# Patient Record
Sex: MALE | Race: WHITE | NOT HISPANIC OR LATINO | Employment: OTHER | URBAN - METROPOLITAN AREA
[De-identification: names, ages, dates, MRNs, and addresses within clinical notes are randomized per-mention and may not be internally consistent; named-entity substitution may affect disease eponyms.]

---

## 2017-04-02 ENCOUNTER — OFFICE VISIT - HEALTHEAST (OUTPATIENT)
Dept: FAMILY MEDICINE | Facility: CLINIC | Age: 82
End: 2017-04-02

## 2017-04-02 DIAGNOSIS — S99.929A INJURY OF NAIL BED OF TOE: ICD-10-CM

## 2017-04-20 ENCOUNTER — OFFICE VISIT - HEALTHEAST (OUTPATIENT)
Dept: INTERNAL MEDICINE | Facility: CLINIC | Age: 82
End: 2017-04-20

## 2017-04-20 DIAGNOSIS — F41.9 ANXIETY: ICD-10-CM

## 2017-04-20 DIAGNOSIS — R35.0 FREQUENCY OF URINATION: ICD-10-CM

## 2017-04-20 DIAGNOSIS — N34.2 URETHRITIS: ICD-10-CM

## 2017-04-20 ASSESSMENT — MIFFLIN-ST. JEOR: SCORE: 1918.81

## 2017-07-03 ENCOUNTER — OFFICE VISIT - HEALTHEAST (OUTPATIENT)
Dept: INTERNAL MEDICINE | Facility: CLINIC | Age: 82
End: 2017-07-03

## 2017-07-03 DIAGNOSIS — E78.5 HYPERLIPIDEMIA: ICD-10-CM

## 2017-07-03 LAB
CHOLEST SERPL-MCNC: 154 MG/DL
FASTING STATUS PATIENT QL REPORTED: YES
HDLC SERPL-MCNC: 46 MG/DL
LDLC SERPL CALC-MCNC: 86 MG/DL
TRIGL SERPL-MCNC: 110 MG/DL

## 2017-07-03 ASSESSMENT — MIFFLIN-ST. JEOR: SCORE: 2080.29

## 2017-07-21 ENCOUNTER — COMMUNICATION - HEALTHEAST (OUTPATIENT)
Dept: INTERNAL MEDICINE | Facility: CLINIC | Age: 82
End: 2017-07-21

## 2017-07-21 DIAGNOSIS — I10 UNSPECIFIED ESSENTIAL HYPERTENSION: ICD-10-CM

## 2017-07-21 DIAGNOSIS — R35.0 FREQUENCY OF URINATION: ICD-10-CM

## 2017-07-21 DIAGNOSIS — E78.5 HYPERLIPIDEMIA: ICD-10-CM

## 2017-10-05 ENCOUNTER — OFFICE VISIT - HEALTHEAST (OUTPATIENT)
Dept: FAMILY MEDICINE | Facility: CLINIC | Age: 82
End: 2017-10-05

## 2017-10-05 ENCOUNTER — RECORDS - HEALTHEAST (OUTPATIENT)
Dept: GENERAL RADIOLOGY | Age: 82
End: 2017-10-05

## 2017-10-05 DIAGNOSIS — J98.4 PNEUMONITIS: ICD-10-CM

## 2017-10-05 DIAGNOSIS — J44.1 CHRONIC OBSTRUCTIVE PULMONARY DISEASE WITH (ACUTE) EXACERBATION (H): ICD-10-CM

## 2017-10-05 DIAGNOSIS — J44.1 ACUTE EXACERBATION OF CHRONIC OBSTRUCTIVE PULMONARY DISEASE (COPD) (H): ICD-10-CM

## 2017-10-11 ENCOUNTER — OFFICE VISIT - HEALTHEAST (OUTPATIENT)
Dept: INTERNAL MEDICINE | Facility: CLINIC | Age: 82
End: 2017-10-11

## 2017-10-11 DIAGNOSIS — J40 BRONCHITIS: ICD-10-CM

## 2017-10-11 DIAGNOSIS — R09.82 PND (POST-NASAL DRIP): ICD-10-CM

## 2017-12-05 ENCOUNTER — OFFICE VISIT - HEALTHEAST (OUTPATIENT)
Dept: FAMILY MEDICINE | Facility: CLINIC | Age: 82
End: 2017-12-05

## 2017-12-05 DIAGNOSIS — M79.672 LEFT FOOT PAIN: ICD-10-CM

## 2017-12-15 ENCOUNTER — COMMUNICATION - HEALTHEAST (OUTPATIENT)
Dept: INTERNAL MEDICINE | Facility: CLINIC | Age: 82
End: 2017-12-15

## 2017-12-15 DIAGNOSIS — F41.9 ANXIETY: ICD-10-CM

## 2017-12-15 DIAGNOSIS — R07.9 CHEST PAIN: ICD-10-CM

## 2017-12-15 DIAGNOSIS — I10 ESSENTIAL HYPERTENSION: ICD-10-CM

## 2018-03-09 ENCOUNTER — COMMUNICATION - HEALTHEAST (OUTPATIENT)
Dept: INTERNAL MEDICINE | Facility: CLINIC | Age: 83
End: 2018-03-09

## 2018-03-09 DIAGNOSIS — F41.9 ANXIETY: ICD-10-CM

## 2018-04-19 ENCOUNTER — COMMUNICATION - HEALTHEAST (OUTPATIENT)
Dept: INTERNAL MEDICINE | Facility: CLINIC | Age: 83
End: 2018-04-19

## 2018-04-19 DIAGNOSIS — R35.0 FREQUENCY OF URINATION: ICD-10-CM

## 2018-04-19 DIAGNOSIS — E78.5 HYPERLIPIDEMIA: ICD-10-CM

## 2018-04-19 DIAGNOSIS — I10 ESSENTIAL HYPERTENSION: ICD-10-CM

## 2018-05-25 ENCOUNTER — COMMUNICATION - HEALTHEAST (OUTPATIENT)
Dept: INTERNAL MEDICINE | Facility: CLINIC | Age: 83
End: 2018-05-25

## 2018-05-25 DIAGNOSIS — F41.9 ANXIETY: ICD-10-CM

## 2018-05-25 DIAGNOSIS — R07.9 CHEST PAIN: ICD-10-CM

## 2018-07-23 ENCOUNTER — COMMUNICATION - HEALTHEAST (OUTPATIENT)
Dept: INTERNAL MEDICINE | Facility: CLINIC | Age: 83
End: 2018-07-23

## 2018-07-23 DIAGNOSIS — J44.9 COPD (CHRONIC OBSTRUCTIVE PULMONARY DISEASE) (H): ICD-10-CM

## 2018-08-19 ENCOUNTER — COMMUNICATION - HEALTHEAST (OUTPATIENT)
Dept: INTERNAL MEDICINE | Facility: CLINIC | Age: 83
End: 2018-08-19

## 2018-08-19 DIAGNOSIS — F41.9 ANXIETY: ICD-10-CM

## 2018-11-01 ENCOUNTER — COMMUNICATION - HEALTHEAST (OUTPATIENT)
Dept: INTERNAL MEDICINE | Facility: CLINIC | Age: 83
End: 2018-11-01

## 2018-11-01 DIAGNOSIS — F41.9 ANXIETY: ICD-10-CM

## 2018-11-05 ENCOUNTER — COMMUNICATION - HEALTHEAST (OUTPATIENT)
Dept: INTERNAL MEDICINE | Facility: CLINIC | Age: 83
End: 2018-11-05

## 2018-11-05 DIAGNOSIS — F41.9 ANXIETY: ICD-10-CM

## 2018-11-06 ENCOUNTER — COMMUNICATION - HEALTHEAST (OUTPATIENT)
Dept: INTERNAL MEDICINE | Facility: CLINIC | Age: 83
End: 2018-11-06

## 2018-11-06 DIAGNOSIS — E78.5 HYPERLIPIDEMIA: ICD-10-CM

## 2018-11-06 DIAGNOSIS — I10 ESSENTIAL HYPERTENSION: ICD-10-CM

## 2018-11-06 DIAGNOSIS — R35.0 FREQUENCY OF URINATION: ICD-10-CM

## 2018-12-03 ENCOUNTER — OFFICE VISIT - HEALTHEAST (OUTPATIENT)
Dept: INTERNAL MEDICINE | Facility: CLINIC | Age: 83
End: 2018-12-03

## 2018-12-03 ENCOUNTER — RECORDS - HEALTHEAST (OUTPATIENT)
Dept: GENERAL RADIOLOGY | Facility: CLINIC | Age: 83
End: 2018-12-03

## 2018-12-03 DIAGNOSIS — M17.12 PRIMARY OSTEOARTHRITIS OF LEFT KNEE: ICD-10-CM

## 2018-12-03 DIAGNOSIS — I10 ESSENTIAL HYPERTENSION: ICD-10-CM

## 2018-12-03 DIAGNOSIS — Z79.899 MEDICATION MANAGEMENT: ICD-10-CM

## 2018-12-03 DIAGNOSIS — E78.00 HYPERCHOLESTEROLEMIA: ICD-10-CM

## 2018-12-03 DIAGNOSIS — Z00.00 HEALTHCARE MAINTENANCE: ICD-10-CM

## 2018-12-03 DIAGNOSIS — E66.01 MORBID OBESITY WITH BMI OF 40.0-44.9, ADULT (H): ICD-10-CM

## 2018-12-03 DIAGNOSIS — F41.9 ANXIETY: ICD-10-CM

## 2018-12-03 DIAGNOSIS — M17.12 UNILATERAL PRIMARY OSTEOARTHRITIS, LEFT KNEE: ICD-10-CM

## 2018-12-03 LAB
ALBUMIN SERPL-MCNC: 3.8 G/DL (ref 3.5–5)
ALP SERPL-CCNC: 141 U/L (ref 45–120)
ALT SERPL W P-5'-P-CCNC: 30 U/L (ref 0–45)
AMPHETAMINES UR QL SCN: NORMAL
ANION GAP SERPL CALCULATED.3IONS-SCNC: 11 MMOL/L (ref 5–18)
AST SERPL W P-5'-P-CCNC: 24 U/L (ref 0–40)
BARBITURATES UR QL: NORMAL
BENZODIAZ UR QL: NORMAL
BILIRUB SERPL-MCNC: 0.8 MG/DL (ref 0–1)
BUN SERPL-MCNC: 23 MG/DL (ref 8–28)
CALCIUM SERPL-MCNC: 9.5 MG/DL (ref 8.5–10.5)
CANNABINOIDS UR QL SCN: NORMAL
CHLORIDE BLD-SCNC: 102 MMOL/L (ref 98–107)
CHOLEST SERPL-MCNC: 140 MG/DL
CO2 SERPL-SCNC: 28 MMOL/L (ref 22–31)
COCAINE UR QL: NORMAL
CREAT SERPL-MCNC: 0.97 MG/DL (ref 0.7–1.3)
CREAT UR-MCNC: 194.8 MG/DL
ERYTHROCYTE [DISTWIDTH] IN BLOOD BY AUTOMATED COUNT: 11.5 % (ref 11–14.5)
FASTING STATUS PATIENT QL REPORTED: NO
GFR SERPL CREATININE-BSD FRML MDRD: >60 ML/MIN/1.73M2
GLUCOSE BLD-MCNC: 100 MG/DL (ref 70–125)
HCT VFR BLD AUTO: 51 % (ref 40–54)
HDLC SERPL-MCNC: 48 MG/DL
HGB BLD-MCNC: 16.8 G/DL (ref 14–18)
LDLC SERPL CALC-MCNC: 70 MG/DL
MCH RBC QN AUTO: 30 PG (ref 27–34)
MCHC RBC AUTO-ENTMCNC: 32.9 G/DL (ref 32–36)
MCV RBC AUTO: 91 FL (ref 80–100)
OPIATES UR QL SCN: NORMAL
OXYCODONE UR QL: NORMAL
PCP UR QL SCN: NORMAL
PLATELET # BLD AUTO: 156 THOU/UL (ref 140–440)
PMV BLD AUTO: 7.9 FL (ref 7–10)
POTASSIUM BLD-SCNC: 4.4 MMOL/L (ref 3.5–5)
PROT SERPL-MCNC: 6.8 G/DL (ref 6–8)
RBC # BLD AUTO: 5.58 MILL/UL (ref 4.4–6.2)
SODIUM SERPL-SCNC: 141 MMOL/L (ref 136–145)
TRIGL SERPL-MCNC: 110 MG/DL
WBC: 8.9 THOU/UL (ref 4–11)

## 2019-02-22 ENCOUNTER — COMMUNICATION - HEALTHEAST (OUTPATIENT)
Dept: INTERNAL MEDICINE | Facility: CLINIC | Age: 84
End: 2019-02-22

## 2019-02-22 DIAGNOSIS — F41.9 ANXIETY: ICD-10-CM

## 2019-04-30 ENCOUNTER — COMMUNICATION - HEALTHEAST (OUTPATIENT)
Dept: INTERNAL MEDICINE | Facility: CLINIC | Age: 84
End: 2019-04-30

## 2019-05-09 ENCOUNTER — COMMUNICATION - HEALTHEAST (OUTPATIENT)
Dept: INTERNAL MEDICINE | Facility: CLINIC | Age: 84
End: 2019-05-09

## 2019-05-18 ENCOUNTER — COMMUNICATION - HEALTHEAST (OUTPATIENT)
Dept: INTERNAL MEDICINE | Facility: CLINIC | Age: 84
End: 2019-05-18

## 2019-05-18 DIAGNOSIS — F41.9 ANXIETY: ICD-10-CM

## 2019-08-08 ENCOUNTER — COMMUNICATION - HEALTHEAST (OUTPATIENT)
Dept: INTERNAL MEDICINE | Facility: CLINIC | Age: 84
End: 2019-08-08

## 2019-08-08 DIAGNOSIS — J44.9 COPD (CHRONIC OBSTRUCTIVE PULMONARY DISEASE) (H): ICD-10-CM

## 2019-08-09 ENCOUNTER — COMMUNICATION - HEALTHEAST (OUTPATIENT)
Dept: INTERNAL MEDICINE | Facility: CLINIC | Age: 84
End: 2019-08-09

## 2019-09-28 ENCOUNTER — COMMUNICATION - HEALTHEAST (OUTPATIENT)
Dept: INTERNAL MEDICINE | Facility: CLINIC | Age: 84
End: 2019-09-28

## 2019-11-03 ENCOUNTER — COMMUNICATION - HEALTHEAST (OUTPATIENT)
Dept: INTERNAL MEDICINE | Facility: CLINIC | Age: 84
End: 2019-11-03

## 2019-11-03 DIAGNOSIS — I10 ESSENTIAL HYPERTENSION: ICD-10-CM

## 2019-11-03 DIAGNOSIS — R35.0 FREQUENCY OF URINATION: ICD-10-CM

## 2019-11-03 DIAGNOSIS — E78.5 HYPERLIPIDEMIA: ICD-10-CM

## 2019-12-03 ENCOUNTER — COMMUNICATION - HEALTHEAST (OUTPATIENT)
Dept: INTERNAL MEDICINE | Facility: CLINIC | Age: 84
End: 2019-12-03

## 2019-12-03 DIAGNOSIS — J44.9 COPD (CHRONIC OBSTRUCTIVE PULMONARY DISEASE) (H): ICD-10-CM

## 2019-12-03 DIAGNOSIS — R07.9 CHEST PAIN: ICD-10-CM

## 2020-02-20 ENCOUNTER — RECORDS - HEALTHEAST (OUTPATIENT)
Dept: ADMINISTRATIVE | Facility: OTHER | Age: 85
End: 2020-02-20

## 2020-04-10 ENCOUNTER — OFFICE VISIT - HEALTHEAST (OUTPATIENT)
Dept: INTERNAL MEDICINE | Facility: CLINIC | Age: 85
End: 2020-04-10

## 2020-04-10 DIAGNOSIS — M17.12 PRIMARY OSTEOARTHRITIS OF LEFT KNEE: ICD-10-CM

## 2020-04-10 DIAGNOSIS — I45.10 RIGHT BUNDLE BRANCH BLOCK: ICD-10-CM

## 2020-04-10 DIAGNOSIS — T50.2X5A DIURETIC-INDUCED HYPOKALEMIA: ICD-10-CM

## 2020-04-10 DIAGNOSIS — E87.6 DIURETIC-INDUCED HYPOKALEMIA: ICD-10-CM

## 2020-04-10 DIAGNOSIS — R07.9 CHEST PAIN: ICD-10-CM

## 2020-04-10 DIAGNOSIS — E78.00 PURE HYPERCHOLESTEROLEMIA: ICD-10-CM

## 2020-04-10 DIAGNOSIS — F41.9 ANXIETY: ICD-10-CM

## 2020-04-10 DIAGNOSIS — E66.01 MORBID OBESITY WITH BMI OF 40.0-44.9, ADULT (H): ICD-10-CM

## 2020-04-10 DIAGNOSIS — I87.2 VENOUS (PERIPHERAL) INSUFFICIENCY: ICD-10-CM

## 2020-04-10 DIAGNOSIS — I10 ESSENTIAL HYPERTENSION: ICD-10-CM

## 2020-04-10 DIAGNOSIS — I25.10 ATHEROSCLEROSIS OF NATIVE CORONARY ARTERY OF NATIVE HEART WITHOUT ANGINA PECTORIS: ICD-10-CM

## 2020-04-10 ASSESSMENT — PATIENT HEALTH QUESTIONNAIRE - PHQ9: SUM OF ALL RESPONSES TO PHQ QUESTIONS 1-9: 1

## 2020-07-30 ENCOUNTER — COMMUNICATION - HEALTHEAST (OUTPATIENT)
Dept: INTERNAL MEDICINE | Facility: CLINIC | Age: 85
End: 2020-07-30

## 2020-07-31 ENCOUNTER — COMMUNICATION - HEALTHEAST (OUTPATIENT)
Dept: INTERNAL MEDICINE | Facility: CLINIC | Age: 85
End: 2020-07-31

## 2020-08-03 ENCOUNTER — COMMUNICATION - HEALTHEAST (OUTPATIENT)
Dept: INTERNAL MEDICINE | Facility: CLINIC | Age: 85
End: 2020-08-03

## 2020-08-31 ENCOUNTER — COMMUNICATION - HEALTHEAST (OUTPATIENT)
Dept: INTERNAL MEDICINE | Facility: CLINIC | Age: 85
End: 2020-08-31

## 2020-09-26 ENCOUNTER — COMMUNICATION - HEALTHEAST (OUTPATIENT)
Dept: INTERNAL MEDICINE | Facility: CLINIC | Age: 85
End: 2020-09-26

## 2020-09-26 DIAGNOSIS — R07.9 CHEST PAIN: ICD-10-CM

## 2020-09-28 RX ORDER — NITROGLYCERIN 0.4 MG/1
TABLET SUBLINGUAL
Qty: 25 TABLET | Refills: 3 | Status: SHIPPED | OUTPATIENT
Start: 2020-09-28 | End: 2023-02-20

## 2020-11-29 ENCOUNTER — COMMUNICATION - HEALTHEAST (OUTPATIENT)
Dept: INTERNAL MEDICINE | Facility: CLINIC | Age: 85
End: 2020-11-29

## 2020-11-29 DIAGNOSIS — E78.5 HYPERLIPIDEMIA: ICD-10-CM

## 2020-12-05 ENCOUNTER — COMMUNICATION - HEALTHEAST (OUTPATIENT)
Dept: INTERNAL MEDICINE | Facility: CLINIC | Age: 85
End: 2020-12-05

## 2020-12-08 ENCOUNTER — COMMUNICATION - HEALTHEAST (OUTPATIENT)
Dept: INTERNAL MEDICINE | Facility: CLINIC | Age: 85
End: 2020-12-08

## 2020-12-08 DIAGNOSIS — R35.0 FREQUENCY OF URINATION: ICD-10-CM

## 2020-12-09 RX ORDER — FINASTERIDE 5 MG/1
TABLET, FILM COATED ORAL
Qty: 90 TABLET | Refills: 3 | Status: SHIPPED | OUTPATIENT
Start: 2020-12-09 | End: 2022-04-11

## 2020-12-18 ENCOUNTER — OFFICE VISIT - HEALTHEAST (OUTPATIENT)
Dept: INTERNAL MEDICINE | Facility: CLINIC | Age: 85
End: 2020-12-18

## 2020-12-18 DIAGNOSIS — F51.01 PRIMARY INSOMNIA: ICD-10-CM

## 2020-12-18 DIAGNOSIS — E66.01 MORBID OBESITY WITH BMI OF 40.0-44.9, ADULT (H): ICD-10-CM

## 2020-12-18 DIAGNOSIS — L73.9 FOLLICULITIS: ICD-10-CM

## 2020-12-18 DIAGNOSIS — I25.10 ATHEROSCLEROSIS OF NATIVE CORONARY ARTERY OF NATIVE HEART WITHOUT ANGINA PECTORIS: ICD-10-CM

## 2020-12-18 DIAGNOSIS — J43.9 PULMONARY EMPHYSEMA, UNSPECIFIED EMPHYSEMA TYPE (H): ICD-10-CM

## 2020-12-18 DIAGNOSIS — F41.9 ANXIETY: ICD-10-CM

## 2020-12-27 ENCOUNTER — COMMUNICATION - HEALTHEAST (OUTPATIENT)
Dept: INTERNAL MEDICINE | Facility: CLINIC | Age: 85
End: 2020-12-27

## 2020-12-27 DIAGNOSIS — I10 ESSENTIAL HYPERTENSION: ICD-10-CM

## 2020-12-28 RX ORDER — DOXAZOSIN 8 MG/1
TABLET ORAL
Qty: 90 TABLET | Refills: 3 | Status: SHIPPED | OUTPATIENT
Start: 2020-12-28 | End: 2021-08-16

## 2021-01-24 ENCOUNTER — COMMUNICATION - HEALTHEAST (OUTPATIENT)
Dept: INTERNAL MEDICINE | Facility: CLINIC | Age: 86
End: 2021-01-24

## 2021-01-28 ENCOUNTER — COMMUNICATION - HEALTHEAST (OUTPATIENT)
Dept: INTERNAL MEDICINE | Facility: CLINIC | Age: 86
End: 2021-01-28

## 2021-02-05 ENCOUNTER — COMMUNICATION - HEALTHEAST (OUTPATIENT)
Dept: INTERNAL MEDICINE | Facility: CLINIC | Age: 86
End: 2021-02-05

## 2021-02-09 ENCOUNTER — OFFICE VISIT - HEALTHEAST (OUTPATIENT)
Dept: INTERNAL MEDICINE | Facility: CLINIC | Age: 86
End: 2021-02-09

## 2021-02-09 DIAGNOSIS — E66.01 MORBID OBESITY WITH BMI OF 40.0-44.9, ADULT (H): ICD-10-CM

## 2021-02-09 DIAGNOSIS — J44.9 CHRONIC OBSTRUCTIVE PULMONARY DISEASE, UNSPECIFIED COPD TYPE (H): ICD-10-CM

## 2021-02-09 DIAGNOSIS — I87.2 VENOUS (PERIPHERAL) INSUFFICIENCY: ICD-10-CM

## 2021-02-10 ENCOUNTER — COMMUNICATION - HEALTHEAST (OUTPATIENT)
Dept: OTHER | Facility: CLINIC | Age: 86
End: 2021-02-10

## 2021-03-09 ENCOUNTER — COMMUNICATION - HEALTHEAST (OUTPATIENT)
Dept: INTERNAL MEDICINE | Facility: CLINIC | Age: 86
End: 2021-03-09

## 2021-03-09 ENCOUNTER — OFFICE VISIT - HEALTHEAST (OUTPATIENT)
Dept: INTERNAL MEDICINE | Facility: CLINIC | Age: 86
End: 2021-03-09

## 2021-03-09 DIAGNOSIS — E78.00 PURE HYPERCHOLESTEROLEMIA: ICD-10-CM

## 2021-03-09 DIAGNOSIS — E78.2 MIXED HYPERLIPIDEMIA: ICD-10-CM

## 2021-03-09 DIAGNOSIS — T50.2X5A DIURETIC-INDUCED HYPOKALEMIA: ICD-10-CM

## 2021-03-09 DIAGNOSIS — R60.0 PERIPHERAL EDEMA: ICD-10-CM

## 2021-03-09 DIAGNOSIS — R53.82 CHRONIC FATIGUE: ICD-10-CM

## 2021-03-09 DIAGNOSIS — J44.9 CHRONIC OBSTRUCTIVE PULMONARY DISEASE, UNSPECIFIED COPD TYPE (H): ICD-10-CM

## 2021-03-09 DIAGNOSIS — E66.01 MORBID OBESITY WITH BMI OF 40.0-44.9, ADULT (H): ICD-10-CM

## 2021-03-09 DIAGNOSIS — I10 ESSENTIAL HYPERTENSION, BENIGN: ICD-10-CM

## 2021-03-09 DIAGNOSIS — R06.02 SHORTNESS OF BREATH: ICD-10-CM

## 2021-03-09 DIAGNOSIS — L30.9 DERMATITIS: ICD-10-CM

## 2021-03-09 DIAGNOSIS — I35.0 AORTIC VALVE STENOSIS, ETIOLOGY OF CARDIAC VALVE DISEASE UNSPECIFIED: ICD-10-CM

## 2021-03-09 DIAGNOSIS — E87.6 DIURETIC-INDUCED HYPOKALEMIA: ICD-10-CM

## 2021-03-09 LAB
ALBUMIN SERPL-MCNC: 3.8 G/DL (ref 3.5–5)
ALBUMIN UR-MCNC: NEGATIVE G/DL
ALP SERPL-CCNC: 120 U/L (ref 45–120)
ALT SERPL W P-5'-P-CCNC: 20 U/L (ref 0–45)
ANION GAP SERPL CALCULATED.3IONS-SCNC: 8 MMOL/L (ref 5–18)
APPEARANCE UR: CLEAR
AST SERPL W P-5'-P-CCNC: 20 U/L (ref 0–40)
BILIRUB SERPL-MCNC: 0.7 MG/DL (ref 0–1)
BILIRUB UR QL STRIP: NEGATIVE
BNP SERPL-MCNC: 30 PG/ML (ref 0–93)
BUN SERPL-MCNC: 26 MG/DL (ref 8–28)
CALCIUM SERPL-MCNC: 9 MG/DL (ref 8.5–10.5)
CHLORIDE BLD-SCNC: 102 MMOL/L (ref 98–107)
CHOLEST SERPL-MCNC: 136 MG/DL
CO2 SERPL-SCNC: 29 MMOL/L (ref 22–31)
COLOR UR AUTO: YELLOW
CREAT SERPL-MCNC: 0.88 MG/DL (ref 0.7–1.3)
ERYTHROCYTE [DISTWIDTH] IN BLOOD BY AUTOMATED COUNT: 13 % (ref 11–14.5)
FASTING STATUS PATIENT QL REPORTED: NO
GFR SERPL CREATININE-BSD FRML MDRD: >60 ML/MIN/1.73M2
GLUCOSE BLD-MCNC: 101 MG/DL (ref 70–125)
GLUCOSE UR STRIP-MCNC: NEGATIVE MG/DL
HCT VFR BLD AUTO: 50.2 % (ref 40–54)
HDLC SERPL-MCNC: 53 MG/DL
HGB BLD-MCNC: 16 G/DL (ref 14–18)
HGB UR QL STRIP: NEGATIVE
KETONES UR STRIP-MCNC: NEGATIVE MG/DL
LDLC SERPL CALC-MCNC: 65 MG/DL
LEUKOCYTE ESTERASE UR QL STRIP: NEGATIVE
MCH RBC QN AUTO: 29.9 PG (ref 27–34)
MCHC RBC AUTO-ENTMCNC: 31.9 G/DL (ref 32–36)
MCV RBC AUTO: 94 FL (ref 80–100)
NITRATE UR QL: NEGATIVE
PH UR STRIP: 5.5 [PH] (ref 5–8)
PLATELET # BLD AUTO: 178 THOU/UL (ref 140–440)
PMV BLD AUTO: 10 FL (ref 7–10)
POTASSIUM BLD-SCNC: 4.6 MMOL/L (ref 3.5–5)
PROT SERPL-MCNC: 6.7 G/DL (ref 6–8)
RBC # BLD AUTO: 5.35 MILL/UL (ref 4.4–6.2)
SODIUM SERPL-SCNC: 139 MMOL/L (ref 136–145)
SP GR UR STRIP: 1.02 (ref 1–1.03)
TRIGL SERPL-MCNC: 91 MG/DL
TSH SERPL DL<=0.005 MIU/L-ACNC: 1.71 UIU/ML (ref 0.3–5)
UROBILINOGEN UR STRIP-ACNC: NORMAL
WBC: 9.9 THOU/UL (ref 4–11)

## 2021-03-10 ENCOUNTER — COMMUNICATION - HEALTHEAST (OUTPATIENT)
Dept: INTERNAL MEDICINE | Facility: CLINIC | Age: 86
End: 2021-03-10

## 2021-03-10 DIAGNOSIS — L30.9 DERMATITIS: ICD-10-CM

## 2021-03-18 ENCOUNTER — COMMUNICATION - HEALTHEAST (OUTPATIENT)
Dept: INTERNAL MEDICINE | Facility: CLINIC | Age: 86
End: 2021-03-18

## 2021-03-29 ENCOUNTER — OFFICE VISIT - HEALTHEAST (OUTPATIENT)
Dept: INTERNAL MEDICINE | Facility: CLINIC | Age: 86
End: 2021-03-29

## 2021-03-29 DIAGNOSIS — I10 ESSENTIAL HYPERTENSION, BENIGN: ICD-10-CM

## 2021-03-29 DIAGNOSIS — L30.9 DERMATITIS: ICD-10-CM

## 2021-03-29 DIAGNOSIS — E66.01 MORBID OBESITY WITH BMI OF 40.0-44.9, ADULT (H): ICD-10-CM

## 2021-03-29 DIAGNOSIS — I35.0 AORTIC VALVE STENOSIS, ETIOLOGY OF CARDIAC VALVE DISEASE UNSPECIFIED: ICD-10-CM

## 2021-03-29 DIAGNOSIS — R60.0 PERIPHERAL EDEMA: ICD-10-CM

## 2021-03-29 LAB
ANION GAP SERPL CALCULATED.3IONS-SCNC: 11 MMOL/L (ref 5–18)
BUN SERPL-MCNC: 27 MG/DL (ref 8–28)
CALCIUM SERPL-MCNC: 9.2 MG/DL (ref 8.5–10.5)
CHLORIDE BLD-SCNC: 100 MMOL/L (ref 98–107)
CO2 SERPL-SCNC: 30 MMOL/L (ref 22–31)
CREAT SERPL-MCNC: 1.05 MG/DL (ref 0.7–1.3)
GFR SERPL CREATININE-BSD FRML MDRD: >60 ML/MIN/1.73M2
GLUCOSE BLD-MCNC: 109 MG/DL (ref 70–125)
POTASSIUM BLD-SCNC: 4.7 MMOL/L (ref 3.5–5)
SODIUM SERPL-SCNC: 141 MMOL/L (ref 136–145)

## 2021-03-29 ASSESSMENT — MIFFLIN-ST. JEOR: SCORE: 2083.12

## 2021-03-30 ENCOUNTER — COMMUNICATION - HEALTHEAST (OUTPATIENT)
Dept: INTERNAL MEDICINE | Facility: CLINIC | Age: 86
End: 2021-03-30

## 2021-03-31 ENCOUNTER — COMMUNICATION - HEALTHEAST (OUTPATIENT)
Dept: INTERNAL MEDICINE | Facility: CLINIC | Age: 86
End: 2021-03-31

## 2021-03-31 DIAGNOSIS — I10 ESSENTIAL HYPERTENSION, BENIGN: ICD-10-CM

## 2021-03-31 RX ORDER — LOSARTAN POTASSIUM 100 MG/1
100 TABLET ORAL DAILY
Qty: 30 TABLET | Refills: 11 | Status: SHIPPED | OUTPATIENT
Start: 2021-03-31 | End: 2021-07-12

## 2021-04-13 ENCOUNTER — COMMUNICATION - HEALTHEAST (OUTPATIENT)
Dept: INTERNAL MEDICINE | Facility: CLINIC | Age: 86
End: 2021-04-13

## 2021-04-15 ENCOUNTER — RECORDS - HEALTHEAST (OUTPATIENT)
Dept: ADMINISTRATIVE | Facility: OTHER | Age: 86
End: 2021-04-15

## 2021-04-16 ENCOUNTER — COMMUNICATION - HEALTHEAST (OUTPATIENT)
Dept: INTERNAL MEDICINE | Facility: CLINIC | Age: 86
End: 2021-04-16

## 2021-04-16 DIAGNOSIS — J44.9 COPD (CHRONIC OBSTRUCTIVE PULMONARY DISEASE) (H): ICD-10-CM

## 2021-04-19 RX ORDER — BUDESONIDE AND FORMOTEROL FUMARATE DIHYDRATE 160; 4.5 UG/1; UG/1
AEROSOL RESPIRATORY (INHALATION)
Qty: 1 INHALER | Refills: 3 | Status: SHIPPED | OUTPATIENT
Start: 2021-04-19 | End: 2021-12-14

## 2021-05-04 ENCOUNTER — OFFICE VISIT - HEALTHEAST (OUTPATIENT)
Dept: INTERNAL MEDICINE | Facility: CLINIC | Age: 86
End: 2021-05-04

## 2021-05-04 DIAGNOSIS — E66.01 MORBID OBESITY WITH BMI OF 40.0-44.9, ADULT (H): ICD-10-CM

## 2021-05-04 DIAGNOSIS — I87.2 VENOUS (PERIPHERAL) INSUFFICIENCY: ICD-10-CM

## 2021-05-04 DIAGNOSIS — I10 ESSENTIAL HYPERTENSION: ICD-10-CM

## 2021-05-04 DIAGNOSIS — L12.0 BULLOUS PEMPHIGOID (H): ICD-10-CM

## 2021-05-04 LAB
ANION GAP SERPL CALCULATED.3IONS-SCNC: 9 MMOL/L (ref 5–18)
BASOPHILS # BLD AUTO: 0 THOU/UL (ref 0–0.2)
BASOPHILS NFR BLD AUTO: 0 % (ref 0–2)
BUN SERPL-MCNC: 32 MG/DL (ref 8–28)
C3 SERPL-MCNC: 145 MG/DL (ref 83–177)
C4 SERPL-MCNC: 30 MG/DL (ref 19–59)
CALCIUM SERPL-MCNC: 9 MG/DL (ref 8.5–10.5)
CHLORIDE BLD-SCNC: 102 MMOL/L (ref 98–107)
CK SERPL-CCNC: 180 U/L (ref 30–190)
CO2 SERPL-SCNC: 28 MMOL/L (ref 22–31)
CREAT SERPL-MCNC: 1.03 MG/DL (ref 0.7–1.3)
EOSINOPHIL COUNT (ABSOLUTE): 3.5 THOU/UL (ref 0–0.4)
EOSINOPHIL NFR BLD AUTO: 29 % (ref 0–6)
ERYTHROCYTE [DISTWIDTH] IN BLOOD BY AUTOMATED COUNT: 13.8 % (ref 11–14.5)
ERYTHROCYTE [SEDIMENTATION RATE] IN BLOOD BY WESTERGREN METHOD: 3 MM/HR (ref 0–15)
GFR SERPL CREATININE-BSD FRML MDRD: >60 ML/MIN/1.73M2
GLUCOSE BLD-MCNC: 109 MG/DL (ref 70–125)
HCT VFR BLD AUTO: 48 % (ref 40–54)
HGB BLD-MCNC: 15.6 G/DL (ref 14–18)
IMMATURE GRANULOCYTE % - MAN (DIFF): 0 %
IMMATURE GRANULOCYTE ABSOLUTE - MAN (DIFF): 0 THOU/UL
LYMPHOCYTES # BLD AUTO: 2.6 THOU/UL (ref 0.8–4.4)
LYMPHOCYTES NFR BLD AUTO: 22 % (ref 20–40)
MCH RBC QN AUTO: 30.1 PG (ref 27–34)
MCHC RBC AUTO-ENTMCNC: 32.5 G/DL (ref 32–36)
MCV RBC AUTO: 93 FL (ref 80–100)
MONOCYTES # BLD AUTO: 1.4 THOU/UL (ref 0–0.9)
MONOCYTES NFR BLD AUTO: 12 % (ref 2–10)
PLAT MORPH BLD: NORMAL
PLATELET # BLD AUTO: 205 THOU/UL (ref 140–440)
PMV BLD AUTO: 10.6 FL (ref 8.5–12.5)
POTASSIUM BLD-SCNC: 4.1 MMOL/L (ref 3.5–5)
RBC # BLD AUTO: 5.19 MILL/UL (ref 4.4–6.2)
SODIUM SERPL-SCNC: 139 MMOL/L (ref 136–145)
TOTAL NEUTROPHILS-ABS(DIFF): 4.4 THOU/UL (ref 2–7.7)
TOTAL NEUTROPHILS-REL(DIFF): 37 % (ref 50–70)
WBC: 12 THOU/UL (ref 4–11)

## 2021-05-05 ENCOUNTER — RECORDS - HEALTHEAST (OUTPATIENT)
Dept: ADMINISTRATIVE | Facility: OTHER | Age: 86
End: 2021-05-05

## 2021-05-05 LAB
ALBUMIN PERCENT: 58.9 % (ref 51–67)
ALBUMIN SERPL ELPH-MCNC: 3.8 G/DL (ref 3.2–4.7)
ALPHA 1 PERCENT: 3.3 % (ref 2–4)
ALPHA 2 PERCENT: 12.6 % (ref 5–13)
ALPHA1 GLOB SERPL ELPH-MCNC: 0.2 G/DL (ref 0.1–0.3)
ALPHA2 GLOB SERPL ELPH-MCNC: 0.8 G/DL (ref 0.4–0.9)
B-GLOBULIN SERPL ELPH-MCNC: 0.8 G/DL (ref 0.7–1.2)
BETA PERCENT: 12.1 % (ref 10–17)
GAMMA GLOB SERPL ELPH-MCNC: 0.9 G/DL (ref 0.6–1.4)
GAMMA GLOBULIN PERCENT: 13.1 % (ref 9–20)
PATH ICD:: NORMAL
PROT PATTERN SERPL ELPH-IMP: NORMAL
PROT SERPL-MCNC: 6.5 G/DL (ref 6–8)
REVIEWING PATHOLOGIST: NORMAL

## 2021-05-06 LAB — CH50 SERPL-ACNC: 72.9 U/ML (ref 38.7–89.9)

## 2021-05-07 LAB — ANA SER QL: 0.4 U

## 2021-05-13 ENCOUNTER — RECORDS - HEALTHEAST (OUTPATIENT)
Dept: ADMINISTRATIVE | Facility: OTHER | Age: 86
End: 2021-05-13

## 2021-05-25 ENCOUNTER — RECORDS - HEALTHEAST (OUTPATIENT)
Dept: ADMINISTRATIVE | Facility: CLINIC | Age: 86
End: 2021-05-25

## 2021-05-26 ENCOUNTER — RECORDS - HEALTHEAST (OUTPATIENT)
Dept: ADMINISTRATIVE | Facility: CLINIC | Age: 86
End: 2021-05-26

## 2021-05-27 ENCOUNTER — RECORDS - HEALTHEAST (OUTPATIENT)
Dept: ADMINISTRATIVE | Facility: CLINIC | Age: 86
End: 2021-05-27

## 2021-05-27 VITALS
DIASTOLIC BLOOD PRESSURE: 64 MMHG | OXYGEN SATURATION: 92 % | SYSTOLIC BLOOD PRESSURE: 130 MMHG | HEART RATE: 74 BPM | TEMPERATURE: 97.3 F

## 2021-05-27 ASSESSMENT — PATIENT HEALTH QUESTIONNAIRE - PHQ9: SUM OF ALL RESPONSES TO PHQ QUESTIONS 1-9: 1

## 2021-05-28 ENCOUNTER — RECORDS - HEALTHEAST (OUTPATIENT)
Dept: ADMINISTRATIVE | Facility: CLINIC | Age: 86
End: 2021-05-28

## 2021-05-28 ENCOUNTER — COMMUNICATION - HEALTHEAST (OUTPATIENT)
Dept: INTERNAL MEDICINE | Facility: CLINIC | Age: 86
End: 2021-05-28

## 2021-05-28 DIAGNOSIS — E78.5 HYPERLIPIDEMIA: ICD-10-CM

## 2021-05-28 NOTE — TELEPHONE ENCOUNTER
Who is calling:  wife  Reason for Call:  Checking for Shingrix serum and please make appt.  Date of last appointment with primary care: unknown  Okay to leave a detailed message: No

## 2021-05-28 NOTE — TELEPHONE ENCOUNTER
Controlled Substance Refill Request  Medication:   Requested Prescriptions     Pending Prescriptions Disp Refills     LORazepam (ATIVAN) 1 MG tablet [Pharmacy Med Name: LORAZEPAM 1MG TABLETS] 30 tablet 0     Sig: TAKE 1/2 TO 1 TABLET BY MOUTH THREE TIMES DAILY AS NEEDED FOR ANXIOUSNESS     Date Last Fill: 2/25/19  Pharmacy: [email or web address] [id number]   Submit electronically to pharmacy  Controlled Substance Agreement on File:   Encounter-Level CSA Scan Date - 04/20/2017:    Scan on 4/25/2017  1:46 PM (below)         Last office visit: Last office visit pertaining to requested medication was 12/3/18.

## 2021-05-29 RX ORDER — PRAVASTATIN SODIUM 20 MG
20 TABLET ORAL EVERY EVENING
Qty: 90 TABLET | Refills: 3 | Status: SHIPPED | OUTPATIENT
Start: 2021-05-29 | End: 2022-04-11

## 2021-05-30 VITALS — WEIGHT: 259.9 LBS | HEIGHT: 73 IN | BODY MASS INDEX: 34.45 KG/M2

## 2021-05-30 VITALS — WEIGHT: 300 LBS | BODY MASS INDEX: 39.85 KG/M2

## 2021-05-31 VITALS — BODY MASS INDEX: 39.53 KG/M2 | WEIGHT: 295.5 LBS

## 2021-05-31 VITALS — WEIGHT: 296.38 LBS | BODY MASS INDEX: 39.28 KG/M2 | HEIGHT: 73 IN

## 2021-05-31 NOTE — TELEPHONE ENCOUNTER
RN cannot approve Refill Request    RN can NOT refill this medication med is not covered by policy/route to provider. Last office visit: 12/3/2018 Aditya Mehta MD Last Physical: Visit date not found Last MTM visit: Visit date not found Last visit same specialty: 12/3/2018 Aditya Mehta MD.  Next visit within 3 mo: Visit date not found  Next physical within 3 mo: Visit date not found      Eda Byrnes, [facility] Triage/Med Refill 8/8/2019    Requested Prescriptions   Pending Prescriptions Disp Refills     budesonide-formoterol (SYMBICORT) 160-4.5 mcg/actuation inhaler [Pharmacy Med Name: SYMBICORT INH AEROSOL 160/4.5]  3     Sig: USE 2 INHALATIONS TWICE A DAY       There is no refill protocol information for this order

## 2021-06-01 ENCOUNTER — OFFICE VISIT - HEALTHEAST (OUTPATIENT)
Dept: INTERNAL MEDICINE | Facility: CLINIC | Age: 86
End: 2021-06-01

## 2021-06-01 DIAGNOSIS — Z51.81 ENCOUNTER FOR THERAPEUTIC DRUG MONITORING: ICD-10-CM

## 2021-06-01 DIAGNOSIS — F51.01 PRIMARY INSOMNIA: ICD-10-CM

## 2021-06-01 DIAGNOSIS — I87.2 VENOUS (PERIPHERAL) INSUFFICIENCY: ICD-10-CM

## 2021-06-01 DIAGNOSIS — F41.9 ANXIETY: ICD-10-CM

## 2021-06-01 DIAGNOSIS — L12.0 BULLOUS PEMPHIGOID (H): ICD-10-CM

## 2021-06-01 DIAGNOSIS — E66.01 MORBID OBESITY WITH BMI OF 40.0-44.9, ADULT (H): ICD-10-CM

## 2021-06-01 LAB
ANION GAP SERPL CALCULATED.3IONS-SCNC: 8 MMOL/L (ref 5–18)
BUN SERPL-MCNC: 19 MG/DL (ref 8–28)
CALCIUM SERPL-MCNC: 8.7 MG/DL (ref 8.5–10.5)
CHLORIDE BLD-SCNC: 102 MMOL/L (ref 98–107)
CO2 SERPL-SCNC: 28 MMOL/L (ref 22–31)
CREAT SERPL-MCNC: 0.96 MG/DL (ref 0.7–1.3)
GFR SERPL CREATININE-BSD FRML MDRD: >60 ML/MIN/1.73M2
GLUCOSE BLD-MCNC: 97 MG/DL (ref 70–125)
POTASSIUM BLD-SCNC: 4.7 MMOL/L (ref 3.5–5)
SODIUM SERPL-SCNC: 138 MMOL/L (ref 136–145)

## 2021-06-01 RX ORDER — PREDNISONE 10 MG/1
TABLET ORAL
Qty: 60 TABLET | Refills: 3 | Status: ON HOLD
Start: 2021-06-01 | End: 2021-07-12

## 2021-06-01 RX ORDER — LORAZEPAM 0.5 MG/1
TABLET ORAL
Qty: 30 TABLET | Refills: 5 | Status: SHIPPED | OUTPATIENT
Start: 2021-06-01 | End: 2021-12-14

## 2021-06-01 RX ORDER — DOXYCYCLINE 100 MG/1
CAPSULE ORAL
Status: ON HOLD | COMMUNITY
Start: 2021-05-13 | End: 2021-07-10

## 2021-06-01 RX ORDER — NIACINAMIDE 500 MG
TABLET ORAL
Status: ON HOLD | COMMUNITY
Start: 2021-05-15 | End: 2021-07-10

## 2021-06-02 VITALS — BODY MASS INDEX: 39.86 KG/M2 | WEIGHT: 298 LBS

## 2021-06-02 NOTE — TELEPHONE ENCOUNTER
Refill Approved    Rx renewed per Medication Renewal Policy. Medication was last renewed on   doxazosin (CARDURA) 8 MG tablet 90 tablet 3 11/8/2018   .  pravastatin (PRAVACHOL) 20 MG tablet 90 tablet 3 11/8/2018     verapamil (VERELAN PM) 360 MG 24 hr capsule 90 capsule 3 11/8/2018       Ashley Robertson, [facility] Triage/Med Refill 11/3/2019     Requested Prescriptions   Pending Prescriptions Disp Refills     verapamil (VERELAN PM) 360 MG 24 hr capsule [Pharmacy Med Name: VERAPAMIL HCL SR CAPS 360MG] 90 capsule 4     Sig: TAKE 1 CAPSULE AT BEDTIME       Calcium-Channel Blockers Protocol Passed - 11/3/2019  6:32 AM        Passed - PCP or prescribing provider visit in past 12 months or next 3 months     Last office visit with prescriber/PCP: 12/3/2018 Aditya Mehta MD OR same dept: 12/3/2018 Aditya Mehta MD OR same specialty: 12/3/2018 Aditya Mehta MD  Last physical: Visit date not found Last MTM visit: Visit date not found   Next visit within 3 mo: Visit date not found  Next physical within 3 mo: Visit date not found  Prescriber OR PCP: Aditya Mehta MD  Last diagnosis associated with med order: 1. Essential hypertension  - verapamil (VERELAN PM) 360 MG 24 hr capsule [Pharmacy Med Name: VERAPAMIL HCL SR CAPS 360MG]; TAKE 1 CAPSULE AT BEDTIME  Dispense: 90 capsule; Refill: 4  - doxazosin (CARDURA) 8 MG tablet [Pharmacy Med Name: DOXAZOSIN MESYL TABS 8MG]; TAKE 1 TABLET AT BEDTIME  Dispense: 90 tablet; Refill: 4    2. Frequency of urination  - finasteride (PROSCAR) 5 mg tablet [Pharmacy Med Name: FINASTERIDE TABS 5MG 5MG]; TAKE 1 TABLET DAILY  Dispense: 90 tablet; Refill: 4    3. Hyperlipidemia  - pravastatin (PRAVACHOL) 20 MG tablet [Pharmacy Med Name: PRAVASTATIN TABS 20MG]; TAKE 1 TABLET EVERY EVENING  Dispense: 90 tablet; Refill: 4    If protocol passes may refill for 12 months if within 3 months of last provider visit (or a total of 15 months).             Passed - Blood pressure filed in past  12 months     BP Readings from Last 1 Encounters:   12/03/18 130/70             finasteride (PROSCAR) 5 mg tablet [Pharmacy Med Name: FINASTERIDE TABS 5MG 5MG] 90 tablet 4     Sig: TAKE 1 TABLET DAILY       There is no refill protocol information for this order        doxazosin (CARDURA) 8 MG tablet [Pharmacy Med Name: DOXAZOSIN MESYL TABS 8MG] 90 tablet 4     Sig: TAKE 1 TABLET AT BEDTIME       Alpha Blockers Refill Protocol  Passed - 11/3/2019  6:32 AM        Passed - PCP or prescribing provider visit in past 12 months       Last office visit with prescriber/PCP: 12/3/2018 Aditya Mehta MD OR same dept: 12/3/2018 Aditya Mehta MD OR same specialty: 12/3/2018 Aditya Mehta MD  Last physical: Visit date not found Last MTM visit: Visit date not found   Next visit within 3 mo: Visit date not found  Next physical within 3 mo: Visit date not found  Prescriber OR PCP: Aditya Mehta MD  Last diagnosis associated with med order: 1. Essential hypertension  - verapamil (VERELAN PM) 360 MG 24 hr capsule [Pharmacy Med Name: VERAPAMIL HCL SR CAPS 360MG]; TAKE 1 CAPSULE AT BEDTIME  Dispense: 90 capsule; Refill: 4  - doxazosin (CARDURA) 8 MG tablet [Pharmacy Med Name: DOXAZOSIN MESYL TABS 8MG]; TAKE 1 TABLET AT BEDTIME  Dispense: 90 tablet; Refill: 4    2. Frequency of urination  - finasteride (PROSCAR) 5 mg tablet [Pharmacy Med Name: FINASTERIDE TABS 5MG 5MG]; TAKE 1 TABLET DAILY  Dispense: 90 tablet; Refill: 4    3. Hyperlipidemia  - pravastatin (PRAVACHOL) 20 MG tablet [Pharmacy Med Name: PRAVASTATIN TABS 20MG]; TAKE 1 TABLET EVERY EVENING  Dispense: 90 tablet; Refill: 4    If protocol passes may refill for 12 months if within 3 months of last provider visit (or a total of 15 months).             Passed - Blood pressure filed in past 12 months     BP Readings from Last 1 Encounters:   12/03/18 130/70             pravastatin (PRAVACHOL) 20 MG tablet [Pharmacy Med Name: PRAVASTATIN TABS 20MG] 90 tablet 4     Sig: TAKE  1 TABLET EVERY EVENING       Statins Refill Protocol (Hmg CoA Reductase Inhibitors) Passed - 11/3/2019  6:32 AM        Passed - PCP or prescribing provider visit in past 12 months      Last office visit with prescriber/PCP: 12/3/2018 Aditya Mehta MD OR same dept: 12/3/2018 Aditya Mehta MD OR same specialty: 12/3/2018 Aditya Mehta MD  Last physical: Visit date not found Last MTM visit: Visit date not found   Next visit within 3 mo: Visit date not found  Next physical within 3 mo: Visit date not found  Prescriber OR PCP: Aditya Mehta MD  Last diagnosis associated with med order: 1. Essential hypertension  - verapamil (VERELAN PM) 360 MG 24 hr capsule [Pharmacy Med Name: VERAPAMIL HCL SR CAPS 360MG]; TAKE 1 CAPSULE AT BEDTIME  Dispense: 90 capsule; Refill: 4  - doxazosin (CARDURA) 8 MG tablet [Pharmacy Med Name: DOXAZOSIN MESYL TABS 8MG]; TAKE 1 TABLET AT BEDTIME  Dispense: 90 tablet; Refill: 4    2. Frequency of urination  - finasteride (PROSCAR) 5 mg tablet [Pharmacy Med Name: FINASTERIDE TABS 5MG 5MG]; TAKE 1 TABLET DAILY  Dispense: 90 tablet; Refill: 4    3. Hyperlipidemia  - pravastatin (PRAVACHOL) 20 MG tablet [Pharmacy Med Name: PRAVASTATIN TABS 20MG]; TAKE 1 TABLET EVERY EVENING  Dispense: 90 tablet; Refill: 4    If protocol passes may refill for 12 months if within 3 months of last provider visit (or a total of 15 months).

## 2021-06-02 NOTE — TELEPHONE ENCOUNTER
RN cannot approve Refill Request    RN can NOT refill this medication med is not covered by policy/route to provider. Last office visit: 12/3/2018 Aditya Mehta MD Last Physical: Visit date not found Last MTM visit: Visit date not found Last visit same specialty: 12/3/2018 Aditya Mehta MD.  Next visit within 3 mo: Visit date not found  Next physical within 3 mo: Visit date not found      Ashley Robertson, [facility] Triage/Med Refill 11/3/2019    Requested Prescriptions   Pending Prescriptions Disp Refills     finasteride (PROSCAR) 5 mg tablet [Pharmacy Med Name: FINASTERIDE TABS 5MG 5MG] 90 tablet 4     Sig: TAKE 1 TABLET DAILY       There is no refill protocol information for this order      Signed Prescriptions Disp Refills    verapamil (VERELAN PM) 360 MG 24 hr capsule 90 capsule 3     Sig: Take 1 capsule (360 mg total) by mouth at bedtime.       Calcium-Channel Blockers Protocol Passed - 11/3/2019  6:32 AM        Passed - PCP or prescribing provider visit in past 12 months or next 3 months     Last office visit with prescriber/PCP: 12/3/2018 Aditya Mehta MD OR same dept: 12/3/2018 Aditya Mehta MD OR same specialty: 12/3/2018 Aditya Mehta MD  Last physical: Visit date not found Last MTM visit: Visit date not found   Next visit within 3 mo: Visit date not found  Next physical within 3 mo: Visit date not found  Prescriber OR PCP: Aditya Mehta MD  Last diagnosis associated with med order: 1. Essential hypertension  - verapamil (VERELAN PM) 360 MG 24 hr capsule; Take 1 capsule (360 mg total) by mouth at bedtime.  Dispense: 90 capsule; Refill: 3  - doxazosin (CARDURA) 8 MG tablet; Take 1 tablet (8 mg total) by mouth at bedtime.  Dispense: 90 tablet; Refill: 3    2. Frequency of urination  - finasteride (PROSCAR) 5 mg tablet [Pharmacy Med Name: FINASTERIDE TABS 5MG 5MG]; TAKE 1 TABLET DAILY  Dispense: 90 tablet; Refill: 4    3. Hyperlipidemia  - pravastatin (PRAVACHOL) 20 MG tablet; Take 1  tablet (20 mg total) by mouth every evening.  Dispense: 90 tablet; Refill: 3    If protocol passes may refill for 12 months if within 3 months of last provider visit (or a total of 15 months).             Passed - Blood pressure filed in past 12 months     BP Readings from Last 1 Encounters:   12/03/18 130/70            doxazosin (CARDURA) 8 MG tablet 90 tablet 3     Sig: Take 1 tablet (8 mg total) by mouth at bedtime.       Alpha Blockers Refill Protocol  Passed - 11/3/2019  6:32 AM        Passed - PCP or prescribing provider visit in past 12 months       Last office visit with prescriber/PCP: 12/3/2018 Aditya Mehta MD OR same dept: 12/3/2018 Aditya Mehta MD OR same specialty: 12/3/2018 Aditya Mehta MD  Last physical: Visit date not found Last MTM visit: Visit date not found   Next visit within 3 mo: Visit date not found  Next physical within 3 mo: Visit date not found  Prescriber OR PCP: Aditya Mehta MD  Last diagnosis associated with med order: 1. Essential hypertension  - verapamil (VERELAN PM) 360 MG 24 hr capsule; Take 1 capsule (360 mg total) by mouth at bedtime.  Dispense: 90 capsule; Refill: 3  - doxazosin (CARDURA) 8 MG tablet; Take 1 tablet (8 mg total) by mouth at bedtime.  Dispense: 90 tablet; Refill: 3    2. Frequency of urination  - finasteride (PROSCAR) 5 mg tablet [Pharmacy Med Name: FINASTERIDE TABS 5MG 5MG]; TAKE 1 TABLET DAILY  Dispense: 90 tablet; Refill: 4    3. Hyperlipidemia  - pravastatin (PRAVACHOL) 20 MG tablet; Take 1 tablet (20 mg total) by mouth every evening.  Dispense: 90 tablet; Refill: 3    If protocol passes may refill for 12 months if within 3 months of last provider visit (or a total of 15 months).             Passed - Blood pressure filed in past 12 months     BP Readings from Last 1 Encounters:   12/03/18 130/70            pravastatin (PRAVACHOL) 20 MG tablet 90 tablet 3     Sig: Take 1 tablet (20 mg total) by mouth every evening.       Statins Refill Protocol  (Hmg CoA Reductase Inhibitors) Passed - 11/3/2019  6:32 AM        Passed - PCP or prescribing provider visit in past 12 months      Last office visit with prescriber/PCP: 12/3/2018 Aditya Mehta MD OR same dept: 12/3/2018 Aditya Mehta MD OR same specialty: 12/3/2018 Aditya Mehta MD  Last physical: Visit date not found Last MTM visit: Visit date not found   Next visit within 3 mo: Visit date not found  Next physical within 3 mo: Visit date not found  Prescriber OR PCP: Aditya Mehta MD  Last diagnosis associated with med order: 1. Essential hypertension  - verapamil (VERELAN PM) 360 MG 24 hr capsule; Take 1 capsule (360 mg total) by mouth at bedtime.  Dispense: 90 capsule; Refill: 3  - doxazosin (CARDURA) 8 MG tablet; Take 1 tablet (8 mg total) by mouth at bedtime.  Dispense: 90 tablet; Refill: 3    2. Frequency of urination  - finasteride (PROSCAR) 5 mg tablet [Pharmacy Med Name: FINASTERIDE TABS 5MG 5MG]; TAKE 1 TABLET DAILY  Dispense: 90 tablet; Refill: 4    3. Hyperlipidemia  - pravastatin (PRAVACHOL) 20 MG tablet; Take 1 tablet (20 mg total) by mouth every evening.  Dispense: 90 tablet; Refill: 3    If protocol passes may refill for 12 months if within 3 months of last provider visit (or a total of 15 months).

## 2021-06-05 VITALS
BODY MASS INDEX: 39.73 KG/M2 | OXYGEN SATURATION: 94 % | SYSTOLIC BLOOD PRESSURE: 126 MMHG | HEART RATE: 68 BPM | DIASTOLIC BLOOD PRESSURE: 70 MMHG | WEIGHT: 297 LBS

## 2021-06-05 VITALS
DIASTOLIC BLOOD PRESSURE: 74 MMHG | HEIGHT: 73 IN | SYSTOLIC BLOOD PRESSURE: 134 MMHG | WEIGHT: 297 LBS | HEART RATE: 70 BPM | BODY MASS INDEX: 39.36 KG/M2 | OXYGEN SATURATION: 92 %

## 2021-06-07 NOTE — PROGRESS NOTES
Nishant Olivo is a 85 y.o. male who is being evaluated via a billable telephone visit.      Nishant Olivo complains of    Chief Complaint   Patient presents with     Follow-up     meds         Assessment/Plan:  1.  Essential hypertension:  He is on verapamil 24-hour 360 mg daily along with furosemide 20 mg daily and doxazosin 8 mg at bedtime.  He reports home blood pressures have been good.    2.  Osteoarthritis left knee:  He has had previous arthroscopic surgery on the knee.  X-ray at last visit showed severe tricompartmental osteoarthritis.  He noted good response from a steroid injection for 2 weeks, but then pain recurred.  He would prefer to avoid surgical intervention if possible.  Synvisc or similar would be advised as an alternative.  He reports weight is down 13 pounds since he was last seen.  Further weight loss also would be of benefit for knee pain.    3.  Coronary artery disease:  He requests a refill of nitroglycerin to have on hand.  He reports that he has had a problems with angina since his last clinic visit in December 2018.    4.  Pure hypercholesterolemia:  Last lipids in 12/18 were acceptable on low-dose pravastatin.    5.  History of right bundle branch block:  No symptoms to suggest more advanced degrees of heart block.  An EKG would advise that he has an appointment.  Consideration could be given to lowering his verapamil dose and replacing with the medication that does not suppress AV conduction.    6.  COPD:  He has a pulse oximeter and reports that oxygen saturation averages in the 90s.  Symptoms are stable, but he is winded minor activity.    7.  Anxiety:  He has a prescription for lorazepam.  He uses this primarily with difficulty getting back to sleep when he awakens at night.  Use does not seem excessive.    Plan:  1.  Medications were updated and reviewed.  Refills were authorized.    2.  He is overdue for monitoring labs.  A clinic visit with lab monitoring and an EKG would be  "recommended in about three months.    3.  Orthopedic referral for left knee pain due to severe arthritis.  Synvisc injection would be advised    Additional provider notes: Shari is retired and lives independently with his wife.  He is staying at home due to Covid 19 concerns    Preventative Healthcare Maintenance recommendations, per Healthcare Maintenance tab, reviewed and discussed with patient. Orders placed.  Health maintenance issues were reviewed and will be updated at his next face-to-face appointment.    I have reviewed and updated the patient's Past Medical History, Social History, Family History, Allergies and Medication List.        The patient has been notified of following:     \"This telephone visit will be conducted via a call between you and your physician/provider. We have found that certain health care needs can be provided without the need for a physical exam.  This service lets us provide the care you need with a short phone conversation.  If a prescription is necessary we can send it directly to your pharmacy.  If lab work is needed we can place an order for that and you can then stop by our lab to have the test done at a later time.    If during the course of the call the physician/provider feels a telephone visit is not appropriate, you will not be charged for this service.\"          Phone call duration:  13:59 minutes    CA Intake time 5 minutes    Telephone Consent was completed by  staff and confirmed by SA  "

## 2021-06-07 NOTE — PATIENT INSTRUCTIONS - HE
1.  Medications were updated and renewed    2.  Orthopedic referral is advised for knee pain.  Perhaps a Synvisc or similar injection would be of benefit.    3.  Clinic follow-up in 3 months.  He would require monitoring labs and an EKG.  Consideration should be given to lowering the verapamil dose and replacing with an alternative agent due to right bundle branch block.    4.  He was commended for weight loss and encouraged to keep working on this.

## 2021-06-09 ENCOUNTER — RECORDS - HEALTHEAST (OUTPATIENT)
Dept: ADMINISTRATIVE | Facility: CLINIC | Age: 86
End: 2021-06-09

## 2021-06-09 NOTE — PROGRESS NOTES
Subjective:      Patient ID: Nishant Olivo is a 82 y.o. male.    Chief Complaint:    HPI Nishant Olivo is a 82 y.o. male who presents today complaining of left loose left toe nail x 1 days. Patient was trying to nap last night on a chair which he was too tall for. He tried to slide his foot under an arm rest and it bent the toes and toe nails back on his left foot. He has decreased sensation in his feet due to previous back issues, so he doesn't have much pain. The toe nail is loose, and it wiggles. Patient is able to walk on the foot.  He also has COPD and a sat of 94% is normal for him. Patient's last TDaP was in 2012. The toe nail bled when the injury occurred, bit it has stopped bleeding.         Past Surgical History:   Procedure Laterality Date     HERNIA REPAIR       KS INSERT INTRACORONARY STENT      Description: Cath Stent Placement;  Recorded: 02/24/2014;  Comments: stenting to the LAD in 1999. 2nd done years later.     KS KNEE SCOPE,DIAGNOSTIC      Description: Arthroscopy Knee Left;  Recorded: 06/27/2009;  Comments: details unk.       No family history on file.    Social History   Substance Use Topics     Smoking status: Former Smoker     Years: 30.00     Types: Cigarettes     Smokeless tobacco: None     Alcohol use None       Review of Systems   Constitutional: Negative for fatigue and fever.   Musculoskeletal: Negative for gait problem and myalgias.   Skin: Positive for color change. Negative for pallor, rash and wound.        Positive for loose toe nail on left foot.       Objective:     /66  Pulse 78  Temp 97.9  F (36.6  C) (Oral)   Resp 16  Wt (!) 300 lb (136.1 kg)  SpO2 94%  BMI 39.85 kg/m2    Physical Exam   Constitutional: He appears well-developed and well-nourished.   Cardiovascular:   Pulses:       Posterior tibial pulses are 2+ on the left side.   Musculoskeletal:        Left ankle: He exhibits no swelling, no ecchymosis, no deformity and normal pulse.   No erythema, edema,  or deformity noted on the left toes. Non-tender to palpation.    Skin: Skin is warm and dry.   Nail on the second left toe is loose and wiggles when touched, there is some dried blood present around the distal aspect of the toe. The matrix is still intact. Small amount of dried blood present on left third toe nail, but nail is not mobile and matrix is intact.      Procedures  Considering that the matrix was intact on the second left toe I decided not to disrupt the nail. The foot was soaked, cleaned and bandaged. Considering the patient was able to walk, and there was a lack of deformity and swelling I did not believe a foot x-ray was necessary.     Assessment / Plan:     1. Injury of nail bed of toe           Patient Instructions   1) Soak toe nail in soapy water twice daily.   2) Keep clean and dry, bandage with soft gauze for protection and padding.  3) The toe nail may fall off on its own.  4) Follow up if signs of infection develop: swelling, puss, redness, or fever.

## 2021-06-10 NOTE — PROGRESS NOTES
ASSESSMENT:  1.  Urethral burning: Urine analysis is benign.  He has no evidence for balantitis, and no exposure to sexually transmitted disease.  He will be given a trial of doxycycline  2.  Shortness of breath:; I suspect this is largely due to obesity and deconditioning.  Further evaluation is warranted  3.  A controlled substance agreement was discussed and filled out for use of lorazepam.  Use seems appropriate.  PLAN:  1.  UA was done and reviewed.  No pyuria was noted.  2.  Doxycycline 100 mg twice daily for 10 days.  Note effect on urethral burning  3.  Next visit in the future for assessment of shortness of breath.  He would require chest x-ray, spirometry, and labs at that time    Orders Placed This Encounter   Procedures     Urinalysis-UC if Indicated     There are no discontinued medications.        ASSESSED PROBLEMS:  1. Frequency of urination  Urinalysis-UC if Indicated   2. Anxiety  LORazepam (ATIVAN) 1 MG tablet       CHIEF COMPLAINT:  Chief Complaint   Patient presents with     Pain     pain in urethra when urinating     Shortness of Breath     with fatigue       HISTORY OF PRESENT ILLNESS:  Nishant is a 82 y.o. male presenting to the clinic today with complaints of dysuria. He reports that for the past 5 days or so he has been experiencing urethral pain shortly after urination. He has also noticed urinary leakage after he has finished urinating. He denies any urinary hesitancy or unintended stopping of urine flow.     Dyspnea:  He feels that his exercise tolerance has slowly been declining and feels progressively more short of breath with minimal activity. He mentions that walking around his house he becomes winded and that he notices himself breathing heavily with even short walks. He has been using Symbicort.     REVIEW OF SYSTEMS:   He denies any hematuria, discharge or odor in the urine. He also denies any chest pain or chest tightness.   All other systems are negative.    PFSH:  He recently  "returned from [place] with his wife.     TOBACCO USE:  History   Smoking Status     Former Smoker     Years: 30.00     Types: Cigarettes   Smokeless Tobacco     Not on file       VITALS:  Vitals:    04/20/17 1003   BP: 136/82   Patient Site: Left Arm   Patient Position: Sitting   Cuff Size: Adult Regular   Pulse: 88   Temp: 97  F (36.1  C)   TempSrc: Tympanic   SpO2: 94%   Weight: (!) 259 lb 14.4 oz (117.9 kg)   Height: 6' 0.75\" (1.848 m)     Wt Readings from Last 3 Encounters:   04/20/17 (!) 259 lb 14.4 oz (117.9 kg)   04/02/17 (!) 300 lb (136.1 kg)   08/28/15 (!) 292 lb (132.5 kg)       PHYSICAL EXAM:  Constitutional:   Reveals an alert, pleasant, obese male who appears mildly short of breath. Does not appear acutely ill. Affect appropriate  Vitals: per nursing notes.  HEENT:  Atraumatic.   Neck:  Supple, no carotid bruits or adenopathy.  Back:  No spine or CVA pain.  Thorax:  No bony deformities.  Lungs: Clear to A&P without rales or wheezes but shallow respirations  Cardiac:   Regular rate and rhythm, normal S1, S2, II/VI systolic murmur at the left sternal border to aortic area.   Abdomen:  Soft, obese, active bowel sounds without bruits, mass, or tenderness.  :  (Men)  No urethral discharge or dermatitis. No testicular masses, no penile lesions.    Extremities:   Trace to 1+ edema bilaterally.      Skin:  No jaundice, peripheral cyanosis or lesions to suggest malignancy.  Neuro:  Alert and oriented. Cranial nerves, motor, sensory exams are intact.  No gross focal deficits.  Psychiatric:  Memory intact, mood appropriate.    QUALITY MEASURES:  The following high BMI interventions were performed this visit: encouragement to exercise and weight monitoring    ADDITIONAL HISTORY SUMMARIZED (2): Notes from 4/2017 from Rika Newman reviewed regarding toe injury.  DECISION TO OBTAIN EXTRA INFORMATION (1): None.   RADIOLOGY TESTS (1): None.  LABS (1): Labs ordered.  MEDICINE TESTS (1): None.  INDEPENDENT REVIEW (2 " each): None.     The visit lasted a total of 13 minutes face to face with the patient. Over 50% of the time was spent counseling and educating the patient about urethritis.    I, Tony Grewal, am scribing for and in the presence of, Dr. Mehta.    I, Dr. Mehta, personally performed the services described in this documentation, as scribed by Tony Grewal in my presence, and it is both accurate and complete.    Dragon dictation was used for this note.  Speech recognition errors are a possibility.    MEDICATIONS:  Current Outpatient Prescriptions   Medication Sig Dispense Refill     aspirin 325 MG tablet Take 325 mg by mouth daily.       doxazosin (CARDURA) 8 MG tablet Take 1 tablet (8 mg total) by mouth bedtime. 90 tablet 3     finasteride (PROSCAR) 5 mg tablet Take 1 tablet (5 mg total) by mouth daily. 90 tablet 3     furosemide (LASIX) 20 MG tablet Take 1 tablet (20 mg total) by mouth daily. 30 tablet 11     LORazepam (ATIVAN) 1 MG tablet Take 1 tablet (1 mg total) by mouth every 6 (six) hours as needed for anxiety. 30 tablet 5     naproxen (NAPROSYN) 250 MG tablet Take 250 mg by mouth 2 (two) times a day with meals.       nitroglycerin (NITROSTAT) 0.4 MG SL tablet Place 1 tablet (0.4 mg total) under the tongue every 5 (five) minutes as needed for chest pain. 90 tablet 3     potassium chloride SA (K-DUR,KLOR-CON) 10 MEQ tablet Take 1 tablet (10 mEq total) by mouth daily. 30 tablet 11     pravastatin (PRAVACHOL) 20 MG tablet Take 1 tablet (20 mg total) by mouth every evening. 90 tablet 3     SYMBICORT 160-4.5 mcg/actuation inhaler USE 2 INHALATIONS TWICE A DAY 20.4 Inhaler 2     verapamil (VERELAN PM) 360 MG 24 hr capsule Take 1 capsule (360 mg total) by mouth bedtime. 90 capsule 3     No current facility-administered medications for this visit.        Total data points: 3.

## 2021-06-11 NOTE — PROGRESS NOTES
ASSESSMENT:  1.  Emergency room follow-up:  Nishant's ER visit for vertigo was reviewed.  Symptoms were quite severe but have not recurred.    2.  Hyperlipidemia: He is on pravastatin.  Fasting lipids will be checked.  3.  Hypertension: Blood pressure is acceptable on his current regimen    PLAN:  1.  Call if vertigo recurs.    2.  Check fasting lipids today  3.  I would wait on checking an MRI of the head  4.  Discontinue meclizine and Zofran      No Follow-up on file.    ASSESSED PROBLEMS:  No diagnosis found.    CHIEF COMPLAINT:  Chief Complaint   Patient presents with     Follow-up     F/u ER [facility] due to Vertigo       HISTORY OF PRESENT ILLNESS:  Nishant is a 82 y.o. male presenting to the clinic today for follow up for ER visit at [facility] on 6/29/17 for vertigo. He reports that he was napping on the couch in the afternoon and upon waking, he felt poorly. He got up and went to the kitchen for a glass of orange juice and became very dizzy. He vomited and emptied his bowels. He also sweat through his shirt. He called for his wife, who is familiar with symptoms of vertigo and recommended that he go to the ER.     In the ER, he was administered a head CT which showed a small focus of increased attenuation within the dorsal medial right thalamus thought to represent mineralization. The CT also showed mild to moderate age-related changes, but no evidence of acute infarct. A follow up MRI with and without contrast was recommended. He has never had an episode of vertigo in the past.     Since his ER visit, he has felt well aside from some nausea. He is eating cautiously and has not had a bowel movement. He denies headache, but has had some nasal congestion. He has not had any more dizziness, although he is changing positions carefully.     REVIEW OF SYSTEMS:   Symptoms of a UTI are entirely resolved now following a course of antibiotic. All other systems are negative.    PFSH:  He will travel to [place] in  "a week.     TOBACCO USE:  History   Smoking Status     Former Smoker     Years: 30.00     Types: Cigarettes   Smokeless Tobacco     Never Used       VITALS:  Vitals:    07/03/17 0837 07/03/17 0840   BP: 142/70 136/72   Patient Site:  Left Arm   Patient Position:  Sitting   Cuff Size:  Adult Large   Pulse: 80    Resp: 20    Temp: 97.7  F (36.5  C)    TempSrc: Oral    SpO2: 92%    Weight: (!) 296 lb 6 oz (134.4 kg)    Height: 6' 0.5\" (1.842 m)      Wt Readings from Last 3 Encounters:   07/03/17 (!) 296 lb 6 oz (134.4 kg)   04/20/17 (!) 259 lb 14.4 oz (117.9 kg)   04/02/17 (!) 300 lb (136.1 kg)     Body mass index is 39.64 kg/(m^2).    PHYSICAL EXAM:  Constitutional:   Reveals an alert, pleasant, obese man. Affect appropriate. Does not seem acutely ill. Vitals: per nursing notes.  HEENT:  Atraumatic.   Neck:  Supple, no carotid bruits or adenopathy.  Back:  No spine or CVA pain.  Thorax:  No bony deformities.  Lungs: Slightly diminished air movement. No wheezes or rales.   Cardiac:   Regular rate and rhythm, normal S1, S2, no murmur.  Abdomen:  Obese, active bowel sounds. No mass or tenderness.  Extremities: 1+ edema bilaterally.     Neuro:  Alert and talkative. No gross focal deficits.    ADDITIONAL HISTORY SUMMARIZED (2): Reviewed ER visit of 6/29/17.  DECISION TO OBTAIN EXTRA INFORMATION (1): None.   RADIOLOGY TESTS (1): CT of 6/29/17 reviewed.  LABS (1): Reviewed labs performed in the ER. Labs ordered.   MEDICINE TESTS (1): None.  INDEPENDENT REVIEW (2 each): None.     The visit lasted a total of 12 minutes face to face with the patient. Over 50% of the time was spent counseling and educating the patient about vertigo.    Fabian SULLIVAN, am scribing for and in the presence of, Dr. Mehta.    LEO SULLIVAN, personally performed the services described in this documentation, as scribed by Fabian Julian in my presence, and it is both accurate and complete.    Dragon dictation was used for this note.  Speech " recognition errors are a possibility.    MEDICATIONS:  Current Outpatient Prescriptions   Medication Sig Dispense Refill     aspirin 325 MG tablet Take 325 mg by mouth daily.       doxazosin (CARDURA) 8 MG tablet Take 1 tablet (8 mg total) by mouth bedtime. 90 tablet 3     finasteride (PROSCAR) 5 mg tablet Take 1 tablet (5 mg total) by mouth daily. 90 tablet 3     furosemide (LASIX) 20 MG tablet Take 1 tablet (20 mg total) by mouth daily. 30 tablet 11     LORazepam (ATIVAN) 1 MG tablet Take 1 tablet (1 mg total) by mouth every 6 (six) hours as needed for anxiety. 30 tablet 5     meclizine (ANTIVERT) 25 mg tablet Take 1 tablet (25 mg total) by mouth 3 (three) times a day as needed. 28 tablet 0     naproxen sodium (ALEVE) 220 MG tablet Take 440 mg by mouth as needed for pain (arthritis pain).       nitroglycerin (NITROSTAT) 0.4 MG SL tablet Place 1 tablet (0.4 mg total) under the tongue every 5 (five) minutes as needed for chest pain. 90 tablet 3     potassium chloride SA (K-DUR,KLOR-CON) 10 MEQ tablet Take 1 tablet (10 mEq total) by mouth daily. 30 tablet 11     pravastatin (PRAVACHOL) 20 MG tablet Take 1 tablet (20 mg total) by mouth every evening. 90 tablet 3     SYMBICORT 160-4.5 mcg/actuation inhaler USE 2 INHALATIONS TWICE A DAY 20.4 Inhaler 2     verapamil (VERELAN PM) 360 MG 24 hr capsule Take 1 capsule (360 mg total) by mouth bedtime. 90 capsule 3     No current facility-administered medications for this visit.        Total data points: 4

## 2021-06-11 NOTE — TELEPHONE ENCOUNTER
RN cannot approve Refill Request    RN can NOT refill this medication med is not covered by policy/route to provider. Last office visit: 12/3/2018 Aditya Mehta MD Last Physical: Visit date not found Last MTM visit: Visit date not found Last visit same specialty: 12/3/2018 Aditya Mehta MD.  Next visit within 3 mo: Visit date not found  Next physical within 3 mo: Visit date not found      Kallie Trotter, [facility] Triage/Med Refill 9/27/2020    Requested Prescriptions   Pending Prescriptions Disp Refills     nitroglycerin (NITROSTAT) 0.4 MG SL tablet [Pharmacy Med Name: NITROGLYCERIN 0.4MG SUB TAB 25] 25 tablet 0     Sig: PLACE ONE TABLET UNDER THE TONGUE EVERY 5 MINUTES AS NEEDED FOR CHEST PAIN       There is no refill protocol information for this order

## 2021-06-13 NOTE — PROGRESS NOTES
"Nishant Olivo is a 85 y.o. male who is being evaluated via a billable video visit.      The patient has been notified of following:     \"This video visit will be conducted via a call between you and your physician/provider. We have found that certain health care needs can be provided without the need for an in-person physical exam.  This service lets us provide the care you need with a video conversation.  If a prescription is necessary we can send it directly to your pharmacy.  If lab work is needed we can place an order for that and you can then stop by our lab to have the test done at a later time.    Video visits are billed at different rates depending on your insurance coverage. Please reach out to your insurance provider with any questions.    If during the course of the call the physician/provider feels a video visit is not appropriate, you will not be charged for this service.\"    Patient has given verbal consent to a Video visit? Yes  How would you like to obtain your AVS? AVS Preference: Mail a copy.  If dropped by the video visit, the video invitation should be sent to: Text to cell phone: [phone]  Will anyone else be joining your video visit? No      Video Start Time: 8:10    Additional provider notes:   ASSESSMENT:  1.  Folliculitis\"  Pérez complains of a rash that first began in March.  He describes it as papular with some pustules.  It is primarily on the trunk.  He was seen by Dr. Bala Hughes and diagnosed with folliculitis and started with a topical steroid and topical clindamycin.  Only minor temporary improvement was noted.  Management options were discussed.  After discussion, he is interested in trying oral minocycline.    2.  Insomnia:  He has been using lorazepam.  He generally takes this if he awakens at night with difficulty getting back to sleep.  He generally takes one half of a 1 mg tab.  He denies adverse effects from this.  I would favor changing his next refill to the 0.5 mg " size    3.  Chronic obstructive lung disease:  He gets short of breath with minor activity, but feels respiratory status has been stable.    4.  Obesity with BMI over 40 :  Activity has been limited.  He has not been going out of the house due to Covid-19 concerns.    PLAN:  1.  Lorazepam dose was decreased from 1 mg to 0.5.  Okay to use a maximum of 1 at night for insomnia.    2.  Minocycline 100 mg daily for 1 month.  Assess effect on dermatitis.  Report response    3.  Monitor home blood pressure readings and report.  He may require a larger blood pressure cuff    4.  Covid vaccine when available    5.  See in clinic for annual wellness visit in the future.  He is not comfortable with coming until Covid-19 concerns glory.      Medications Discontinued During This Encounter   Medication Reason     LORazepam (ATIVAN) 1 MG tablet Reorder           ASSESSED PROBLEMS:  1. Folliculitis  minocycline (MINOCIN,DYNACIN) 100 MG capsule   2. Anxiety  LORazepam (ATIVAN) 0.5 MG tablet   3. Primary insomnia  LORazepam (ATIVAN) 0.5 MG tablet   4. Morbid obesity with BMI of 40.0-44.9, adult (H)     5. Pulmonary emphysema, unspecified emphysema type (H)         CHIEF COMPLAINT:  Chief Complaint   Patient presents with     Medication Refill       HISTORY OF PRESENT ILLNESS:  Nishant is a 85 y.o. male who presents via video visit.  He has multiple concerns.  He requests a refill of lorazepam.  Uses this for insomnia.  He generally does not take it on going to bed, but will take it during the night if he awakens and has difficulty getting back to sleep.  He generally uses one half of a 1 mg tab most nights.  He denies adverse effects from this.    He also complains of persistent dermatitis.  This began last March.  He has noted erythematous papules and pustules primarily on the trunk.  This is mildly pruritic.  Dr. Bala Hughes for a dermatology consult in the past and was diagnosed with folliculitis.  He was given a topical steroid  and clindamycin.  Symptoms showed only minor improvement.    He has a history of chronic obstructive pulmonary disease.  He gets short of breath with minor activity.  This is not different than his usual baseline    He has a history of essential hypertension.  He reports home blood pressure readings  Fluctuate widely.  He is not sure his cuff is accurate    He has a history of coronary artery disease with a stent placed to the LAD in 1999 and a second stent years later.  He denies any current issues with angina    REVIEW OF SYSTEMS:    Comprehensive review of systems is otherwise negative.    PFSH:   and retired.  Has been largely staying at home due to Covid-19    TOBACCO USE:  Social History     Tobacco Use   Smoking Status Former Smoker     Years: 30.00     Types: Cigarettes   Smokeless Tobacco Never Used       VITALS:  There were no vitals filed for this visit.  Wt Readings from Last 3 Encounters:   12/03/18 (!) 298 lb (135.2 kg)   10/05/17 (!) 295 lb 8 oz (134 kg)   07/03/17 (!) 296 lb 6 oz (134.4 kg)       PHYSICAL EXAM:  Constitutional:   Reveals an alert talkative obese man who does not seem in acute distress..  Vitals: per nursing notes..    Skin: Skin could not be adequately visualized for assessment of his dermatitis  Psychiatric:  Memory intact, mood appropriate.    DATA REVIEWED:  Additional History from Old Records Summarized (2): Dermatology visit reviewed.  Decision to Obtain Records (1): None.   Radiology Tests Summarized or Ordered (1): None.  Labs Reviewed or Ordered (1):  previous labs reviewed  Medicine Test Summarized or Ordered (1): None.   Independent Review of EKG or X-RAY(2 each): None.    The visit lasted a total of 23 minutes face to face with the patient. Over 50% of the time was spent counseling and educating the patient about dermatitis and chronic health issues..    Dragon dictation was used for this note. Speech recognition errors are a possibility.     MEDICATIONS:  Current  Outpatient Medications   Medication Sig Dispense Refill     budesonide-formoterol (SYMBICORT) 160-4.5 mcg/actuation inhaler USE 2 INHALATIONS TWICE A DAY 3 Inhaler 3     doxazosin (CARDURA) 8 MG tablet Take 1 tablet (8 mg total) by mouth at bedtime. 90 tablet 3     finasteride (PROSCAR) 5 mg tablet TAKE 1 TABLET DAILY 90 tablet 3     furosemide (LASIX) 20 MG tablet One tablet daily as needed for edema 90 tablet 3     LORazepam (ATIVAN) 0.5 MG tablet TAKE 1/2 TO 1 TABLET BY MOUTH THREE TIMES DAILY AS NEEDED FOR ANXIOUSNESS 30 tablet 5     nitroglycerin (NITROSTAT) 0.4 MG SL tablet PLACE ONE TABLET UNDER THE TONGUE EVERY 5 MINUTES AS NEEDED FOR CHEST PAIN 25 tablet 3     potassium chloride (K-DUR,KLOR-CON) 10 MEQ tablet TAKE 1 TABLET BY MOUTH DAILY AS NEEDED WHEN FUROSEMIDE IS USED 90 tablet 3     pravastatin (PRAVACHOL) 20 MG tablet TAKE 1 TABLET EVERY EVENING 90 tablet 1     verapamil (VERELAN PM) 360 MG 24 hr capsule Take 1 capsule (360 mg total) by mouth at bedtime. 90 capsule 3     aspirin 325 MG tablet Take 325 mg by mouth daily.       minocycline (MINOCIN,DYNACIN) 100 MG capsule Take 1 capsule (100 mg total) by mouth daily for 10 days. 30 capsule 4     No current facility-administered medications for this visit.            Video-Visit Details    Type of service:  Video Visit  Video start time:  8:10  Video End Time 8:33   Originating Location (pt. Location): Home    Distant Location (provider location):  [facility]     Platform used for Video Visit: Bhavesh Mehta MD

## 2021-06-13 NOTE — TELEPHONE ENCOUNTER
Refill Approved    Rx renewed per Medication Renewal Policy. Medication was last renewed on 11/3/19.    Freda Hill, [facility] Triage/Med Refill 11/30/2020     Requested Prescriptions   Pending Prescriptions Disp Refills     pravastatin (PRAVACHOL) 20 MG tablet [Pharmacy Med Name: PRAVASTATIN TABS 20MG] 90 tablet 3     Sig: TAKE 1 TABLET EVERY EVENING       Statins Refill Protocol (Hmg CoA Reductase Inhibitors) Passed - 11/29/2020 11:40 PM        Passed - PCP or prescribing provider visit in past 12 months      Last office visit with prescriber/PCP: 12/3/2018 Aditya Mehta MD OR same dept: Visit date not found OR same specialty: 12/3/2018 Aditya Mehta MD  Last physical: Visit date not found Last MTM visit: Visit date not found   Next visit within 3 mo: Visit date not found  Next physical within 3 mo: Visit date not found  Prescriber OR PCP: Aditya Mehta MD  Last diagnosis associated with med order: 1. Hyperlipidemia  - pravastatin (PRAVACHOL) 20 MG tablet [Pharmacy Med Name: PRAVASTATIN TABS 20MG]; TAKE 1 TABLET EVERY EVENING  Dispense: 90 tablet; Refill: 3    If protocol passes may refill for 12 months if within 3 months of last provider visit (or a total of 15 months).

## 2021-06-13 NOTE — PROGRESS NOTES
Provider wore a mask during this visit.   Subjective:   Nishant Olivo is a 82 y.o. male  Roomed by: Patrizia      Chief Complaint   Patient presents with     URI     feels like sinus with lots of discharge.  Has moved into his chest.  Coughing all day, concerned about Bronchitis.   Cold symptoms started about a week ago. Admits lots of nasal drainage. Says chest hurts with coughing. Admits some SOB. Felt feverish a week ago, but no chills. Energy level has been way down, but appetite ok. Admitted some headache, tooth ache several days ago, but not a problem today. Did not use symbicort yesterday or today. Says sense of smell and taste have not changed.   Review of Systems  Const - Resp - see HPI  Allergies   Allergen Reactions     Cat Hair Std Allergenic Ext      Sulfa (Sulfonamide Antibiotics)        Current Outpatient Prescriptions:      aspirin 325 MG tablet, Take 325 mg by mouth daily., Disp: , Rfl:      doxazosin (CARDURA) 8 MG tablet, TAKE 1 TABLET AT BEDTIME, Disp: 90 tablet, Rfl: 2     finasteride (PROSCAR) 5 mg tablet, TAKE 1 TABLET DAILY, Disp: 90 tablet, Rfl: 2     LORazepam (ATIVAN) 1 MG tablet, Take 1 tablet (1 mg total) by mouth every 6 (six) hours as needed for anxiety., Disp: 30 tablet, Rfl: 5     naproxen sodium (ALEVE) 220 MG tablet, Take 440 mg by mouth as needed for pain (arthritis pain)., Disp: , Rfl:      pravastatin (PRAVACHOL) 20 MG tablet, TAKE 1 TABLET EVERY EVENING, Disp: 90 tablet, Rfl: 2     verapamil (VERELAN PM) 360 MG 24 hr capsule, TAKE 1 CAPSULE AT BEDTIME, Disp: 90 capsule, Rfl: 2     nitroglycerin (NITROSTAT) 0.4 MG SL tablet, Place 1 tablet (0.4 mg total) under the tongue every 5 (five) minutes as needed for chest pain., Disp: 90 tablet, Rfl: 3     potassium chloride SA (K-DUR,KLOR-CON) 10 MEQ tablet, Take 1 tablet (10 mEq total) by mouth daily., Disp: 30 tablet, Rfl: 11     SYMBICORT 160-4.5 mcg/actuation inhaler, USE 2 INHALATIONS TWICE A DAY, Disp: 20.4 Inhaler, Rfl:  2  Patient Active Problem List   Diagnosis     Anxiety     Hearing Loss     Left Ventricular Hypertrophy     Hiatal Hernia     Lumbar Disc Degeneration     Nicotine Dependence - In Remission     Squamous Cell Carcinoma Of The Skin     Basal Cell Carcinoma Of The Skin     Hyperlipidemia     Obesity     Hypertension     Coronary Artery Disease     Complete Right Bundle Branch Block     Chronic Obstructive Pulmonary Disease     Benign Polyps Of The Large Intestine     Frequency of urination     Medical History Reviewed  Objective:     Vitals:    10/05/17 1637 10/05/17 1657   BP: 146/80 138/80   Pulse: 66    Resp: 22    Temp: 97.8  F (36.6  C)    TempSrc: Oral    SpO2: 95%    Weight: (!) 295 lb 8 oz (134 kg)    Gen - Pt in NAD  Eyes - Conjunctiva non injected, no drainage  Face - not TTP over frontal sinus areas; not TTP over maxillary area  Ears - external canals - no induration, Right TM - not injected, Left TM - not injected   Nose - mildly congested, no nasal drainage  Pharynx - non injected, tonsils 1+ size  Neck - supple, no cervical adenopathy, no masses  Cor - RRR w/o murmur  Lungs - Fair to good air entry; no wheezes or crackles noted on auscultation - no coughing noted  Skin - no lesions, no rashes noted    Xr Chest Pa And Lateral    Result Date: 10/5/2017  XR CHEST PA AND LATERAL 10/5/2017 5:16 PM INDICATION: Chronic obstructive pulmonary disease with (acute) exacerbation. COMPARISON: 7/7/2014. FINDINGS: Hyperexpanded lungs compatible with obstructive lung disease. Subtle density projecting over the peripheral left midlung and 6th rib. This could be artifactual, focal pneumonitis or underlying nodule. Recommend resolution on follow-up. Additional hazy density over the lower lungs on the frontal view, likely overlapping soft tissues. Otherwise, the lungs are clear without focal abnormality on the lateral view. Incidental accessory azygos fissure. No pleural effusion or pneumothorax. Normal heart size. Aortic  atherosclerosis. NOTE: ABNORMAL REPORT THE DICTATION ABOVE DESCRIBES AN ABNORMALITY FOR WHICH FOLLOW-UP IS NEEDED. This report was electronically interpreted by: Dr. Piyush Bose DO ON 10/05/2017 at 17:28  Radiologist's report discussed day of visit.      Assessment - Plan   Medical Decision Making -82-year-old man with a history of COPD and comorbidities of CAD and hypertension presents with URI symptoms and cough for a week.  Patient did admit some shortness of breath recently but no true fever or chills.  Patient's exam was pretty negative but because of his symptoms chest x-ray was ordered.  Discussed chest x-ray findings with him and elected to treat to cover for both COPD exacerbation and possible pneumonia. From patient's history, normal vital signs and exam, PE is not suspected.     1. Acute exacerbation of chronic obstructive pulmonary disease (COPD)  - XR Chest PA and Lateral; Future  - levoFLOXacin (LEVAQUIN) 750 MG tablet; Take 1 tablet (750 mg total) by mouth daily for 5 days.  Dispense: 5 tablet; Refill: 0    2. Pneumonitis    At the conclusion of the encounter, assessment and plan were discussed.   All questions were answered.   The patient or guardian acknowledged understanding and was involved in the decision making regarding the overall care plan.    Patient Instructions   1. Continue drinking plenty of non-caffeine liquids   2. Follow up at your primary clinic within the next 7-10 days  4. If your symptoms are getting worse after 48 hours of antibiotics or you have any questions, call the clinic number     When to call your healthcare provider  Call your provider immediately if you have any of the following:    Increased shortness of breath, wheezing, or coughing    Increased mucus    Yellow, green, bloody, or smelly mucus    Fever or chills    Tightness in your chest that does not go away with rest or medicine    An irregular heartbeat or a feeling that your heart is beating very  fast    Swollen ankles

## 2021-06-13 NOTE — TELEPHONE ENCOUNTER
RN cannot approve Refill Request    RN can NOT refill this medication med is not covered by policy/route to provider. Last office visit: 12/3/2018 Aditya Mehta MD Last Physical: Visit date not found Last MTM visit: Visit date not found Last visit same specialty: 12/3/2018 Aditya Mehta MD.  Next visit within 3 mo: Visit date not found  Next physical within 3 mo: Visit date not found      Freda Hill, [facility] Triage/Med Refill 12/9/2020    Requested Prescriptions   Pending Prescriptions Disp Refills     finasteride (PROSCAR) 5 mg tablet [Pharmacy Med Name: FINASTERIDE TABS 5MG] 90 tablet 3     Sig: TAKE 1 TABLET DAILY       There is no refill protocol information for this order

## 2021-06-13 NOTE — PROGRESS NOTES
[facility] Follow Up Note    Assessment/Plan:    1. Bronchitis  Recent URI with sinusitis and bronchitis: improved on levaquin. CXR abnormality: recommended to repeat in 6 weeks. Lung exam is clear today. Continue Symbicort.  - fluticasone (FLONASE) 50 mcg/actuation nasal spray; 2 sprays each nostril nightly  Dispense: 16 g; Refill: 12  - montelukast (SINGULAIR) 10 mg tablet; Take 1 tablet (10 mg total) by mouth at bedtime.  Dispense: 30 tablet; Refill: 6    2. PND (post-nasal drip), seasonal allergies and ongoing sinus drainage.  Significant mucosa inflammation on exam. rec nasal spray and Singulair. Pt unable to tolerate untihistamines since they worsen his prostate Sx.  - fluticasone (FLONASE) 50 mcg/actuation nasal spray; 2 sprays each nostril nightly  Dispense: 16 g; Refill: 12  - montelukast (SINGULAIR) 10 mg tablet; Take 1 tablet (10 mg total) by mouth at bedtime.  Dispense: 30 tablet; Refill: 6    Erica Valdivia MD    Chief Complaint:  Chief Complaint   Patient presents with     Follow-up     Walk in       History of Present Illness:  Nishant is a 82 y.o. male with h/o LVH, HH, CAD, COPD, vertigo, anxiety here for urgent care f/u for URI.    H/o cough for 1 months, sinus pain, congestion. Was seen in UR. CXR showed 2 small areas of abnormality. Recommended repeat CXR in 6 weeks. Pt Tx with Levaquin x 5 days with improved coigh and sinus pain, however continue to have significant pND, nasal congestion and bringing up phlegm. Has chronic SOB w.o worsening, using Symbicort once a day. Unable to take antihistamines due to prostate SE. Has h/o seasonal allergies.    Review of Systems:  A comprehensive review of systems was performed and was otherwise negative. Chronic LE edema w/o change.    PFSH:  Social History: , accompanied by wife  History   Smoking Status     Former Smoker     Years: 30.00     Types: Cigarettes   Smokeless Tobacco     Never Used     Social History     Social History  Narrative       Past History: reviewed  Current Outpatient Prescriptions   Medication Sig Dispense Refill     aspirin 325 MG tablet Take 325 mg by mouth daily.       doxazosin (CARDURA) 8 MG tablet TAKE 1 TABLET AT BEDTIME 90 tablet 2     finasteride (PROSCAR) 5 mg tablet TAKE 1 TABLET DAILY 90 tablet 2     furosemide (LASIX) 20 MG tablet Take 20 mg by mouth daily.       LORazepam (ATIVAN) 1 MG tablet Take 1 tablet (1 mg total) by mouth every 6 (six) hours as needed for anxiety. 30 tablet 5     naproxen sodium (ALEVE) 220 MG tablet Take 440 mg by mouth as needed for pain (arthritis pain).       nitroglycerin (NITROSTAT) 0.4 MG SL tablet Place 1 tablet (0.4 mg total) under the tongue every 5 (five) minutes as needed for chest pain. 90 tablet 3     potassium chloride SA (K-DUR,KLOR-CON) 10 MEQ tablet Take 1 tablet (10 mEq total) by mouth daily. 30 tablet 11     pravastatin (PRAVACHOL) 20 MG tablet TAKE 1 TABLET EVERY EVENING 90 tablet 2     SYMBICORT 160-4.5 mcg/actuation inhaler USE 2 INHALATIONS TWICE A DAY 20.4 Inhaler 2     verapamil (VERELAN PM) 360 MG 24 hr capsule TAKE 1 CAPSULE AT BEDTIME 90 capsule 2     fluticasone (FLONASE) 50 mcg/actuation nasal spray 2 sprays each nostril nightly 16 g 12     montelukast (SINGULAIR) 10 mg tablet Take 1 tablet (10 mg total) by mouth at bedtime. 30 tablet 6     No current facility-administered medications for this visit.        Physical Exam:    Vitals:    10/11/17 1624   BP: 134/72   Patient Site: Left Arm   Patient Position: Sitting   Cuff Size: Adult Large   Pulse: 73   Temp: 98  F (36.7  C)   TempSrc: Tympanic   SpO2: 94%     Wt Readings from Last 3 Encounters:   10/05/17 (!) 295 lb 8 oz (134 kg)   07/03/17 (!) 296 lb 6 oz (134.4 kg)   04/20/17 (!) 259 lb 14.4 oz (117.9 kg)     There is no height or weight on file to calculate BMI.    Constitutional:  Reveals a older male.  Vitals:  Per nursing notes.  HEENT:No cervical LAD, no thyromegaly,  conjunctiva is pink, no  scleral icterus, TMs are visualized and normal bl, oropharynx is clear, no exudates,nasal mucosa with moderate congestion and septal deviation, no sinus tenderness to palpation.   Cardiac:  Regular rate and rhythm,no murmurs, rubs, or gallops.  Legs with 1+ chronic edema. Palpation of the radial pulse regular.  Lungs: Clear to auscultation bl.  Respiratory effort normal. No wheezes, rales or mucus appreciated.  Abdomen:positive BS, soft, nontender, nondistended.  No hepato-splenomagaly     Psychiatric: affect appropriate, memory intact.     Data Review:    Analysis and Summary of Old Records (2): yes      Records Requested (1): no     Other History Summarized (from other people in the room) (2): wife    Radiology Tests Summarized (XRAY/CT/MRI/DXA) (1):cxr    Labs Reviewed (1): no    Medicine Tests Reviewed (EKG/ECHO/COLONOSCOPY/EGD) (1): no    Independent Review of EKG or X-RAY (2): no

## 2021-06-13 NOTE — PATIENT INSTRUCTIONS - HE
1.  Lorazepam dose was decreased from 1 mg to 0.5 mg.  Okay to use 1 at night for insomnia.    2.  Minocycline 100 mg daily for 1 month.  Assess effect on  Dermatitis.    3.  Monitor home blood pressure readings.  You may require a larger blood pressure cuff.    4.  Covid vaccine when available.    5.  See in clinic for annual wellness visit when Covid concerns diminish.

## 2021-06-14 NOTE — TELEPHONE ENCOUNTER
RN cannot approve Refill Request    RN can NOT refill this medication PCP messaged that patient is overdue for Office Visit. Last office visit: 12/3/2018 Aditya Mehta MD Last Physical: Visit date not found Last MTM visit: Visit date not found Last visit same specialty: 12/3/2018 Aditya Mehta MD.  Next visit within 3 mo: Visit date not found  Next physical within 3 mo: Visit date not found      Yari Anne, [facility] Triage/Med Refill 12/27/2020    Requested Prescriptions   Pending Prescriptions Disp Refills     doxazosin (CARDURA) 8 MG tablet [Pharmacy Med Name: DOXAZOSIN MESYLATE TABS 8MG] 90 tablet 3     Sig: TAKE 1 TABLET AT BEDTIME       Alpha Blockers Refill Protocol  Failed - 12/27/2020  5:01 AM        Failed - Blood pressure filed in past 12 months     BP Readings from Last 1 Encounters:   12/03/18 130/70             Passed - PCP or prescribing provider visit in past 12 months       Last office visit with prescriber/PCP: 12/3/2018 Aditya Mehta MD OR same dept: Visit date not found OR same specialty: 12/3/2018 Aditya Mehta MD  Last physical: Visit date not found Last MTM visit: Visit date not found   Next visit within 3 mo: Visit date not found  Next physical within 3 mo: Visit date not found  Prescriber OR PCP: Aditya Mehta MD  Last diagnosis associated with med order: 1. Essential hypertension  - doxazosin (CARDURA) 8 MG tablet [Pharmacy Med Name: DOXAZOSIN MESYLATE TABS 8MG]; TAKE 1 TABLET AT BEDTIME  Dispense: 90 tablet; Refill: 3  - verapamiL (VERELAN PM) 360 MG 24 hr capsule [Pharmacy Med Name: VERAPAMIL HCL SR CAPS 360MG]; TAKE 1 CAPSULE AT BEDTIME  Dispense: 90 capsule; Refill: 3    If protocol passes may refill for 12 months if within 3 months of last provider visit (or a total of 15 months).                verapamiL (VERELAN PM) 360 MG 24 hr capsule [Pharmacy Med Name: VERAPAMIL HCL SR CAPS 360MG] 90 capsule 3     Sig: TAKE 1 CAPSULE AT BEDTIME       Calcium-Channel  Blockers Protocol Failed - 12/27/2020  5:01 AM        Failed - Blood pressure filed in past 12 months     BP Readings from Last 1 Encounters:   12/03/18 130/70             Passed - PCP or prescribing provider visit in past 12 months or next 3 months     Last office visit with prescriber/PCP: 12/3/2018 Aditya Mehta MD OR same dept: Visit date not found OR same specialty: 12/3/2018 Aditya Mehta MD  Last physical: Visit date not found Last MTM visit: Visit date not found   Next visit within 3 mo: Visit date not found  Next physical within 3 mo: Visit date not found  Prescriber OR PCP: Aditya Mehta MD  Last diagnosis associated with med order: 1. Essential hypertension  - doxazosin (CARDURA) 8 MG tablet [Pharmacy Med Name: DOXAZOSIN MESYLATE TABS 8MG]; TAKE 1 TABLET AT BEDTIME  Dispense: 90 tablet; Refill: 3  - verapamiL (VERELAN PM) 360 MG 24 hr capsule [Pharmacy Med Name: VERAPAMIL HCL SR CAPS 360MG]; TAKE 1 CAPSULE AT BEDTIME  Dispense: 90 capsule; Refill: 3    If protocol passes may refill for 12 months if within 3 months of last provider visit (or a total of 15 months).

## 2021-06-14 NOTE — PROGRESS NOTES
Chief Complaint   Patient presents with     Foot Pain     left foot pain on the bottom started last night       HPI    Patient is here for moderate left plantar foot pain started immediately yesterday after he stepped on the floor (leveled floor). Pain only comes with weight bearing. No pain at rest. No other injuries. No fever, chills.     ROS: Pertinent ROS noted in HPI.     Allergies   Allergen Reactions     Cat Hair Std Allergenic Ext      Sulfa (Sulfonamide Antibiotics)        Patient Active Problem List   Diagnosis     Anxiety     Hearing Loss     Left Ventricular Hypertrophy     Hiatal Hernia     Lumbar Disc Degeneration     Nicotine Dependence - In Remission     Squamous Cell Carcinoma Of The Skin     Basal Cell Carcinoma Of The Skin     Hyperlipidemia     Obesity     Hypertension     Coronary Artery Disease     Complete Right Bundle Branch Block     Chronic Obstructive Pulmonary Disease     Benign Polyps Of The Large Intestine     Frequency of urination       No family history on file.    Social History     Social History     Marital status:      Spouse name: N/A     Number of children: N/A     Years of education: N/A     Occupational History     Not on file.     Social History Main Topics     Smoking status: Former Smoker     Years: 30.00     Types: Cigarettes     Smokeless tobacco: Never Used     Alcohol use No     Drug use: No     Sexual activity: Not on file     Other Topics Concern     Not on file     Social History Narrative         Objective:     Vitals:    12/05/17 1530   BP: 114/76   Pulse: 73   Temp: 97.8  F (36.6  C)   SpO2: 93%       Gen:NAD  MSK: normal inspection of ankles and feet. Mild tenderness at left mid plantar foot. No pain to palpation of ankles, dorsal feet, toes. Mild limp.   Neuro: intact and symmetric gross sensation of feet.    XR - no acute fractures per my interpretation, discussed during visit. I reviewed official read with patient as well.      Left foot pain  -     XR  Foot Left 3 or More VWS    Likely sprain/contusion. Supportive cares and f/u as directed

## 2021-06-15 NOTE — PROGRESS NOTES
ASSESSMENT:  1. Peripheral edema: This is quite pronounced. He has not noted much improvement with furosemide 20 mg daily, but does note marked urinary frequency. I suspect edema is largely due to venous insufficiency and aggravated by obesity. We will have screening done for other etiologies such as CHF as well. Management was reviewed    2. COPD:  He has no longer smoking. He does note shortness of breath with very minor activity    3. Dermatitis:  He notes a pruritic dermatitis on the trunk and extremities. This has troubled him for at least a year. He was treated for folliculitis by Dr. Bala Hughes, and also received a course of minocycline with no sustained benefit.    4. Obesity:  Weight is stable compared to his last visit in 2018. This is surprising since his peripheral edema is much worse.    5. Aortic outflow murmur:  Echocardiogram revealed mild aortic sclerosis in 2015. Murmur is quite prominent, and echocardiogram will be rechecked    6. Essential hypertension:  Blood pressure tends to be lower than in the past. He is treated with verapamil  mg daily, furosemide 40 mg daily, Cardura 8 mg at bedtime I would favor lowering his verapamil dose.    PLAN:  1. Labs as outlined for medication monitoring, evaluation for various causes of edema, and follow-up of hypercholesterolemia.    2. Try Zyrtec 10 mg twice daily for pruritus.    3. Triamcinolone 0.1% cream. Apply twice daily to involved areas    4. Stop minocycline since it has not been of much benefit    5. Dermatology consult. He may need testing for various allergies    6. Schedule echocardiogram. Evaluate for aortic stenosis.    7. Decrease verapamil to 180 mg in the p.m.    8. Increase furosemide to 40 mg in the a.m. and 40 mg in the afternoon.    9. Increase potassium to 20 mEq twice daily.    10. Elevate legs, low-salt diet, compressive wraps to the knees when up.    11. See in 2 weeks or as needed    Orders Placed This Encounter   Procedures      BNP(B-type Natriuretic Peptide)     HM2(CBC w/o Differential)     Urinalysis-UC if Indicated     Comprehensive Metabolic Panel     Lipid Cascade     Order Specific Question:   Fasting is required?     Answer:   Unknown     Thyroid Stimulating Hormone (TSH)     Ambulatory referral to Dermatology     Referral Priority:   Routine     Referral Type:   Consultation     Referral Reason:   Evaluation and Treatment     Requested Specialty:   Dermatology     Number of Visits Requested:   1     Echo Complete     Standing Status:   Future     Standing Expiration Date:   6/7/2021     Order Specific Question:   Reason for exam?     Answer:   Edema     Medications Discontinued During This Encounter   Medication Reason     furosemide (LASIX) 20 MG tablet Dose adjustment     potassium chloride (K-DUR,KLOR-CON) 10 MEQ tablet Dose adjustment     verapamiL (VERELAN PM) 360 MG 24 hr capsule Dose adjustment       Return in about 2 months (around 5/9/2021) for Recheck.    ASSESSED PROBLEMS:  1. Peripheral edema  furosemide (LASIX) 40 MG tablet    BNP(B-type Natriuretic Peptide)    HM2(CBC w/o Differential)    Urinalysis-UC if Indicated    Comprehensive Metabolic Panel    Thyroid Stimulating Hormone (TSH)   2. Diuretic-induced hypokalemia  potassium chloride (K-DUR,KLOR-CON) 20 MEQ tablet   3. Essential hypertension, benign  verapamiL (CALAN-SR) 180 MG CR tablet   4. Dermatitis  Ambulatory referral to Dermatology   5. Mixed hyperlipidemia  Lipid Cascade   6. Shortness of breath  BNP(B-type Natriuretic Peptide)    Echo Complete   7. Aortic valve stenosis, etiology of cardiac valve disease unspecified  Echo Complete   8. Chronic fatigue  Thyroid Stimulating Hormone (TSH)   9. Morbid obesity with BMI of 40.0-44.9, adult (H)     10. Chronic obstructive pulmonary disease, unspecified COPD type (H)         CHIEF COMPLAINT:  Chief Complaint   Patient presents with     Follow Up     swelling in feet, spot on right foot, skin issue not  "responinging to treatment       HISTORY OF PRESENT ILLNESS:  Nishant is a 86 y.o. male seen with multiple concerns. He last was seen in the clinic and December 2018. He has noted increasing problems with peripheral edema with a blister noted on the lateral aspect of the right foot. He has been started on furosemide, with the dose increased to 40 mg daily after a virtual visit. Edema has persisted.    He has had problems with a diffuse pruritic dermatitis for over a year. He saw Bala Hughes in dermatology consultation 1 year ago and was treated topically for folliculitis. No major improvement was seen. He subsequently was started on minocycline after a \"virtual visit \". He initially noted some improvement, but symptoms returned to baseline. He has difficulty sleeping due to the pruritus    He has a history of COPD and does note shortness of breath with minor activity. He now uses a walker for ambulation.    He acknowledges significant obesity and difficulty losing weight. Weight is about stable compared to December 2018.    He has a history of hypercholesterolemia on pravastatin    He is on doxazosin and verapamil for hypertension. Doxazosin and finasteride are also used for urinary frequency related to prostatic hypertrophy.    REVIEW OF SYSTEMS:    Comprehensive review of systems is otherwise negative.    PFSH:  Retired. Lives with his wife. Formerly interested in travel, but has difficulty now due to health concerns.    TOBACCO USE:  Social History     Tobacco Use   Smoking Status Former Smoker     Years: 30.00     Types: Cigarettes   Smokeless Tobacco Never Used       VITALS:  Vitals:    03/09/21 1113   BP: 126/70   Patient Site: Left Arm   Patient Position: Sitting   Cuff Size: Adult Large   Pulse: 68   SpO2: 94%   Weight: (!) 297 lb (134.7 kg)     Wt Readings from Last 3 Encounters:   03/09/21 (!) 297 lb (134.7 kg)   12/03/18 (!) 298 lb (135.2 kg)   10/05/17 (!) 295 lb 8 oz (134 kg)       PHYSICAL " EXAM:  Constitutional:   Reveals an pleasant obese man who appears short of breath with very minor activity.    Vitals: per nursing notes.  HEENT: Atraumatic  Eyes: No conjunctival hyperemia  Oropharynx:   Mouth and throat clear, no thrush or exudate.  Neck:  Supple, no carotid bruits or adenopathy.  Back:  No spine or CVA pain.  Thorax:  No bony deformities.  Lungs: Diminished air movement. No audible wheezes or focal rales  Cardiac:   Regular rate and rhythm, 2/6 systolic ejection murmur upper left sternal border to aortic area  Breasts:   No masses, no axillary adenopathy.  Abdomen:  Soft, active bowel sounds without bruits, mass, or tenderness. Obese.  Extremities:  2+ peripheral edema with stasis changes, blister at the lateral aspect of the right fifth MTP joint. Some dependent rubor. No foot ulcers.    Skin: Patchy pruritic dermatitis on the trunk and extremities also on the forehead. No vesicles or skin ulcers  Neuro:  Alert and oriented.  No gross focal deficits.  Psychiatric:  Memory intact, mood appropriate.    DATA REVIEWED:  Additional History from Old Records Summarized (2): Chart reviewed. Dermatology note reviewed  Decision to Obtain Records (1): None.   Radiology Tests Summarized or Ordered (1): None.  Labs Reviewed or Ordered (1): Labs reviewed and ordered  Medicine Test Summarized or Ordered (1): Echocardiogram from 2015 reviewed  Independent Review of EKG or X-RAY(2 each): None.    The visit lasted a total of 35 minutes face to face with the patient. Over 50% of the time was spent counseling and educating the patient about management of peripheral edema, hypertension, and evaluation of heart murmur and dermatitis.      Dragon dictation was used for this note. Speech recognition errors are a possibility.     MEDICATIONS:  Current Outpatient Medications   Medication Sig Dispense Refill     aspirin 325 MG tablet Take 325 mg by mouth daily.       budesonide-formoterol (SYMBICORT) 160-4.5  mcg/actuation inhaler USE 2 INHALATIONS TWICE A DAY 3 Inhaler 3     doxazosin (CARDURA) 8 MG tablet TAKE 1 TABLET AT BEDTIME 90 tablet 3     finasteride (PROSCAR) 5 mg tablet TAKE 1 TABLET DAILY 90 tablet 3     LORazepam (ATIVAN) 0.5 MG tablet TAKE 1/2 TO 1 TABLET BY MOUTH THREE TIMES DAILY AS NEEDED FOR ANXIOUSNESS 30 tablet 5     nitroglycerin (NITROSTAT) 0.4 MG SL tablet PLACE ONE TABLET UNDER THE TONGUE EVERY 5 MINUTES AS NEEDED FOR CHEST PAIN 25 tablet 3     pravastatin (PRAVACHOL) 20 MG tablet TAKE 1 TABLET EVERY EVENING 90 tablet 1     furosemide (LASIX) 40 MG tablet Take 1 tablet (40 mg total) by mouth 2 (two) times a day. 180 tablet 3     potassium chloride (K-DUR,KLOR-CON) 20 MEQ tablet Take 1 tablet (20 mEq total) by mouth daily. 30 tablet 11     triamcinolone (KENALOG) 0.1 % cream Apply twice daily to involved area for dermatitis 80 g 1     verapamiL (CALAN-SR) 180 MG CR tablet Take 1 tablet (180 mg total) by mouth at bedtime. 30 tablet 11     No current facility-administered medications for this visit.

## 2021-06-15 NOTE — PROGRESS NOTES
Nishant is a 85 y.o. male contacting the clinic today via video, and would like the invitation sent by: Other e-mail: [email or web address]    Platform used for Video Visit: AmWell    Notified to have Pill Bottles ready, and Pen/Paper for instructions?  Yes  Estimate of how far behind doctor is running? yes     ASSESSMENT:  1.  Peripheral edema  Pérez has noted problems with swelling of the feet and lower legs for the past month.  There is no associated shortness of breath or orthopnea.  He has not had any new medications.  He acknowledges that he has gained weight during the Covid pandemic.  He spends much of his time sitting with his feet down and is quite sedentary.  He is on verapamil.  There is been no recent change in dose.  I suspect peripheral edema is largely due to venous insufficiency and aggravated by obesity and possibly verapamil.  You should have clinic evaluation and exam to make sure CHF or other systemic issues are not contributing factors.    2.  Obesity:  He acknowledges considerable weight gain over the past year    3.  Chronic obstructive pulmonary disease:  He reports respiratory status has been stable    4.  Coronary artery disease:  No recent symptoms to suggest angina    5.  Folliculitis:  He reported improvement 10 dermatitis on the trunk with minocycline, but noted recurrence when he went off of it    Preventive Health Care: Covid vaccination is encouraged    PLAN:  1.  Work on weight loss and low-sodium diet    2.  Compressive stockings to the knees when up.  Elevate legs periodically through the day    3.  Use minocycline for 1 month for folliculitis.  Reassess need for further use at upcoming clinic appointment.    4.  Furosemide 40 mg daily with potassium 20 mEq daily.  Note effect on edema.    5.  .  See for follow-up in 2 to 3 weeks.  He will need monitoring labs done at that time.    6.  Covid vaccine when available        CHIEF COMPLAINT:  Chief Complaint   Patient presents with     Foot  "Swelling     top of feet up to lower leg x several weeks       HISTORY OF PRESENT ILLNESS:  Nishant is a 85 y.o. male contacting the clinic today via video with concern about swelling of his feet and lower legs.  He states this has been present for 1 month.  Edema goes down overnight.  His wife reports that he has had purplish discoloration of the legs and that the skin feels thickened.  He has had a prescription of furosemide 20 mg to be used \"as needed \"for edema in the past.  He has tried this intermittently without much benefit.  He acknowledges that he has had considerable weight gain during the Covid pandemic.   He is quite sedentary, and sits with his legs in a dependent position for prolonged periods of time.  He has a history of COPD.  Respiratory status has been stable.  He has a history of coronary artery disease.  He has not had symptoms suggestive of angina.  He denies orthopnea or PND.  He is on verapamil for hypertension.  There has been no change in dose    REVIEW OF SYSTEMS:   Notes weight gain as noted above.  He was given minocycline at his last virtual visit for symptoms suggestive of folliculitis.  He reports that dermatitis improved with minocycline, but recurred when he went off of it    PFSH:  Social History     Social History Narrative     Not on file     Social Determinants:  .  His wife assists with history    TOBACCO USE:  Social History     Tobacco Use   Smoking Status Former Smoker     Years: 30.00     Types: Cigarettes   Smokeless Tobacco Never Used       VITALS:  There were no vitals filed for this visit.  Wt Readings from Last 3 Encounters:   12/03/18 (!) 298 lb (135.2 kg)   10/05/17 (!) 295 lb 8 oz (134 kg)   07/03/17 (!) 296 lb 6 oz (134.4 kg)       PHYSICAL EXAM:  (observations via Video)  Alert pleasant obese man sitting at a table.  He does not appear short of breath.    MEDICATIONS:   Current Outpatient Medications   Medication Sig Dispense Refill     aspirin 325 MG " tablet Take 325 mg by mouth daily.       budesonide-formoterol (SYMBICORT) 160-4.5 mcg/actuation inhaler USE 2 INHALATIONS TWICE A DAY 3 Inhaler 3     doxazosin (CARDURA) 8 MG tablet TAKE 1 TABLET AT BEDTIME 90 tablet 3     finasteride (PROSCAR) 5 mg tablet TAKE 1 TABLET DAILY 90 tablet 3     furosemide (LASIX) 20 MG tablet One tablet daily as needed for edema 90 tablet 3     LORazepam (ATIVAN) 0.5 MG tablet TAKE 1/2 TO 1 TABLET BY MOUTH THREE TIMES DAILY AS NEEDED FOR ANXIOUSNESS 30 tablet 5     nitroglycerin (NITROSTAT) 0.4 MG SL tablet PLACE ONE TABLET UNDER THE TONGUE EVERY 5 MINUTES AS NEEDED FOR CHEST PAIN 25 tablet 3     potassium chloride (K-DUR,KLOR-CON) 10 MEQ tablet TAKE 1 TABLET BY MOUTH DAILY AS NEEDED WHEN FUROSEMIDE IS USED 90 tablet 3     pravastatin (PRAVACHOL) 20 MG tablet TAKE 1 TABLET EVERY EVENING 90 tablet 1     verapamiL (VERELAN PM) 360 MG 24 hr capsule TAKE 1 CAPSULE AT BEDTIME 90 capsule 3     No current facility-administered medications for this visit.              I      Video Start time:  4:20  Video End time:  4:45  Face to face plus orders:  28 minutes  Documentation time:  5 minutes    The visit lasted a total of 33 minutes       Patient has given verbal consent to a Video visit?  Yes  How would you like to obtain your AVS?  MyChart  If dropped by the video visit, the video invitation should be sent to: Text to cell phone: [phone]  Will anyone else be joining your video visit?  Wife Daniela     Video-Visit Details    Type of service:  Video Visit  Originating Location (pt. Location): Home  Distant Location (provider location):   [facility]       Aditya Mehta MD

## 2021-06-15 NOTE — PATIENT INSTRUCTIONS - HE
1.  Try Zyrtec 10 mg twice daily for itching    2. Triamcinolone 0.1 % cream  Twice daily to involved areas.    3.  Dermatology consult.    4.  Schedule echocardiogram.  Evaluate for aortic stenosis.    5.  Increase furosemide to 40 mg in the morning and 40 mg in the afternoon.    6.  Decrease verapimil to 180 mg in the PM    7.  I will notify you of test results    8.  See in 2 weeks.    9.  Increase potassium to 20 MEQ twice daily.

## 2021-06-15 NOTE — PATIENT INSTRUCTIONS - HE
1.  Furosemide 40 mg daily    2.  Take potassium 20 mEq daily    3.  Low-salt diet.  Leg elevation.  Work on weight loss.    4.  Covid vaccine when available    5.  See in 2 to 3 weeks.    6.  Compressive stockings to the knees.  On in the a.m.  Of in the PM    7.  Use minocycline for 1 month for folliculitis.  Reassess need for use at that time.

## 2021-06-16 PROBLEM — L12.0 BULLOUS PEMPHIGOID (H): Status: ACTIVE | Noted: 2021-06-03

## 2021-06-16 PROBLEM — I87.2 VENOUS (PERIPHERAL) INSUFFICIENCY: Status: ACTIVE | Noted: 2021-05-06

## 2021-06-16 PROBLEM — E66.01 MORBID OBESITY WITH BMI OF 40.0-44.9, ADULT (H): Status: ACTIVE | Noted: 2018-12-05

## 2021-06-16 PROBLEM — R35.0 FREQUENCY OF URINATION: Status: ACTIVE | Noted: 2017-07-21

## 2021-06-16 NOTE — TELEPHONE ENCOUNTER
Telephone Encounter by Aletha Barroso CMA at 5/20/2019 11:14 AM     Author: Alteha Barroso CMA Service: -- Author Type: Certified Medical Assistant    Filed: 5/20/2019 11:19 AM Encounter Date: 5/18/2019 Status: Signed    : Aletha Barroso CMA (Certified Medical Assistant)       Medication: Lorazepam   Last Date Filled 2/25/19   pulled: YES    Only PCP Prescribing? : YES  Taken as prescribed from physician notes? YES    CSA in last year: YES  Random Utox in last year: YES - Urine was negative for Benzos   (if any of the above answer NO - schedule with PCP)     Opioids + benzodiazepines? NO  (if the above answer YES - schedule with PCP every 6 months)     Is patient on the Executive Review Team? NO      All responses suggest: Scheduling with PCP for further intervention

## 2021-06-16 NOTE — PATIENT INSTRUCTIONS - HE
1.  Stop verapamil    2.  Start Losartan 100 mg daily    3.  Increase furosemide to 80 mg in the AM, 40 mg in the afternoon.    4.  See in three to four weeks.    5.  Schedule echocardiogram for follow-up of aortic stenosis

## 2021-06-16 NOTE — PROGRESS NOTES
Pérez: Potassium, kidney tests, and other labs look good.  Adjust medications as we discussed in clinic.  It was nice to see you.    Aditya Mehta MD

## 2021-06-16 NOTE — TELEPHONE ENCOUNTER
RN cannot approve Refill Request    RN can NOT refill this medication Protocol failed and NO refill given. Last office visit: 3/29/2021 Aditya Mehta MD Last Physical: Visit date not found Last MTM visit: Visit date not found Last visit same specialty: 3/29/2021 Aditya Mehta MD.  Next visit within 3 mo: Visit date not found  Next physical within 3 mo: Visit date not found      Yari Anne, [facility] Triage/Med Refill 4/16/2021    Requested Prescriptions   Pending Prescriptions Disp Refills     budesonide-formoteroL (SYMBICORT) 160-4.5 mcg/actuation inhaler [Pharmacy Med Name: SYMBICORT INH AEROSOL 160/4.5MCG] 1 Inhaler 3     Sig: USE 2 INHALATIONS TWICE A DAY       There is no refill protocol information for this order

## 2021-06-16 NOTE — PROGRESS NOTES
ASSESSMENT:  1.  Peripheral edema:  BNP was in the normal range when last checked.  I suspect edema is largely due to venous insufficiency and aggravated by morbid obesity.  His verapamil dose was decreased at last visit.  I would favor going off of it completely since the calcium channel blocker could contribute to edema.  He is using furosemide 40 mg twice daily.  The dose will be increased further.  He was encouraged to continue leg elevation and compressive wraps.    2.  Morbid obesity:  The importance of weight loss and long-term management was emphasized    3.  Essential hypertension:  Blood pressure remains acceptable on the lower verapamil dose.  I would favored totally going off of it with losartan as an alternate.    4.  Aortic stenosis:  This should be monitored with serial echocardiograms.    5.  Dermatitis:  He notes transient improvement with Zyrtec and topical triamcinolone, but the dermatitis has not resolved.  He does have an upcoming dermatology appointment.  PLAN:  1.  Stop verapamil.    2.  Start losartan 100 mg daily.    3.  Increase furosemide to 80 mg in a.m., 40 mg in the afternoon.  Keep potassium supplement the same for now.    4.  See in 3 to 4 weeks    5.  Continue leg elevation and compressive stockings    6.  Schedule echocardiogram for follow-up of aortic stenosis.    Orders Placed This Encounter   Procedures     Basic Metabolic Panel     Echo Complete     Standing Status:   Future     Standing Expiration Date:   6/27/2021     Order Specific Question:   Reason for exam?     Answer:   Aortic stenosis     Medications Discontinued During This Encounter   Medication Reason     verapamiL (CALAN-SR) 180 MG CR tablet Alternate therapy     furosemide (LASIX) 40 MG tablet        Return in about 4 weeks (around 4/26/2021) for Recheck.    ASSESSED PROBLEMS:  1. Essential hypertension, benign  Basic Metabolic Panel    DISCONTINUED: losartan (COZAAR) 100 MG tablet   2. Peripheral edema  furosemide  "(LASIX) 40 MG tablet    Basic Metabolic Panel   3. Aortic valve stenosis, etiology of cardiac valve disease unspecified  Echo Complete   4. Morbid obesity with BMI of 40.0-44.9, adult (H)     5. Dermatitis         CHIEF COMPLAINT:  Chief Complaint   Patient presents with     Follow-up       HISTORY OF PRESENT ILLNESS:  Nishant is a 86 y.o. male seen for follow-up of peripheral edema.  He last was seen on 3/9.  BNP was in the normal range.  Hemogram, thyroid function and other labs were largely unremarkable.  Furosemide 40 mg twice daily was advised.  His verapamil dose was decreased due to concerns that it could contribute to edema.  He states he has been trying to elevate the legs and use compressive stockings.  Edema remains an issue particularly on the right foot.  Has had some blisters noted.    Uses a walker for ambulation and is quite sedentary.  BMI is substantially above goal at 39.73.    He has a history of COPD.  He notes shortness of breath with minor activity.  O2 saturation is 92% on room air.    He has been using Zyrtec and topical triamcinolone for pruritic dermatitis.  This is of temporary benefit, but dermatitis comes back rapidly if he misses treatment    REVIEW OF SYSTEMS:    Comprehensive review of systems is negative.    PFSH:   and retired.  Lives with his wife.  Former avid traveler.  Has remained home due to Covid and health concerns    TOBACCO USE:  Social History     Tobacco Use   Smoking Status Former Smoker     Years: 30.00     Types: Cigarettes   Smokeless Tobacco Never Used       VITALS:  Vitals:    03/29/21 1301   BP: 134/74   Patient Site: Left Arm   Patient Position: Sitting   Cuff Size: Adult Regular   Pulse: 70   SpO2: 92%   Weight: (!) 297 lb (134.7 kg)   Height: 6' 0.5\" (1.842 m)     Wt Readings from Last 3 Encounters:   03/29/21 (!) 297 lb (134.7 kg)   03/09/21 (!) 297 lb (134.7 kg)   12/03/18 (!) 298 lb (135.2 kg)       PHYSICAL EXAM:  Constitutional:   Reveals an alert " pleasant obese man.  He appears short of breath with minor activity.  He uses a walker for ambulation, Vitals: per nursing notes.  HEENT: Atraumatic  Eyes:   no conjunctival hyperemia  Oropharynx:   Mouth and throat clear, no thrush or exudate.  Neck:  Supple, no carotid bruits or adenopathy.  Back:  No spine or CVA pain.  Thorax:  No bony deformities.  Lungs:  diminished air movement.  No audible wheezes  Cardiac:   Regular rate and rhythm.  2/6 systolic ejection murmur left sternal border to aortic area  Abdomen:  Soft, active bowel sounds without bruits, mass, or tenderness.  Obese.  Extremities:   2+ peripheral edema,  most prominent dorsal aspect right foot.  Small blister noted there.    Skin: Patchy erythematous eruption with excoriation  Neuro:  Alert and oriented.   No gross focal deficits.  Psychiatric:  Memory intact, mood appropriate.    DATA REVIEWED:  Additional History from Old Records Summarized (2): Records reviewed  Decision to Obtain Records (1): None.   Radiology Tests Summarized or Ordered (1): None.  Labs Reviewed or Ordered (1): Labs reviewed and ordered  Medicine Test Summarized or Ordered (1): Echocardiogram reviewed and ordered  Independent Review of EKG or X-RAY(2 each): None.    The visit lasted a total of 25 minutes face to face with the patient. Over 50% of the time was spent counseling and educating the patient about management of peripheral edema.    Dragon dictation was used for this note. Speech recognition errors are a possibility.     MEDICATIONS:  Current Outpatient Medications   Medication Sig Dispense Refill     aspirin 325 MG tablet Take 325 mg by mouth daily.       budesonide-formoterol (SYMBICORT) 160-4.5 mcg/actuation inhaler USE 2 INHALATIONS TWICE A DAY 3 Inhaler 3     doxazosin (CARDURA) 8 MG tablet TAKE 1 TABLET AT BEDTIME 90 tablet 3     finasteride (PROSCAR) 5 mg tablet TAKE 1 TABLET DAILY 90 tablet 3     furosemide (LASIX) 40 MG tablet Two tabs in AM, one in the  afternoon 270 tablet 3     LORazepam (ATIVAN) 0.5 MG tablet TAKE 1/2 TO 1 TABLET BY MOUTH THREE TIMES DAILY AS NEEDED FOR ANXIOUSNESS 30 tablet 5     nitroglycerin (NITROSTAT) 0.4 MG SL tablet PLACE ONE TABLET UNDER THE TONGUE EVERY 5 MINUTES AS NEEDED FOR CHEST PAIN 25 tablet 3     potassium chloride (K-DUR,KLOR-CON) 20 MEQ tablet Take 1 tablet (20 mEq total) by mouth daily. 30 tablet 11     pravastatin (PRAVACHOL) 20 MG tablet TAKE 1 TABLET EVERY EVENING 90 tablet 1     triamcinolone (KENALOG) 0.1 % cream Apply twice daily to involved area for dermatitis 80 g 1     losartan (COZAAR) 100 MG tablet Take 1 tablet (100 mg total) by mouth daily. 30 tablet 11     No current facility-administered medications for this visit.

## 2021-06-16 NOTE — TELEPHONE ENCOUNTER
"Telephone Encounter by Kinsey Salas CMA at 2/25/2019 11:43 AM     Author: Kinsey Salas CMA Service: -- Author Type: Certified Medical Assistant    Filed: 2/25/2019 11:46 AM Encounter Date: 2/22/2019 Status: Signed    : Kinsey Salas CMA (Certified Medical Assistant)       Medication: Lorazepam  Last Date Filled 12/4/2018 for 30 tablets   pulled: YES     Only PCP Prescribing? : YES  Taken as prescribed from physician notes? YES- 12/3/2018- 2.  Medication management:  Uses lorazepam intermittently for insomnia or anxiety.  Use seems appropriate.  He denies adverse effects.  Controlled substance agreement was discussed and filled out.  He will have a urine tox screen sent per clinic policy.  3.  Anxiety disorder: Uses lorazepam \"as needed \".  Use does not seem excessive.  Controlled substance agreement was discussed and filled out as above.    CSA in last year: YES  Random Utox in last year: YES  (if any of the above answer NO - schedule with PCP)     Opioids + benzodiazepines? NO  (if the above answer YES - schedule with PCP every 6 months)     All responses suggest: Refilling prescription         "

## 2021-06-17 NOTE — PATIENT INSTRUCTIONS - HE
1.  Dermatology consult for probable bullous pemphigoid.    2.  Start prednisone 20 mg.  Three tabs daily    3.  I will notify you of test results.    4.  Report status in one week.  See in one month

## 2021-06-17 NOTE — PROGRESS NOTES
Pérez: Labs all look fairly normal aside from prominent eosinophilia.  Eosinophils tend to be elevated with allergic reactions.  I hope the prednisone is helping with the dermatitis and blistering.    Aditya Mehta MD

## 2021-06-17 NOTE — PROGRESS NOTES
ASSESSMENT:  1.  Bullous pemphigoid;  Pérez earlier was seen with massive peripheral edema with some blisters on the lower legs which seem primarily due to edema.. He now has new much more prominent blisters on the lower legs, and also some blisters on the hands and arms.  This seems most consistent with bullous pemphigoid.  He also has an erythematous pruritic eruption which suggests an allergic component.  He will be placed on high-dose steroids, and will have a dermatology consult ordered.    2.  Venous insufficiency;  Peripheral edema actually has improved despite the worsening blisters.    3.  Morbid obesity:  This is a contributing factor to peripheral edema    4.  Essential hypertension;  Blood pressure is acceptable on current regimen    PLAN:  1.  Labs as outlined.  He will be notified of test results.    2.  Dermatology consultation to evaluate for bullous dermatitis.    3.  Start prednisone 20 mg.  3 tabs daily pending dermatology consult.    4.  Labs as outlined.  I will notify you of test results.    5.  Report the effect of prednisone on symptoms in 1 week.  See in 1 month.    Orders Placed This Encounter   Procedures     Complement, C'3     Complement, C'4     Complement, Total     Antinuclear Antibodies Screen (BLACK)     Erythrocyte Sedimentation Rate     Electrophoresis, Protein, Serum     Basic Metabolic Panel     HM1 (CBC with Diff)     CK Total     Manual Differential     There are no discontinued medications.    Return in about 4 weeks (around 6/1/2021).    ASSESSED PROBLEMS:  1. Bullous pemphigoid  Complement, C'3    Complement, C'4    Complement, Total    Antinuclear Antibodies Screen (BLACK)    Erythrocyte Sedimentation Rate    Electrophoresis, Protein, Serum    HM1(CBC and Differential)    Basic Metabolic Panel    predniSONE (DELTASONE) 20 MG tablet    CK Total   2. Morbid obesity with BMI of 40.0-44.9, adult (H)     3. Essential hypertension     4. Venous (peripheral) insufficiency          CHIEF COMPLAINT:  Chief Complaint   Patient presents with     Follow-up     f/u from 03/29 appt        HISTORY OF PRESENT ILLNESS:  Nishant is a 86 y.o. male seen with his daughter for evaluation of prominent blistering dermatitis.  At last visit, he primarily complained of massive peripheral edema with some blisters on the lower legs.  Was placed on diuretics.  Edema has improved, but he now has worsening blisters on the feet and lower legs and also on the hands.  He also notes pruritic dermatitis on the legs, lower back, and hands and arms.  He has not had blisters on the face, mouth, back, or abdomen.  He has been taking Zyrtec and using topical triamcinolone.    REVIEW OF SYSTEMS:    Comprehensive review of systems is otherwise negative.    PFSH:   and retired.  Likes to travel, but activity has been limited due to health concerns and Covid.    TOBACCO USE:  Social History     Tobacco Use   Smoking Status Former Smoker     Years: 30.00     Types: Cigarettes   Smokeless Tobacco Never Used       VITALS:  Vitals:    05/04/21 1255   BP: 130/64   Patient Site: Left Arm   Patient Position: Sitting   Cuff Size: Adult Large   Pulse: 74   Temp: 97.3  F (36.3  C)   TempSrc: Tympanic   SpO2: 92%     Wt Readings from Last 3 Encounters:   03/29/21 (!) 297 lb (134.7 kg)   03/09/21 (!) 297 lb (134.7 kg)   12/03/18 (!) 298 lb (135.2 kg)       PHYSICAL EXAM:  Constitutional:   Reveals an alert pleasant talkative male.  He does appear rather frail and chronically ill.   Vitals: per nursing notes.  HEENT: Atraumatic.    Eyes: No conjunctival hyperemia  Oropharynx:   Mouth and throat clear, no thrush or exudate.  No mouth blisters  Neck:  Supple, no carotid bruits or adenopathy.  Back:  No spine or CVA pain.  Thorax:  No bony deformities.  Lungs: Somewhat diminished air movement.  No wheezes  Cardiac:   Regular rate and rhythm, normal S1, S2, no murmur or gallop.  Abdomen:  Soft, active bowel sounds without bruits,  mass, or tenderness.  Obese  Extremities: 2+ edema.  Prominent flaccid blisters over the feet and lower legs.  Also swelling and blisters on the hands and forearms    Skin: Erythematous patchy dermatitis with some excoriation on the lower back and face.  Erythematous dermatitis with swelling and flaccid blisters on the hands, lower legs, and forearms  Neuro:  Alert and oriented  No gross focal deficits.  Psychiatric:  Memory intact, mood appropriate.    DATA REVIEWED:  Additional History from Old Records Summarized (2): Chart reviewed  Decision to Obtain Records (1): None.   Radiology Tests Summarized or Ordered (1): None.  Labs Reviewed or Ordered (1): Labs reviewed and ordered  Medicine Test Summarized or Ordered (1): None.   Independent Review of EKG or X-RAY(2 each): None.    The visit lasted a total of 30 minutes face to face with the patient. Over 50% of the time was spent counseling and educating the patient about evaluation of bullous dermatitis      Dragon dictation was used for this note. Speech recognition errors are a possibility.     MEDICATIONS:  Current Outpatient Medications   Medication Sig Dispense Refill     aspirin 325 MG tablet Take 325 mg by mouth daily.       budesonide-formoteroL (SYMBICORT) 160-4.5 mcg/actuation inhaler USE 2 INHALATIONS TWICE A DAY 1 Inhaler 3     doxazosin (CARDURA) 8 MG tablet TAKE 1 TABLET AT BEDTIME 90 tablet 3     finasteride (PROSCAR) 5 mg tablet TAKE 1 TABLET DAILY 90 tablet 3     furosemide (LASIX) 40 MG tablet Two tabs in AM, one in the afternoon 270 tablet 3     LORazepam (ATIVAN) 0.5 MG tablet TAKE 1/2 TO 1 TABLET BY MOUTH THREE TIMES DAILY AS NEEDED FOR ANXIOUSNESS 30 tablet 5     losartan (COZAAR) 100 MG tablet Take 1 tablet (100 mg total) by mouth daily. 30 tablet 11     nitroglycerin (NITROSTAT) 0.4 MG SL tablet PLACE ONE TABLET UNDER THE TONGUE EVERY 5 MINUTES AS NEEDED FOR CHEST PAIN 25 tablet 3     potassium chloride (K-DUR,KLOR-CON) 20 MEQ tablet Take 1  tablet (20 mEq total) by mouth daily. 30 tablet 11     pravastatin (PRAVACHOL) 20 MG tablet TAKE 1 TABLET EVERY EVENING 90 tablet 1     triamcinolone (KENALOG) 0.1 % cream Apply twice daily to involved area for dermatitis 80 g 1     predniSONE (DELTASONE) 20 MG tablet Three tabs daily. 90 tablet 1     No current facility-administered medications for this visit.

## 2021-06-18 ENCOUNTER — RECORDS - HEALTHEAST (OUTPATIENT)
Dept: ADMINISTRATIVE | Facility: CLINIC | Age: 86
End: 2021-06-18

## 2021-06-20 NOTE — LETTER
Letter by Aditya Mehta MD at      Author: Aditya Mehta MD Service: -- Author Type: --    Filed:  Encounter Date: 7/31/2020 Status: (Other)         July 31, 2020     Patient: Nishant Olivo   YOB: 1935   Date of Visit: 7/31/2020       To Whom It May Concern:    Daniela Llanos is  to my patient Nishant Olivo, and assists him with many activities of daily living.  He is 85 years old, and has a medical history including chronic obstructive pulmonary disease and coronary artery disease.  He would be at high risk for complications if he ever developed a Covid-19 infection.  His wife is a .  I feel it would be medically  best for her to continue working from home to limit Pérez's risk for COVID exposure.    If you have any questions or concerns, please don't hesitate to call.    Sincerely,        Electronically signed by Aditya Mehta MD

## 2021-06-21 NOTE — LETTER
Letter by Aditya Mehta MD at      Author: Aditya Mehta MD Service: -- Author Type: --    Filed:  Encounter Date: 3/30/2021 Status: (Other)         Nishant Olivo  [address] [place] [address]             March 30, 2021         Dear Mr. Olivo,    Below are the results from your recent visit:    Resulted Orders   Basic Metabolic Panel   Result Value Ref Range    Sodium 141 136 - 145 mmol/L    Potassium 4.7 3.5 - 5.0 mmol/L    Chloride 100 98 - 107 mmol/L    CO2 30 22 - 31 mmol/L    Anion Gap, Calculation 11 5 - 18 mmol/L    Glucose 109 70 - 125 mg/dL    Calcium 9.2 8.5 - 10.5 mg/dL    BUN 27 8 - 28 mg/dL    Creatinine 1.05 0.70 - 1.30 mg/dL    GFR MDRD Af Amer >60 >60 mL/min/1.73m2    GFR MDRD Non Af Amer >60 >60 mL/min/1.73m2    Narrative    Fasting Glucose reference range is 70-99 mg/dL per  American Diabetes Association (ADA) guidelines.       Pérez: Potassium, kidney tests, and other labs look good.  Adjust medications as we discussed in clinic.  It was nice to see you.     Please call with questions or contact us using [employer].    Sincerely,        Electronically signed by Aditya Mehta MD

## 2021-06-21 NOTE — LETTER
Letter by Aditya Mehta MD at      Author: Aditya Mehta MD Service: -- Author Type: --    Filed:  Encounter Date: 3/9/2021 Status: (Other)         Nishant Olivo  [address] [place] [address]             March 9, 2021         Dear Mr. Olivo,    Below are the results from your recent visit:    Resulted Orders   BNP(B-type Natriuretic Peptide)   Result Value Ref Range    BNP 30 0 - 93 pg/mL   HM2(CBC w/o Differential)   Result Value Ref Range    WBC 9.9 4.0 - 11.0 thou/uL    RBC 5.35 4.40 - 6.20 mill/uL    Hemoglobin 16.0 14.0 - 18.0 g/dL    Hematocrit 50.2 40.0 - 54.0 %    MCV 94 80 - 100 fL    MCH 29.9 27.0 - 34.0 pg    MCHC 31.9 (L) 32.0 - 36.0 g/dL    RDW 13.0 11.0 - 14.5 %    Platelets 178 140 - 440 thou/uL    MPV 10.0 7.0 - 10.0 fL   Urinalysis-UC if Indicated   Result Value Ref Range    Color, UA Yellow Colorless, Yellow, Straw, Light Yellow    Clarity, UA Clear Clear    Glucose, UA Negative Negative    Bilirubin, UA Negative Negative    Ketones, UA Negative Negative    Specific Gravity, UA 1.025 1.005 - 1.030    Blood, UA Negative Negative    pH, UA 5.5 5.0 - 8.0    Protein, UA Negative Negative    Urobilinogen, UA 0.2 E.U./dL 0.2 E.U./dL, 1.0 E.U./dL    Nitrite, UA Negative Negative    Leukocytes, UA Negative Negative    Narrative    Microscopic not indicated  UC not indicated   Comprehensive Metabolic Panel   Result Value Ref Range    Sodium 139 136 - 145 mmol/L    Potassium 4.6 3.5 - 5.0 mmol/L    Chloride 102 98 - 107 mmol/L    CO2 29 22 - 31 mmol/L    Anion Gap, Calculation 8 5 - 18 mmol/L    Glucose 101 70 - 125 mg/dL    BUN 26 8 - 28 mg/dL    Creatinine 0.88 0.70 - 1.30 mg/dL    GFR MDRD Af Amer >60 >60 mL/min/1.73m2    GFR MDRD Non Af Amer >60 >60 mL/min/1.73m2    Bilirubin, Total 0.7 0.0 - 1.0 mg/dL    Calcium 9.0 8.5 - 10.5 mg/dL    Protein, Total 6.7 6.0 - 8.0 g/dL    Albumin 3.8 3.5 - 5.0 g/dL    Alkaline Phosphatase 120 45 - 120 U/L    AST 20 0 - 40 U/L    ALT 20 0 - 45 U/L     Narrative    Fasting Glucose reference range is 70-99 mg/dL per  American Diabetes Association (ADA) guidelines.   Lipid Cascade   Result Value Ref Range    Cholesterol 136 <=199 mg/dL    Triglycerides 91 <=149 mg/dL    HDL Cholesterol 53 >=40 mg/dL    LDL Calculated 65 <=129 mg/dL    Patient Fasting > 8hrs? No    Thyroid Stimulating Hormone (TSH)   Result Value Ref Range    TSH 1.71 0.30 - 5.00 uIU/mL        Pérez: The BNP, (heart test), is in the normal range.  Suggest that your leg swelling is due to venous insufficiency and not a cardiac problem.  The TSH, (thyroid-stimulating hormone), is in the normal range.  Beds are good with total cholesterol 136 and an LDL fraction of 65.  Continue the pravastatin.  Other labs including your potassium, blood sugar, hemoglobin, liver and kidney tests are normal.  It was nice to see you.  Let me know how you do with the medication changes.     Please call with questions or contact us using [employer].    Sincerely,        Electronically signed by Aditya Mehta MD

## 2021-06-22 NOTE — PROGRESS NOTES
"ASSESSMENT:  1.  Knee pain due to advanced osteoarthritis: Left knee troubles him more than the right.  X-ray reveals advanced degenerative change involving all 3 compartments.  Management options were discussed, he is interested in a steroid injection which will be given today.    2.  Medication management:  Uses lorazepam intermittently for insomnia or anxiety.  Use seems appropriate.  He denies adverse effects.  Controlled substance agreement was discussed and filled out.  He will have a urine tox screen sent per clinic policy.    3.  Anxiety disorder: Uses lorazepam \"as needed \".  Use does not seem excessive.  Controlled substance agreement was discussed and filled out as above.    4.  Hypercholesterolemia: He is on low-dose pravastatin.  He has a previous diagnosis of coronary artery disease, although no recent issues.  Lipid cascade will be checked    5.  Essential hypertension: Control is good on current regimen    6.  Morbid obesity: He understands that this is a risk for worsening knee and low back pain.  Further efforts at weight loss were encouraged    7.  Obstructive uropathy: On finasteride and doxazosin.  He still notes urinary frequency and urgency.    8.  COPD: On Symbicort.  Symptoms seem stable    PLAN:  1.  Left knee x-ray was done and reviewed    2.  After informed consent and sterilely prepping, the left knee was injected with 80 mg of Depo-Medrol with 2 cc of 2% lidocaine.  He tolerated the procedure well.  Report the effect of the injection and pain in 2 weeks.  Right knee could be done at a later date if he has a good response to this injection.    3.  Labs as outlined.  He will be notified of test results    4.  Pneumo-23 booster for health maintenance    5.  Lorazepam was reordered.    6.  Clinic follow-up 6 months or as needed.    Orders Placed This Encounter   Procedures     XR Knee Left 1 or 2 VWS     Standing Status:   Future     Number of Occurrences:   1     Standing Expiration Date:  "  12/3/2019     Order Specific Question:   Can the procedure be changed per Radiologist protocol?     Answer:   Yes     Pneumococcal polysaccharide vaccine 23-valent 3 yo or older, subq/IM     Drugs of Abuse 1,Urine     Lipid Cascade     Order Specific Question:   Fasting is required?     Answer:   Unknown     Comprehensive Metabolic Panel     HM2(CBC w/o Differential)     Medications Discontinued During This Encounter   Medication Reason     furosemide (LASIX) 20 MG tablet Duplicate order     fluticasone (FLONASE) 50 mcg/actuation nasal spray Therapy completed     LORazepam (ATIVAN) 1 MG tablet Reorder     Administrations This Visit     methylPREDNISolone acetate injection 80 mg (DEPO-MEDROL)     Admin Date  12/03/2018 Action  Given Dose  80 mg Route  Intra-articular Administered By  Aditya Mehta MD                  ASSESSED PROBLEMS:  1. Medication management  Drugs of Abuse 1,Urine    Comprehensive Metabolic Panel    HM2(CBC w/o Differential)   2. Anxiety  LORazepam (ATIVAN) 1 MG tablet   3. Primary osteoarthritis of left knee  XR Knee Left 1 or 2 VWS    methylPREDNISolone acetate injection 80 mg (DEPO-MEDROL)   4. Healthcare maintenance  Pneumococcal polysaccharide vaccine 23-valent 3 yo or older, subq/IM   5. Hypercholesterolemia  Lipid Cascade       CHIEF COMPLAINT:  Chief Complaint   Patient presents with     Medication Management     follow up, discuss med, need update CSA?Utox, Ca pend the order     Hypertension     follow up     Knee Pain     X 2 weeks of Bilaterial knee pain, left side is worse     Fatigue     feels weekness       HISTORY OF PRESENT ILLNESS:  Nishant is a 83 y.o. male seen with multiple concerns.  His major concern is knee pain.  This is a chronic problem with worsening over the past 2 weeks.  He particularly notes left knee discomfort aggravated by walking.  There is no recent injury.  He is quite sedentary.  He has used Aleve for discomfort with suboptimal relief of symptoms.    He  remains on Pravachol for hypercholesterolemia.  He has a past history of ischemic heart disease, but no recent anginal symptoms    He uses lorazepam for anxiousness and sometimes for insomnia.  Use overall does not seem excessive.  A controlled substance agreement was discussed and filled out.  He will have a urine tox screen done per clinic policy    Blood pressure is under good control on current regimen    He is on finasteride and doxazosin due to urinary frequency and urgency.  Symptoms overall have been stable.  He still does have significant symptoms    He acknowledges difficulty controlling his weight.  He understands that this is a risk factor for worsening knee and low back pain    He is not smoking.  He remains on Symbicort for chronic obstructive lung disease    REVIEW OF SYSTEMS:    Comprehensive review of systems is negative except as mentioned    PFSH:  Retired.  He and his wife are [employer].  He has upcoming trips planned to [place] and death of family.  His wife is going to [place] as well.  He is not going there  due to his difficulties with walking    TOBACCO USE:  Social History     Tobacco Use   Smoking Status Former Smoker     Years: 30.00     Types: Cigarettes   Smokeless Tobacco Never Used       VITALS:  Vitals:    12/03/18 1513   BP: 130/70   Patient Site: Left Arm   Patient Position: Sitting   Cuff Size: Adult Large   Pulse: 70   SpO2: 92%   Weight: (!) 298 lb (135.2 kg)     Wt Readings from Last 3 Encounters:   12/03/18 (!) 298 lb (135.2 kg)   10/05/17 (!) 295 lb 8 oz (134 kg)   07/03/17 (!) 296 lb 6 oz (134.4 kg)       PHYSICAL EXAM:  Constitutional:   Reveals an alert obese male who ambulates with an antalgic gait.  He needs assistance to get up on the exam table vitals: per nursing notes.  HEENT: Atraumatic.    Eyes:  EOMs full, PERRL.  Oropharynx:   Mouth and throat clear, no thrush or exudate.  Neck:  Supple, no carotid bruits or adenopathy.  Back:  No spine or CVA pain.  Thorax:  No  bony deformities.  Lungs: Poor air movement but no audible wheezes or rales  Cardiac:   Regular rate and rhythm, normal S1, S2, no murmur or gallop.  Abdomen:  Soft, active bowel sounds without bruits, mass, or tenderness.  Obese with apple shape habitus  Extremities: Moderate crepitance range of motion of both knees.  Stasis changes lower legs with trace ankle edema  Skin:  No jaundice, peripheral cyanosis or lesions to suggest malignancy.  Alvino facial complexion  Neuro:  Alert and oriented.   No gross focal deficits.  Psychiatric:  Memory intact, mood appropriate.    DATA REVIEWED:  Additional History from Old Records Summarized (2): None.  Decision to Obtain Records (1): None.   Radiology Tests Summarized or Ordered (1): None.  Labs Reviewed or Ordered (1): None.  Medicine Test Summarized or Ordered (1): None.   Independent Review of EKG or X-RAY(2 each): None.    The visit lasted a total of 30minutes face to face with the patient. Over 50% of the time was spent counseling and educating the patient about management of knee pain due to osteoarthritis..    .    Dragon dictation was used for this note. Speech recognition errors are a possibility.     MEDICATIONS:  Current Outpatient Medications   Medication Sig Dispense Refill     aspirin 325 MG tablet Take 325 mg by mouth daily.       budesonide-formoterol (SYMBICORT) 160-4.5 mcg/actuation inhaler USE 2 INHALATIONS TWICE A DAY 3 Inhaler 3     doxazosin (CARDURA) 8 MG tablet Take 1 tablet (8 mg total) by mouth at bedtime. 90 tablet 3     finasteride (PROSCAR) 5 mg tablet Take 1 tablet (5 mg total) by mouth daily. 90 tablet 3     furosemide (LASIX) 20 MG tablet Take 20 mg by mouth daily.       LORazepam (ATIVAN) 1 MG tablet 1/2-1 tab 3 times daily as needed for anxiousness. 30 tablet 0     montelukast (SINGULAIR) 10 mg tablet Take 1 tablet (10 mg total) by mouth at bedtime. 30 tablet 6     naproxen sodium (ALEVE) 220 MG tablet Take 440 mg by mouth as needed for pain  (arthritis pain).       nitroglycerin (NITROSTAT) 0.4 MG SL tablet Place 1 tablet (0.4 mg total) under the tongue every 5 (five) minutes as needed for chest pain. 100 tablet 0     potassium chloride SA (K-DUR,KLOR-CON) 10 MEQ tablet TAKE 1 TABLET BY MOUTH DAILY AS NEEDED WHEN FUROSEMIDE IS USED 90 tablet 1     pravastatin (PRAVACHOL) 20 MG tablet Take 1 tablet (20 mg total) by mouth every evening. 90 tablet 3     verapamil (VERELAN PM) 360 MG 24 hr capsule Take 1 capsule (360 mg total) by mouth at bedtime. 90 capsule 3     No current facility-administered medications for this visit.

## 2021-06-24 NOTE — TELEPHONE ENCOUNTER
Controlled Substance Refill Request  Medication:   Requested Prescriptions     Pending Prescriptions Disp Refills     LORazepam (ATIVAN) 1 MG tablet 30 tablet 0     Si/2-1 tab 3 times daily as needed for anxiousness     Date Last Fill: 12/3/18  Pharmacy: [facility] [id number]   Submit electronically to pharmacy  Controlled Substance Agreement on File:   Encounter-Level CSA Scan Date - 2017:    Scan on 2017  1:46 PM (below)         Last office visit: Last office visit pertaining to requested medication was 12/3/18.

## 2021-06-25 NOTE — TELEPHONE ENCOUNTER
Refill Approved    Rx renewed per Medication Renewal Policy. Medication was last renewed on 11/30/20, last OV 5/4/21.    Yari Anne, [facility] Triage/Med Refill 5/29/2021     Requested Prescriptions   Pending Prescriptions Disp Refills     pravastatin (PRAVACHOL) 20 MG tablet [Pharmacy Med Name: PRAVASTATIN TABS 20MG] 90 tablet 3     Sig: TAKE 1 TABLET EVERY EVENING       Statins Refill Protocol (Hmg CoA Reductase Inhibitors) Passed - 5/28/2021 11:40 PM        Passed - PCP or prescribing provider visit in past 12 months      Last office visit with prescriber/PCP: 5/4/2021 Aditya Mehta MD OR same dept: 5/4/2021 Aditya Mehta MD OR same specialty: 5/4/2021 Aditya Mehta MD  Last physical: Visit date not found Last MTM visit: Visit date not found   Next visit within 3 mo: Visit date not found  Next physical within 3 mo: Visit date not found  Prescriber OR PCP: Aditya Mehta MD  Last diagnosis associated with med order: 1. Hyperlipidemia  - pravastatin (PRAVACHOL) 20 MG tablet [Pharmacy Med Name: PRAVASTATIN TABS 20MG]; TAKE 1 TABLET EVERY EVENING  Dispense: 90 tablet; Refill: 3    If protocol passes may refill for 12 months if within 3 months of last provider visit (or a total of 15 months).

## 2021-06-25 NOTE — PROGRESS NOTES
ASSESSMENT:  1.  Bullous pemphigoid:  He was started on prednisone 60 mg daily on 5/4/2021.  He subsequently has seen Dr. Hughes in dermatology consult and did have a skin biopsy consistent with bullous pemphigoid.  Symptoms have dramatically improved with steroids.  He is now down to 30 mg daily.  He previously had been placed on furosemide due to massive peripheral edema.  This was discontinued by Dr. Hughes due to concerns that the pemphigoid could be triggered by a furosemide allergy.  Pérez does have a past history of sulfa allergy.    2.  Peripheral edema due to venous insufficiency:  This is quite prominent particularly on the dorsal aspect of the right foot.  BNP from March was in the normal range at 30 suggesting that edema is due to venous insufficiency and not CHF.   We will try managing this with compressive stockings and leg elevation.  Lymphedema clinic consult is advised.  Weight loss would be of benefit    3.  Obesity with BMI of 39.33    PLAN:  1.  Check glucose due to prolonged steroid use    2.  Lymphedema clinic consult    3 compressive stockings to the knees.  Orthotic company will call to schedule measuring.      4.  Dermatology follow-up with Dr. Hughes as planned.    5.  Avoid diuretics due to concerns about furosemide allergy is a triggering cause    6.  See in 3 months or as needed    Orders Placed This Encounter   Procedures     Basic Metabolic Panel     Ambulatory referral to Lymphedema Care     Referral Priority:   Routine     Referral Type:   Consultation     Referral Reason:   Evaluation and Treatment     Number of Visits Requested:   1     Ambulatory referral for Orthotics / Prosthetics - [place]     Referral Priority:   Routine     Referral Type:   Orthotics     Referral Reason:   Evaluation and Treatment     Referral Location:   [facility]     Requested Specialty:   Orthotics     Number of Visits Requested:   1     Medications Discontinued During This Encounter    Medication Reason     potassium chloride (K-DUR,KLOR-CON) 20 MEQ tablet Therapy completed     triamcinolone (KENALOG) 0.1 % cream Therapy completed     furosemide (LASIX) 40 MG tablet Therapy completed     predniSONE (DELTASONE) 20 MG tablet Therapy completed     LORazepam (ATIVAN) 0.5 MG tablet Reorder       Return in about 3 months (around 9/1/2021) for Recheck.    ASSESSED PROBLEMS:  1. Venous (peripheral) insufficiency  Ambulatory referral to Lymphedema Care    Ambulatory referral for Orthotics / Prosthetics - [place]   2. Encounter for therapeutic drug monitoring  Basic Metabolic Panel   3. Anxiety  LORazepam (ATIVAN) 0.5 MG tablet   4. Primary insomnia  LORazepam (ATIVAN) 0.5 MG tablet   5. Bullous pemphigoid  predniSONE (DELTASONE) 10 mg tablet   6. Morbid obesity with BMI of 40.0-44.9, adult (H)         CHIEF COMPLAINT:  Chief Complaint   Patient presents with     Follow-up     1 month       HISTORY OF PRESENT ILLNESS:  Nishant is a 86 y.o. male seen for follow-up of bullous pemphigoid.  He originally was seen in March complaining of marked peripheral edema.  He had some blisters on the lower legs which seem more related to tense edema.  He was placed on furosemide, advised to elevate legs, avoid salt, and use compressive dressings.  When seen in follow-up on 5/4, he has not developed profound blistering of the legs and also on the arms, along with an erythematous pruritic dermatitis.  Blistering was felt most consistent with bullous pemphigoid.  He was started on prednisone 60 mg daily and referred to Dr.. Hughes for dermatology consultation.  Biopsy was consistent with bullous pemphigoid.  He now has tapered down to 30 mg prednisone daily.  Blistering has largely resolved.    He had a normal BNP of 31 seen in March.  He does have considerable edema of the lower legs particularly the right foot, but feels respiratory status is at baseline    REVIEW OF SYSTEMS:  He had the Pfizer Covid  vaccine  Comprehensive review of systems is negative.    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  Vitals: per nursing notes.  HEENT: Atraumatic  Eyes: No conjunctival hyperemia  Oropharynx:   Mouth and throat clear, no thrush or exudate.  Neck:  Supple, no carotid bruits or adenopathy.  Back:  No spine or CVA pain.  Thorax:  No bony deformities.  Lungs: Diminished air movement, but no audible wheezes or rales  Cardiac:   Regular rate and rhythm, normal S1, S2, no murmur or gallop.  Abdomen:  Soft, active bowel sounds without bruits, mass, or tenderness.  Obese  Extremities: 2+ edema right lower leg particularly dorsal aspect of foot with slightly lesser edema left.   Skin:   Scabbing blisters on lower legs.  Previously noted blisters on arms have resolved  Neuro:  Alert and oriented.   No gross focal deficits.  Psychiatric:  Memory intact, mood appropriate.    DATA REVIEWED:  Additional History from Old Records Summarized (2):  charted dermatology records reviewed  Decision to Obtain Records (1): None.   Radiology Tests Summarized or Ordered (1): None.  Labs Reviewed or Ordered (1):  labs reviewed and ordered  Medicine Test Summarized or Ordered (1):  skin biopsy reviewed  Independent Review of EKG or X-RAY(2 each): None.    The visit lasted a total of 25 minutes face to face with the patient. Over 50% of the time was spent counseling and educating the patient about management of bullous pemphigoid and  peripheral edema due to venous insufficiency.    Dragon dictation was used for this note. Speech recognition errors are a possibility.     MEDICATIONS:  Current Outpatient Medications   Medication Sig Dispense Refill     aspirin 325 MG tablet Take 325 mg by mouth daily.       budesonide-formoteroL (SYMBICORT) 160-4.5 mcg/actuation inhaler USE 2 INHALATIONS TWICE A DAY 1 Inhaler 3     cholecalciferol, vitamin D3, (VITAMIN D3) 25 mcg (1,000 unit) capsule        doxazosin (CARDURA) 8 MG tablet TAKE 1 TABLET AT BEDTIME 90 tablet 3     doxycycline (MONODOX) 100 MG capsule TAKE 1 CAPSULE BY MOUTH TWICE DAILY WITH FOOD AND WATER       finasteride (PROSCAR) 5 mg tablet TAKE 1 TABLET DAILY 90 tablet 3     LORazepam (ATIVAN) 0.5 MG tablet TAKE 1/2 TO 1 TABLET BY MOUTH THREE TIMES DAILY AS NEEDED FOR ANXIOUSNESS 30 tablet 5     losartan (COZAAR) 100 MG tablet Take 1 tablet (100 mg total) by mouth daily. 30 tablet 11     niacinamide 500 mg tablet        nitroglycerin (NITROSTAT) 0.4 MG SL tablet PLACE ONE TABLET UNDER THE TONGUE EVERY 5 MINUTES AS NEEDED FOR CHEST PAIN 25 tablet 3     pravastatin (PRAVACHOL) 20 MG tablet Take 1 tablet (20 mg total) by mouth every evening. 90 tablet 3     predniSONE (DELTASONE) 10 mg tablet Three tabs daily. 60 tablet 3     No current facility-administered medications for this visit.

## 2021-06-25 NOTE — PROGRESS NOTES
Pérez: Blood sugar and other labs are good.  I am glad that you have had an excellent response to the prednisone.  It was nice to see you.    Aditya Mehta MD

## 2021-06-26 NOTE — PATIENT INSTRUCTIONS - HE
1.  Check glucose today    2.  Lymphedema clinic consult    3.  Compressive stockings to knees.  Orthotic company will call to schedule    4.  Follow-up with Dr. Hughes as planned.    5.  Avoid diuretics for now    6. See me in three months or as needed

## 2021-06-28 ENCOUNTER — COMMUNICATION - HEALTHEAST (OUTPATIENT)
Dept: INTERNAL MEDICINE | Facility: CLINIC | Age: 86
End: 2021-06-28

## 2021-06-28 DIAGNOSIS — L12.0 BULLOUS PEMPHIGOID (H): ICD-10-CM

## 2021-06-28 DIAGNOSIS — R05.9 COUGH: ICD-10-CM

## 2021-07-06 ENCOUNTER — COMMUNICATION - HEALTHEAST (OUTPATIENT)
Dept: INTERNAL MEDICINE | Facility: CLINIC | Age: 86
End: 2021-07-06

## 2021-07-06 VITALS
DIASTOLIC BLOOD PRESSURE: 60 MMHG | BODY MASS INDEX: 39.33 KG/M2 | SYSTOLIC BLOOD PRESSURE: 138 MMHG | HEART RATE: 85 BPM | OXYGEN SATURATION: 95 % | WEIGHT: 294 LBS

## 2021-07-07 ENCOUNTER — HOSPITAL ENCOUNTER (INPATIENT)
Dept: MEDSURG UNIT | Facility: HOSPITAL | Age: 86
LOS: 5 days | Discharge: HOME HEALTH | End: 2021-07-12
Attending: EMERGENCY MEDICINE | Admitting: FAMILY MEDICINE
Payer: COMMERCIAL

## 2021-07-07 DIAGNOSIS — I48.91 ATRIAL FIBRILLATION WITH RVR (H): ICD-10-CM

## 2021-07-07 DIAGNOSIS — I50.30 NYHA CLASS 3 HEART FAILURE WITH PRESERVED EJECTION FRACTION (H): Primary | ICD-10-CM

## 2021-07-07 DIAGNOSIS — J31.0 CHRONIC RHINITIS: ICD-10-CM

## 2021-07-07 DIAGNOSIS — I50.9 ACUTE CONGESTIVE HEART FAILURE, UNSPECIFIED HEART FAILURE TYPE (H): ICD-10-CM

## 2021-07-07 DIAGNOSIS — I48.91 ATRIAL FIBRILLATION WITH RAPID VENTRICULAR RESPONSE (H): ICD-10-CM

## 2021-07-07 LAB
ANION GAP SERPL CALCULATED.3IONS-SCNC: 8 MMOL/L (ref 5–18)
BNP SERPL-MCNC: 133 PG/ML (ref 0–93)
BUN SERPL-MCNC: 22 MG/DL (ref 8–28)
CALCIUM SERPL-MCNC: 9 MG/DL (ref 8.5–10.5)
CHLORIDE BLD-SCNC: 103 MMOL/L (ref 98–107)
CO2 SERPL-SCNC: 28 MMOL/L (ref 22–31)
CREAT SERPL-MCNC: 1.04 MG/DL (ref 0.7–1.3)
ERYTHROCYTE [DISTWIDTH] IN BLOOD BY AUTOMATED COUNT: 15.2 % (ref 11–14.5)
GFR SERPL CREATININE-BSD FRML MDRD: >60 ML/MIN/1.73M2
GLUCOSE BLD-MCNC: 107 MG/DL (ref 70–125)
HCT VFR BLD AUTO: 49.6 % (ref 40–54)
HGB BLD-MCNC: 15.9 G/DL (ref 14–18)
INR PPP: 1.04 (ref 0.9–1.1)
MAGNESIUM SERPL-MCNC: 2.5 MG/DL (ref 1.8–2.6)
MCH RBC QN AUTO: 30.8 PG (ref 27–34)
MCHC RBC AUTO-ENTMCNC: 32.1 G/DL (ref 32–36)
MCV RBC AUTO: 96 FL (ref 80–100)
PLATELET # BLD AUTO: 173 THOU/UL (ref 140–440)
PMV BLD AUTO: 9.9 FL (ref 8.5–12.5)
POTASSIUM BLD-SCNC: 4.4 MMOL/L (ref 3.5–5)
RBC # BLD AUTO: 5.16 MILL/UL (ref 4.4–6.2)
SARS-COV-2 PCR RESULT-HE - HISTORICAL: NEGATIVE
SODIUM SERPL-SCNC: 139 MMOL/L (ref 136–145)
TROPONIN I SERPL-MCNC: 0.02 NG/ML (ref 0–0.29)
TROPONIN I SERPL-MCNC: 0.02 NG/ML (ref 0–0.29)
TSH SERPL DL<=0.005 MIU/L-ACNC: 0.54 UIU/ML (ref 0.3–5)
WBC: 12.3 THOU/UL (ref 4–11)

## 2021-07-07 PROCEDURE — C9803 HOPD COVID-19 SPEC COLLECT: HCPCS

## 2021-07-07 PROCEDURE — 85027 COMPLETE CBC AUTOMATED: CPT

## 2021-07-07 PROCEDURE — 84484 ASSAY OF TROPONIN QUANT: CPT

## 2021-07-07 PROCEDURE — 96374 THER/PROPH/DIAG INJ IV PUSH: CPT

## 2021-07-07 PROCEDURE — 999N001212 HC REV CODE 9999 CHARGE CONVERSION OPNP

## 2021-07-07 PROCEDURE — 99285 EMERGENCY DEPT VISIT HI MDM: CPT | Mod: 25

## 2021-07-07 PROCEDURE — 80048 BASIC METABOLIC PNL TOTAL CA: CPT

## 2021-07-07 PROCEDURE — 36415 COLL VENOUS BLD VENIPUNCTURE: CPT

## 2021-07-07 PROCEDURE — 85610 PROTHROMBIN TIME: CPT

## 2021-07-07 PROCEDURE — 71045 X-RAY EXAM CHEST 1 VIEW: CPT

## 2021-07-07 PROCEDURE — 84443 ASSAY THYROID STIM HORMONE: CPT

## 2021-07-07 PROCEDURE — 83880 ASSAY OF NATRIURETIC PEPTIDE: CPT

## 2021-07-07 PROCEDURE — 83735 ASSAY OF MAGNESIUM: CPT

## 2021-07-07 PROCEDURE — 87635 SARS-COV-2 COVID-19 AMP PRB: CPT

## 2021-07-07 PROCEDURE — 96376 TX/PRO/DX INJ SAME DRUG ADON: CPT

## 2021-07-07 PROCEDURE — 96375 TX/PRO/DX INJ NEW DRUG ADDON: CPT

## 2021-07-07 RX ORDER — PREDNISONE 5 MG/1
5 TABLET ORAL 2 TIMES DAILY
Status: SHIPPED | COMMUNITY
Start: 2021-07-07 | End: 2021-10-07

## 2021-07-07 ASSESSMENT — MIFFLIN-ST. JEOR
SCORE: 2113.29
SCORE: 2127.35

## 2021-07-07 NOTE — ED TRIAGE NOTES
ED Triage Notes by Alison Sánchez RN at 7/7/2021 12:59 PM     Author: Alison Sánchez RN Service: -- Author Type: Registered Nurse    Filed: 7/7/2021 12:59 PM Date of Service: 7/7/2021 12:59 PM Status: Signed    : Alison Sánchez RN (Registered Nurse)       Pt sent by primary provider for eval of a-fib and heart failure. Denies chest pain.

## 2021-07-07 NOTE — PROGRESS NOTES
Progress Notes by Mirta Beard PharmD at 7/7/2021  3:31 PM     Author: Mirta Beard PharmD Service: Pharmacy Author Type: Pharmacist    Filed: 7/7/2021  3:32 PM Date of Service: 7/7/2021  3:31 PM Status: Signed    : Mirta Beard PharmD (Pharmacist)       Pharmacy Note - Admission Medication History    Pertinent Provider Information:      ______________________________________________________________________    Prior To Admission (PTA) med list completed and updated in EMR.       PTA Med List   Medication Sig Last Dose   ? aspirin 325 MG tablet Take 325 mg by mouth at bedtime.  7/6/2021 at pm   ? budesonide-formoteroL (SYMBICORT) 160-4.5 mcg/actuation inhaler USE 2 INHALATIONS TWICE A DAY 7/7/2021 at am   ? cholecalciferol, vitamin D3, (VITAMIN D3) 25 mcg (1,000 unit) capsule  7/7/2021 at am   ? doxazosin (CARDURA) 8 MG tablet TAKE 1 TABLET AT BEDTIME 7/6/2021 at pm   ? finasteride (PROSCAR) 5 mg tablet TAKE 1 TABLET DAILY 7/6/2021 at pm   ? LORazepam (ATIVAN) 0.5 MG tablet TAKE 1/2 TO 1 TABLET BY MOUTH THREE TIMES DAILY AS NEEDED FOR ANXIOUSNESS Past Week at Unknown time   ? losartan (COZAAR) 100 MG tablet Take 1 tablet (100 mg total) by mouth daily. 7/6/2021 at am   ? nitroglycerin (NITROSTAT) 0.4 MG SL tablet PLACE ONE TABLET UNDER THE TONGUE EVERY 5 MINUTES AS NEEDED FOR CHEST PAIN More than a month at Unknown time   ? pravastatin (PRAVACHOL) 20 MG tablet Take 1 tablet (20 mg total) by mouth every evening. 7/6/2021 at pm   ? predniSONE (DELTASONE) 5 MG tablet Take 5 mg by mouth 2 (two) times a day . 7/7/2021 at am       Information source(s): Patient, Clinic records and [facility]/[employer]  Method of interview communication: in-person    Summary of Changes to PTA Med List  New:   Discontinued:   Changed: Prednisone    Patient was asked about OTC/herbal products specifically.  PTA med list reflects this.    In the past week, patient estimated taking medication this percent of the time:  greater than  90%.    Allergies were reviewed, assessed, and updated with the patient.      Patient did not bring any medications to the hospital and can't retrieve from home. No multi-dose medications are available for use during hospital stay.     The information provided in this note is only as accurate as the sources available at the time of the update(s).    Thank you for the opportunity to participate in the care of this patient.    Mirta Beard, PharmTERRI  7/7/2021 3:31 PM

## 2021-07-07 NOTE — ED PROVIDER NOTES
ED Provider Notes by Manny Rm MD at 7/7/2021  1:21 PM     Author: Manny Rm MD Service: -- Author Type: Physician    Filed: 7/7/2021  4:32 PM Date of Service: 7/7/2021  1:21 PM Status: Signed    : Manny Rm MD (Physician)       [facility] EMERGENCY DEPARTMENT  PHYSICIAN NOTE    MRN: [id number]    FINAL IMPRESSION     1. Atrial fibrillation with rapid ventricular response (H)     2. Acute congestive heart failure, unspecified heart failure type (H)           MDM & ED COURSE     ED Course as of Jul 07 1525   Wed Jul 07, 2021   1323 Pt well-appearing. Vital signs notable for tachycardia. He is here with HESTER and newly diagnosed afib from clinic EKG earlier today. He has had weight gain and worsening HESTER which suggest acute CHF, though he does not have a previous dx of this. His EKG shows potential ischemic changes with a new TWI in III, likely due to the stress of afib with RVR, though ACS as a precipitating event is certainly a possibility. COVID is possible but he is vaccinated and has no cough or fever, making this much less likely. Ddx considered also includes asthma/COPD exacerbation, PE, bacterial PNA, airway obstruction, pleural effusion, PTX, DKA, anemia, thyrotoxicosis, and sepsis. Decision for metoprolol vs diltiazem complicated by the fact that he has both COPD and a likely decreased EF. He has no wheezing on exam so I think metoprolol is the better option - will give PO and IV doses. He had a significant reaction to Lasix previously and clinic notes express concern over the possibility of a cross reaction with another loop diuretic - will discuss with cardiology to determine best course for diuresis. Will plan to admit pending workup.    [AA]      ED Course User Index  [AA] Manny Rm MD         1:10 PM Initial history and physical performed. Plan of care discussed.     1:15 PM EKG interpretation: Afib at 139 bpm with no ectopy. No ST changes,  TWI in III, new from previous. RBBB unchanged.  Please see signed document for full description.     1:26 PM Cardiology paged, awaiting call back.    1:37 PM I spoke with Dr. Clark of Cardiology who recommends Bumex because it is structurally different from Lasix but recommends consulting pharmacolgy. Conferred with the pharmacist who agrees that Bumex should be safe and recommends starting a dose of .5mg.     2:09 PM CXR shows central vascular congestion without overt pulmonary edema per radiology read.    2:43 PM Heart rate decreased to 105-115 after 2 doses of IV metoprolol.     2:51 PM BNP noted to be 133. This is not significantly elevated but it was 30 four months ago. His BMI may be keeping the BNP lower than expected for acute CHF so this is not ruled out.    3:23 PM I reevaluated the patient and he is feeling improved. His heart rate is not quite at the goal. Will give 3rd dose of IV metoprolol. Hospitalist paged, awaiting call back.     3:29 PM I spoke with hospitalist Dr. Mcgrath who accepts the patient for admission. All available labs reviewed and unremarkable unless otherwise discussed previously.         ED Meds  Medications   metoprolol tartrate injection 5 mg (LOPRESSOR) (5 mg Intravenous Given 7/7/21 1425)   metoprolol tartrate tablet 25 mg (LOPRESSOR) (25 mg Oral Given 7/7/21 1352)   bumetanide injection 0.5 mg (BUMEX) (0.5 mg Intravenous Given 7/7/21 1432)         ===================================================================    HPI     Nishant Olivo is a 86 y.o. male with relevant PMH significant for hypertension, CAD, and COPD presenting with increased shortness of breath and increased heart rate. Patient states that he was sent from [facility] this morning to evaluate increased shortness of breath at rest, weight gain, and increased heart rate. Patient does not have shortness of breath at rest in the ED but states getting out of the bed will cause him shortness of breath. He has gained  15 pounds in the last week. The patient denies chest pain and abdominal pain. He has chronic LE edema but it has been worse bilaterally over the past several days.      ROS  All other ROS negative.    Problem list, medications, allergies, PMH, PSH, family history, and social history reviewed and updated as able in Epic.      PHYSICAL EXAM     Vitals:    07/07/21 1336 07/07/21 1400 07/07/21 1430 07/07/21 1500   BP:  (!) 141/94 133/77 125/78   Patient Position:       Pulse: (!) 137 (!) 130 (!) 125 (!) 115   Resp: 27 (!) 34 24 19   Temp:       TempSrc:       SpO2: 95% 95% 94% 94%   Weight:       Height:          Constitutional: Alert, no acute distress.  HENT: Normocephalic, atraumatic.  Eyes: PERRL, EOMI.  Neck: Supple, full ROM. No JVD.  CV: Tachycardic, irregularly irregular rhythm. Peripheral pulses intact and symmetric.  Pulm: Non-labored respirations. CTAB.  Abdomen: Soft, non-tender.  MSK: Significant bilateral LE pitting edema, no calf tenderness.  Neuro: A/O x3. Normal speech. No focal deficits.  Skin: Circular areas of erythema to left leg c/w previous pemphigoid.   Psych: Cooperative, able to follow commands. Intact attention.      COVID PPE during patient encounter  Eye protection: face shield  Mask: N95  Gown: no  Gloves: yes  Hair cover: no    I attest that Lena Hayden is acting in a scribe capacity, has observed my performance of services, and has documented them in accordance with my direction.        Asp, Manny Nogueira MD  07/07/21 7052

## 2021-07-07 NOTE — CONSULTS
"Consults by Shayna Tsai RN at 7/7/2021  2:11 PM     Author: Shayna Tsai RN Service: -- Author Type: RN, Care Manager    Filed: 7/7/2021  2:13 PM Date of Service: 7/7/2021  2:11 PM Status: Signed    : Shayna Tsai RN (RN, Care Manager)     Consult Orders    1. Inpatient consult to Care Management / Social Work [[id number]] ordered by Sujey, Manny Nogueira MD at 07/07/21 1409             Care Management Initial Consult    General Information:  Patient's communication limitations: hard of hearing  Level of Orientation: A & O x 3     Advance Care Planning: Patient does not have advance directive   Yes, will do independently    Living Environment:   People in home/Living arrangements: Spouse/significant other  Current residence:  Private residence(\"now having to take breaks using steps; can sleep on 1 leve\")      Family/Social Support:  Care provided by/ Primary Caregiver: Self immediate family  Provides care for someone: No  Description of Support System: Spouse/significant other, Children, Family members     Lifestyle & Psychosocial Needs:        Socioeconomic History   ? Marital status:      Spouse name: Not on file   ? Number of children: Not on file   ? Years of education: Not on file   ? Highest education level: Not on file     Tobacco Use   ? Smoking status: Former Smoker     Years: 30.00     Types: Cigarettes   ? Smokeless tobacco: Never Used   Substance and Sexual Activity   ? Alcohol use: No   ? Drug use: No       Functional Status:  Prior to admission ADL limits: Yes hearing difficulty, gait disturbance, housekeeping, laundry, stairs    Current Resources:   Skilled Home Care Services:    Community Resources: None  Equipment currently used at home: cane, straight, walker, rolling(\"uses the cane mostly, walker for long distances\")  Supplies currently used at home: Other(\"reading glasses, denture; will wear comp. stocking again\")    Employment:  Employment Status: RetiredNo No  " "  Financial/Environmental Concerns/Barriers to Discharge:        Values/Beliefs:  Spiritual, Cultural Beliefs, [employer] Practices, Values that affect care:                Additional Information:  Pérez lives in a house with his wife. He is able to live on the one level if needed, \"he can stay in the first floor bedroom if needed. The steps have been harder lately with needing to take breaks\". He is independent with ADLs at baseline. Likely no discharge needs at this time.    He is Hard of Hearing at baseline. He does not wear hearing aids. He uses a \"cane most of the time for mobility, but he also has a walker for longer distances to use if needed\". He \"was wearing compression stockings, but got wounds on his legs so has not started using them again.\"    Wife to transport at discharge.      Shayna Tsai RN       "

## 2021-07-07 NOTE — ED NOTES
ED Notes by Shayna Nuñez MD at 7/7/2021 12:44 PM     Author: Shayna Nuñez MD Service: -- Author Type: Physician    Filed: 7/7/2021 12:46 PM Date of Service: 7/7/2021 12:44 PM Status: Signed    : Shayna Nuñez MD (Physician)       87 y/o man being sent in from [facility]. Recently treated with steroids and antibiotics for presumed COPD exacerbation but came to clinic today because he wasn't improving.     In clinic today his weight is up 15 lbs in the last week. He is in afib RVR with rates in the 130s. 94% on room air. Coming by POV.     To do: workup for dyspnea/likely CHF and admit     Shayna Nuñez MD  07/07/21 2919

## 2021-07-08 ENCOUNTER — COMMUNICATION - HEALTHEAST (OUTPATIENT)
Dept: SCHEDULING | Facility: CLINIC | Age: 86
End: 2021-07-08

## 2021-07-08 LAB
ANION GAP SERPL CALCULATED.3IONS-SCNC: 8 MMOL/L (ref 5–18)
AORTIC ROOT: 2.3 CM
AORTIC VALVE MEAN VELOCITY: 79.9 CM/S
ASCENDING AORTA: 3.3 CM
AV DIMENSIONLESS INDEX VTI: 0.9
AV MEAN GRADIENT: 3 MMHG
AV PEAK GRADIENT: 6.9 MMHG
AV VALVE AREA: 3.5 CM2
AV VELOCITY RATIO: 0.7
BASOPHILS # BLD AUTO: 0.1 THOU/UL (ref 0–0.2)
BASOPHILS NFR BLD AUTO: 1 % (ref 0–2)
BSA FOR ECHO PROCEDURE: 2.65 M2
BUN SERPL-MCNC: 21 MG/DL (ref 8–28)
CALCIUM SERPL-MCNC: 8.3 MG/DL (ref 8.5–10.5)
CHLORIDE BLD-SCNC: 105 MMOL/L (ref 98–107)
CO2 SERPL-SCNC: 26 MMOL/L (ref 22–31)
CREAT SERPL-MCNC: 0.95 MG/DL (ref 0.7–1.3)
CV BLOOD PRESSURE: ABNORMAL MMHG
CV ECHO HEIGHT: 73 IN
CV ECHO WEIGHT: 299 LBS
DOP CALC AO PEAK VEL: 131 CM/S
DOP CALC AO VTI: 18.5 CM
DOP CALC LVOT AREA: 3.8 CM2
DOP CALC LVOT DIAMETER: 2.2 CM
DOP CALC LVOT PEAK VEL: 93.8 CM/S
DOP CALC LVOT STROKE VOLUME: 64.6 CM3
DOP CALCLVOT PEAK VEL VTI: 17 CM
ECHO EJECTION FRACTION ESTIMATED: 50 %
EOSINOPHIL # BLD AUTO: 0 THOU/UL (ref 0–0.4)
EOSINOPHIL NFR BLD AUTO: 0 % (ref 0–6)
ERYTHROCYTE [DISTWIDTH] IN BLOOD BY AUTOMATED COUNT: 15 % (ref 11–14.5)
FRACTIONAL SHORTENING: 39.2 % (ref 28–44)
GFR SERPL CREATININE-BSD FRML MDRD: >60 ML/MIN/1.73M2
GLUCOSE BLD-MCNC: 100 MG/DL (ref 70–125)
HCT VFR BLD AUTO: 45.9 % (ref 40–54)
HGB BLD-MCNC: 14.8 G/DL (ref 14–18)
IMM GRANULOCYTES # BLD: 0.4 THOU/UL
IMM GRANULOCYTES NFR BLD: 4 %
INTERVENTRICULAR SEPTUM IN END DIASTOLE: 1.37 CM (ref 0.6–1)
IVS/PW RATIO: 1.4
LA AREA 1: 25.5 CM2
LA AREA 2: 30 CM2
LEFT ATRIUM LENGTH: 5.4 CM
LEFT ATRIUM SIZE: 4 CM
LEFT ATRIUM VOLUME INDEX: 45.4 ML/M2
LEFT ATRIUM VOLUME: 120.4 ML
LEFT VENTRICLE CARDIAC INDEX: 2.6 L/MIN/M2
LEFT VENTRICLE CARDIAC OUTPUT: 6.8 L/MIN
LEFT VENTRICLE HEART RATE: 106 BPM
LEFT VENTRICLE MASS INDEX: 65.5 G/M2
LEFT VENTRICULAR INTERNAL DIMENSION IN DIASTOLE: 4.24 CM (ref 4.2–5.8)
LEFT VENTRICULAR INTERNAL DIMENSION IN SYSTOLE: 2.58 CM (ref 2.5–4)
LEFT VENTRICULAR MASS: 173.7 G
LEFT VENTRICULAR OUTFLOW TRACT MEAN GRADIENT: 2 MMHG
LEFT VENTRICULAR OUTFLOW TRACT MEAN VELOCITY: 63.7 CM/S
LEFT VENTRICULAR OUTFLOW TRACT PEAK GRADIENT: 4 MMHG
LEFT VENTRICULAR POSTERIOR WALL IN END DIASTOLE: 0.96 CM (ref 0.6–1)
LV STROKE VOLUME INDEX: 24.4 ML/M2
LYMPHOCYTES # BLD AUTO: 2 THOU/UL (ref 0.8–4.4)
LYMPHOCYTES NFR BLD AUTO: 19 % (ref 20–40)
MAGNESIUM SERPL-MCNC: 2.4 MG/DL (ref 1.8–2.6)
MCH RBC QN AUTO: 30.8 PG (ref 27–34)
MCHC RBC AUTO-ENTMCNC: 32.2 G/DL (ref 32–36)
MCV RBC AUTO: 96 FL (ref 80–100)
MONOCYTES # BLD AUTO: 1.1 THOU/UL (ref 0–0.9)
MONOCYTES NFR BLD AUTO: 10 % (ref 2–10)
MV AVERAGE E/E' RATIO: 20.7 CM/S
MV DECELERATION TIME: 149 MS
MV E'TISSUE VEL-LAT: 5.9 CM/S
MV E'TISSUE VEL-MED: 4.25 CM/S
MV LATERAL E/E' RATIO: 17.8
MV MEDIAL E/E' RATIO: 24.7
MV PEAK E VELOCITY: 105 CM/S
NEUTROPHILS # BLD AUTO: 6.8 THOU/UL (ref 2–7.7)
NEUTROPHILS NFR BLD AUTO: 65 % (ref 50–70)
NUC REST DIASTOLIC VOLUME INDEX: 4785.6 LBS
NUC REST SYSTOLIC VOLUME INDEX: 73 IN
PLATELET # BLD AUTO: 159 THOU/UL (ref 140–440)
PMV BLD AUTO: 9.8 FL (ref 8.5–12.5)
POTASSIUM BLD-SCNC: 4.3 MMOL/L (ref 3.5–5)
PV ACCELERATION TIME: 113 MS
RBC # BLD AUTO: 4.8 MILL/UL (ref 4.4–6.2)
SODIUM SERPL-SCNC: 139 MMOL/L (ref 136–145)
TRICUSPID REGURGITATION PEAK PRESSURE GRADIENT: 37.9 MMHG
TRICUSPID VALVE ANULAR PLANE SYSTOLIC EXCURSION: 2.1 CM
TRICUSPID VALVE PEAK REGURGITANT VELOCITY: 308 CM/S
TROPONIN I SERPL-MCNC: 0.03 NG/ML (ref 0–0.29)
WBC: 10.4 THOU/UL (ref 4–11)

## 2021-07-08 PROCEDURE — 36415 COLL VENOUS BLD VENIPUNCTURE: CPT

## 2021-07-08 PROCEDURE — 97116 GAIT TRAINING THERAPY: CPT | Mod: GP

## 2021-07-08 PROCEDURE — 85025 COMPLETE CBC W/AUTO DIFF WBC: CPT

## 2021-07-08 PROCEDURE — 83735 ASSAY OF MAGNESIUM: CPT

## 2021-07-08 PROCEDURE — 97166 OT EVAL MOD COMPLEX 45 MIN: CPT | Mod: GO

## 2021-07-08 PROCEDURE — 97162 PT EVAL MOD COMPLEX 30 MIN: CPT | Mod: GP

## 2021-07-08 PROCEDURE — 80048 BASIC METABOLIC PNL TOTAL CA: CPT

## 2021-07-08 PROCEDURE — 999N001212 HC REV CODE 9999 CHARGE CONVERSION OPNP: Mod: GP

## 2021-07-08 PROCEDURE — 97535 SELF CARE MNGMENT TRAINING: CPT | Mod: GO

## 2021-07-08 PROCEDURE — 93306 TTE W/DOPPLER COMPLETE: CPT

## 2021-07-08 ASSESSMENT — MIFFLIN-ST. JEOR: SCORE: 2100.59

## 2021-07-08 NOTE — PROGRESS NOTES
"Progress Notes by Estuardo Dugan DO at 7/8/2021  3:31 PM     Author: Estuardo Dugan DO Service: Hospitalist Author Type: Physician    Filed: 7/8/2021  3:37 PM Date of Service: 7/8/2021  3:31 PM Status: Signed    : Estuardo Dugan DO (Physician)       [facility] [place] [facility] Progress Note       ASSESSMENT:    Principal Problem:    Atrial fibrillation with RVR (H)  Active Problems:    Bullous pemphigoid    Acute diastolic congestive heart failure (H)    Leukocytosis      PLAN:   86-year-old male with history of coronary artery disease presents with new A. fib with RVR and acute systolic CHF.      New A. fib: TTE shows newly reduced EF of 50%  --Increase metoprolol given poor rate control  --Start Eliquis  --Consider digoxin and cardiology consultation if rate control difficult on increased dose metoprolol      CAD: Continue aspirin, reduce dose to 81 mg daily given Eliquis initiation.  Continue home Cozaar and Pravachol.  Continue beta-blocker initiation as above      Acute systolic CHF: TTE shows reduced EF of 50%  --Continue IV Bumex, continue increased dose metoprolol      Bullous pemphigoid:  --Continue prednisone      COPD: Continue home inhalers      Reported history of aortic stenosis: TTE 7/8/2021 notes normal aortic valve structure and function      Lymphedema: Continue leg wraps      BPH: Continue home Cardura and Proscar      Allergic rhinitis: Start Flonase            DVT PPX: Starting Eliquis            Estuardo Dugan D.O.               -------------------------------------------------------------------------------------------------------------  SUBJECTIVE: NAD. Denies any nausea, vomiting, abdominal pain, chest pain, SOB, new swelling, fevers, chills, confusion or headache.     Exam:  /59 (Patient Position: Lying)   Pulse (!) 101   Temp 97.8  F (36.6  C) (Oral)   Resp 22   Ht 6' 1\" (1.854 m)   Wt (!) 299 lb 1.6 oz (135.7 kg)   SpO2 90%   BMI 39.46 kg/m    General: " NAD  RESPIRATORY: Breathing nonlabored  CARDIOVASCULAR: 2+ le edema bilat.   ABDOMEN: soft and non-tender  NEUROLOGIC: Non focal, motor and sensory intact, speech clear      Diagnostics Reviewed:      Recent Results (from the past 24 hour(s))   Troponin I    Collection Time: 07/07/21  6:06 PM   Result Value Ref Range    Troponin I 0.02 0.00 - 0.29 ng/mL   Troponin I    Collection Time: 07/07/21 11:52 PM   Result Value Ref Range    Troponin I 0.03 0.00 - 0.29 ng/mL   Basic metabolic panel    Collection Time: 07/08/21  5:01 AM   Result Value Ref Range    Sodium 139 136 - 145 mmol/L    Potassium 4.3 3.5 - 5.0 mmol/L    Chloride 105 98 - 107 mmol/L    CO2 26 22 - 31 mmol/L    Anion Gap, Calculation 8 5 - 18 mmol/L    Glucose 100 70 - 125 mg/dL    Calcium 8.3 (L) 8.5 - 10.5 mg/dL    BUN 21 8 - 28 mg/dL    Creatinine 0.95 0.70 - 1.30 mg/dL    GFR MDRD Af Amer >60 >60 mL/min/1.73m2    GFR MDRD Non Af Amer >60 >60 mL/min/1.73m2   Magnesium    Collection Time: 07/08/21  5:01 AM   Result Value Ref Range    Magnesium 2.4 1.8 - 2.6 mg/dL   HM1 (CBC with Diff)    Collection Time: 07/08/21  5:01 AM   Result Value Ref Range    WBC 10.4 4.0 - 11.0 thou/uL    RBC 4.80 4.40 - 6.20 mill/uL    Hemoglobin 14.8 14.0 - 18.0 g/dL    Hematocrit 45.9 40.0 - 54.0 %    MCV 96 80 - 100 fL    MCH 30.8 27.0 - 34.0 pg    MCHC 32.2 32.0 - 36.0 g/dL    RDW 15.0 (H) 11.0 - 14.5 %    Platelets 159 140 - 440 thou/uL    MPV 9.8 8.5 - 12.5 fL    Neutrophils % 65 50 - 70 %    Lymphocytes % 19 (L) 20 - 40 %    Monocytes % 10 2 - 10 %    Eosinophils % 0 0 - 6 %    Basophils % 1 0 - 2 %    Immature Granulocyte % 4 (H) <=0 %    Neutrophils Absolute 6.8 2.0 - 7.7 thou/uL    Lymphocytes Absolute 2.0 0.8 - 4.4 thou/uL    Monocytes Absolute 1.1 (H) 0.0 - 0.9 thou/uL    Eosinophils Absolute 0.0 0.0 - 0.4 thou/uL    Basophils Absolute 0.1 0.0 - 0.2 thou/uL    Immature Granulocyte Absolute 0.4 (H) <=0.0 thou/uL   Echo Complete    Collection Time: 07/08/21  8:05 AM    Result Value Ref Range    AV mean sade 79.9 cm/s    AV mean gradient 3 mmHg    AV VTI 18.5 cm    AV peak sade 131 cm/s    AO root 2.3 cm    AO ascending 3.3 cm    IVSd 1.37 (!) 0.6 - 1.0 cm    LVIDd 4.24 4.2 - 5.8 cm    LVIDs 2.58 2.5 - 4.0 cm    LVOT diam 2.2 cm    LVOT mean gradient 2 mmHg    LVOT peak VTI 17 cm    LVOT mean sade 63.7 cm/s    LVOT peak sade 93.8 cm/s    LVOT peak gradient 4 mmHg    LV PWd 0.965 0.6 - 1.0 cm    MV E' lat sade 5.9 cm/s    MV E' med sade 4.25 cm/s    LA size 4 cm    MV decel time 149 ms    MV peak E sade 105 cm/s    PV accel time 113 ms    TR peak sade 308 cm/s    BSA 2.65 m2    Hieght 73 in    Weight 4,785.6 lbs    /80 mmHg     bpm    IVS/PW ratio 1.4     TR peak gradent 37.9 mmHg    LV FS 39.2 28.0 - 44.0 %    LA volume 120.4 mL    LV mass 173.7 g    AV area 3.5 cm2    AV DIM IND sade 0.7     LVOT area 3.80 cm2    LVOT SV 64.6 cm3    AV peak gradient 6.9 mmHg    LA volume index 45.4 mL/m2    LV mass index 65.5 g/m2    LV SVi 24.4 ml/m2    TAPSE 2.1 cm    MV med E/e' ratio 24.7     MV lat E/e' ratio 17.8     LV CO 6.8 l/min    LV Ci 2.6 l/min/m2    LA area 2 30.0 cm2    LA area 1 25.5 cm2    Height 73.0 in    Weight 299 lbs    MV Avg E/e' Ratio 20.7 cm/s    LA length 5.4 cm    AV DIM IND VTI 0.9     Echo LVEF Estimated 50 %

## 2021-07-08 NOTE — PLAN OF CARE
Plan of Care by Sara Rojas RN at 7/8/2021  2:57 PM     Author: Sara Rojas RN Service: -- Author Type: Registered Nurse    Filed: 7/8/2021  3:07 PM Date of Service: 7/8/2021  2:57 PM Status: Signed    : Sara Rojas RN (Registered Nurse)         Problem: Daily Care  Goal: Daily care needs are met  Outcome: Progressing     Problem: Psychosocial Needs  Goal: Demonstrates ability to cope with hospitalization/illness  Outcome: Progressing     Problem: Psychosocial Needs  Goal: Collaborate with patient/family/caregiver to identify patient specific goals for this hospitalization  Outcome: Progressing   Heart rate tachy in the low 100's to 130's, Metoprolol dose increased. Patient denies pain, denies shortness of breath. Continue to monitor

## 2021-07-08 NOTE — PROGRESS NOTES
"Progress Notes by Margi Valdovinos RN at 2021  8:42 PM     Author: Margi Valdovinos RN Service: -- Author Type: Registered Nurse    Filed: 2021 10:52 PM Date of Service: 2021  8:42 PM Status: Signed    : Margi Valdovinos RN (Registered Nurse)         Progress Note:    Name: Nishant Olivo  :   1935  MRN:   [id number]    Pt denied pain. VSS on room air. Tele reading a-fib with RVR- rates up into the 120's. Notified MD and order obtained for PRN metoprolol, HR hasnt consistently met parameters so writer didn't administer. Gets very dyspneic with exertion or talking in bed. Ambulating stand-by-assist/independent. Calling appropriately.         /78 (Patient Position: Semi-basurto)   Pulse (!) 113   Temp 97.7  F (36.5  C) (Oral)   Resp 24   Ht 6' 1\" (1.854 m)   Wt (!) 301 lb 14.4 oz (136.9 kg)   SpO2 92%   BMI 39.83 kg/m        2021      Margi Valdovinos RN               "

## 2021-07-08 NOTE — PROGRESS NOTES
Progress Notes by Shauna Do RN at 7/8/2021 11:42 AM     Author: Shauna Do RN Service: -- Author Type: Registered Nurse    Filed: 7/8/2021  3:45 PM Date of Service: 7/8/2021 11:42 AM Status: Addendum    : Shauna Do RN (Registered Nurse)    Related Notes: Original Note by Shauna Do RN (Registered Nurse) filed at 7/8/2021 11:45 AM       Care Management Follow Up Note    Length of Stay (days) 1     Patient plan of care discussed at Interdisciplinary Rounds: yes                Expected Discharge Date: 07/10/21  Concerns to be Addressed / Barriers to Discharge:   Medical Progression. IV Bumex.    Anticipated Discharge Disposition:  Home with possible home care services   Anticipated Discharge Services:    Therapy recommending Home PT  Selected Continued Care - Admitted Since 7/7/2021    No services have been selected for the patient.        Anticipated Discharge DME:  To be determiend     Plan:  Chart Reviewed. Therapy recommending home PT at discharge. Went to meet in room to discuss with patient; however, patient asleep at this time. Will try to follow up again this afternoon. At baseline from home with spouse where he can live on one level if needed; independent at baseline. Per previous note patient hard of hearing and does not wear hearing aids. Has a cane and walker available for use as needed. Spouse to transport. Care manager to follow.   3:43 PM Met with patient in room to discuss recommendation of home care services for PT. Patient in agreement. Has not previously worked with home care and does not have an agency preference. Patient agreeable for care management to arrange home care services with an agency that has availability in his area. Will send referral to [facility] care to review. Will follow up with alternate home care agencies as needed.     Shauna Do RN

## 2021-07-08 NOTE — PROGRESS NOTES
Progress Notes by Neelam Anderson OT at 7/8/2021  1:02 PM     Author: Neelam Anderson OT Service: -- Author Type: Occupational Therapist    Filed: 7/8/2021  1:04 PM Date of Service: 7/8/2021  1:02 PM Status: Signed    : Neelam Anderson OT (Occupational Therapist)       Occupational Therapy    Patient was not ready to initiate wraps on LE's at this time.  Pt is interested but feels he needs to manage his current condition.  While the wraps will help his LE swelling at this time, it is appropriate to allow patient to initiate his treatment as an outpatient to allow a consistent follow up of care.  Pt may not be able to get into an outpatient clinic for a few weeks and family would have to attempt to manage cares until then.  Place patient in tubigrip for the time being.     07/08/21 1000   Visit Specifics   Session Type Eval   Treatment Time   ADL Training 15   Patient Information   Onset date 7/8/2021   Chart Reviewed Yes   Prior Status   Independent With All ADL's   Lymphedema Information   Onset of Swelling  pt with 1 year h/o bullous pemphigoid that increased swelling and caused a full body rash, has now resolved mostly, still with LE swelling   Caregiver Availability spouse can assist at home   Prior Lymph Garments No   Stemmer Sign Negative   Additional Comments was supposed to be fit with compression socks after MD visit in June, was never contacted   Skin Integrity   Skin Integrity Site R Below Knee;L Below knee;R Foot;L Foot   R Below the Knee Intact;Moderate edema;Severe edema   L Below The Knee Intact;Moderate edema   R Foot Intact;Severe edema   L Foot  Intact;Moderate edema   Skin Check Performed Yes;No concerns   Comments small red patches on LLE near ankle from pemphigoid   Edema   Edema Site R Below Knee;R Foot;L Foot;L Below knee   R Below Knee 2+   L Below Knee 2+   R Foot 3+   L Foot  3+   Lymphedema Assessment   Treatment Diagnosis Cardiac related   Rehab Potential Good    Lymphedema Treatment Plan Bandaging   Frequency 3-5x/week   Lymph Treatment   Lymphedema Treatment Tubular compression   Tubular Compression   Location Bilateral;Foot;Below knee   Compression Level Medium   Educated on Indications for Removal and Follow-up Yes   Comments size H to left LE, size J to R LE   Recommendations   Follow-up Care Outpatient lymphedema clinic   Treatment Suggestions for Next Session recheck tubigrip, likelywill need smaller size on RLE   Interdisciplinary Treatment   PT/OT Therapy Goals Reviewed Yes   PT/OT Lymph Therapy POC Yes

## 2021-07-08 NOTE — H&P
H&P by Amie Mcgrath MD at 2021  3:29 PM     Author: Amie Mcgrath MD Service: Hospitalist Author Type: Physician    Filed: 2021  1:42 AM Date of Service: 2021  3:29 PM Status: Signed    : Amie Mcgrath MD (Physician)       Admission History and Physical   Nishant Olivo, RAJIV 1935, MRN [id number]    PCP: Aditya Mehta MD, [phone]   Code status:  Full Code       Extended Emergency Contact Information  Primary Emergency Contact: Daniela Llanos   [place]  Mobile Phone: [phone]  Relation: Spouse       Assessment and Plan   86-year-old male with a remote history of coronary artery disease status post 2 stents placed in the  admitted to the hospital with A. fib with RVR and CHF    A. fib with RVR  --- New onset.  Suspect contributing to pulmonary complaints/CHF  --- Admit to cardiac telemetry  --- Serial troponin  --- Rate responded well to IV metoprolol.  Continue oral Lopressor  --- Patient with significant allergy/reaction with bullous pemphigoid to Lasix.  Continue IV Bumex  --- TSH normal  ---check mag  --- Echocardiogram in the morning.  Last echo 6 years ago showing EF 55%  ---chadsvasc 5 - briefly discussed AC with him today; more discussions in am  ---check mag and replace if indicated    Acute CHFpEF  --- Suspect related to tachyarrhythmia  --- Continue Bumex per above; diuretic naïve  --- Heart failure order set used  --- Echo in the morning, last EF 55%    Leukocytosis  --- No symptoms infection  --- Is on steroids.  Suspect related to this.  Mild.  Monitor.    Bullous pemphigoid  --- There is recently increased.  Sounds like they thought dyspnea was potentially COPD exacerbation.  --- To new current dose but could consider decreasing while here.    CAD--- remote history stent placement in the .  States he has 2 stents  --- Starting beta-blocker per above  --- Continue Cozaar, Pravachol, aspirin  --- Echo in a.m. per above    Lymphedema - OT for  wraps    Aortic stenosis--- noted previously.  Recheck on echo    Hypertension--- IV Bumex per above  --- Continue home Cozaar while adding metoprolol for rate control  --- BMP stable    BPH--- continue home Cardura and Proscar    COPD  ---previous tobacco abuse  --- No symptoms exacerbation; shortness of breath CHF related  --- Tinea Symbicort    Anxiety - uses lorazepam prn    Obesity    COVID-19 negative, vaccinated      DVT prophylaxis; lovenox  Code status; full code  Admit inppatient status as expect at greater than two midnight stay for treatment of new onset A. fib with RVR and acute CHF.             Chief Complaint:  Tachycardia, suspected A. fib     HPI:    Nishant Olivo is a 86 y.o. old male with a history of known coronary artery disease with 2 remote stents placed in the 90s as well as recent treatment with prednisone for bullous pemphigoid secondary to Lasix who came into the emergency room department sent in from clinic for further work-up and evaluation of tachycardia suspected to be new onset A. fib.  Patient's primary historian.  He tells me that he has been struggling with dyspnea for the last 2 weeks.  Prednisone was increased without any relief.  He has no history that he knows of of COPD but did have a previous tobacco abuse quitting about 50 years ago.  He denies any chest pain but endorses increase in PND and orthopnea along with dyspnea on exertion.  He is somewhat equivocal when I asked him about weight gain or increase in lower extremity edema.  Has had no vomiting no change in urination patterns has had some loose stools recently although not in the last 2 weeks he thought associated with some antibiotics.  He does endorse a dry cough but no fever.    In the emergency room department he was found to have A. fib with RVR as well as concern for CHF and he is being admitted.         Medical History  Past Medical History:   Diagnosis Date   ? COPD (chronic obstructive pulmonary disease)  (H)    ? Coronary artery disease    ? Hypertension    ? Right bundle branch block     complete        Surgical History  He  has a past surgical history that includes pr knee scope,diagnostic; pr insert intracoronary stent; and Hernia repair.       Social History  Reviewed, and he  reports that he has quit smoking. His smoking use included cigarettes. He quit after 30.00 years of use. He has never used smokeless tobacco. He reports that he does not drink alcohol or use drugs.  Social History     Social History Narrative    Lives with his wife.        Allergies  Allergies   Allergen Reactions   ? Furosemide Rash     pemphigoid   ? Cat Hair Std Allergenic Ext    ? Sulfa (Sulfonamide Antibiotics)     Family History  Reviewed, and History reviewed. No pertinent family history.  Family Status   Relation Name Status   ? Mother     ? Father             Prior to Admission Medications   Medications Prior to Admission   Medication Sig Dispense Refill Last Dose   ? aspirin 325 MG tablet Take 325 mg by mouth at bedtime.    2021 at pm   ? budesonide-formoteroL (SYMBICORT) 160-4.5 mcg/actuation inhaler USE 2 INHALATIONS TWICE A DAY 1 Inhaler 3 2021 at am   ? cholecalciferol, vitamin D3, (VITAMIN D3) 25 mcg (1,000 unit) capsule    2021 at am   ? doxazosin (CARDURA) 8 MG tablet TAKE 1 TABLET AT BEDTIME 90 tablet 3 2021 at pm   ? finasteride (PROSCAR) 5 mg tablet TAKE 1 TABLET DAILY 90 tablet 3 2021 at pm   ? LORazepam (ATIVAN) 0.5 MG tablet TAKE 1/2 TO 1 TABLET BY MOUTH THREE TIMES DAILY AS NEEDED FOR ANXIOUSNESS 30 tablet 5 Past Week at Unknown time   ? losartan (COZAAR) 100 MG tablet Take 1 tablet (100 mg total) by mouth daily. 30 tablet 11 2021 at am   ? nitroglycerin (NITROSTAT) 0.4 MG SL tablet PLACE ONE TABLET UNDER THE TONGUE EVERY 5 MINUTES AS NEEDED FOR CHEST PAIN 25 tablet 3 More than a month at Unknown time   ? pravastatin (PRAVACHOL) 20 MG tablet Take 1 tablet (20 mg total) by  mouth every evening. 90 tablet 3 7/6/2021 at pm   ? predniSONE (DELTASONE) 5 MG tablet Take 5 mg by mouth 2 (two) times a day .   7/7/2021 at am          Review of Systems:  A comprehensive review of systems was negative except as noted. Physical Exam:  Temp:  [97.6  F (36.4  C)-98.4  F (36.9  C)] 97.6  F (36.4  C)  Heart Rate:  [] 107  Resp:  [18-34] 20  BP: (104-158)/(72-94) 125/83    General Appearance:    Alert, cooperative, no distress, appears stated age   Head:    Normocephalic, without obvious abnormality, atraumatic   Eyes:    conjunctiva/corneas clear, EOM's intact,    Throat:  Oropharynx moist   Neck:   Supple,  trachea midline, no adenopathy;     thyroid:  no enlargement/tenderness/nodules;    Lungs:    Mild crackles bilateral    Heart:   Tachycardic and irregularly irregular   Abdomen:     Soft, obese, non-tender, no hepatosplenomegaly, no masses   Extremities:  1-2+ bilateral LE edema, otherwise extremities normal, atraumatic, without cyanosis    Skin:   No open sores, no rashes or lesions, normal turgor and color   Lymph nodes:   Cervical, supraclavicular  nodes normal   Neurologic:  Alert and oriented, grossly intact without focal deficits appreciated.        Pertinent Labs  Lab Results: personally reviewed.     Recent Results (from the past 24 hour(s))   Electrocardiogram Perform and Read   Result Value Ref Range    SYSTOLIC BLOOD PRESSURE      DIASTOLIC BLOOD PRESSURE      VENTRICULAR RATE 135 BPM    ATRIAL RATE 136 BPM    P-R INTERVAL      QRS DURATION 124 ms    Q-T INTERVAL 318 ms    QTC CALCULATION (BEZET) 477 ms    P Axis      R AXIS 79 degrees    T AXIS -21 degrees    MUSE DIAGNOSIS       Atrial fibrillation with rapid ventricular response  Right bundle branch block  Abnormal ECG  When compared with ECG of 29-JUN-2017 19:09,  Atrial fibrillation has replaced Sinus rhythm  Vent. rate has increased BY  65 BPM  T wave inversion now evident in Inferior leads     HM2(CBC w/o Differential)    Result Value Ref Range    WBC 12.3 (H) 4.0 - 11.0 thou/uL    RBC 5.16 4.40 - 6.20 mill/uL    Hemoglobin 15.9 14.0 - 18.0 g/dL    Hematocrit 49.6 40.0 - 54.0 %    MCV 96 80 - 100 fL    MCH 30.8 27.0 - 34.0 pg    MCHC 32.1 32.0 - 36.0 g/dL    RDW 15.2 (H) 11.0 - 14.5 %    Platelets 173 140 - 440 thou/uL    MPV 9.9 8.5 - 12.5 fL   INR   Result Value Ref Range    INR 1.04 0.90 - 1.10   Basic Metabolic Panel   Result Value Ref Range    Sodium 139 136 - 145 mmol/L    Potassium 4.4 3.5 - 5.0 mmol/L    Chloride 103 98 - 107 mmol/L    CO2 28 22 - 31 mmol/L    Anion Gap, Calculation 8 5 - 18 mmol/L    Glucose 107 70 - 125 mg/dL    Calcium 9.0 8.5 - 10.5 mg/dL    BUN 22 8 - 28 mg/dL    Creatinine 1.04 0.70 - 1.30 mg/dL    GFR MDRD Af Amer >60 >60 mL/min/1.73m2    GFR MDRD Non Af Amer >60 >60 mL/min/1.73m2   Troponin I   Result Value Ref Range    Troponin I 0.02 0.00 - 0.29 ng/mL   BNP(B-type Natriuretic Peptide)   Result Value Ref Range     (H) 0 - 93 pg/mL   Thyroid Stimulating Hormone (TSH)   Result Value Ref Range    TSH 0.54 0.30 - 5.00 uIU/mL   Magnesium   Result Value Ref Range    Magnesium 2.5 1.8 - 2.6 mg/dL   Asymptomatic SARS-CoV-2 (COVID-19)-PCR    Specimen: Respiratory   Result Value Ref Range    SARS-CoV-2 PCR Result Negative Negative, Invalid   Troponin I   Result Value Ref Range    Troponin I 0.02 0.00 - 0.29 ng/mL   Troponin I   Result Value Ref Range    Troponin I 0.03 0.00 - 0.29 ng/mL         Pertinent Radiology  Radiology Results: reviewed.      Xr Chest 1 View Portable    Result Date: 7/7/2021  EXAM: XR CHEST 1 VIEW PORTABLE LOCATION: [facility] DATE/TIME: 7/7/2021 1:54 PM INDICATION: Dyspnea on exertion COMPARISON: Chest x-ray 10/05/2017     Incidental azygos fissure. Upper normal heart size. Central vascular congestion. No overt pulmonary edema. No focal pneumonic consolidation or pleural effusion.        EKG Results: Tracing personally reviewed; A fib with RVR.  QTc  483.

## 2021-07-08 NOTE — PROGRESS NOTES
Progress Notes by Nataliya Salvador PT at 7/8/2021  9:54 AM     Author: Nataliya Salvador PT Service: -- Author Type: Physical Therapist    Filed: 7/8/2021  9:54 AM Date of Service: 7/8/2021  9:54 AM Status: Signed    : Nataliya Salvador PT (Physical Therapist)       Physical Therapy     07/08/21 0915   Visit Specifics   Eval Type Initial eval   Inital PT Consult 07/08/21   Bed/Tabs/Pad Alarm Applied Not applicable   General   Onset date 07/07/21   Chart Reviewed Yes   PT/OT Patient/Caregiver Stated Goals go home   Treatment Time   Gait Training 25   Precautions   Weight Bearing Status fwb   Home Living   Type of Home House   Home Layout Able to live on main level with bedroom/bathroom;Stairs to enter with rails   Mobility Equipment Cane;Walker-4 wheeled   Prior Status   Independent With Functional mobility;Without device;With device  (cane or 4WW out of home for distance)   Lives With Spouse   Device Used Yes   Cognition   Overall Cognitive Status WFL   RLE Assessment   RLE Assessment   (grossly deconditioned)   LLE Assessment   LLE Assessment   (grossly deconditioned)   Bed Mobility   Supine to Sit Independent   Transfer    Sit To Stand Independent   Stand To Sit Independent    Additional Transfer Toilet   Toilet Independent   Transfer Comments safe transitions, educated in safe tech with FWW   Ambulation    Ambulation 02 Sat at rest;Ambulation 02 Sat;Amount of 02;02 Recovery Time;HR at rest;HR with activity;Additional Ambulation Assessment   Weight Bearing Status wbat   Distance (ft)  100   Assistance Distant supervision   Assistive Device Rolling walker;Bariatric   Verbal Cues Posture;Breathing techniques;LE advancement  (pacing)   Pattern Flexed posture;Decreased pace;Decreased step length;R decreased heel strike;L decreased heel strike   02 Saturation At Rest 94   02 Saturation With Activity 86 %   Amount of 02 Delivered (L/min) 0 Liters   02 Saturation Recovery Time (sec) 90   HR At Rest 120 bpm    HR With Activity 140 bpm   Ambulation 2   Distance (ft) 2 10   Assistance 2 Close supervision   Assistive Device 2 None   Quality of Gait/Comment 2 in room to bathroom   Pattern 2 Flexed posture;L decreased heel strike;R decreased heel strike;L decreased foot clearance;R decreased foot clearance;Decreased step length;Decreased pace   Stairs   Stairs   (limited by fatigue)   Fall Risk   Fall Risk   (reports fall 4 years ago, stumbling over a child)   Plan   Treatment/Interventions Functional transfer training;Gait/stair training;Home exercise program;Strengthening/ROM   PT Frequency Daily   Assessment   Prognosis Excellent   Problem List Decreased strength;Decreased endurance;Decreased mobility   Barriers to Discharge None   Recommendation   PT Discharge Recommendation Home PT;Home with assist   PT Equipment Recommendation Rolling walker / FWW;4WW   Treatment Suggestions for Next Session stairs, gait with/without  AD, strength/endurance   PT Care Plan REVIEWED DAILY Yes, goals remain appropriate

## 2021-07-08 NOTE — PLAN OF CARE
Plan of Care by Latrice Fierro RN at 7/8/2021  5:39 AM     Author: Latrice Fierro RN Service: -- Author Type: Registered Nurse    Filed: 7/8/2021  5:43 AM Date of Service: 7/8/2021  5:39 AM Status: Signed    : Latrice Fierro RN (Registered Nurse)         Problem: Daily Care  Goal: Daily care needs are met  Outcome: Progressing  Patient denied feeling short of breath at rest, though told staff that earlier he was breathing hard with activity.  Heart rate was elevated >120 at 0140.  Patient received prn metoprolol 5 mg once, and heart rate decreased to low 100s, though continued in atrial fibrillation.      Problem: Excessive Fluid Volume  Goal: Patient will achieve/maintain normal respiratory rate/effort  Outcome: Progressing     Intake and output measured.  Patient compliant with using urinal.  He denied pain, including chest pain.

## 2021-07-09 LAB
ANION GAP SERPL CALCULATED.3IONS-SCNC: 10 MMOL/L (ref 5–18)
BUN SERPL-MCNC: 22 MG/DL (ref 8–28)
C DIFF TOX B STL QL: NEGATIVE
CALCIUM SERPL-MCNC: 8.5 MG/DL (ref 8.5–10.5)
CHLORIDE BLD-SCNC: 102 MMOL/L (ref 98–107)
CO2 SERPL-SCNC: 28 MMOL/L (ref 22–31)
CREAT SERPL-MCNC: 0.96 MG/DL (ref 0.7–1.3)
GFR SERPL CREATININE-BSD FRML MDRD: >60 ML/MIN/1.73M2
GLUCOSE BLD-MCNC: 100 MG/DL (ref 70–125)
MAGNESIUM SERPL-MCNC: 2.4 MG/DL (ref 1.8–2.6)
PLATELET # BLD AUTO: 153 THOU/UL (ref 140–440)
POTASSIUM BLD-SCNC: 3.9 MMOL/L (ref 3.5–5)
POTASSIUM BLD-SCNC: 3.9 MMOL/L (ref 3.5–5)
RIBOTYPE 027/NAP1/BI: NORMAL
SODIUM SERPL-SCNC: 140 MMOL/L (ref 136–145)

## 2021-07-09 PROCEDURE — 85049 AUTOMATED PLATELET COUNT: CPT

## 2021-07-09 PROCEDURE — 87493 C DIFF AMPLIFIED PROBE: CPT

## 2021-07-09 PROCEDURE — 999N001212 HC REV CODE 9999 CHARGE CONVERSION OPNP

## 2021-07-09 PROCEDURE — 83735 ASSAY OF MAGNESIUM: CPT

## 2021-07-09 PROCEDURE — 36415 COLL VENOUS BLD VENIPUNCTURE: CPT

## 2021-07-09 PROCEDURE — 80048 BASIC METABOLIC PNL TOTAL CA: CPT

## 2021-07-09 ASSESSMENT — MIFFLIN-ST. JEOR: SCORE: 2100.14

## 2021-07-09 NOTE — PLAN OF CARE
Plan of Care by Neelam Anderson OT at 7/9/2021  2:43 PM     Author: Neelam Anderson OT Service: -- Author Type: Occupational Therapist    Filed: 7/9/2021  2:44 PM Date of Service: 7/9/2021  2:43 PM Status: Signed    : Neelam Anderson OT (Occupational Therapist)       Lymphedema Therapy Discharge Summary  Refer to Care Plan goals above for progress towards goals at time of discharge.   Date of Lymphedema Discharge: 7/9/2021  Refer to daily documentation flowsheet for equipment issued.   Discharge Destination: Remains in hospital  Discharge Comments: Pt to continue to wear tubigrip as tolerated.  Home therapy for lymph care upon discharge recommended.  CM to get orders.    Neelam ABBASI/L, CLT  7/9/2021      Problem: Lymphedema Therapy  Goal: Lymphedema Goals  Description: Patient will demonstrate the following by 7/12/2021, in order to maximize independence with ADL/IADL performance:   -Tolerate compression at least 23/24 hours per day to improve comfort in extremities.   -Patient/family to verbalize ability to direct care for edema management techniques.       Goals entered on 7/8/2021 by Neelam Anderson OTR/L, CLT           Outcome: Completed

## 2021-07-09 NOTE — PROGRESS NOTES
"Progress Notes by Estuardo Dugan DO at 7/9/2021  8:20 AM     Author: Estuardo Dugan DO Service: Hospitalist Author Type: Physician    Filed: 7/9/2021 12:38 PM Date of Service: 7/9/2021  8:20 AM Status: Signed    : Estuardo Dugan DO (Physician)       [facility] Progress Note       ASSESSMENT:    Principal Problem:    Atrial fibrillation with RVR (H)  Active Problems:    Bullous pemphigoid    Acute diastolic congestive heart failure (H)    Leukocytosis      PLAN:   86-year-old male with history of coronary artery disease presents with new A. fib with RVR and acute systolic CHF.      New A. fib: TTE shows newly reduced EF of 50%  --Increased metoprolol to 100mg BID however remains with poor rate control and oxygen requirement  --Started Eliquis  -- appreciate cardiology consultation to assist with management. Question need to start digoxin.       CAD: Continue aspirin, reduce dose to 81 mg daily given Eliquis initiation.  Continue home Cozaar and Pravachol.  Continue beta-blocker initiation as above      Acute systolic CHF: TTE shows reduced EF of 50%  --Continue IV Bumex, continue increased dose metoprolol      Bullous pemphigoid:  --Continue prednisone      COPD: Continue home inhalers      Reported history of aortic stenosis: TTE 7/8/2021 notes normal aortic valve structure and function      Lymphedema: Continue leg wraps      BPH: Continue home Cardura and Proscar      Allergic rhinitis: Started Flonase            DVT PPX: Started Eliquis            Esturado Dugan D.O.               -------------------------------------------------------------------------------------------------------------  SUBJECTIVE: NAD. Denies any nausea, vomiting, abdominal pain, chest pain,  fevers, chills, confusion or headache.     Exam:  /86 (Patient Position: Lying)   Pulse (!) 113   Temp 97.8  F (36.6  C) (Oral)   Resp 18   Ht 6' 1\" (1.854 m)   Wt (!) 299 lb (135.6 kg)   SpO2 96%   BMI 39.45 " kg/m    General: NAD  RESPIRATORY: Breathing nonlabored  CARDIOVASCULAR: 2+ le edema bilat.   ABDOMEN: soft and non-tender  NEUROLOGIC: Non focal, motor and sensory intact, speech clear      Diagnostics Reviewed:      Recent Results (from the past 24 hour(s))   Platelet Count - every other day x 3    Collection Time: 07/09/21  4:49 AM   Result Value Ref Range    Platelets 153 140 - 440 thou/uL   Magnesium    Collection Time: 07/09/21  4:50 AM   Result Value Ref Range    Magnesium 2.4 1.8 - 2.6 mg/dL   Potassium - Next AM    Collection Time: 07/09/21  4:50 AM   Result Value Ref Range    Potassium 3.9 3.5 - 5.0 mmol/L   Basic Metabolic Panel    Collection Time: 07/09/21  4:50 AM   Result Value Ref Range    Sodium 140 136 - 145 mmol/L    Potassium 3.9 3.5 - 5.0 mmol/L    Chloride 102 98 - 107 mmol/L    CO2 28 22 - 31 mmol/L    Anion Gap, Calculation 10 5 - 18 mmol/L    Glucose 100 70 - 125 mg/dL    Calcium 8.5 8.5 - 10.5 mg/dL    BUN 22 8 - 28 mg/dL    Creatinine 0.96 0.70 - 1.30 mg/dL    GFR MDRD Af Amer >60 >60 mL/min/1.73m2    GFR MDRD Non Af Amer >60 >60 mL/min/1.73m2

## 2021-07-09 NOTE — CONSULTS
Consults by Phil Rowley MD at 7/9/2021  9:14 AM     Author: Phil Rowley MD Service: Cardiology Author Type: Physician    Filed: 7/9/2021  2:38 PM Date of Service: 7/9/2021  9:14 AM Status: Signed    : Phil Rowley MD (Physician)     Consult Orders    1. Inpatient consult to Cardiology Reason for Consult? new a. fib with RVR; Did you contact the consulting MD? No; Consult priority: Today (routine); Communication for MD: No phone communication necessary for now [[id number]] ordered by Estuardo Dugan DO at 07/09/21 0804              [facility] team is asked to see Nishant Olivo in consultation to evaluate new a. fib with RVR.      Assessment:     1. Atrial fibrillation with rapid ventricular response.  Improving with initiation of metoprolol.  Given his prior tolerance of verapamil, will add this agent to obtain better heart rate control.  Suggest switching to once daily regimen tomorrow.  Continue Eliquis.  2. Morbid obesity, sleep difficulties suggesting obstructive sleep apnea with no prior evaluation.  3. Remote history of coronary artery disease with no recent anginal symptoms and no evidence of myocardial infarction despite rapid ventricular response.  4. Heart failure with preserved ejection fraction likely secondary to #1 and possibly #2.  Will use Bumex for diuresis acutely, advised to resume use of support stockings after hospital discharge for chronic venous insufficiency.     Plan:     1. Continue Bumex 1 mg twice daily IV  2. Add spironolactone to help prevent hyponatremia  3. Add verapamil 80 every 8 to help with rate control as blood pressure allows.  4. Consider overnight oximetry, or outpatient sleep apnea evaluation  5. Support stockings on every morning, off nightly     Current History:     Nishant Olivo is an 86-year-old gentleman with a remote history of coronary artery disease, status post coronary intervention more than 2 decades  ago.  He also has aortic sclerosis noted previously but no stenosis by echocardiogram.  Over the last several months he has been noted to have lower extremity edema, initially suspected to be related to venous insufficiency aggravated by obesity.  He was treated with furosemide which seemed to worsen the swelling, with blistering over legs as well as hands and arms.  A skin biopsy was consistent with bullous pemphigoid.  Furosemide was discontinued and he was treated with steroids, with improved symptoms.    2 days ago he became acutely aware of shortness of breath, particularly when climbing steps.  He also noted difficulty sleeping at night because of dyspnea.  He has felt his lower extremity edema has been stable to slightly increasing, but his activity tolerance has decreased significantly.  He has been working on weight loss and reports a recent weight of 285 pounds.  This suggests a 20 pound weight gain in the last few days leading up to his admission.    Has not experienced any chest pain or stroke symptoms.  He has had no awareness of palpitations or racing heartbeat.  Feels considerably improved since admission following administration of Bumex.  He recalls that many years ago he was treated with verapamil after his coronary intervention, but this was switched to losartan for uncertain reasons.  He does not recall any adverse reaction to verapamil.    He does have some tendency towards chronic constipation but this is mild.  He denies bruising or bleeding difficulties.  He has been tolerating Eliquis without a problem.        Past Medical History:     Past Medical History:   Diagnosis Date   ? COPD (chronic obstructive pulmonary disease) (H)    ? Coronary artery disease    ? Hypertension    ? Right bundle branch block     complete     Sleep history: Chronically poor, with frequent nocturnal awakenings.  Denies history of snoring or apnea.    Past Surgical History:     Past Surgical History:   Procedure  "Laterality Date   ? HERNIA REPAIR     ? WI INSERT INTRACORONARY STENT      Description: Cath Stent Placement;  Recorded: 02/24/2014;  Comments: stenting to the LAD in 1999. 2nd done years later.   ? WI KNEE SCOPE,DIAGNOSTIC      Description: Arthroscopy Knee Left;  Recorded: 06/27/2009;  Comments: details unk.       Family History:   History reviewed. No pertinent family history.  Family history reviewed and is not pertinent to the chief complaint or presenting problem    Social History:    reports that he has quit smoking. His smoking use included cigarettes. He quit after 30.00 years of use. He has never used smokeless tobacco. He reports that he does not drink alcohol or use drugs.  Exercise history: Minimal    Meds:   Scheduled Meds:  ? apixaban ANTICOAGULANT  5 mg Oral BID   ? aspirin  81 mg Oral Daily with brkfst   ? budesonide-formoteroL  2 puff Inhalation BID   ? bumetanide  1 mg Intravenous Q12H   ? doxazosin  8 mg Oral QHS   ? finasteride  5 mg Oral DAILY   ? fluticasone propionate  1 spray Each Nare DAILY   ? losartan  100 mg Oral DAILY   ? metoprolol tartrate  100 mg Oral BID   ? polyethylene glycol  17 g Oral DAILY   ? pravastatin  20 mg Oral QPM   ? predniSONE  5 mg Oral BID   ? sodium chloride  2.5 mL Intravenous Line Care     Continuous Infusions:  PRN Meds:.acetaminophen, acetaminophen, bisacodyL, LORazepam, magnesium hydroxide, nitroglycerin, ondansetron **OR** ondansetron    Allergies:   Furosemide, Cat hair std allergenic ext, and Sulfa (sulfonamide antibiotics)    Review of Systems:   A 12 point comprehensive review of systems was negative except as noted in the current history.    Objective:      Physical Exam  Wt Readings from Last 3 Encounters:   07/09/21 (!) 299 lb (135.6 kg)   07/07/21 (!) 305 lb 12.8 oz (138.7 kg)   06/01/21 (!) 294 lb (133.4 kg)     Body mass index is 39.45 kg/m .  /86 (Patient Position: Lying)   Pulse (!) 113   Temp 97.8  F (36.6  C) (Oral)   Resp 18   Ht 6' 1\" " (1.854 m)   Wt (!) 299 lb (135.6 kg)   SpO2 96%   BMI 39.45 kg/m      General Appearance:  No apparent distress.  HEENT: No scleral icterus; the mucous membranes were pink and moist.  Conjunctiva not injected  Neck:  No cervical bruits, jugular venous distention, or thyromegaly.  Stout.  Chest:The spine was straight. The chest was symmetric.  Lungs:  Respirations unlabored; the lungs are clear to auscultation.  Cardiovascular:    Nonpalpable point of maximal impulse. Auscultation reveals rapid, irregularly irregular first and second heart sounds with no murmurs, rubs, or gallops. Carotid, radial, and dorsalis pedal pulses are intact and symmetric.  Abdomen: Obese.  No organomegaly, masses, bruits, or tenderness. Bowels sounds are present  Extremities: 2+ pedal ankle and pretibial edema.  Nontender  Skin:  No xanthelasma.  Neurologic: Mood and affect are appropriate. Speech is fluent.      EKG (personally reviewed): Atrial fibrillation 139 bpm.  Right bundle branch block.    Imaging   EXAM: XR CHEST 1 VIEW PORTABLE  LOCATION: [facility]  DATE/TIME: 7/7/2021 1:54 PM  INDICATION: Dyspnea on exertion  COMPARISON: Chest x-ray 10/05/2017  IMPRESSION:   Incidental azygos fissure. Upper normal heart size. Central vascular congestion. No overt pulmonary edema. No focal pneumonic consolidation or pleural effusion.      Cardiac diagnostics    Echocardiogram 7/8:    Left ventricle ejection fraction is mildly decreased. The estimated left ventricular ejection fraction is 50% without obvious wall motion abnormality.    Normal right ventricular size and systolic function.    Trace mitral and mild tricuspid insufficiency. Mild pulmonary hypertension noted.    When compared to the previous study dated 6/29/2015, left ventricular systolic function may be slightly reduced on current study.        Lab Review   Results from last 7 days   Lab Units 07/09/21  0450 07/08/21  0501 07/07/21  1317   LN-SODIUM  mmol/L 140 139 139   LN-POTASSIUM mmol/L 3.9  3.9 4.3 4.4   LN-CHLORIDE mmol/L 102 105 103   LN-CO2 mmol/L 28 26 28   LN-BLOOD UREA NITROGEN mg/dL 22 21 22   LN-CREATININE mg/dL 0.96 0.95 1.04   LN-CALCIUM mg/dL 8.5 8.3* 9.0     Results from last 7 days   Lab Units 07/09/21  0449 07/08/21  0501   LN-WHITE BLOOD CELL COUNT thou/uL  --  10.4   LN-HEMOGLOBIN g/dL  --  14.8   LN-HEMATOCRIT %  --  45.9   LN-PLATELET COUNT thou/uL 153 159     Lab Results   Component Value Date    LDLCALC 65 03/09/2021           Phil Rowley MD, FACC  7/9/2021    Note created using Dragon voice recognition software.  Sound alike errors may have escaped editing.

## 2021-07-09 NOTE — PLAN OF CARE
Plan of Care by Harleen Foote RN at 7/9/2021  5:25 AM     Author: Harleen Foote RN Service: -- Author Type: Registered Nurse    Filed: 7/9/2021  5:29 AM Date of Service: 7/9/2021  5:25 AM Status: Signed    : Harleen Foote RN (Registered Nurse)       Had 1 episode of diarrhea, loose and brown in color per patient- RN didn't see the BM. Instructed patient to call RN in case he will have another episode.  Afib- 90 to low 100's.  88% on room air- placed on 2 liters- 91%, increased it to 3 liters- 94%

## 2021-07-09 NOTE — PROGRESS NOTES
Progress Notes by Geneva Church SW at 7/9/2021  2:16 PM     Author: Geneva Church SW Service: -- Author Type:     Filed: 7/9/2021  2:20 PM Date of Service: 7/9/2021  2:16 PM Status: Signed    : Geneva Church SW ()       Care Management Follow Up Note    Length of Stay (days) 2     Patient plan of care discussed at Interdisciplinary Rounds: yes                Expected Discharge Date: 07/10/21  Concerns to be Addressed / Barriers to Discharge:  clinical progression    Anticipated Discharge Disposition:  home wit home care  Anticipated Discharge Services:    PT and OT (Lymphedema)  Selected Continued Care - Admitted Since 7/7/2021     Home Medical Care     Service Provider Selected Services Address Phone Fax Patient Preferred Last Updated    [facility] [address] [place] [address] [address], [facility] [id number] [phone] -- Geneva Church SW 7/9/2021 1416               Anticipated Discharge DME:  walker    Plan: DIRK completed chart review, discussed updates with MD and rehab department.   Patient will discharge home with his wife, therapy recommending home PT , OT ( for lymphedema). Patient and wife agreeable. DIRK spoke to Manny in intake at [facility] who states they are able to accept patient, and patient commercial BCBS plan.   CM will follow for discharge planning needs.    DIRK Orantes

## 2021-07-09 NOTE — PROGRESS NOTES
"Progress Notes by Margi Valdovinos RN at 2021 10:27 PM     Author: Margi Valdovinos RN Service: -- Author Type: Registered Nurse    Filed: 2021 10:30 PM Date of Service: 2021 10:27 PM Status: Signed    : Margi Valdovinos RN (Registered Nurse)         Progress Note:    Name: Nishant Olivo  :   1935  MRN:   [id number]    Pt denied pain. VSS on room air. Tele reading a-fib. HR has been better since he took 100 mg of PO Metoprolol. Now running in high 90's to 100's. Getting IV Bumex. Had an output of 1325 mL this evening. Voiding via the urinal. Ambulating independently. Calling appropriately.       /76 (Patient Position: Lying)   Pulse (!) 113   Temp 97.9  F (36.6  C) (Oral)   Resp 20   Ht 6' 1\" (1.854 m)   Wt (!) 299 lb 1.6 oz (135.7 kg)   SpO2 95%   BMI 39.46 kg/m        2021      Margi Valdovinos RN               "

## 2021-07-10 PROBLEM — I48.91 ATRIAL FIBRILLATION WITH RAPID VENTRICULAR RESPONSE (H): Status: ACTIVE | Noted: 2021-07-10

## 2021-07-10 PROBLEM — I50.9 ACUTE CONGESTIVE HEART FAILURE, UNSPECIFIED HEART FAILURE TYPE (H): Status: ACTIVE | Noted: 2021-07-10

## 2021-07-10 LAB
ANION GAP SERPL CALCULATED.3IONS-SCNC: 8 MMOL/L (ref 5–18)
BUN SERPL-MCNC: 23 MG/DL (ref 8–28)
CALCIUM SERPL-MCNC: 8.2 MG/DL (ref 8.5–10.5)
CHLORIDE BLD-SCNC: 101 MMOL/L (ref 98–107)
CO2 SERPL-SCNC: 30 MMOL/L (ref 22–31)
CREAT SERPL-MCNC: 1.14 MG/DL (ref 0.7–1.3)
GFR SERPL CREATININE-BSD FRML MDRD: >60 ML/MIN/1.73M2
GLUCOSE BLD-MCNC: 107 MG/DL (ref 70–125)
LACTATE SERPL-SCNC: 0.9 MMOL/L (ref 0.7–2)
MAGNESIUM SERPL-MCNC: 2.5 MG/DL (ref 1.8–2.6)
POTASSIUM BLD-SCNC: 4.1 MMOL/L (ref 3.5–5)
SODIUM SERPL-SCNC: 139 MMOL/L (ref 136–145)

## 2021-07-10 PROCEDURE — 97110 THERAPEUTIC EXERCISES: CPT | Mod: GP

## 2021-07-10 PROCEDURE — 83605 ASSAY OF LACTIC ACID: CPT

## 2021-07-10 PROCEDURE — 36415 COLL VENOUS BLD VENIPUNCTURE: CPT

## 2021-07-10 PROCEDURE — 999N001212 HC REV CODE 9999 CHARGE CONVERSION OPNP

## 2021-07-10 PROCEDURE — 97116 GAIT TRAINING THERAPY: CPT | Mod: GP

## 2021-07-10 PROCEDURE — 83735 ASSAY OF MAGNESIUM: CPT

## 2021-07-10 PROCEDURE — 80048 BASIC METABOLIC PNL TOTAL CA: CPT

## 2021-07-10 RX ORDER — FINASTERIDE 5 MG/1
5 TABLET, FILM COATED ORAL AT BEDTIME
Status: DISCONTINUED | OUTPATIENT
Start: 2021-07-11 | End: 2021-07-12 | Stop reason: HOSPADM

## 2021-07-10 RX ORDER — PRAVASTATIN SODIUM 20 MG
20 TABLET ORAL EVERY EVENING
Status: DISCONTINUED | OUTPATIENT
Start: 2021-07-11 | End: 2021-07-12 | Stop reason: HOSPADM

## 2021-07-10 RX ORDER — LOSARTAN POTASSIUM 50 MG/1
100 TABLET ORAL DAILY
Status: DISCONTINUED | OUTPATIENT
Start: 2021-07-11 | End: 2021-07-11

## 2021-07-10 RX ORDER — DOCUSATE SODIUM 100 MG/1
100 CAPSULE, LIQUID FILLED ORAL DAILY
Status: DISCONTINUED | OUTPATIENT
Start: 2021-07-11 | End: 2021-07-11

## 2021-07-10 RX ORDER — LORAZEPAM 0.5 MG/1
0.5 TABLET ORAL 3 TIMES DAILY PRN
Status: DISCONTINUED | OUTPATIENT
Start: 2021-07-11 | End: 2021-07-12 | Stop reason: HOSPADM

## 2021-07-10 RX ORDER — NITROGLYCERIN 0.4 MG/1
0.4 TABLET SUBLINGUAL EVERY 5 MIN PRN
Status: DISCONTINUED | OUTPATIENT
Start: 2021-07-11 | End: 2021-07-12 | Stop reason: HOSPADM

## 2021-07-10 RX ORDER — BISACODYL 10 MG
10 SUPPOSITORY, RECTAL RECTAL DAILY PRN
Status: DISCONTINUED | OUTPATIENT
Start: 2021-07-11 | End: 2021-07-11

## 2021-07-10 RX ORDER — PREDNISONE 5 MG/1
5 TABLET ORAL 2 TIMES DAILY
Status: DISCONTINUED | OUTPATIENT
Start: 2021-07-11 | End: 2021-07-12 | Stop reason: HOSPADM

## 2021-07-10 RX ORDER — FLUTICASONE PROPIONATE 50 MCG
1 SPRAY, SUSPENSION (ML) NASAL DAILY
Status: DISCONTINUED | OUTPATIENT
Start: 2021-07-11 | End: 2021-07-12 | Stop reason: HOSPADM

## 2021-07-10 RX ORDER — POLYETHYLENE GLYCOL 3350 17 G/17G
17 POWDER, FOR SOLUTION ORAL DAILY
Status: DISCONTINUED | OUTPATIENT
Start: 2021-07-11 | End: 2021-07-11

## 2021-07-10 RX ORDER — ASPIRIN 81 MG/1
81 TABLET, CHEWABLE ORAL DAILY
Status: DISCONTINUED | OUTPATIENT
Start: 2021-07-11 | End: 2021-07-12 | Stop reason: HOSPADM

## 2021-07-10 RX ORDER — DOXAZOSIN 4 MG/1
8 TABLET ORAL AT BEDTIME
Status: DISCONTINUED | OUTPATIENT
Start: 2021-07-11 | End: 2021-07-12 | Stop reason: HOSPADM

## 2021-07-10 RX ORDER — SPIRONOLACTONE 25 MG/1
25 TABLET ORAL DAILY
Status: DISCONTINUED | OUTPATIENT
Start: 2021-07-11 | End: 2021-07-12 | Stop reason: HOSPADM

## 2021-07-10 RX ORDER — ONDANSETRON 4 MG/1
8 TABLET, FILM COATED ORAL EVERY 8 HOURS PRN
Status: DISCONTINUED | OUTPATIENT
Start: 2021-07-11 | End: 2021-07-12 | Stop reason: HOSPADM

## 2021-07-10 RX ORDER — ACETAMINOPHEN 650 MG/1
650 SUPPOSITORY RECTAL EVERY 4 HOURS PRN
Status: DISCONTINUED | OUTPATIENT
Start: 2021-07-11 | End: 2021-07-12 | Stop reason: HOSPADM

## 2021-07-10 RX ORDER — ACETAMINOPHEN 500 MG
500-1000 TABLET ORAL EVERY 4 HOURS PRN
Status: DISCONTINUED | OUTPATIENT
Start: 2021-07-11 | End: 2021-07-12 | Stop reason: HOSPADM

## 2021-07-10 RX ORDER — METOPROLOL TARTRATE 25 MG/1
100 TABLET, FILM COATED ORAL 2 TIMES DAILY
Status: DISCONTINUED | OUTPATIENT
Start: 2021-07-11 | End: 2021-07-12 | Stop reason: HOSPADM

## 2021-07-10 RX ORDER — ONDANSETRON 2 MG/ML
4 INJECTION INTRAMUSCULAR; INTRAVENOUS EVERY 4 HOURS PRN
Status: DISCONTINUED | OUTPATIENT
Start: 2021-07-11 | End: 2021-07-12 | Stop reason: HOSPADM

## 2021-07-10 RX ORDER — VERAPAMIL HYDROCHLORIDE 40 MG/1
80 TABLET ORAL EVERY 8 HOURS SCHEDULED
Status: DISCONTINUED | OUTPATIENT
Start: 2021-07-11 | End: 2021-07-11

## 2021-07-10 RX ORDER — BUDESONIDE AND FORMOTEROL FUMARATE DIHYDRATE 160; 4.5 UG/1; UG/1
2 AEROSOL RESPIRATORY (INHALATION)
Status: DISCONTINUED | OUTPATIENT
Start: 2021-07-11 | End: 2021-07-12 | Stop reason: HOSPADM

## 2021-07-10 ASSESSMENT — MIFFLIN-ST. JEOR
SCORE: 2069.29
SCORE: 2068.88

## 2021-07-10 NOTE — PROGRESS NOTES
Progress Notes by Margo Gonzalez PT at 7/10/2021  4:34 PM     Author: Margo Gonzalez PT Service: -- Author Type: Physical Therapist    Filed: 7/10/2021  4:35 PM Date of Service: 7/10/2021  4:34 PM Status: Signed    : Margo Gonzalez PT (Physical Therapist)          07/10/21 1451   Visit Specifics   Bed/Tabs/Pad Alarm Applied Not applicable   Treatment Time   Gait Training 15   Theraputic Exercise 15   Precautions   Weight Bearing Status fwb   General Precautions   (tele )   Transfer    Sit To Stand Stand by assistance;Verbal cues   Stand To Sit Stand by assistance;Verbal cues   Transfer Comments failed to push up/reach back   Ambulation    Weight Bearing Status fwb   Distance (ft)  100 x2   Assistance Close supervision;CGA  (more sba second time)   Assistive Device Rolling walker;Bariatric   Verbal Cues Posture;Breathing techniques   Pattern Flexed posture  (flexed knees, toes-in, poor turn tech. )   02 Saturation With Activity 93 %   Amount of 02 Delivered (L/min) 0 Liters   HR At Rest 80 bpm   HR With Activity 88 bpm   Stairs   Stairs   (pt. declined; doesn't feel tolerant)   Exercise   Exercise Seated   Seated Exercises   Seated Exercises Heel Toe Lifts;LAQ;Hip flexion;Hip abduction/adduction;Chair push ups   LAQ Both;10 reps;2 sets   hip Flexion Both;10 reps;2 sets   Hip Abduction/adduction Both;10 reps;2 sets   Chair Push Ups Both;10 reps;2 sets   Heel/Toe Lifts Both;10 reps;2 sets   Response to Treatment   Response to Treatment Treatment limited by SOB   Recommendation   PT Discharge Recommendation Home PT;Home with assist   PT Equipment Recommendation Rolling walker / FWW   Treatment Suggestions for Next Session 3 stairs, gait quality/safety, stamina   PT Care Plan REVIEWED DAILY Yes, goals revised   PT Handouts Given HEP

## 2021-07-10 NOTE — PROGRESS NOTES
Progress Notes by Isa Zepeda RD at 7/10/2021  3:42 PM     Author: Isa Zepeda RD Service: -- Author Type: Registered Dietitian    Filed: 7/10/2021  3:44 PM Date of Service: 7/10/2021  3:42 PM Status: Signed    : Isa Zepeda RD (Registered Dietitian)       Clinical Nutrition Therapy Education Note    Nutrition History:  Information from patient  Met with pt to provide written and verbal guidelines on 2 gram Na diet.  Pt states he and his wife have been discussing this too.  Discussed foods high and low in Na, appropriate sodium free seasonings, label reading and low sodium shopping guidelines.  Pt verbalized understanding -had no questions at this time.  RD name and number provided in case questions arise    Intervention:  Education materials provided: low sodium nutrition therapy, low sodium shopping guidelines

## 2021-07-10 NOTE — PROGRESS NOTES
Progress Notes by Phil Rowley MD at 7/10/2021 12:28 PM     Author: Phil Rowley MD Service: Cardiology Author Type: Physician    Filed: 7/10/2021 12:56 PM Date of Service: 7/10/2021 12:28 PM Status: Signed    : Phil Rowley MD (Physician)       Cardiology Progress Note    Assessment/Plan:    1. Atrial fibrillation with rapid ventricular response.  Rate control improving with metoprolol, but blood pressure too low for most doses of verapamil.  Now off of losartan, will try losartan again today.  Consider switch to diltiazem or digoxin if ineffective.  2. Heart failure with preserved ejection fraction.  Weight down 9 pounds since admission, will decrease Bumex and switch to oral.  3. Morbid obesity, possible obstructive sleep apnea based on history.  Will check overnight oximetry tonight  4. Remote history of coronary disease with no recent anginal symptoms despite rapid atrial fibrillation        Principal Problem:    Atrial fibrillation with RVR (H)  Active Problems:    Bullous pemphigoid    Acute diastolic congestive heart failure (H)    Leukocytosis    NYHA class 3 heart failure with preserved ejection fraction (H)     LOS: 3 days     Subjective:  Still quite tired.  Does not feel much better.  No awareness of palpitations.  No lightheadedness with activities.      Objective:   Vital signs in last 24 hours:  Vitals:    07/10/21 0519 07/10/21 0656 07/10/21 0755 07/10/21 1139   BP: 99/71 112/81 104/73 92/68   Patient Position: Lying Sitting Sitting Semi-basurto   Pulse: 98 89 82 88   Resp: 22  20 18   Temp: 98  F (36.7  C)  97.5  F (36.4  C) 98.2  F (36.8  C)   TempSrc: Oral  Oral Oral   SpO2: 94%  95% 92%   Weight: (!) 292 lb 3.2 oz (132.5 kg)      Height:         Weight:   Wt Readings from Last 3 Encounters:   07/10/21 (!) 292 lb 3.2 oz (132.5 kg)   07/07/21 (!) 305 lb 12.8 oz (138.7 kg)   06/01/21 (!) 294 lb (133.4 kg)           PHYSICAL EXAM    Conjunctiva bilaterally  injected.  Respiratory: Few bibasilar crackles.  No respiratory distress, No wheezing, No chest tenderness.   Cardiovascular: Irregularly irregular, rapid.  No murmurs, No rubs, No gallops.   GI: Obese.  Bowel sounds normal, Soft, No tenderness, No masses  Extremities: 2+ pitting edema up to the knees.  Chronic stasis changes.       Cardiographics:   Telemetry atrial fibrillation, 80 to 110 bpm.    Imaging   EXAM: XR CHEST 1 VIEW PORTABLE  LOCATION: [facility]  DATE/TIME: 7/7/2021 1:54 PM  INDICATION: Dyspnea on exertion  COMPARISON: Chest x-ray 10/05/2017  IMPRESSION:   Incidental azygos fissure. Upper normal heart size. Central vascular congestion. No overt pulmonary edema. No focal pneumonic consolidation or pleural effusion.        Cardiac diagnostics    Echocardiogram 7/8:    Left ventricle ejection fraction is mildly decreased. The estimated left ventricular ejection fraction is 50% without obvious wall motion abnormality.    Normal right ventricular size and systolic function.    Trace mitral and mild tricuspid insufficiency. Mild pulmonary hypertension noted.    When compared to the previous study dated 6/29/2015, left ventricular systolic function may be slightly reduced on current study.       Lab Results:   Lab Results   Component Value Date    WBC 10.4 07/08/2021    HGB 14.8 07/08/2021    HCT 45.9 07/08/2021     07/09/2021    CHOL 136 03/09/2021    TRIG 91 03/09/2021    HDL 53 03/09/2021    ALT 20 03/09/2021    AST 20 03/09/2021     07/10/2021    K 4.1 07/10/2021     07/10/2021    CREATININE 1.14 07/10/2021    BUN 23 07/10/2021    CO2 30 07/10/2021    TSH 0.54 07/07/2021    PSA 0.69 11/01/2012    INR 1.04 07/07/2021     Lab Results   Component Value Date    CKTOTAL 180 05/04/2021    CKMB 3 06/29/2017    TROPONINI 0.03 07/07/2021         Phil Rowley MD [facility]  7/10/2021

## 2021-07-10 NOTE — PROGRESS NOTES
Progress Notes by Estuardo Dugan DO at 7/10/2021  7:49 AM     Author: Estuardo Dugan DO Service: Hospitalist Author Type: Physician    Filed: 7/10/2021  2:09 PM Date of Service: 7/10/2021  7:49 AM Status: Signed    : Estuardo Dugan DO (Physician)       [facility] [place] Medicine Progress Note       ASSESSMENT:    Principal Problem:    Atrial fibrillation with RVR (H)  Active Problems:    Bullous pemphigoid    Acute diastolic congestive heart failure (H)    Leukocytosis    NYHA class 3 heart failure with preserved ejection fraction (H)      PLAN:   86-year-old male with history of coronary artery disease presents with new A. fib with RVR and acute systolic CHF.      New A. fib: TTE shows newly reduced EF of 50%  --Increased metoprolol to 100mg two times a day, started verapamil 7/9/21  --Started Eliquis  -- appreciate cardiology consultation to assist with management. Consider transition to dilt vs digoxin pending clinical course  -- continue spironolactone and bumex      CAD: Continue aspirin, reduce dose to 81 mg daily given Eliquis initiation.  Continue home Cozaar( as bp allows) and Pravachol.  Continue beta-blocker initiation as above.       Acute systolic CHF: TTE shows reduced EF of 50%  --Continue Bumex, spironolactone, increased dose metoprolol, ARB as bp allows      Bullous pemphigoid:  --Continue prednisone      COPD: Continue home inhalers      Reported history of aortic stenosis: TTE 7/8/2021 notes normal aortic valve structure and function      Lymphedema: Continue leg wraps      BPH: Continue home Cardura and Proscar      Allergic rhinitis: Started Flonase            DVT PPX: Started Eliquis            Estuardo Dugan D.O.               -------------------------------------------------------------------------------------------------------------  SUBJECTIVE: NAD. Denies any nausea, vomiting, abdominal pain, chest pain,  fevers, chills, confusion or headache.     Exam:  /81 (Patient  "Position: Sitting)   Pulse 89   Temp 98  F (36.7  C) (Oral)   Resp 22   Ht 6' 1\" (1.854 m)   Wt (!) 292 lb 3.2 oz (132.5 kg)   SpO2 94%   BMI 38.55 kg/m    General: NAD  RESPIRATORY: Breathing nonlabored  CARDIOVASCULAR: 2+ le edema bilat.   ABDOMEN: soft and non-tender  NEUROLOGIC: Non focal, motor and sensory intact, speech clear      Diagnostics Reviewed:      Recent Results (from the past 24 hour(s))   Lactic Acid    Collection Time: 07/10/21  1:02 AM   Result Value Ref Range    Lactic Acid 0.9 0.7 - 2.0 mmol/L   Magnesium    Collection Time: 07/10/21  4:28 AM   Result Value Ref Range    Magnesium 2.5 1.8 - 2.6 mg/dL   Basic Metabolic Panel    Collection Time: 07/10/21  4:28 AM   Result Value Ref Range    Sodium 139 136 - 145 mmol/L    Potassium 4.1 3.5 - 5.0 mmol/L    Chloride 101 98 - 107 mmol/L    CO2 30 22 - 31 mmol/L    Anion Gap, Calculation 8 5 - 18 mmol/L    Glucose 107 70 - 125 mg/dL    Calcium 8.2 (L) 8.5 - 10.5 mg/dL    BUN 23 8 - 28 mg/dL    Creatinine 1.14 0.70 - 1.30 mg/dL    GFR MDRD Af Amer >60 >60 mL/min/1.73m2    GFR MDRD Non Af Amer >60 >60 mL/min/1.73m2                 "

## 2021-07-10 NOTE — PLAN OF CARE
Plan of Care by Alyse Drummond RN at 7/10/2021  7:31 AM     Author: Alyse Drummond RN Service: -- Author Type: Registered Nurse    Filed: 7/10/2021  7:31 AM Date of Service: 7/10/2021  7:31 AM Status: Signed    : Alyse Drummond RN (Registered Nurse)         Problem: Pain  Goal: Patient's pain/discomfort is manageable  Outcome: Progressing     Problem: Safety  Goal: Patient will be injury free during hospitalization  Outcome: Progressing     Problem: Daily Care  Goal: Daily care needs are met  Outcome: Progressing   Pt denies pain. Had Low BP during the shift, updated MD, no new orders. BP monitored during the shift until resolved. Oxygen saturation in the 80s on RA, able to reach >90 on 3L via oxymask. Encouraged Incentive spirometer use. Pt able to achieve 3000 ml on IS. Uses urinal at bedside. Afib on tele monitor with HR btw

## 2021-07-10 NOTE — PLAN OF CARE
Plan of Care by Chantal Moyer RN at 7/9/2021 11:22 PM     Author: Chantal Moyer RN Service: -- Author Type: Registered Nurse    Filed: 7/9/2021 11:33 PM Date of Service: 7/9/2021 11:22 PM Status: Signed    : Chantal Moyer RN (Registered Nurse)         Problem: Pain  Goal: Patient's pain/discomfort is manageable  Outcome: Progressing     Problem: Safety  Goal: Patient will be injury free during hospitalization  Outcome: Progressing     Problem: Daily Care  Goal: Daily care needs are met  Outcome: Progressing     Problem: Psychosocial Needs  Goal: Collaborate with patient/family/caregiver to identify patient specific goals for this hospitalization  Outcome: Progressing     Problem: Discharge Barriers  Goal: Patient's discharge needs are met  Outcome: Progressing     Problem: Excessive Fluid Volume  Goal: Patient will achieve/maintain normal respiratory rate/effort  Outcome: Progressing     Problem: Activity Intolerance/Impaired Mobility  Goal: Mobility/activity is maintained at optimum level for patient  Outcome: Progressing     Problem: Knowledge Deficit  Goal: Patient/family/caregiver demonstrates understanding of disease process, treatment plan, medications, and discharge instructions  Outcome: Progressing     Problem: Potential for Falls  Goal: Patient will remain free of falls  Outcome: Progressing     Problem: Pain  Goal: Patient's pain/discomfort is manageable  Outcome: Progressing     Problem: Safety  Goal: Patient will be injury free during hospitalization  Outcome: Progressing     Problem: Daily Care  Goal: Daily care needs are met  Outcome: Progressing     Problem: Psychosocial Needs  Goal: Collaborate with patient/family/caregiver to identify patient specific goals for this hospitalization  Outcome: Progressing     Problem: Discharge Barriers  Goal: Patient's discharge needs are met  Outcome: Progressing     Problem: Excessive Fluid Volume  Goal: Patient will achieve/maintain normal respiratory  rate/effort  Outcome: Progressing     Problem: Activity Intolerance/Impaired Mobility  Goal: Mobility/activity is maintained at optimum level for patient  Outcome: Progressing     Problem: Knowledge Deficit  Goal: Patient/family/caregiver demonstrates understanding of disease process, treatment plan, medications, and discharge instructions  Outcome: Progressing     Problem: Potential for Falls  Goal: Patient will remain free of falls  Outcome: Progressing      Alert. Oriented. Forgetful with time. Reoriented as needed.    Denied pain.     Unable to determine if breathing is better or not. In room air. No respiratory distress noted.    IV bumex, strict I/O.    Afib 80's - 100's.     Falls education given. Patient demonstrated understanding.

## 2021-07-11 PROBLEM — I48.91 ATRIAL FIBRILLATION WITH RVR (H): Status: ACTIVE | Noted: 2021-07-07

## 2021-07-11 PROBLEM — I50.31 ACUTE DIASTOLIC CONGESTIVE HEART FAILURE (H): Status: ACTIVE | Noted: 2021-07-08

## 2021-07-11 PROBLEM — D72.829 LEUKOCYTOSIS: Status: ACTIVE | Noted: 2021-07-08

## 2021-07-11 LAB
ANION GAP SERPL CALCULATED.3IONS-SCNC: 9 MMOL/L (ref 5–18)
BUN SERPL-MCNC: 26 MG/DL (ref 8–28)
CALCIUM SERPL-MCNC: 8.6 MG/DL (ref 8.5–10.5)
CHLORIDE BLD-SCNC: 99 MMOL/L (ref 98–107)
CO2 SERPL-SCNC: 30 MMOL/L (ref 22–31)
CREAT SERPL-MCNC: 1.17 MG/DL (ref 0.7–1.3)
GFR SERPL CREATININE-BSD FRML MDRD: 57 ML/MIN/1.73M2
GLUCOSE BLD-MCNC: 104 MG/DL (ref 70–125)
MAGNESIUM SERPL-MCNC: 2.6 MG/DL (ref 1.8–2.6)
PLATELET # BLD AUTO: 157 10E3/UL (ref 150–450)
POTASSIUM BLD-SCNC: 3.9 MMOL/L (ref 3.5–5)
SODIUM SERPL-SCNC: 138 MMOL/L (ref 136–145)

## 2021-07-11 PROCEDURE — 80048 BASIC METABOLIC PNL TOTAL CA: CPT | Performed by: EMERGENCY MEDICINE

## 2021-07-11 PROCEDURE — 250N000013 HC RX MED GY IP 250 OP 250 PS 637: Performed by: INTERNAL MEDICINE

## 2021-07-11 PROCEDURE — 85049 AUTOMATED PLATELET COUNT: CPT | Performed by: EMERGENCY MEDICINE

## 2021-07-11 PROCEDURE — 99207 PR CDG-CUT & PASTE-POTENTIAL IMPACT ON LEVEL: CPT | Performed by: INTERNAL MEDICINE

## 2021-07-11 PROCEDURE — 94762 N-INVAS EAR/PLS OXIMTRY CONT: CPT

## 2021-07-11 PROCEDURE — 83735 ASSAY OF MAGNESIUM: CPT | Performed by: EMERGENCY MEDICINE

## 2021-07-11 PROCEDURE — 99232 SBSQ HOSP IP/OBS MODERATE 35: CPT | Performed by: INTERNAL MEDICINE

## 2021-07-11 PROCEDURE — 36415 COLL VENOUS BLD VENIPUNCTURE: CPT | Performed by: EMERGENCY MEDICINE

## 2021-07-11 PROCEDURE — 99233 SBSQ HOSP IP/OBS HIGH 50: CPT | Performed by: INTERNAL MEDICINE

## 2021-07-11 PROCEDURE — 250N000012 HC RX MED GY IP 250 OP 636 PS 637

## 2021-07-11 PROCEDURE — 210N000001 HC R&B IMCU HEART CARE

## 2021-07-11 PROCEDURE — 250N000013 HC RX MED GY IP 250 OP 250 PS 637

## 2021-07-11 RX ORDER — VERAPAMIL HYDROCHLORIDE 180 MG/1
180 TABLET, EXTENDED RELEASE ORAL AT BEDTIME
Status: DISCONTINUED | OUTPATIENT
Start: 2021-07-11 | End: 2021-07-12 | Stop reason: HOSPADM

## 2021-07-11 RX ORDER — LOPERAMIDE HCL 2 MG
4 CAPSULE ORAL 3 TIMES DAILY PRN
Status: DISCONTINUED | OUTPATIENT
Start: 2021-07-11 | End: 2021-07-12 | Stop reason: HOSPADM

## 2021-07-11 RX ORDER — BUMETANIDE 1 MG/1
1 TABLET ORAL
Status: DISCONTINUED | OUTPATIENT
Start: 2021-07-11 | End: 2021-07-11

## 2021-07-11 RX ADMIN — METOPROLOL TARTRATE 100 MG: 25 TABLET, FILM COATED ORAL at 21:21

## 2021-07-11 RX ADMIN — PREDNISONE 5 MG: 5 TABLET ORAL at 08:58

## 2021-07-11 RX ADMIN — FINASTERIDE 5 MG: 5 TABLET, FILM COATED ORAL at 21:22

## 2021-07-11 RX ADMIN — METOPROLOL TARTRATE 100 MG: 25 TABLET, FILM COATED ORAL at 08:55

## 2021-07-11 RX ADMIN — LOSARTAN POTASSIUM 100 MG: 50 TABLET, FILM COATED ORAL at 08:54

## 2021-07-11 RX ADMIN — PRAVASTATIN SODIUM 20 MG: 20 TABLET ORAL at 21:22

## 2021-07-11 RX ADMIN — ASPIRIN 81 MG: 81 TABLET, CHEWABLE ORAL at 08:55

## 2021-07-11 RX ADMIN — PREDNISONE 5 MG: 5 TABLET ORAL at 21:21

## 2021-07-11 RX ADMIN — VERAPAMIL HYDROCHLORIDE 80 MG: 40 TABLET, FILM COATED ORAL at 05:50

## 2021-07-11 RX ADMIN — APIXABAN 5 MG: 5 TABLET, FILM COATED ORAL at 08:57

## 2021-07-11 RX ADMIN — SPIRONOLACTONE 25 MG: 25 TABLET ORAL at 08:56

## 2021-07-11 RX ADMIN — DOXAZOSIN 8 MG: 4 TABLET ORAL at 21:21

## 2021-07-11 RX ADMIN — BUDESONIDE AND FORMOTEROL FUMARATE DIHYDRATE 2 PUFF: 160; 4.5 AEROSOL RESPIRATORY (INHALATION) at 21:20

## 2021-07-11 RX ADMIN — APIXABAN 5 MG: 5 TABLET, FILM COATED ORAL at 21:22

## 2021-07-11 RX ADMIN — VERAPAMIL HYDROCHLORIDE 180 MG: 180 TABLET, FILM COATED, EXTENDED RELEASE ORAL at 21:22

## 2021-07-11 RX ADMIN — FLUTICASONE PROPIONATE 1 SPRAY: 50 SPRAY, METERED NASAL at 08:53

## 2021-07-11 ASSESSMENT — MIFFLIN-ST. JEOR: SCORE: 2064.3

## 2021-07-11 NOTE — PROGRESS NOTES
Cardiology Progress Note    Assessment/Plan:    1. Atrial fibrillation with rapid ventricular response.  Rate control improved with verapamil plus metoprolol  2. Heart failure with preserved ejection fraction.  Weight down 9 pounds since admission stabilizing on oral diuretics  3. Morbid obesity, possible obstructive sleep apnea based on history.  Will check overnight oximetry tonight  4. Remote history of coronary disease with no recent anginal symptoms despite rapid atrial fibrillation    Likely discharge in a.m. if weight stable or declining.  Increase activity today and assess tolerance, rate control    Principal Problem:    Atrial fibrillation with RVR (H)  Active Problems:    Bullous pemphigoid    Acute diastolic congestive heart failure (H)    Leukocytosis    NYHA class 3 heart failure with preserved ejection fraction (H)     LOS: 3 days     Subjective:  Feels improved.  More comfortable supine.  No awareness of heart racing.  No lightheadedness or dizziness.  Walking some with improved activity tolerance.      Objective:   Vital signs in last 24 hours:  Vitals:    07/10/21 0519 07/10/21 0656 07/10/21 0755 07/10/21 1139   BP: 99/71 112/81 104/73 92/68   Patient Position: Lying Sitting Sitting Semi-basurto   Pulse: 98 89 82 88   Resp: 22  20 18   Temp: 98  F (36.7  C)  97.5  F (36.4  C) 98.2  F (36.8  C)   TempSrc: Oral  Oral Oral   SpO2: 94%  95% 92%   Weight: (!) 292 lb 3.2 oz (132.5 kg)      Height:         Weight:   Wt Readings from Last 3 Encounters:   07/10/21 (!) 292 lb 3.2 oz (132.5 kg)   07/07/21 (!) 305 lb 12.8 oz (138.7 kg)   06/01/21 (!) 294 lb (133.4 kg)           PHYSICAL EXAM    Conjunctiva bilaterally injected.  Respiratory: Clear throughout.  No respiratory distress, No wheezing, No chest tenderness.   Cardiovascular: Irregularly irregular.  No murmurs, No rubs, No gallops.   GI: Obese.  Bowel sounds normal, Soft, No tenderness, No masses  Extremities: 2+ pitting edema up to the knees.   Chronic stasis changes.  Slightly tender    Cardiographics:   Telemetry atrial fibrillation, 80 to 100 bpm.    Imaging   EXAM: XR CHEST 1 VIEW PORTABLE  LOCATION: [facility]  DATE/TIME: 7/7/2021 1:54 PM  INDICATION: Dyspnea on exertion  COMPARISON: Chest x-ray 10/05/2017  IMPRESSION:   Incidental azygos fissure. Upper normal heart size. Central vascular congestion. No overt pulmonary edema. No focal pneumonic consolidation or pleural effusion.        Cardiac diagnostics    Echocardiogram 7/8:    Left ventricle ejection fraction is mildly decreased. The estimated left ventricular ejection fraction is 50% without obvious wall motion abnormality.    Normal right ventricular size and systolic function.    Trace mitral and mild tricuspid insufficiency. Mild pulmonary hypertension noted.    When compared to the previous study dated 6/29/2015, left ventricular systolic function may be slightly reduced on current study.       Lab Results:   Lab Results   Component Value Date    WBC 10.4 07/08/2021    HGB 14.8 07/08/2021    HCT 45.9 07/08/2021     07/09/2021    CHOL 136 03/09/2021    TRIG 91 03/09/2021    HDL 53 03/09/2021    ALT 20 03/09/2021    AST 20 03/09/2021     07/10/2021    K 4.1 07/10/2021     07/10/2021    CREATININE 1.14 07/10/2021    BUN 23 07/10/2021    CO2 30 07/10/2021    TSH 0.54 07/07/2021    PSA 0.69 11/01/2012    INR 1.04 07/07/2021     Lab Results   Component Value Date    CKTOTAL 180 05/04/2021    CKMB 3 06/29/2017    TROPONINI 0.03 07/07/2021         Phil Rowley MD FAC  7/11/2021

## 2021-07-11 NOTE — PHARMACY-ADMISSION MEDICATION HISTORY
Medication history completed upon admission. See pharmacy admission medication history note from 7/7/21.

## 2021-07-11 NOTE — PLAN OF CARE
Patient up with assist of one.  Had interupted sleeep, per night shift, due to alarns sounding and staff checking on patient.  Denied pain when asked.  Patient stated that he had received chf pamphlet and that wife took home.  Patient has a scale at home and was able to verbalize to call the doctor if gained 5 pounds in a week but needed to be reminded about calling for 2 pounds on e consecutive days.  Patient also verbalized that he should eat a low fat 2 gram sodium diet, .    Alize Plaza RN

## 2021-07-11 NOTE — PLAN OF CARE
Problem: Risk for Delirium  Goal: Optimal Coping  Outcome: Declining     Problem: Risk for Delirium  Goal: Improved Sleep  Outcome: Declining   Patient here with new a-fib and HF. Denied pain, was on oximitry study on room air . 02 sat's dropped as low as 74% do to apneic episodes and rebounded back to 89-93%. Patient did not get restful sleep and is still tired. Rhythm=a-fib with Hr=75, lungs clear.

## 2021-07-11 NOTE — PROGRESS NOTES
Progress Notes by Estuardo Dugan DO at 7/10/2021  7:49 AM     Author: Estuardo Dugan DO Service: Hospitalist Author Type: Physician    Filed: 7/10/2021  2:09 PM Date of Service: 7/10/2021  7:49 AM Status: Signed    : Estuardo Dugan DO (Physician)       [facility] Progress Note       ASSESSMENT:    Principal Problem:    Atrial fibrillation with RVR (H)  Active Problems:    Bullous pemphigoid    Acute diastolic congestive heart failure (H)    Leukocytosis    NYHA class 3 heart failure with preserved ejection fraction (H)      PLAN:   86-year-old male with history of coronary artery disease presents with new A. fib with RVR and acute systolic CHF.      New A. fib: TTE shows newly reduced EF of 50%  --Increased metoprolol to 100mg two times a day, started verapamil 7/9/21  --Started Eliquis  -- appreciate cardiology consultation to assist with management.   -- continue spironolactone and monitor for net negative volume balance   -- bumex stopped       CAD: Continue aspirin, reduce dose to 81 mg daily given Eliquis initiation.  Continue home Cozaar( as bp allows) and Pravachol.  Continue beta-blocker initiation as above.       Concern for SHANKAR: overnight oximetry performed 7/10, No alarms   Overnight with strip recorder      Acute systolic CHF: TTE shows reduced EF of 50%  --Continue spironolactone, verapamil, metoprolol       Bullous pemphigoid:  --Continue prednisone      COPD: Continue home inhalers      Reported history of aortic stenosis: TTE 7/8/2021 notes normal aortic valve structure and function      Lymphedema: Continue leg wraps      BPH: Continue home Cardura and Proscar      Allergic rhinitis: Started Flonase            DVT PPX: Started Eliquis            Estuardo Dugan D.O.               -------------------------------------------------------------------------------------------------------------  SUBJECTIVE: NAD. Denies any nausea, vomiting, abdominal pain, chest pain,   "fevers, chills, confusion or headache.     Exam:  /81 (Patient Position: Sitting)   Pulse 89   Temp 98  F (36.7  C) (Oral)   Resp 22   Ht 6' 1\" (1.854 m)   Wt (!) 292 lb 3.2 oz (132.5 kg)   SpO2 94%   BMI 38.55 kg/m    General: NAD  RESPIRATORY: Breathing nonlabored  CARDIOVASCULAR: 2+ le edema bilat.   NEUROLOGIC: Non focal, motor and sensory intact, speech clear      Diagnostics Reviewed:      Recent Results (from the past 24 hour(s))   Lactic Acid    Collection Time: 07/10/21  1:02 AM   Result Value Ref Range    Lactic Acid 0.9 0.7 - 2.0 mmol/L   Magnesium    Collection Time: 07/10/21  4:28 AM   Result Value Ref Range    Magnesium 2.5 1.8 - 2.6 mg/dL   Basic Metabolic Panel    Collection Time: 07/10/21  4:28 AM   Result Value Ref Range    Sodium 139 136 - 145 mmol/L    Potassium 4.1 3.5 - 5.0 mmol/L    Chloride 101 98 - 107 mmol/L    CO2 30 22 - 31 mmol/L    Anion Gap, Calculation 8 5 - 18 mmol/L    Glucose 107 70 - 125 mg/dL    Calcium 8.2 (L) 8.5 - 10.5 mg/dL    BUN 23 8 - 28 mg/dL    Creatinine 1.14 0.70 - 1.30 mg/dL    GFR MDRD Af Amer >60 >60 mL/min/1.73m2    GFR MDRD Non Af Amer >60 >60 mL/min/1.73m2                   "

## 2021-07-11 NOTE — PLAN OF CARE
Plan of Care by Chantal Moyer RN at 7/10/2021 11:12 PM     Author: Chantal Moyer RN Service: -- Author Type: Registered Nurse    Filed: 7/10/2021 11:17 PM Date of Service: 7/10/2021 11:12 PM Status: Signed    : Chantal Moyer RN (Registered Nurse)         Problem: Pain  Goal: Patient's pain/discomfort is manageable  Outcome: Progressing     Problem: Safety  Goal: Patient will be injury free during hospitalization  Outcome: Progressing     Problem: Psychosocial Needs  Goal: Demonstrates ability to cope with hospitalization/illness  Outcome: Progressing  Goal: Collaborate with patient/family/caregiver to identify patient specific goals for this hospitalization  Outcome: Progressing     Problem: Discharge Barriers  Goal: Patient's discharge needs are met  Outcome: Progressing     Problem: Excessive Fluid Volume  Goal: Patient will achieve/maintain normal respiratory rate/effort  Outcome: Progressing     Problem: Activity Intolerance/Impaired Mobility  Goal: Mobility/activity is maintained at optimum level for patient  Outcome: Progressing     Problem: Potential for Falls  Goal: Patient will remain free of falls  Outcome: Progressing      Alert. Oriented. Forgetful at times. Reoriented as needed.    Denied pain.    Patient reported he is less short of breath with activity. 91 -93% room air. IS 2500.     Afib 's -110's.     Strict I/O.    Will have overnoc oximetry.    Falls education. Demonstrated understanding. Interventions in placed.

## 2021-07-12 VITALS
OXYGEN SATURATION: 92 % | TEMPERATURE: 97.7 F | DIASTOLIC BLOOD PRESSURE: 66 MMHG | HEART RATE: 73 BPM | BODY MASS INDEX: 38.42 KG/M2 | SYSTOLIC BLOOD PRESSURE: 98 MMHG | WEIGHT: 289.9 LBS | HEIGHT: 73 IN | RESPIRATION RATE: 18 BRPM

## 2021-07-12 LAB
ANION GAP SERPL CALCULATED.3IONS-SCNC: 9 MMOL/L (ref 5–18)
BUN SERPL-MCNC: 25 MG/DL (ref 8–28)
CALCIUM SERPL-MCNC: 8.5 MG/DL (ref 8.5–10.5)
CHLORIDE BLD-SCNC: 103 MMOL/L (ref 98–107)
CO2 SERPL-SCNC: 29 MMOL/L (ref 22–31)
CREAT SERPL-MCNC: 1.13 MG/DL (ref 0.7–1.3)
GFR SERPL CREATININE-BSD FRML MDRD: 59 ML/MIN/1.73M2
GLUCOSE BLD-MCNC: 121 MG/DL (ref 70–125)
MAGNESIUM SERPL-MCNC: 2.6 MG/DL (ref 1.8–2.6)
PLATELET # BLD AUTO: 152 10E3/UL (ref 150–450)
POTASSIUM BLD-SCNC: 4 MMOL/L (ref 3.5–5)
SODIUM SERPL-SCNC: 141 MMOL/L (ref 136–145)

## 2021-07-12 PROCEDURE — 250N000013 HC RX MED GY IP 250 OP 250 PS 637

## 2021-07-12 PROCEDURE — 85049 AUTOMATED PLATELET COUNT: CPT | Performed by: FAMILY MEDICINE

## 2021-07-12 PROCEDURE — 80048 BASIC METABOLIC PNL TOTAL CA: CPT | Performed by: INTERNAL MEDICINE

## 2021-07-12 PROCEDURE — 99239 HOSP IP/OBS DSCHRG MGMT >30: CPT | Performed by: INTERNAL MEDICINE

## 2021-07-12 PROCEDURE — 250N000012 HC RX MED GY IP 250 OP 636 PS 637

## 2021-07-12 PROCEDURE — 36415 COLL VENOUS BLD VENIPUNCTURE: CPT | Performed by: FAMILY MEDICINE

## 2021-07-12 PROCEDURE — 83735 ASSAY OF MAGNESIUM: CPT | Performed by: INTERNAL MEDICINE

## 2021-07-12 RX ORDER — VERAPAMIL HYDROCHLORIDE 180 MG/1
180 TABLET, EXTENDED RELEASE ORAL AT BEDTIME
Qty: 30 TABLET | Refills: 0 | Status: SHIPPED | OUTPATIENT
Start: 2021-07-12 | End: 2021-07-16

## 2021-07-12 RX ORDER — ASPIRIN 81 MG/1
81 TABLET, CHEWABLE ORAL DAILY
Qty: 30 TABLET | Refills: 0 | Status: SHIPPED | OUTPATIENT
Start: 2021-07-13 | End: 2021-08-24

## 2021-07-12 RX ORDER — METOPROLOL TARTRATE 100 MG
100 TABLET ORAL 2 TIMES DAILY
Qty: 60 TABLET | Refills: 0 | Status: SHIPPED | OUTPATIENT
Start: 2021-07-12 | End: 2021-07-16

## 2021-07-12 RX ORDER — FLUTICASONE PROPIONATE 50 MCG
1 SPRAY, SUSPENSION (ML) NASAL DAILY
Qty: 9.9 ML | Refills: 0 | Status: SHIPPED | OUTPATIENT
Start: 2021-07-13 | End: 2021-12-14

## 2021-07-12 RX ORDER — SPIRONOLACTONE 25 MG/1
25 TABLET ORAL DAILY
Qty: 30 TABLET | Refills: 0 | Status: SHIPPED | OUTPATIENT
Start: 2021-07-13 | End: 2021-07-16

## 2021-07-12 RX ADMIN — METOPROLOL TARTRATE 100 MG: 25 TABLET, FILM COATED ORAL at 09:53

## 2021-07-12 RX ADMIN — ASPIRIN 81 MG: 81 TABLET, CHEWABLE ORAL at 09:54

## 2021-07-12 RX ADMIN — BUDESONIDE AND FORMOTEROL FUMARATE DIHYDRATE 2 PUFF: 160; 4.5 AEROSOL RESPIRATORY (INHALATION) at 09:53

## 2021-07-12 RX ADMIN — SPIRONOLACTONE 25 MG: 25 TABLET ORAL at 09:54

## 2021-07-12 RX ADMIN — FLUTICASONE PROPIONATE 1 SPRAY: 50 SPRAY, METERED NASAL at 09:53

## 2021-07-12 RX ADMIN — PREDNISONE 5 MG: 5 TABLET ORAL at 09:54

## 2021-07-12 RX ADMIN — APIXABAN 5 MG: 5 TABLET, FILM COATED ORAL at 09:53

## 2021-07-12 ASSESSMENT — MIFFLIN-ST. JEOR: SCORE: 2058.86

## 2021-07-12 NOTE — PLAN OF CARE
Problem: Adult Inpatient Plan of Care  Goal: Absence of Hospital-Acquired Illness or Injury  Intervention: Identify and Manage Fall Risk  Recent Flowsheet Documentation  Taken 7/12/2021 0033 by Zulema Brand RN  Safety Promotion/Fall Prevention:   nonskid shoes/slippers when out of bed   mobility aid in reach   assistive device/personal items within reach   clutter free environment maintained   patient and family education     Problem: Adult Inpatient Plan of Care  Goal: Absence of Hospital-Acquired Illness or Injury  Intervention: Prevent Skin Injury  Recent Flowsheet Documentation  Taken 7/12/2021 0033 by Zulema Brand RN  Body Position: position changed independently     Problem: Risk for Delirium  Goal: Improved Sleep  Outcome: Declining     Problem: Respiratory Compromise (Heart Failure)  Goal: Effective Oxygenation and Ventilation  Outcome: Declining  Intervention: Promote Airway Secretion Clearance  Recent Flowsheet Documentation  Taken 7/12/2021 0033 by Zulema Brand RN  Cough And Deep Breathing: done independently per patient     Problem: Respiratory Compromise (Heart Failure)  Goal: Effective Oxygenation and Ventilation  Intervention: Promote Airway Secretion Clearance  Recent Flowsheet Documentation  Taken 7/12/2021 0033 by Zulema Brand RN  Cough And Deep Breathing: done independently per patient     Problem: Sleep Disordered Breathing (Heart Failure)  Goal: Effective Breathing Pattern During Sleep  Outcome: Declining     Problem: Cardiac Output Decreased (Heart Failure)  Goal: Optimal Cardiac Output  Outcome: Change based on patient need/priority   Patient is on oximetry study on room air, desat's to 80% during sleep and apneic episodes noted. 02 sat's 90-93% when awake. Rhythm=a-fib and SVR with hr=37 at 0433. Lungs are clear. Patient is not getting quality sleep. Will cont to monitor.

## 2021-07-12 NOTE — PLAN OF CARE
Patient and spouse were present when discharge paper work reviewed.  Text sent to discharging MD to call room or come see patient and spouse concerning Oxygen.  MD called and talked to spouse. There had no other concerns.  Wife transported patient home. Both verbalized understanding.  Discharge follow up appointment made and in discharge paperwork.      Alize Plaza RN

## 2021-07-12 NOTE — DISCHARGE INSTRUCTIONS
[facility] will call and set up an appointment for congestive heart failurefollow up and follow up appointment  with  ***.  If you have not heard from them ehsan afternoon call [phone] to schedule those appointments.

## 2021-07-12 NOTE — PROVIDER NOTIFICATION
07/11/21 2228   Sleep Oximetry Log   Note: Testing will be done on Room Air unless specified by Physician   Pulse 86   SpO2 89 %   FiO2 (%) 21 %   O2 Device (LPM) 0 LPM   Respirations 16   Awake / Asleep Asleep   Position Right   $Pulse Ox-Overnight Set-up

## 2021-07-12 NOTE — PLAN OF CARE
Problem: Risk for Delirium  Goal: Optimal Coping  Outcome: No Change  Goal: Improved Behavioral Control  Outcome: No Change  Goal: Improved Attention and Thought Clarity  Outcome: No Change  Goal: Improved Sleep  Outcome: No Change     Problem: Adult Inpatient Plan of Care  Goal: Absence of Hospital-Acquired Illness or Injury  Outcome: No Change  Intervention: Identify and Manage Fall Risk  Recent Flowsheet Documentation  Taken 7/11/2021 1800 by Chantal Moyer RN  Safety Promotion/Fall Prevention:    nonskid shoes/slippers when out of bed    patient and family education    safety round/check completed  Intervention: Prevent Skin Injury  Recent Flowsheet Documentation  Taken 7/11/2021 1800 by Chantal Moyer RN  Body Position: position changed independently  Goal: Optimal Comfort and Wellbeing  Outcome: No Change        Alert. Oriented. Forgetful date. Hard of hearing.     Patient verbalized his breathing is much better. 94% on room air.    SBP 90's -110's. 's - 120's later tonight. Scheduled metoprolol given, HR improved to 80's - 90's. Afib BBB.    Standby assist. Falls education, demonstrated understanding.

## 2021-07-12 NOTE — DISCHARGE SUMMARY
[facility] MEDICINE  DISCHARGE SUMMARY      Primary Care Physician: Aditya Mehta              Admission Date: 7/7/2021    Discharge Provider: Estuardo Dugan DO Discharge Date: 7/12/2021    Diet: see discharge orders below Code Status: Full Code    Activity: as tolerated       Condition at Discharge: Stable      REASON FOR ADMISSION (See Admission Note for Details)      New Atrial fibrillation    PRINCIPAL DISCHARGE DIAGNOSIS    Active Problems:    Atrial fibrillation with rapid ventricular response (H)    Acute congestive heart failure, unspecified heart failure type (H)        SIGNIFICANT FINDINGS (Imaging, labs):      See below    PENDING LABS      None    PROCEDURES ( this hospitalization only)       None    RECOMMENDATION FOR F/U VISIT      See below    DISPOSITION ( home, home care, TCU...)      Home    SUMMARY OF HOSPITAL COURSE:       86-year-old male with history of coronary artery disease presents with new A. fib with RVR and acute systolic CHF.        New A. fib: TTE shows newly reduced EF of 50%  --Discharge on metoprolol to 100mg two times a day, and verapamil 180 mg daily.  --Started Eliquis  -- continue spironolactone  -- outpatient CHF follow up           CAD: Continue aspirin, reduce dose to 81 mg daily given Eliquis initiation.  Hold home Cozaar given soft blood pressures, Continue Pravachol.  Continue beta-blocker and verapamil initiation as above.         Concern for SHANKAR: overnight oximetry performed 7/10, No alarms   Overnight with strip recorder. Will plan referral to sleep medicine        Acute systolic CHF: TTE shows reduced EF of 50%  --Continue spironolactone, verapamil, metoprolol         Bullous pemphigoid:  --Continue prednisone        COPD: Continue home inhalers        Reported history of aortic stenosis: TTE 7/8/2021 notes normal aortic valve structure and function        Lymphedema: Continue leg wraps        BPH: Continue home  Cardura and Proscar        Allergic rhinitis: Started Flonase       Discharge Medications with Med changes:         Review of your medicines      START taking      Dose / Directions   apixaban ANTICOAGULANT 5 MG tablet  Commonly known as: ELIQUIS  Indication: Atrial Fibrillation Not Caused By A Heart Valve Problem  Used for: Atrial fibrillation with RVR (H)      Dose: 5 mg  Take 1 tablet (5 mg) by mouth 2 times daily  Quantity: 60 tablet  Refills: 0     aspirin 81 MG chewable tablet  Commonly known as: ASA  Used for: Atrial fibrillation with RVR (H)  Replaces: aspirin 325 MG tablet      Dose: 81 mg  Start taking on: July 13, 2021  Take 1 tablet (81 mg) by mouth daily  Quantity: 30 tablet  Refills: 0     fluticasone 50 MCG/ACT nasal spray  Commonly known as: FLONASE  Used for: Chronic rhinitis      Dose: 1 spray  Start taking on: July 13, 2021  Spray 1 spray into both nostrils daily  Quantity: 9.9 mL  Refills: 0     metoprolol tartrate 100 MG tablet  Commonly known as: LOPRESSOR  Used for: Atrial fibrillation with RVR (H)      Dose: 100 mg  Take 1 tablet (100 mg) by mouth 2 times daily  Quantity: 60 tablet  Refills: 0     spironolactone 25 MG tablet  Commonly known as: ALDACTONE  Used for: Atrial fibrillation with RVR (H)      Dose: 25 mg  Start taking on: July 13, 2021  Take 1 tablet (25 mg) by mouth daily  Quantity: 30 tablet  Refills: 0     verapamil  MG CR tablet  Commonly known as: CALAN-SR  Used for: Atrial fibrillation with RVR (H)      Dose: 180 mg  Take 1 tablet (180 mg) by mouth At Bedtime  Quantity: 30 tablet  Refills: 0        CONTINUE these medicines which may have CHANGED, or have new prescriptions. If we are uncertain of the size of tablets/capsules you have at home, strength may be listed as something that might have changed.      Dose / Directions   predniSONE 5 MG tablet  Commonly known as: DELTASONE  This may have changed: Another medication with the same name was removed. Continue taking  this medication, and follow the directions you see here.      Dose: 5 mg  [PREDNISONE (DELTASONE) 5 MG TABLET] Take 5 mg by mouth 2 (two) times a day .  Refills: 0        CONTINUE these medicines which have NOT CHANGED      Dose / Directions   budesonide-formoterol 160-4.5 MCG/ACT Inhaler  Commonly known as: SYMBICORT  Used for: COPD (chronic obstructive pulmonary disease) (H)      [BUDESONIDE-FORMOTEROL (SYMBICORT) 160-4.5 MCG/ACTUATION INHALER] USE 2 INHALATIONS TWICE A DAY  Quantity: 1 Inhaler  Refills: 3     cholecalciferol 25 mcg (1000 units) capsule  Commonly known as: VITAMIN D3      [CHOLECALCIFEROL, VITAMIN D3, (VITAMIN D3) 25 MCG (1,000 UNIT) CAPSULE]  Refills: 0     doxazosin 8 MG tablet  Commonly known as: CARDURA  Used for: Essential hypertension      [DOXAZOSIN (CARDURA) 8 MG TABLET] TAKE 1 TABLET AT BEDTIME  Quantity: 90 tablet  Refills: 3     finasteride 5 MG tablet  Commonly known as: PROSCAR  Used for: Frequency of urination      [FINASTERIDE (PROSCAR) 5 MG TABLET] TAKE 1 TABLET DAILY  Quantity: 90 tablet  Refills: 3     LORazepam 0.5 MG tablet  Commonly known as: ATIVAN  Used for: Anxiety, Primary insomnia      [LORAZEPAM (ATIVAN) 0.5 MG TABLET] TAKE 1/2 TO 1 TABLET BY MOUTH THREE TIMES DAILY AS NEEDED FOR ANXIOUSNESS  Quantity: 30 tablet  Refills: 5     nitroGLYcerin 0.4 MG sublingual tablet  Commonly known as: NITROSTAT  Used for: Chest pain  Notes to patient: Replace bottle 90 days after first opened.      [NITROGLYCERIN (NITROSTAT) 0.4 MG SL TABLET] PLACE ONE TABLET UNDER THE TONGUE EVERY 5 MINUTES AS NEEDED FOR CHEST PAIN  Quantity: 25 tablet  Refills: 3     pravastatin 20 MG tablet  Commonly known as: PRAVACHOL  Used for: Hyperlipidemia      Dose: 20 mg  [PRAVASTATIN (PRAVACHOL) 20 MG TABLET] Take 1 tablet (20 mg total) by mouth every evening.  Quantity: 90 tablet  Refills: 3        STOP taking    aspirin 325 MG tablet  Commonly known as: ASA  Replaced by: aspirin 81 MG chewable tablet         losartan 100 MG tablet  Commonly known as: COZAAR              Where to get your medicines      These medications were sent to [id number] - [place], [place] - [address] [facility] [address]  [address], [place] [address]    Phone: [phone]     apixaban ANTICOAGULANT 5 MG tablet    aspirin 81 MG chewable tablet    fluticasone 50 MCG/ACT nasal spray    metoprolol tartrate 100 MG tablet    spironolactone 25 MG tablet    verapamil  MG CR tablet                             Discharge Procedure Orders   Home Care PT Referral for Hospital Discharge   Standing Status: Future   Referral Priority: Routine Referral Type: Home Health Therapies & Aides   Number of Visits Requested: 1     Home Care OT Referral for Hospital Discharge   Standing Status: Future   Referral Priority: Routine Referral Type: Consultation   Number of Visits Requested: 1     SLEEP EVALUATION & MANAGEMENT REFERRAL - ADULT -   Standing Status: Future   Referral Priority: Routine Referral Type: Consultation   Number of Visits Requested: 1     Reason for your hospital stay   Order Comments: Atrial fibrillation     MD face to face encounter   Order Comments: Documentation of Face to Face and Certification for Home Health Services    I certify that patient: Nishant Olivo is under my care and that I, or a nurse practitioner or physician's assistant working with me, had a face-to-face encounter that meets the physician face-to-face encounter requirements with this patient on: 7/12/2021.    This encounter with the patient was in whole, or in part, for the following medical condition, which is the primary reason for home health care: Deconditioning.    I certify that, based on my findings, the following services are medically necessary home health services: Occupational Therapy and Physical Therapy.    My clinical findings support the need for the above services because: Occupational Therapy  Services are needed to assess and treat cognitive ability and address ADL safety due to impairment in ROM. and Physical Therapy Services are needed to assess and treat the following functional impairments: Deconditioning.    Further, I certify that my clinical findings support that this patient is homebound (i.e. absences from home require considerable and taxing effort and are for medical reasons or [facility] or infrequently or of short duration when for other reasons) because: Requires assistance of another person or specialized equipment to access medical services because patient: Range of motion limitations prevents ability to exit home safely...    Based on the above findings. I certify that this patient is confined to the home and needs intermittent skilled nursing care, physical therapy and/or speech therapy.  The patient is under my care, and I have initiated the establishment of the plan of care.  This patient will be followed by a physician who will periodically review the plan of care.  Physician/Provider to provide follow up care: Aditya Mehta    Attending hospital physician (the Medicare certified [facility] provider): Estuardo Dugan DO  Physician Signature: See electronic signature associated with these discharge orders.  Date: 7/12/2021     Activity   Order Comments: Your activity upon discharge: activity as tolerated     Order Specific Question Answer Comments   Is discharge order? Yes      Monitor and record   Order Comments: Weigh yourself every morning     When to contact your care team   Order Comments: Contact your care team If symptoms get worse, If increased shortness of breath, If waking up at night with difficulty breathing, If unable to lie down for sleep due to symptoms, If weight gain of 2 pounds a day for 2 days in a row OR 5 pounds in 1 week., Increased swelling in your ankles or legs, and Dizziness or lightheadedness     Discharge Follow Up - with Primary Care clinic within  "3-5 days (RN to schedule prior to d/c for [facility] primary care     Add follow up information to the AVS prior to printing     Diet   Order Comments: Follow this diet upon discharge: Orders Placed This Encounter      Combination Diet 2 gm NA Diet; Low Saturated Fat Diet     Order Specific Question Answer Comments   Is discharge order? Yes          Subjective       NAD. Denies any nausea, vomiting, abdominal pain, chest pain, SOB, new swelling, fevers, chills, confusion or headache.     Examination      Vital Signs in last 24 hours:   Vital signs:  Temp: 97.7  F (36.5  C) Temp src: Oral BP: 98/66 Pulse: 73   Resp: 18 SpO2: 92 % O2 Device: None (Room air) Oxygen Delivery: 2 LPM Height: 185.4 cm (6' 1\") Weight: 131.5 kg (289 lb 14.4 oz)  Estimated body mass index is 38.25 kg/m  as calculated from the following:    Height as of this encounter: 1.854 m (6' 1\").    Weight as of this encounter: 131.5 kg (289 lb 14.4 oz).    General: NAD  HEENT: Without congestion or inflammation  RESPIRATORY: Respirations nonlabored  CARDIOVASCULAR: 1+ le edema bilat.  NEUROLOGIC: No focal arm or leg weakness, speech is clear      Please see EMR for more detailed significant labs, imaging, consultant notes etc.  Total time spent on discharge: >30 minutes    Estuardo Dugan D.O.        CC:Aditya Mehta        "

## 2021-07-13 ENCOUNTER — PATIENT OUTREACH (OUTPATIENT)
Dept: CARE COORDINATION | Facility: CLINIC | Age: 86
End: 2021-07-13

## 2021-07-13 DIAGNOSIS — Z71.89 OTHER SPECIFIED COUNSELING: ICD-10-CM

## 2021-07-13 NOTE — PROGRESS NOTES
Clinic Care Coordination Contact    Assessment: Upon chart review, [facility] Team member will cancel/close the referral for TCM outreach due to reason below:     Patient has a follow up appointment with an appropriate provider today for hospital discharge.     Mabel Hirsch MA  [facility]

## 2021-07-16 ENCOUNTER — OFFICE VISIT (OUTPATIENT)
Dept: INTERNAL MEDICINE | Facility: CLINIC | Age: 86
End: 2021-07-16
Payer: COMMERCIAL

## 2021-07-16 VITALS
HEIGHT: 73 IN | HEART RATE: 94 BPM | OXYGEN SATURATION: 98 % | DIASTOLIC BLOOD PRESSURE: 66 MMHG | SYSTOLIC BLOOD PRESSURE: 96 MMHG | WEIGHT: 291.8 LBS | BODY MASS INDEX: 38.67 KG/M2

## 2021-07-16 DIAGNOSIS — I25.10 ATHEROSCLEROSIS OF NATIVE CORONARY ARTERY OF NATIVE HEART WITHOUT ANGINA PECTORIS: ICD-10-CM

## 2021-07-16 DIAGNOSIS — I50.9 ACUTE CONGESTIVE HEART FAILURE, UNSPECIFIED HEART FAILURE TYPE (H): ICD-10-CM

## 2021-07-16 DIAGNOSIS — R29.818 SUSPECTED SLEEP APNEA: ICD-10-CM

## 2021-07-16 DIAGNOSIS — L12.0 BULLOUS PEMPHIGOID (H): ICD-10-CM

## 2021-07-16 DIAGNOSIS — I48.91 ATRIAL FIBRILLATION WITH RVR (H): Primary | ICD-10-CM

## 2021-07-16 LAB
ANION GAP SERPL CALCULATED.3IONS-SCNC: 11 MMOL/L (ref 5–18)
BUN SERPL-MCNC: 25 MG/DL (ref 8–28)
CALCIUM SERPL-MCNC: 9.1 MG/DL (ref 8.5–10.5)
CHLORIDE BLD-SCNC: 103 MMOL/L (ref 98–107)
CO2 SERPL-SCNC: 27 MMOL/L (ref 22–31)
CREAT SERPL-MCNC: 1.1 MG/DL (ref 0.7–1.3)
GFR SERPL CREATININE-BSD FRML MDRD: 61 ML/MIN/1.73M2
GLUCOSE BLD-MCNC: 103 MG/DL (ref 70–125)
POTASSIUM BLD-SCNC: 4.4 MMOL/L (ref 3.5–5)
SODIUM SERPL-SCNC: 141 MMOL/L (ref 136–145)

## 2021-07-16 PROCEDURE — 99496 TRANSJ CARE MGMT HIGH F2F 7D: CPT | Performed by: INTERNAL MEDICINE

## 2021-07-16 PROCEDURE — 36415 COLL VENOUS BLD VENIPUNCTURE: CPT | Performed by: INTERNAL MEDICINE

## 2021-07-16 PROCEDURE — 80048 BASIC METABOLIC PNL TOTAL CA: CPT | Performed by: INTERNAL MEDICINE

## 2021-07-16 RX ORDER — SPIRONOLACTONE 25 MG/1
25 TABLET ORAL DAILY
Qty: 90 TABLET | Refills: 3 | Status: SHIPPED | OUTPATIENT
Start: 2021-07-16 | End: 2021-07-22

## 2021-07-16 RX ORDER — METOPROLOL TARTRATE 100 MG
100 TABLET ORAL 2 TIMES DAILY
Qty: 180 TABLET | Refills: 3 | Status: SHIPPED | OUTPATIENT
Start: 2021-07-16 | End: 2021-07-22

## 2021-07-16 RX ORDER — VERAPAMIL HYDROCHLORIDE 180 MG/1
180 TABLET, EXTENDED RELEASE ORAL AT BEDTIME
Qty: 90 TABLET | Refills: 3 | Status: SHIPPED | OUTPATIENT
Start: 2021-07-16 | End: 2021-07-22

## 2021-07-16 ASSESSMENT — MIFFLIN-ST. JEOR: SCORE: 2067.48

## 2021-07-16 NOTE — PROGRESS NOTES
Office Visit - Follow Up   Nishant Olivo   86 year old male    Date of Visit: 7/16/2021    Chief Complaint   Patient presents with     Hospital F/U     ED, [facility], discharged 7/12/21, A fib        Assessment and Plan   1. Atrial fibrillation with RVR (H)  Continue with rate control strategy and Eliquis for stroke prevention.  We discussed rate versus rhythm control.  We discussed consideration of cardioversion and the need to be on anticoagulation prior to this as well as role of anticoagulation and stroke prevention.  We also discussed alternative methods of stroke prevention such as left atrial occlusion device.  He will continue on metoprolol verapamil and Eliquis.  Cardiology consultation placed  - apixaban ANTICOAGULANT (ELIQUIS) 5 MG tablet; Take 1 tablet (5 mg) by mouth 2 times daily  Dispense: 180 tablet; Refill: 3  - verapamil ER (CALAN-SR) 180 MG CR tablet; Take 1 tablet (180 mg) by mouth At Bedtime  Dispense: 90 tablet; Refill: 3  - metoprolol tartrate (LOPRESSOR) 100 MG tablet; Take 1 tablet (100 mg) by mouth 2 times daily  Dispense: 180 tablet; Refill: 3  - spironolactone (ALDACTONE) 25 MG tablet; Take 1 tablet (25 mg) by mouth daily  Dispense: 90 tablet; Refill: 3  - Adult Cardiology Eval Referral; Future  - Basic metabolic panel  (Ca, Cl, CO2, Creat, Gluc, K, Na, BUN); Future  - SLEEP EVALUATION & MANAGEMENT REFERRAL - ADULT -; Future  - Basic metabolic panel  (Ca, Cl, CO2, Creat, Gluc, K, Na, BUN)    2. Acute congestive heart failure, unspecified heart failure type (H)  Doing okay still little bit edematous but I think he also has some venous stasis.  Continue with leg elevation compression and current diuretic dosing    3. Bullous pemphigoid  He is on low-dose prednisone tapering down, apparently related to furosemide    4. Suspected sleep apnea  - SLEEP EVALUATION & MANAGEMENT REFERRAL - ADULT -; Future    5. Atherosclerosis of native coronary artery of native heart without angina  "pectoris  Continue secondary prevention    Return in about 4 weeks (around 8/13/2021) for visit with PCP.     History of Present Illness   This 86 year old old man comes in for hospital follow-up.  Admitted with atrial fibrillation with RVR and heart failure.  His EF was about 50%.  He reports he is unable to take furosemide because of bullous pemphigus.  It looks like he did tolerate IV Bumex.  He diuresed and was rate controlled metoprolol and verapamil.  He is feeling better at discharge and his weights been stable.  He does have some underlying edema and wears compression garments.  He is taking spironolactone and has been holding statin.  He does not yet have a follow-up with cardiology.  He believes a sleep study has been ordered but he has not been contacted yet    Review of Systems: A comprehensive review of systems was negative except as noted.     Medications, Allergies and Problem List   Reviewed, reconciled and updated  Post Discharge Medication Reconciliation Status: discharge medications reconciled, continue medications without change     Physical Exam   General Appearance:   No acute distress    BP 96/66 (BP Location: Left arm, Patient Position: Sitting, Cuff Size: Adult Regular)   Pulse 94   Ht 1.854 m (6' 1\")   Wt 132.4 kg (291 lb 12.8 oz)   SpO2 98%   BMI 38.50 kg/m      His heart rate is controlled rhythm is regular lungs are generally clear to auscultation bilaterally and he has bilateral pitting edema     Additional Information   Current Outpatient Medications   Medication Sig Dispense Refill     apixaban ANTICOAGULANT (ELIQUIS) 5 MG tablet Take 1 tablet (5 mg) by mouth 2 times daily 180 tablet 3     aspirin (ASA) 81 MG chewable tablet Take 1 tablet (81 mg) by mouth daily 30 tablet 0     budesonide-formoteroL (SYMBICORT) 160-4.5 mcg/actuation inhaler [BUDESONIDE-FORMOTEROL (SYMBICORT) 160-4.5 MCG/ACTUATION INHALER] USE 2 INHALATIONS TWICE A DAY 1 Inhaler 3     cholecalciferol, vitamin D3, " (VITAMIN D3) 25 mcg (1,000 unit) capsule [CHOLECALCIFEROL, VITAMIN D3, (VITAMIN D3) 25 MCG (1,000 UNIT) CAPSULE]        doxazosin (CARDURA) 8 MG tablet [DOXAZOSIN (CARDURA) 8 MG TABLET] TAKE 1 TABLET AT BEDTIME 90 tablet 3     finasteride (PROSCAR) 5 mg tablet [FINASTERIDE (PROSCAR) 5 MG TABLET] TAKE 1 TABLET DAILY 90 tablet 3     fluticasone (FLONASE) 50 MCG/ACT nasal spray Spray 1 spray into both nostrils daily 9.9 mL 0     LORazepam (ATIVAN) 0.5 MG tablet [LORAZEPAM (ATIVAN) 0.5 MG TABLET] TAKE 1/2 TO 1 TABLET BY MOUTH THREE TIMES DAILY AS NEEDED FOR ANXIOUSNESS 30 tablet 5     metoprolol tartrate (LOPRESSOR) 100 MG tablet Take 1 tablet (100 mg) by mouth 2 times daily 180 tablet 3     nitroglycerin (NITROSTAT) 0.4 MG SL tablet [NITROGLYCERIN (NITROSTAT) 0.4 MG SL TABLET] PLACE ONE TABLET UNDER THE TONGUE EVERY 5 MINUTES AS NEEDED FOR CHEST PAIN 25 tablet 3     pravastatin (PRAVACHOL) 20 MG tablet [PRAVASTATIN (PRAVACHOL) 20 MG TABLET] Take 1 tablet (20 mg total) by mouth every evening. 90 tablet 3     predniSONE (DELTASONE) 5 MG tablet [PREDNISONE (DELTASONE) 5 MG TABLET] Take 5 mg by mouth 2 (two) times a day .       spironolactone (ALDACTONE) 25 MG tablet Take 1 tablet (25 mg) by mouth daily 90 tablet 3     verapamil ER (CALAN-SR) 180 MG CR tablet Take 1 tablet (180 mg) by mouth At Bedtime 90 tablet 3     Allergies   Allergen Reactions     Furosemide Rash     pemphigoid     Cat Hair Std Allergenic Ext [Cat Hair Extract] Unknown     Sulfa (Sulfonamide Antibiotics) [Sulfa Drugs] Unknown     Social History     Tobacco Use     Smoking status: Former Smoker     Years: 30.00     Types: Cigarettes     Smokeless tobacco: Never Used   Substance Use Topics     Alcohol use: No     Drug use: No       Review and/or order of clinical lab tests:  Review and/or order of radiology tests:  Review and/or order of medicine tests:  Discussion of test results with performing physician:  Decision to obtain old records and/or obtain  history from someone other than the patient:  Review and summarization of old records and/or obtaining history from someone other than the patient and.or discussion of case with another health care provider:  Independent visualization of image, tracing or specimen itself:    Time:      Smith Ellis MD

## 2021-07-19 ENCOUNTER — MYC MEDICAL ADVICE (OUTPATIENT)
Dept: INTERNAL MEDICINE | Facility: CLINIC | Age: 86
End: 2021-07-19

## 2021-07-19 DIAGNOSIS — I50.31 ACUTE DIASTOLIC CONGESTIVE HEART FAILURE (H): Primary | ICD-10-CM

## 2021-07-19 DIAGNOSIS — I10 ESSENTIAL HYPERTENSION: ICD-10-CM

## 2021-07-19 DIAGNOSIS — I48.91 ATRIAL FIBRILLATION WITH RVR (H): ICD-10-CM

## 2021-07-20 DIAGNOSIS — I50.33 ACUTE ON CHRONIC DIASTOLIC HEART FAILURE (H): Primary | ICD-10-CM

## 2021-07-20 RX ORDER — BUMETANIDE 1 MG/1
1 TABLET ORAL DAILY
Qty: 30 TABLET | Refills: 11 | Status: SHIPPED | OUTPATIENT
Start: 2021-07-20 | End: 2021-10-15

## 2021-07-22 RX ORDER — VERAPAMIL HYDROCHLORIDE 180 MG/1
180 TABLET, EXTENDED RELEASE ORAL AT BEDTIME
Qty: 30 TABLET | Refills: 5 | Status: SHIPPED | OUTPATIENT
Start: 2021-07-22 | End: 2021-08-16

## 2021-07-22 RX ORDER — METOPROLOL TARTRATE 100 MG
100 TABLET ORAL 2 TIMES DAILY
Qty: 60 TABLET | Refills: 5 | Status: SHIPPED | OUTPATIENT
Start: 2021-07-22 | End: 2022-10-06

## 2021-07-22 RX ORDER — SPIRONOLACTONE 25 MG/1
25 TABLET ORAL DAILY
Qty: 30 TABLET | Refills: 5 | Status: SHIPPED | OUTPATIENT
Start: 2021-07-22 | End: 2022-10-06

## 2021-07-26 ENCOUNTER — MYC MEDICAL ADVICE (OUTPATIENT)
Dept: INTERNAL MEDICINE | Facility: CLINIC | Age: 86
End: 2021-07-26

## 2021-07-26 DIAGNOSIS — Z88.9 DRUG ALLERGY: Primary | ICD-10-CM

## 2021-07-29 ENCOUNTER — OFFICE VISIT (OUTPATIENT)
Dept: ALLERGY | Facility: CLINIC | Age: 86
End: 2021-07-29
Attending: INTERNAL MEDICINE
Payer: COMMERCIAL

## 2021-07-29 VITALS — OXYGEN SATURATION: 97 % | HEART RATE: 61 BPM | HEIGHT: 73 IN | BODY MASS INDEX: 38.57 KG/M2 | WEIGHT: 291 LBS

## 2021-07-29 DIAGNOSIS — L12.0 BULLOUS PEMPHIGOID (H): ICD-10-CM

## 2021-07-29 DIAGNOSIS — Z88.9 DRUG ALLERGY: Primary | ICD-10-CM

## 2021-07-29 PROCEDURE — 99243 OFF/OP CNSLTJ NEW/EST LOW 30: CPT | Performed by: ALLERGY & IMMUNOLOGY

## 2021-07-29 ASSESSMENT — MIFFLIN-ST. JEOR: SCORE: 2063.85

## 2021-07-29 NOTE — PROGRESS NOTES
"        Subjective       HPI Chief complaint: Concern for medication reaction    History of present illness: This is a pleasant 86-year-old gentleman I was asked to see for evaluation by Dr. Mehta in regards to possible medication reaction.  Patient states that he developed bullous pemphigoid in May of this year. It was shortly after he had his Lasix dose increased.    It was thought that may  have been secondary to Lasix.  He has a history of heart failure and has been using the Lasix to control his symptoms.  He states his bullous pemphigoid got quite intense.  His wife states she had a bandages on his legs.  He had biopsy with dermatology that confirmed bullous pemphigoid, and the Lasix was stopped.  He was put on prednisone, doxycycline and several creams to try and control symptoms.  He was hospitalized in July with heart failure.  He was started on Bumex IV and noted no recurrence of the bullous pemphigoid, however, he was on prednisone at that time.  He stopped prednisone this last Saturday but then Tuesday took a dose of Bumex.  He took this orally.  He notes that last night the blister started to come back again.    Past medical history: COPD, hypertension, coronary artery disease, congestive heart failure, right bundle zeina block, atrial fibrillation with rapid ventricular response    Social history: He is     Family history: Noncontributory        Review of Systems   Constitutional, HEENT, cardiovascular, pulmonary, gi and gu systems are negative, except as otherwise noted.  He does note swelling in his lower legs.  He is hard of hearing        Objective    Pulse 61   Ht 1.854 m (6' 1\")   Wt 132 kg (291 lb)   SpO2 97%   BMI 38.39 kg/m    Body mass index is 38.39 kg/m .  Physical Exam      Gen: Pleasant male not in acute distress  HEENT: Eyes no erythema of the bulbar or palpebral conjunctiva, no edema. Ears: No deformities or lesions nose: No congestion, Mouth: Throat clear, .   Neck: No " masses lesions or swelling  Respiratory: No coughing with breathing, no retractions  Lymph: No visible supraclavicular or cervical lymphadenopathy  Skin: Red patches on the arms  Psych: Alert and oriented times 3    Impression report and plan:  1.  Bullous pemphigoid    Frostbite has been associated with bullous pemphigoid,, however, I do not have any tests to conclusively prove that this is secondary to furosemide as this pemphigoid does have an increased incidence and elderly population.  I would like to talk with his dermatologist and I will contact him.  I have also contacted other allergist that may be more equipped to answer this problem.  I will get back to the patient regarding this result but I think alternative diuretic choice may be the most safe option given his BP.

## 2021-07-29 NOTE — LETTER
7/29/2021         RE: Nishant Gilliam  [address] [place] [address]        Dear Colleague,    Thank you for referring your patient, Nishant Gilliam, to the [facility]. Unfortunately, there is no way to conclusively prove that Bumex or Lasix are causing his BP.  I think best choice is likely alternative diuretic if possible.  However, I have contacted [facility] for opinion as well.  Please see a copy of my visit note below.            Subjective       HPI Chief complaint: Concern for medication reaction    History of present illness: This is a pleasant 86-year-old gentleman I was asked to see for evaluation by Dr. Mehta in regards to possible medication reaction.  Patient states that he developed bullous pemphigoid in May of this year. It was shortly after he had his Lasix dose increased.    It was thought that may  have been secondary to Lasix.  He has a history of heart failure and has been using the Lasix to control his symptoms.  He states his bullous pemphigoid got quite intense.  His wife states she had a bandages on his legs.  He had biopsy with dermatology that confirmed bullous pemphigoid, and the Lasix was stopped.  He was put on prednisone, doxycycline and several creams to try and control symptoms.  He was hospitalized in July with heart failure.  He was started on Bumex IV and noted no recurrence of the bullous pemphigoid, however, he was on prednisone at that time.  He stopped prednisone this last Saturday but then Tuesday took a dose of Bumex.  He took this orally.  He notes that last night the blister started to come back again.    Past medical history: COPD, hypertension, coronary artery disease, congestive heart failure, right bundle zeina block, atrial fibrillation with rapid ventricular response    Social history: He is     Family history: Noncontributory        Review of Systems   Constitutional, HEENT, cardiovascular, pulmonary, gi and gu  "systems are negative, except as otherwise noted.  He does note swelling in his lower legs.  He is hard of hearing        Objective    Pulse 61   Ht 1.854 m (6' 1\")   Wt 132 kg (291 lb)   SpO2 97%   BMI 38.39 kg/m    Body mass index is 38.39 kg/m .  Physical Exam      Gen: Pleasant male not in acute distress  HEENT: Eyes no erythema of the bulbar or palpebral conjunctiva, no edema. Ears: No deformities or lesions nose: No congestion, Mouth: Throat clear, .   Neck: No masses lesions or swelling  Respiratory: No coughing with breathing, no retractions  Lymph: No visible supraclavicular or cervical lymphadenopathy  Skin: Red patches on the arms  Psych: Alert and oriented times 3    Impression report and plan:  1.  Bullous pemphigoid    Frostbite has been associated with bullous pemphigoid,, however, I do not have any tests to conclusively prove that this is secondary to furosemide as this pemphigoid does have an increased incidence and elderly population.  I would like to talk with his dermatologist and I will contact him.  I have also contacted other allergist that may be more equipped to answer this problem.  I will get back to the patient regarding this result but I think alternative diuretic choice may be the most safe option given his BP.          Again, thank you for allowing me to participate in the care of your patient.        Sincerely,        Vira GUNN MD    "

## 2021-08-05 ENCOUNTER — TRANSFERRED RECORDS (OUTPATIENT)
Dept: HEALTH INFORMATION MANAGEMENT | Facility: CLINIC | Age: 86
End: 2021-08-05

## 2021-08-16 ENCOUNTER — OFFICE VISIT (OUTPATIENT)
Dept: INTERNAL MEDICINE | Facility: CLINIC | Age: 86
End: 2021-08-16
Payer: COMMERCIAL

## 2021-08-16 VITALS
SYSTOLIC BLOOD PRESSURE: 102 MMHG | HEART RATE: 78 BPM | DIASTOLIC BLOOD PRESSURE: 64 MMHG | WEIGHT: 291.1 LBS | OXYGEN SATURATION: 96 % | BODY MASS INDEX: 38.41 KG/M2

## 2021-08-16 DIAGNOSIS — L12.0 BULLOUS PEMPHIGOID (H): ICD-10-CM

## 2021-08-16 DIAGNOSIS — I48.91 ATRIAL FIBRILLATION WITH RAPID VENTRICULAR RESPONSE (H): ICD-10-CM

## 2021-08-16 DIAGNOSIS — I87.2 VENOUS (PERIPHERAL) INSUFFICIENCY: ICD-10-CM

## 2021-08-16 DIAGNOSIS — N13.9 OBSTRUCTIVE UROPATHY: Primary | ICD-10-CM

## 2021-08-16 DIAGNOSIS — E66.01 MORBID OBESITY WITH BMI OF 40.0-44.9, ADULT (H): ICD-10-CM

## 2021-08-16 DIAGNOSIS — I10 ESSENTIAL HYPERTENSION: ICD-10-CM

## 2021-08-16 PROCEDURE — 99214 OFFICE O/P EST MOD 30 MIN: CPT | Performed by: INTERNAL MEDICINE

## 2021-08-16 RX ORDER — DOXAZOSIN 4 MG/1
4 TABLET ORAL AT BEDTIME
Qty: 90 TABLET | Refills: 3 | Status: SHIPPED | OUTPATIENT
Start: 2021-08-16 | End: 2021-08-24

## 2021-08-16 RX ORDER — DOXAZOSIN 8 MG/1
4 TABLET ORAL AT BEDTIME
Qty: 90 TABLET | Refills: 3
Start: 2021-08-16 | End: 2021-12-16 | Stop reason: DRUGHIGH

## 2021-08-16 RX ORDER — MINOCYCLINE HYDROCHLORIDE 100 MG/1
1 TABLET ORAL 2 TIMES DAILY
COMMUNITY
Start: 2021-08-05 | End: 2021-12-14

## 2021-08-16 NOTE — PATIENT INSTRUCTIONS
1.  Decrease Doxazosin to 4 mg daily.  Monitor BP.    2.  Recheck EKG at cardiology appointment.    3.  Do not take supplemental potassium currently.  May need to add if frequent use of Bumex is required    4. See in two to three months    5.  Continue both aspirin and Eliquis for now

## 2021-08-17 PROBLEM — I48.91 ATRIAL FIBRILLATION WITH RVR (H): Status: RESOLVED | Noted: 2021-07-07 | Resolved: 2021-08-17

## 2021-08-17 NOTE — PROGRESS NOTES
"ASSESSMENT:  1. Essential hypertension  Since last visit, Pérez has recorded multiple low blood pressure readings.  He was taken off of verapamil due to this.  Blood pressure still is on the low end.  After discussion, he will decrease his doxazosin dose.  - doxazosin (CARDURA) 8 MG tablet; Take 0.5 tablets (4 mg) by mouth At Bedtime  Dispense: 90 tablet; Refill: 3  - doxazosin (CARDURA) 4 MG tablet; Take 1 tablet (4 mg) by mouth At Bedtime  Dispense: 90 tablet; Refill: 3    2. Obstructive uropathy  Pérez has been on doxazosin both for obstructive uropathy and essential hypertension.  Doxazosin dose will be decreased.  If blood pressure remains low I would stop it entirely and replaced with tamsulosin  - doxazosin (CARDURA) 4 MG tablet; Take 1 tablet (4 mg) by mouth At Bedtime  Dispense: 90 tablet; Refill: 3    3. Morbid obesity with BMI of 40.0-44.9, adult (H)  This is a strong contributing factor to his issues with peripheral edema and problems with ambulation    4. Atrial fibrillation with rapid ventricular response (H)  He was hospitalized on 7/7 with atrial fibrillation with rapid ventricular response and symptoms consistent with acute heart failure with ejection fraction of 50%.  Metoprolol was added to verapamil for rate control.  He was started on Eliquis.  He has not had problems with symptomatic palpitations since leaving the hospital    5. Bullous pemphigoid  He was referred to Dr. Bala Hughes.  He has tapered prednisone to 10 mg daily.  Concern has been raised that he may be allergic to loop diuretics.  He feels his skin has been under good control since his last dermatology appointment.  He was seen by Dr. Hightower to discuss diuretic management with concern over allergy to loop diuretics.  It most pemphigoid originally occurred while he was on furosemide    6. Venous (peripheral) insufficiency  He still has significant peripheral edema.  He is taking Bumex \"as needed \"rather than daily.  He averages taking " this about twice weekly    PLAN:  1.  Decrease doxazosin to 4 mg daily.  Monitor blood pressure.  Note effect on urinary symptoms.    2.  EKG will be rechecked at cardiology appointment.    3.  Do not take supplemental potassium at the present time.  This may need to be added if frequent use of Bumex is required    4.  Report the effect of lower doxazosin unknown blood pressure in several weeks.  Clinic follow-up 2 to 3 months.    5.  Continue Eliquis due to the history of atrial fibrillation.  Continue low-dose aspirin due to coronary artery disease with previous stent placement      Medications Discontinued During This Encounter   Medication Reason     verapamil ER (CALAN-SR) 180 MG CR tablet Therapy completed     doxazosin (CARDURA) 8 MG tablet Reorder       Return in about 3 months (around 11/16/2021) for Follow up.    ASSESSED PROBLEMS:  See above      CHIEF COMPLAINT:  chief complaint    HISTORY OF PRESENT ILLNESS:  Nishant is a 86 year old male seen for follow-up of essential hypertension, venous insufficiency, bullous pemphigoid, and other concerns.  He was hospitalized on 7/9 with increased shortness of breath, atrial fibrillation with rapid ventricular response, and symptoms of acute heart failure.  He was diuresed with IV Bumex at that time.  Metoprolol was added to verapamil for rate control, and he was started on Eliquis as an anticoagulant.  He reports heart rate has generally averaged in the 70s.  He has not had symptomatic palpitations.    He has a recent diagnosis of bullous pemphigoid.  This began while he was on furosemide as a diuretic.  He was taken off of furosemide but placed on Bumex when hospitalized with rapid atrial fibrillation.  Pérez has seen Dr. Bala Hughes in dermatology consultation.  He is on a tapering course of prednisone.  Skin is currently doing well.  He was referred to Dr. Vira Hightower due to concerns that the bullous pemphigoid could have been triggered by a loop diuretic  allergy.    He reports blood pressure has been low recently with systolics in the 80s to low 100s.  Was taken off of verapamil due to this.  He also was advised to decrease doxazosin, but he has continued at this point.  Doxazosin originally was prescribed both for hypertension and for symptoms of obstructive uropathy    REVIEW OF SYSTEMS:  He notes chronic shortness of breath aggravated by activity.  Uses a walker for ambulation  Comprehensive review of systems is negative.    PFSH:   and retired.  Previously an avid traveler with a goal of visiting all 50 states.  Seen with his daughter    TOBACCO USE:  History   Smoking Status     Former Smoker     Years: 30.00     Types: Cigarettes   Smokeless Tobacco     Never Used       VITALS:  Vitals:    08/16/21 0934   BP: 102/64   BP Location: Left arm   Patient Position: Sitting   Cuff Size: Adult Large   Pulse: 78   SpO2: 96%   Weight: 132 kg (291 lb 1.6 oz)     Wt Readings from Last 3 Encounters:   08/16/21 132 kg (291 lb 1.6 oz)   07/29/21 132 kg (291 lb)   07/16/21 132.4 kg (291 lb 12.8 oz)       PHYSICAL EXAM:  Constitutional:   Reveals an alert pleasant obese man who appears short of breath with minor activity.    Vitals: per nursing notes.  HEENT: Atraumatic  Eyes: No scleral icterus  Neck:  Supple, no carotid bruits or adenopathy.  Back:  No spine or CVA pain.  Thorax:  No bony deformities.  Lungs: Clear to A&P without rales or wheezes.  Respiratory effort normal.  Cardiac:   Distant heart tones.  Regular rhythm.  No murmur appreciated  Abdomen:  Soft, active bowel sounds without bruits, mass, or tenderness.  Obese  Extremities:   2+ edema with stasis changes right lower leg with 1+ edema left lower leg.  Skin: No bullae currently appreciated  Neuro:  Alert and oriented. .  No gross focal deficits.  Psychiatric:  Memory intact, mood appropriate.    DATA REVIEWED:  Additional History from Old Records Summarized (2): None.  Decision to Obtain Records (1):  None.   Radiology Tests Summarized or Ordered (1): None.  Labs Reviewed or Ordered (1): Labs reviewed.  Medicine Test Summarized or Ordered (1):  previous cardiac echo and EKG reviewed  Independent Review of EKG or X-RAY(2 each): Previous EKG reviewed    The visit lasted a total of 30 minutes face to face with the patient. Over 50% of the time was spent counseling and educating the patient about management of essential hypertension and peripheral edema.    Dragon dictation was used for this note. Speech recognition errors are a possibility.     MEDICATIONS:  Current Outpatient Medications   Medication Sig Dispense Refill     apixaban ANTICOAGULANT (ELIQUIS) 5 MG tablet Take 1 tablet (5 mg) by mouth 2 times daily 60 tablet 5     aspirin (ASA) 81 MG chewable tablet Take 1 tablet (81 mg) by mouth daily 30 tablet 0     budesonide-formoteroL (SYMBICORT) 160-4.5 mcg/actuation inhaler [BUDESONIDE-FORMOTEROL (SYMBICORT) 160-4.5 MCG/ACTUATION INHALER] USE 2 INHALATIONS TWICE A DAY 1 Inhaler 3     bumetanide (BUMEX) 1 MG tablet Take 1 tablet (1 mg) by mouth daily 30 tablet 11     cholecalciferol, vitamin D3, (VITAMIN D3) 25 mcg (1,000 unit) capsule [CHOLECALCIFEROL, VITAMIN D3, (VITAMIN D3) 25 MCG (1,000 UNIT) CAPSULE]        doxazosin (CARDURA) 4 MG tablet Take 1 tablet (4 mg) by mouth At Bedtime 90 tablet 3     doxazosin (CARDURA) 8 MG tablet Take 0.5 tablets (4 mg) by mouth At Bedtime 90 tablet 3     finasteride (PROSCAR) 5 mg tablet [FINASTERIDE (PROSCAR) 5 MG TABLET] TAKE 1 TABLET DAILY 90 tablet 3     fluticasone (FLONASE) 50 MCG/ACT nasal spray Spray 1 spray into both nostrils daily 9.9 mL 0     LORazepam (ATIVAN) 0.5 MG tablet [LORAZEPAM (ATIVAN) 0.5 MG TABLET] TAKE 1/2 TO 1 TABLET BY MOUTH THREE TIMES DAILY AS NEEDED FOR ANXIOUSNESS 30 tablet 5     metoprolol tartrate (LOPRESSOR) 100 MG tablet Take 1 tablet (100 mg) by mouth 2 times daily 60 tablet 5     minocycline (DYNACIN) 100 MG tablet Take 1 tablet by mouth 2  times daily       nitroglycerin (NITROSTAT) 0.4 MG SL tablet [NITROGLYCERIN (NITROSTAT) 0.4 MG SL TABLET] PLACE ONE TABLET UNDER THE TONGUE EVERY 5 MINUTES AS NEEDED FOR CHEST PAIN 25 tablet 3     pravastatin (PRAVACHOL) 20 MG tablet [PRAVASTATIN (PRAVACHOL) 20 MG TABLET] Take 1 tablet (20 mg total) by mouth every evening. 90 tablet 3     predniSONE (DELTASONE) 5 MG tablet Take 5 mg by mouth 2 times daily        spironolactone (ALDACTONE) 25 MG tablet Take 1 tablet (25 mg) by mouth daily 30 tablet 5

## 2021-08-21 ENCOUNTER — HEALTH MAINTENANCE LETTER (OUTPATIENT)
Age: 86
End: 2021-08-21

## 2021-08-23 ENCOUNTER — OFFICE VISIT (OUTPATIENT)
Dept: CARDIOLOGY | Facility: CLINIC | Age: 86
End: 2021-08-23
Attending: INTERNAL MEDICINE
Payer: COMMERCIAL

## 2021-08-23 VITALS
HEIGHT: 73 IN | SYSTOLIC BLOOD PRESSURE: 106 MMHG | DIASTOLIC BLOOD PRESSURE: 72 MMHG | WEIGHT: 295 LBS | HEART RATE: 97 BPM | RESPIRATION RATE: 16 BRPM | BODY MASS INDEX: 39.1 KG/M2

## 2021-08-23 DIAGNOSIS — E66.01 MORBID OBESITY WITH BMI OF 40.0-44.9, ADULT (H): ICD-10-CM

## 2021-08-23 DIAGNOSIS — I48.91 ATRIAL FIBRILLATION WITH RAPID VENTRICULAR RESPONSE (H): Primary | ICD-10-CM

## 2021-08-23 DIAGNOSIS — I50.30 NYHA CLASS 3 HEART FAILURE WITH PRESERVED EJECTION FRACTION (H): ICD-10-CM

## 2021-08-23 DIAGNOSIS — I48.91 ATRIAL FIBRILLATION WITH RVR (H): ICD-10-CM

## 2021-08-23 DIAGNOSIS — I48.19 PERSISTENT ATRIAL FIBRILLATION (H): ICD-10-CM

## 2021-08-23 DIAGNOSIS — I25.10 ATHEROSCLEROSIS OF NATIVE CORONARY ARTERY OF NATIVE HEART WITHOUT ANGINA PECTORIS: ICD-10-CM

## 2021-08-23 PROCEDURE — 99214 OFFICE O/P EST MOD 30 MIN: CPT | Performed by: INTERNAL MEDICINE

## 2021-08-23 ASSESSMENT — ENCOUNTER SYMPTOMS
POOR WOUND HEALING: 0
SYNCOPE: 0
DYSPNEA ON EXERTION: 1
ORTHOPNEA: 0
DOUBLE VISION: 0
COUGH DISTURBING SLEEP: 0
EYE PAIN: 1
NAIL CHANGES: 0
JAUNDICE: 0
LIGHT-HEADEDNESS: 1
EXERCISE INTOLERANCE: 1
BRUISES/BLEEDS EASILY: 1
EYE IRRITATION: 1
BLOATING: 0
MYALGIAS: 1
TASTE DISTURBANCE: 0
TROUBLE SWALLOWING: 0
NECK MASS: 0
HEARTBURN: 0
BOWEL INCONTINENCE: 0
SPUTUM PRODUCTION: 1
SNORES LOUDLY: 0
ABDOMINAL PAIN: 0
SORE THROAT: 0
HEMOPTYSIS: 0
ARTHRALGIAS: 1
EYE REDNESS: 1
NECK PAIN: 0
SMELL DISTURBANCE: 0
MUSCLE CRAMPS: 0
EYE WATERING: 0
SWOLLEN GLANDS: 0
HYPERTENSION: 0
NAUSEA: 0
DIARRHEA: 0
SINUS PAIN: 0
LEG PAIN: 1
MUSCLE WEAKNESS: 0
PALPITATIONS: 1
HYPOTENSION: 1
WHEEZING: 0
STIFFNESS: 1
COUGH: 0
SKIN CHANGES: 0
POSTURAL DYSPNEA: 0
SLEEP DISTURBANCES DUE TO BREATHING: 0
CONSTIPATION: 1
VOMITING: 0
HOARSE VOICE: 0
SINUS CONGESTION: 1
BLOOD IN STOOL: 0
JOINT SWELLING: 1
BACK PAIN: 1
SHORTNESS OF BREATH: 1
RECTAL PAIN: 0

## 2021-08-23 ASSESSMENT — SLEEP AND FATIGUE QUESTIONNAIRES
HOW LIKELY ARE YOU TO NOD OFF OR FALL ASLEEP WHILE WATCHING TV: HIGH CHANCE OF DOZING
HOW LIKELY ARE YOU TO NOD OFF OR FALL ASLEEP WHEN YOU ARE A PASSENGER IN A CAR FOR AN HOUR WITHOUT A BREAK: MODERATE CHANCE OF DOZING
HOW LIKELY ARE YOU TO NOD OFF OR FALL ASLEEP WHILE SITTING AND READING: SLIGHT CHANCE OF DOZING
HOW LIKELY ARE YOU TO NOD OFF OR FALL ASLEEP WHILE SITTING INACTIVE IN A PUBLIC PLACE: SLIGHT CHANCE OF DOZING
HOW LIKELY ARE YOU TO NOD OFF OR FALL ASLEEP IN A CAR, WHILE STOPPED FOR A FEW MINUTES IN TRAFFIC: WOULD NEVER DOZE
HOW LIKELY ARE YOU TO NOD OFF OR FALL ASLEEP WHILE SITTING QUIETLY AFTER LUNCH WITHOUT ALCOHOL: MODERATE CHANCE OF DOZING
HOW LIKELY ARE YOU TO NOD OFF OR FALL ASLEEP WHILE SITTING AND TALKING TO SOMEONE: SLIGHT CHANCE OF DOZING
HOW LIKELY ARE YOU TO NOD OFF OR FALL ASLEEP WHILE LYING DOWN TO REST IN THE AFTERNOON WHEN CIRCUMSTANCES PERMIT: HIGH CHANCE OF DOZING

## 2021-08-23 ASSESSMENT — MIFFLIN-ST. JEOR: SCORE: 2081.99

## 2021-08-23 NOTE — PATIENT INSTRUCTIONS
1. Stop aspirin.  Your coronary artery disease is stable, and the risk/benefit ratio no longer favors its continued use.  2. Follow through with sleep apnea testing and their recommendations.  3. We will schedule a 24-hour Holter monitor to make sure your heart rate is adequately controlled with activities and at rest.  4. I look forward to seeing you back in 3 months, we can discuss whether to pursue cardioversion at that time.

## 2021-08-23 NOTE — LETTER
8/23/2021    Aditya Mehta MD  [address] [facility] [address]    RE: Nishant Gilliam       Dear Colleague,    I had the pleasure of seeing Nishant Gilliam in the [facility].      Cardiology Clinic Office Note    Assessment / Plan:    1.  Atrial fibrillation.  Currently on metoprolol only for heart rate control.  Will assess adequacy of rate control with 24-hour Holter monitor.  Discussed possibility of a trial of cardioversion if he does not note any significant improvement in activity tolerance with initiation of treatment of suspected sleep apnea.  2.  Heart failure with preserved ejection fraction likely related to poorly controlled atrial fibrillation rate  3.  Morbid obesity, suspected obstructive sleep apnea.  Evaluation to take place tonight.  Encouraged to follow through with recommendations.  This may affect his lower extremity edema and activity tolerance.  4.  Remote history of coronary artery disease.  Risk/benefit ratio no longer favors continued use of aspirin.  Advised to discontinue this agent.    Follow-up 3 months    ______________________________________________________________________    Subjective:    I had the opportunity to see Nishant Gilliam at the [facility].  He is accompanied by his wife today.  Nishant Gilliam is a 86 year old male with a history of coronary artery disease, status post coronary intervention more than 2 decades ago.  History of aortic sclerosis but no aortic stenosis.  He was admitted last month with rapid atrial fibrillation, developing after he was diagnosed with bullous pemphigoid, and sudden worsening of edema in his legs.  He was treated with rate control using metoprolol and verapamil, and anticoagulation with Eliquis.    Since his hospital discharge, his blood pressure has declined, leading to discontinuation of verapamil.  His doxazosin has also been  reduced, without any return of prostate symptoms.  He takes a milligram of Bumex about twice a week, only when his weight climbs, reporting that this is never significantly impacted his longstanding lower extremity edema.  He is not aware of any racing heartbeat but does note that he fatigues quickly walking around his house with physical therapy.  He does use a walker.  ______________________________________________________________________    Problem List:  Patient Active Problem List   Diagnosis     Anxiety     Hearing Loss     Left Ventricular Hypertrophy     Hiatal Hernia     Lumbar Disc Degeneration     Nicotine Dependence - In Remission     Squamous Cell Carcinoma Of The Skin     Basal Cell Carcinoma Of The Skin     Pure hypercholesterolemia     Essential hypertension     Coronary Artery Disease     Complete Right Bundle Branch Block     Chronic Obstructive Pulmonary Disease     Benign Polyps Of The Large Intestine     Frequency of urination     Morbid obesity with BMI of 40.0-44.9, adult (H)     Venous (peripheral) insufficiency     Bullous pemphigoid     Atrial fibrillation with rapid ventricular response (H)     Acute congestive heart failure, unspecified heart failure type (H)     Acute diastolic congestive heart failure (H)     Leukocytosis     NYHA class 3 heart failure with preserved ejection fraction (H)     Medical History:  Past Medical History:   Diagnosis Date     COPD (chronic obstructive pulmonary disease) (H)      Coronary artery disease      Hypertension      Right bundle branch block     complete     Surgical History:  Past Surgical History:   Procedure Laterality Date      KNEE SCOPE, DIAGNOSTIC      Description: Arthroscopy Knee Left;  Recorded: 06/27/2009;  Comments: details unk.     HERNIA REPAIR       ZZ CORONARY STENT PERCUT, INITIAL VESSEL      Description: Cath Stent Placement;  Recorded: 02/24/2014;  Comments: stenting to the LAD in 1999. 2nd done years later.     Social  History:  Social History     Socioeconomic History     Marital status:      Spouse name: Not on file     Number of children: Not on file     Years of education: Not on file     Highest education level: Not on file   Occupational History     Not on file   Tobacco Use     Smoking status: Former Smoker     Years: 30.00     Types: Cigarettes     Smokeless tobacco: Never Used   Substance and Sexual Activity     Alcohol use: No     Drug use: No     Sexual activity: Not on file   Other Topics Concern     Not on file   Social History Narrative    Lives with his wife.     Social Determinants of Health     Financial Resource Strain:      Difficulty of Paying Living Expenses:    Food Insecurity:      Worried About Running Out of Food in the Last Year:      Ran Out of Food in the Last Year:    Transportation Needs:      Lack of Transportation (Medical):      Lack of Transportation (Non-Medical):    Physical Activity:      Days of Exercise per Week:      Minutes of Exercise per Session:    Stress:      Feeling of Stress :    Social Connections:      Frequency of Communication with Friends and Family:      Frequency of Social Gatherings with Friends and Family:      Attends Sikh Services:      Active Member of Clubs or Organizations:      Attends Club or Organization Meetings:      Marital Status:    Intimate Partner Violence:      Fear of Current or Ex-Partner:      Emotionally Abused:      Physically Abused:      Sexually Abused:      Sleep History:  Poorly restorative, sleeping for 2 to 3 hours then awakening, then returning to sleep after a couple of hours.  Exercise History:  With physical therapy, walks with a walker to a limited degree around his home only.    Review of Systems:        Positive for shortness of breath with walking, leg swelling, constipation, joint pains, pemphigoid rash,, daytime sleepiness, easy bruising, easy bleeding.        Family History:  No family history on  file.      Allergies:  Allergies   Allergen Reactions     Furosemide Rash     pemphigoid     Cat Hair Std Allergenic Ext [Cat Hair Extract] Unknown     Sulfa Drugs Unknown     Medications:  Current Outpatient Medications   Medication Sig Dispense Refill     apixaban ANTICOAGULANT (ELIQUIS) 5 MG tablet Take 1 tablet (5 mg) by mouth 2 times daily 60 tablet 5     aspirin (ASA) 81 MG chewable tablet Take 1 tablet (81 mg) by mouth daily 30 tablet 0     budesonide-formoteroL (SYMBICORT) 160-4.5 mcg/actuation inhaler [BUDESONIDE-FORMOTEROL (SYMBICORT) 160-4.5 MCG/ACTUATION INHALER] USE 2 INHALATIONS TWICE A DAY (Patient taking differently: Inhale 2 puffs into the lungs daily ) 1 Inhaler 3     bumetanide (BUMEX) 1 MG tablet Take 1 tablet (1 mg) by mouth daily (Patient taking differently: Take 1 mg by mouth daily as needed ) 30 tablet 11     cholecalciferol, vitamin D3, (VITAMIN D3) 25 mcg (1,000 unit) capsule [CHOLECALCIFEROL, VITAMIN D3, (VITAMIN D3) 25 MCG (1,000 UNIT) CAPSULE]        doxazosin (CARDURA) 8 MG tablet Take 0.5 tablets (4 mg) by mouth At Bedtime 90 tablet 3     finasteride (PROSCAR) 5 mg tablet [FINASTERIDE (PROSCAR) 5 MG TABLET] TAKE 1 TABLET DAILY 90 tablet 3     fluticasone (FLONASE) 50 MCG/ACT nasal spray Spray 1 spray into both nostrils daily (Patient taking differently: Spray 1 spray into both nostrils daily as needed ) 9.9 mL 0     metoprolol tartrate (LOPRESSOR) 100 MG tablet Take 1 tablet (100 mg) by mouth 2 times daily 60 tablet 5     minocycline (DYNACIN) 100 MG tablet Take 1 tablet by mouth 2 times daily       nitroglycerin (NITROSTAT) 0.4 MG SL tablet [NITROGLYCERIN (NITROSTAT) 0.4 MG SL TABLET] PLACE ONE TABLET UNDER THE TONGUE EVERY 5 MINUTES AS NEEDED FOR CHEST PAIN 25 tablet 3     pravastatin (PRAVACHOL) 20 MG tablet [PRAVASTATIN (PRAVACHOL) 20 MG TABLET] Take 1 tablet (20 mg total) by mouth every evening. 90 tablet 3     predniSONE (DELTASONE) 5 MG tablet Take 5 mg by mouth 2 times daily     "    spironolactone (ALDACTONE) 25 MG tablet Take 1 tablet (25 mg) by mouth daily 30 tablet 5     doxazosin (CARDURA) 4 MG tablet Take 1 tablet (4 mg) by mouth At Bedtime (Patient not taking: Reported on 8/23/2021) 90 tablet 3     LORazepam (ATIVAN) 0.5 MG tablet [LORAZEPAM (ATIVAN) 0.5 MG TABLET] TAKE 1/2 TO 1 TABLET BY MOUTH THREE TIMES DAILY AS NEEDED FOR ANXIOUSNESS (Patient not taking: Reported on 8/23/2021) 30 tablet 5       Objective:   Wt Readings from Last 3 Encounters:   08/23/21 133.8 kg (295 lb)   08/16/21 132 kg (291 lb 1.6 oz)   07/29/21 132 kg (291 lb)     Vital signs:  /72 (BP Location: Left arm, Patient Position: Sitting, Cuff Size: Adult Large)   Pulse 97   Resp 16   Ht 1.854 m (6' 1\")   Wt 133.8 kg (295 lb)   BMI 38.92 kg/m        Physical Exam:    General Appearance : Awake, Alert, No acute distress  HEENT: No Scleral icterus; the mucous membranes were pink and moist.  Conjunctivae slightly injected  Neck:  No cervical bruits, jugular venous distention, or thyromegaly   Chest: The spine was straight. Chest wall symmetric  Lungs: Respirations unlabored; the lungs are clear to auscultation.  No wheezing   Cardiovascular: Nonpalpable point of maximal impulse.  Auscultation reveals irregularly irregular first and second heart sounds with no murmurs, rubs, or gallops.  Carotid, radial, and dorsalis pedal pulses are intact and symmetric.    Abdomen: Obese.  No organomegaly, masses, bruits, or tenderness. Bowels sounds are present  Extremities:  No clubbing, cyanosis.  3+ edema to the knees bilaterally, right slightly more than left  Skin: No rash, bruising  Musculoskeletal: No tenderness.  Neurologic: Alert and oriented ×3. Speech is fluent.    Lab Results:  LIPIDS:  Lab Results   Component Value Date    CHOL 136 03/09/2021    CHOL 140 12/03/2018    CHOL 154 07/03/2017     Lab Results   Component Value Date    HDL 53 03/09/2021    HDL 48 12/03/2018    HDL 46 07/03/2017     No components found " for: LDLCALC  Lab Results   Component Value Date    TRIG 91 03/09/2021    TRIG 110 12/03/2018    TRIG 110 07/03/2017       Echocardiogram 7/8/2021:    Left ventricle ejection fraction is mildly decreased. The estimated left ventricular ejection fraction is 50% without obvious wall motion abnormality.    Normal right ventricular size and systolic function.    Trace mitral and mild tricuspid insufficiency. Mild pulmonary hypertension noted.    When compared to the previous study dated 6/29/2015, left ventricular systolic function may be slightly reduced on current study.            MAYE KIM MD [facility]  [phone]    This note created using Dragon voice recognition software.  Sound alike errors may have escaped editing.            Thank you for allowing me to participate in the care of your patient.      Sincerely,     Maye Kim MD     [facility]  cc:   Smith Ellis MD  [facility]  [address] [place] [address]

## 2021-08-23 NOTE — PROGRESS NOTES
Cardiology Clinic Office Note    Assessment / Plan:    1.  Atrial fibrillation.  Currently on metoprolol only for heart rate control.  Will assess adequacy of rate control with 24-hour Holter monitor.  Discussed possibility of a trial of cardioversion if he does not note any significant improvement in activity tolerance with initiation of treatment of suspected sleep apnea.  2.  Heart failure with preserved ejection fraction likely related to poorly controlled atrial fibrillation rate  3.  Morbid obesity, suspected obstructive sleep apnea.  Evaluation to take place tonight.  Encouraged to follow through with recommendations.  This may affect his lower extremity edema and activity tolerance.  4.  Remote history of coronary artery disease.  Risk/benefit ratio no longer favors continued use of aspirin.  Advised to discontinue this agent.    Follow-up 3 months    ______________________________________________________________________    Subjective:    I had the opportunity to see Nishant Gilliam at the [facility].  He is accompanied by his wife today.  Nishant Gilliam is a 86 year old male with a history of coronary artery disease, status post coronary intervention more than 2 decades ago.  History of aortic sclerosis but no aortic stenosis.  He was admitted last month with rapid atrial fibrillation, developing after he was diagnosed with bullous pemphigoid, and sudden worsening of edema in his legs.  He was treated with rate control using metoprolol and verapamil, and anticoagulation with Eliquis.    Since his hospital discharge, his blood pressure has declined, leading to discontinuation of verapamil.  His doxazosin has also been reduced, without any return of prostate symptoms.  He takes a milligram of Bumex about twice a week, only when his weight climbs, reporting that this is never significantly impacted his longstanding lower extremity edema.  He is not aware of any  racing heartbeat but does note that he fatigues quickly walking around his house with physical therapy.  He does use a walker.  ______________________________________________________________________    Problem List:  Patient Active Problem List   Diagnosis     Anxiety     Hearing Loss     Left Ventricular Hypertrophy     Hiatal Hernia     Lumbar Disc Degeneration     Nicotine Dependence - In Remission     Squamous Cell Carcinoma Of The Skin     Basal Cell Carcinoma Of The Skin     Pure hypercholesterolemia     Essential hypertension     Coronary Artery Disease     Complete Right Bundle Branch Block     Chronic Obstructive Pulmonary Disease     Benign Polyps Of The Large Intestine     Frequency of urination     Morbid obesity with BMI of 40.0-44.9, adult (H)     Venous (peripheral) insufficiency     Bullous pemphigoid     Atrial fibrillation with rapid ventricular response (H)     Acute congestive heart failure, unspecified heart failure type (H)     Acute diastolic congestive heart failure (H)     Leukocytosis     NYHA class 3 heart failure with preserved ejection fraction (H)     Medical History:  Past Medical History:   Diagnosis Date     COPD (chronic obstructive pulmonary disease) (H)      Coronary artery disease      Hypertension      Right bundle branch block     complete     Surgical History:  Past Surgical History:   Procedure Laterality Date     HC KNEE SCOPE, DIAGNOSTIC      Description: Arthroscopy Knee Left;  Recorded: 06/27/2009;  Comments: details unk.     HERNIA REPAIR       ZZHC CORONARY STENT PERCUT, INITIAL VESSEL      Description: Cath Stent Placement;  Recorded: 02/24/2014;  Comments: stenting to the LAD in 1999. 2nd done years later.     Social History:  Social History     Socioeconomic History     Marital status:      Spouse name: Not on file     Number of children: Not on file     Years of education: Not on file     Highest education level: Not on file   Occupational History     Not on  file   Tobacco Use     Smoking status: Former Smoker     Years: 30.00     Types: Cigarettes     Smokeless tobacco: Never Used   Substance and Sexual Activity     Alcohol use: No     Drug use: No     Sexual activity: Not on file   Other Topics Concern     Not on file   Social History Narrative    Lives with his wife.     Social Determinants of Health     Financial Resource Strain:      Difficulty of Paying Living Expenses:    Food Insecurity:      Worried About Running Out of Food in the Last Year:      Ran Out of Food in the Last Year:    Transportation Needs:      Lack of Transportation (Medical):      Lack of Transportation (Non-Medical):    Physical Activity:      Days of Exercise per Week:      Minutes of Exercise per Session:    Stress:      Feeling of Stress :    Social Connections:      Frequency of Communication with Friends and Family:      Frequency of Social Gatherings with Friends and Family:      Attends [employer] Services:      Active Member of Clubs or Organizations:      Attends Club or Organization Meetings:      Marital Status:    Intimate Partner Violence:      Fear of Current or Ex-Partner:      Emotionally Abused:      Physically Abused:      Sexually Abused:      Sleep History:  Poorly restorative, sleeping for 2 to 3 hours then awakening, then returning to sleep after a couple of hours.  Exercise History:  With physical therapy, walks with a walker to a limited degree around his home only.    Review of Systems:        Positive for shortness of breath with walking, leg swelling, constipation, joint pains, pemphigoid rash,, daytime sleepiness, easy bruising, easy bleeding.        Family History:  No family history on file.      Allergies:  Allergies   Allergen Reactions     Furosemide Rash     pemphigoid     Cat Hair Std Allergenic Ext [Cat Hair Extract] Unknown     Sulfa Drugs Unknown     Medications:  Current Outpatient Medications   Medication Sig Dispense Refill     apixaban ANTICOAGULANT  (ELIQUIS) 5 MG tablet Take 1 tablet (5 mg) by mouth 2 times daily 60 tablet 5     aspirin (ASA) 81 MG chewable tablet Take 1 tablet (81 mg) by mouth daily 30 tablet 0     budesonide-formoteroL (SYMBICORT) 160-4.5 mcg/actuation inhaler [BUDESONIDE-FORMOTEROL (SYMBICORT) 160-4.5 MCG/ACTUATION INHALER] USE 2 INHALATIONS TWICE A DAY (Patient taking differently: Inhale 2 puffs into the lungs daily ) 1 Inhaler 3     bumetanide (BUMEX) 1 MG tablet Take 1 tablet (1 mg) by mouth daily (Patient taking differently: Take 1 mg by mouth daily as needed ) 30 tablet 11     cholecalciferol, vitamin D3, (VITAMIN D3) 25 mcg (1,000 unit) capsule [CHOLECALCIFEROL, VITAMIN D3, (VITAMIN D3) 25 MCG (1,000 UNIT) CAPSULE]        doxazosin (CARDURA) 8 MG tablet Take 0.5 tablets (4 mg) by mouth At Bedtime 90 tablet 3     finasteride (PROSCAR) 5 mg tablet [FINASTERIDE (PROSCAR) 5 MG TABLET] TAKE 1 TABLET DAILY 90 tablet 3     fluticasone (FLONASE) 50 MCG/ACT nasal spray Spray 1 spray into both nostrils daily (Patient taking differently: Spray 1 spray into both nostrils daily as needed ) 9.9 mL 0     metoprolol tartrate (LOPRESSOR) 100 MG tablet Take 1 tablet (100 mg) by mouth 2 times daily 60 tablet 5     minocycline (DYNACIN) 100 MG tablet Take 1 tablet by mouth 2 times daily       nitroglycerin (NITROSTAT) 0.4 MG SL tablet [NITROGLYCERIN (NITROSTAT) 0.4 MG SL TABLET] PLACE ONE TABLET UNDER THE TONGUE EVERY 5 MINUTES AS NEEDED FOR CHEST PAIN 25 tablet 3     pravastatin (PRAVACHOL) 20 MG tablet [PRAVASTATIN (PRAVACHOL) 20 MG TABLET] Take 1 tablet (20 mg total) by mouth every evening. 90 tablet 3     predniSONE (DELTASONE) 5 MG tablet Take 5 mg by mouth 2 times daily        spironolactone (ALDACTONE) 25 MG tablet Take 1 tablet (25 mg) by mouth daily 30 tablet 5     doxazosin (CARDURA) 4 MG tablet Take 1 tablet (4 mg) by mouth At Bedtime (Patient not taking: Reported on 8/23/2021) 90 tablet 3     LORazepam (ATIVAN) 0.5 MG tablet [LORAZEPAM  "(ATIVAN) 0.5 MG TABLET] TAKE 1/2 TO 1 TABLET BY MOUTH THREE TIMES DAILY AS NEEDED FOR ANXIOUSNESS (Patient not taking: Reported on 8/23/2021) 30 tablet 5       Objective:   Wt Readings from Last 3 Encounters:   08/23/21 133.8 kg (295 lb)   08/16/21 132 kg (291 lb 1.6 oz)   07/29/21 132 kg (291 lb)     Vital signs:  /72 (BP Location: Left arm, Patient Position: Sitting, Cuff Size: Adult Large)   Pulse 97   Resp 16   Ht 1.854 m (6' 1\")   Wt 133.8 kg (295 lb)   BMI 38.92 kg/m        Physical Exam:    General Appearance : Awake, Alert, No acute distress  HEENT: No Scleral icterus; the mucous membranes were pink and moist.  Conjunctivae slightly injected  Neck:  No cervical bruits, jugular venous distention, or thyromegaly   Chest: The spine was straight. Chest wall symmetric  Lungs: Respirations unlabored; the lungs are clear to auscultation.  No wheezing   Cardiovascular: Nonpalpable point of maximal impulse.  Auscultation reveals irregularly irregular first and second heart sounds with no murmurs, rubs, or gallops.  Carotid, radial, and dorsalis pedal pulses are intact and symmetric.    Abdomen: Obese.  No organomegaly, masses, bruits, or tenderness. Bowels sounds are present  Extremities:  No clubbing, cyanosis.  3+ edema to the knees bilaterally, right slightly more than left  Skin: No rash, bruising  Musculoskeletal: No tenderness.  Neurologic: Alert and oriented ×3. Speech is fluent.    Lab Results:  LIPIDS:  Lab Results   Component Value Date    CHOL 136 03/09/2021    CHOL 140 12/03/2018    CHOL 154 07/03/2017     Lab Results   Component Value Date    HDL 53 03/09/2021    HDL 48 12/03/2018    HDL 46 07/03/2017     No components found for: LDLCALC  Lab Results   Component Value Date    TRIG 91 03/09/2021    TRIG 110 12/03/2018    TRIG 110 07/03/2017       Echocardiogram 7/8/2021:    Left ventricle ejection fraction is mildly decreased. The estimated left ventricular ejection fraction is 50% without " obvious wall motion abnormality.    Normal right ventricular size and systolic function.    Trace mitral and mild tricuspid insufficiency. Mild pulmonary hypertension noted.    When compared to the previous study dated 6/29/2015, left ventricular systolic function may be slightly reduced on current study.            MAYE KIM MD [facility]  [phone]    This note created using Dragon voice recognition software.  Sound alike errors may have escaped editing.

## 2021-08-24 ENCOUNTER — VIRTUAL VISIT (OUTPATIENT)
Dept: SLEEP MEDICINE | Facility: CLINIC | Age: 86
End: 2021-08-24
Attending: INTERNAL MEDICINE
Payer: COMMERCIAL

## 2021-08-24 VITALS — BODY MASS INDEX: 39.1 KG/M2 | HEIGHT: 73 IN | WEIGHT: 295 LBS

## 2021-08-24 DIAGNOSIS — I27.20 PULMONARY HYPERTENSION (H): ICD-10-CM

## 2021-08-24 DIAGNOSIS — E66.9 OBESITY (BMI 30-39.9): ICD-10-CM

## 2021-08-24 DIAGNOSIS — I48.91 ATRIAL FIBRILLATION WITH RVR (H): ICD-10-CM

## 2021-08-24 DIAGNOSIS — R06.81 WITNESSED APNEIC SPELLS: ICD-10-CM

## 2021-08-24 DIAGNOSIS — R40.0 DAYTIME SOMNOLENCE: ICD-10-CM

## 2021-08-24 DIAGNOSIS — G47.9 SLEEP DISTURBANCE: Primary | ICD-10-CM

## 2021-08-24 PROCEDURE — 99205 OFFICE O/P NEW HI 60 MIN: CPT | Mod: 95 | Performed by: NURSE PRACTITIONER

## 2021-08-24 ASSESSMENT — MIFFLIN-ST. JEOR: SCORE: 2081.99

## 2021-08-24 NOTE — PROGRESS NOTES
Nishant Gilliam is a 86 year old who is being evaluated via a billable video visit.      How would you like to obtain your AVS? MyChart  If the video visit is dropped, the invitation should be resent by: Text to cell phone: [phone]  Will anyone else be joining your video visit? No    Does patient have any form of state insurance? NO    Do you have wifi? Yes  Do you have a smart phone? Yes  Can you download an hemanth on your phone comfortably with out assistance? Yes  Can you watch a YouPenxyube video? Yes        Rachana Coleman CMA      Video-Visit Details    Type of service:  Video Visit  Video Start Time: 10:36 AM  Video End Time:  11:25 AM    Originating Location (pt. Location): Home    Distant Location (provider location):  [facility]     Platform used for Video Visit: GPX Software      Sleep Consultation:    Date on this visit: 8/24/2021    Nishant Gilliam is sent by Estuardo Dugan for a sleep consultation regarding atrial fibrillation, recent hospitalization, possible sleep apnea observed.    Primary Physician: Aditya Mehta     Nishant Gilliam is an 86-year-old male with a PMH pertinent for COPD, HTN, RBBB, coronary artery disease, new onset atrial fibrillation and acute systolic CHF who presents today, accompanied by his wife, with reports of frequent fragmented sleep and difficulty falling back to sleep for several years.  His wife reports that she has witnessed him choking during his sleep at times.  He was recently hospitalized on 07/07/2021 to 07/12/2021 for new onset atrial fibrillation with RVR and acute congestive heart failure. It was noted that he was observed stopping breathing during his sleep in the hospital. His TTE showed a newly reduced EF of 50%, was started on metoprolol, verapamil, and Eliquis.  During his stay there was concern for possible SHANKAR with overnight oximetry performed on 7/10, no alarms overnight with strip recorder -Per discharge summary (unable  to locate overnight oximetry results). He reports that he has been sleeping downstairs over the last month and sleeps on a futon and also in a recliner (feels sleeps better in the chair).    Nishant goes to sleep at 10:00 PM - 12:00 AM during the week. He wakes up at 6:00 AM without an alarm. He falls asleep in 5 minutes.  Nishant denies difficulty falling asleep.  He wakes up 2-3 times a night and is often unable to fall back to sleep but will get up and do something for a few hours and may return to sleep.  Nishant wakes up to uncertain reasons.  On weekends, Nishant goes to sleep at about the same time. Patient gets an average of 5-6 hours of sleep per night.     Patient does not use electronics in bed, watch TV in bed, worry in bed about anything and read in bed.     Nishant is , retired.     Nishant does not snore. Patient does have a regular bed partner. There is report of choking at times.  He usually does not have witnessed apneas. They never sleep separately.  Patient sleeps on his side. He has occasional morning dry mouth, denies occasional sleep disturbance, snort arousals, morning headaches, morning confusion and restless legs. Nishant denies any bruxism, sleep walking, sleep talking, dream enactment, sleep paralysis, cataplexy and hypnogogic/hypnopompic hallucinations.    He denies sleep walking, sleep talking, enuresis and sleep terrors as a child.  Nishant has difficulty breathing through his nose, denies claustrophobia, reflux at night, heartburn and depression.      Nishant has gained 10-20 pounds in the last year.  Patient describes themself as a night person.  Patient's [place] Sleepiness score 13/24 consistent with excessive daytime sleepiness.      Nishant naps 2-4 times per day for  minutes, feels refreshed after naps. He takes frequent inadvertant naps.  He is not driving at this time.  Patient was counseled on the importance of driving while alert, to pull over if drowsy, or nap before  getting into the vehicle if sleepy.  He uses 1 cups/day of coffee (1/2 caf/decaf). Last caffeine intake is usually before late afternoon .      Other tests:  7/08/2021 Echocardiogram Complete  Narrative & Impression    Left ventricle ejection fraction is mildly decreased. The estimated left ventricular ejection fraction is 50% without obvious wall motion abnormality.    Normal right ventricular size and systolic function.    Trace mitral and mild tricuspid insufficiency. Mild pulmonary hypertension noted.    When compared to the previous study dated 6/29/2015, left ventricular systolic function may be slightly reduced on current study.      Allergies:    Allergies   Allergen Reactions     Furosemide Rash     pemphigoid     Cat Hair Std Allergenic Ext [Cat Hair Extract] Unknown     Sulfa Drugs Unknown       Medications:    Current Outpatient Medications   Medication Sig Dispense Refill     apixaban ANTICOAGULANT (ELIQUIS) 5 MG tablet Take 1 tablet (5 mg) by mouth 2 times daily 60 tablet 5     budesonide-formoteroL (SYMBICORT) 160-4.5 mcg/actuation inhaler [BUDESONIDE-FORMOTEROL (SYMBICORT) 160-4.5 MCG/ACTUATION INHALER] USE 2 INHALATIONS TWICE A DAY (Patient taking differently: Inhale 2 puffs into the lungs daily ) 1 Inhaler 3     bumetanide (BUMEX) 1 MG tablet Take 1 tablet (1 mg) by mouth daily (Patient taking differently: Take 1 mg by mouth daily as needed ) 30 tablet 11     cholecalciferol, vitamin D3, (VITAMIN D3) 25 mcg (1,000 unit) capsule [CHOLECALCIFEROL, VITAMIN D3, (VITAMIN D3) 25 MCG (1,000 UNIT) CAPSULE]        doxazosin (CARDURA) 8 MG tablet Take 0.5 tablets (4 mg) by mouth At Bedtime 90 tablet 3     finasteride (PROSCAR) 5 mg tablet [FINASTERIDE (PROSCAR) 5 MG TABLET] TAKE 1 TABLET DAILY 90 tablet 3     fluticasone (FLONASE) 50 MCG/ACT nasal spray Spray 1 spray into both nostrils daily (Patient taking differently: Spray 1 spray into both nostrils daily as needed ) 9.9 mL 0     LORazepam (ATIVAN) 0.5 MG  tablet [LORAZEPAM (ATIVAN) 0.5 MG TABLET] TAKE 1/2 TO 1 TABLET BY MOUTH THREE TIMES DAILY AS NEEDED FOR ANXIOUSNESS 30 tablet 5     metoprolol tartrate (LOPRESSOR) 100 MG tablet Take 1 tablet (100 mg) by mouth 2 times daily 60 tablet 5     minocycline (DYNACIN) 100 MG tablet Take 1 tablet by mouth 2 times daily       nitroglycerin (NITROSTAT) 0.4 MG SL tablet [NITROGLYCERIN (NITROSTAT) 0.4 MG SL TABLET] PLACE ONE TABLET UNDER THE TONGUE EVERY 5 MINUTES AS NEEDED FOR CHEST PAIN 25 tablet 3     pravastatin (PRAVACHOL) 20 MG tablet [PRAVASTATIN (PRAVACHOL) 20 MG TABLET] Take 1 tablet (20 mg total) by mouth every evening. 90 tablet 3     predniSONE (DELTASONE) 5 MG tablet Take 5 mg by mouth 2 times daily        spironolactone (ALDACTONE) 25 MG tablet Take 1 tablet (25 mg) by mouth daily 30 tablet 5       Problem List:  Patient Active Problem List    Diagnosis Date Noted     Persistent atrial fibrillation (H) 08/23/2021     Priority: Medium     Atrial fibrillation with rapid ventricular response (H) 07/10/2021     Priority: Medium     Acute congestive heart failure, unspecified heart failure type (H) 07/10/2021     Priority: Medium     NYHA class 3 heart failure with preserved ejection fraction (H)      Priority: Medium     Acute diastolic congestive heart failure (H) 07/08/2021     Priority: Medium     Leukocytosis 07/08/2021     Priority: Medium     Bullous pemphigoid 06/03/2021     Priority: Medium     Venous (peripheral) insufficiency 05/06/2021     Priority: Medium     Morbid obesity with BMI of 40.0-44.9, adult (H) 12/05/2018     Priority: Medium     Pure hypercholesterolemia      Priority: Medium     Created by Conversion         Essential hypertension      Priority: Medium     Created by Conversion  Replacement Utility updated for latest IMO load         Frequency of urination 07/21/2017     Priority: Medium     Anxiety      Priority: Medium     Created by Conversion  [facility] Annotation: Jun 27 2009 12:13PM -  Asif Garcia: panic   attacks  Replacement Utility updated for latest IMO load         Hearing Loss      Priority: Medium     Created by Conversion  [facility] Annotation: Jun 27 2009 12:16PM - Asif Garcia:   sensory-neural   hearing loss  Replacement Utility updated for latest IMO load         Hiatal Hernia      Priority: Medium     Created by Conversion  Replacement Utility updated for latest IMO load         Squamous Cell Carcinoma Of The Skin      Priority: Medium     Created by [facility] Annotation: Jul 7 2013  9:12PM Asif Cordero: R temple   area  Replacement Utility updated for latest IMO load         Basal Cell Carcinoma Of The Skin      Priority: Medium     Created by Conversion  Replacement Utility updated for latest IMO load         Coronary Artery Disease      Priority: Medium     Created by Conversion  [facility] Annotation: Jan 28 2008 12:29PM - Aditya Mehta: disease in   LAD,   RCA, \T\ Circ.stent to LAD  Replacement Utility updated for latest IMO load         Chronic Obstructive Pulmonary Disease      Priority: Medium     Created by [facility] Annotation: Jan 28 2008 12:29PM - Aditya Mehta: little   response   to bronchodilators         Left Ventricular Hypertrophy      Priority: Medium     Created by [facility] Annotation: Jun 27 2009 12:06PM - Asif Garcia: ECHO   6/2004no   wall motion abnormality         Lumbar Disc Degeneration      Priority: Medium     Created by [facility] Annotation: Jun 27 2009 12:01PM - Asif Garcia: MRI         Nicotine Dependence - In Remission      Priority: Medium     Created by [facility] Annotation: Jun 27 2009 12:32PM Asif Cordero: 3 ppd until   age   42 y.         Complete Right Bundle Branch Block      Priority: Medium     Created by Conversion         Benign Polyps Of The Large Intestine      Priority: Medium     Created by [facility] Annotation: Jun 27 2009  "12:20PM - Haylee Velez: sessile   polyp-   path. showed \" normal colon mucosa\"            Past Medical/Surgical History:  Past Medical History:   Diagnosis Date     COPD (chronic obstructive pulmonary disease) (H)      Coronary artery disease      Hypertension      Right bundle branch block     complete     Past Surgical History:   Procedure Laterality Date     HC KNEE SCOPE, DIAGNOSTIC      Description: Arthroscopy Knee Left;  Recorded: 06/27/2009;  Comments: details unk.     HERNIA REPAIR       ZZHC CORONARY STENT PERCUT, INITIAL VESSEL      Description: Cath Stent Placement;  Recorded: 02/24/2014;  Comments: stenting to the LAD in 1999. 2nd done years later.       Social History:  Social History     Socioeconomic History     Marital status:      Spouse name: Not on file     Number of children: Not on file     Years of education: Not on file     Highest education level: Not on file   Occupational History     Not on file   Tobacco Use     Smoking status: Former Smoker     Years: 30.00     Types: Cigarettes     Smokeless tobacco: Never Used   Substance and Sexual Activity     Alcohol use: No     Drug use: No     Sexual activity: Not on file   Other Topics Concern     Not on file   Social History Narrative    Lives with his wife.     Social Determinants of Health     Financial Resource Strain:      Difficulty of Paying Living Expenses:    Food Insecurity:      Worried About Running Out of Food in the Last Year:      Ran Out of Food in the Last Year:    Transportation Needs:      Lack of Transportation (Medical):      Lack of Transportation (Non-Medical):    Physical Activity:      Days of Exercise per Week:      Minutes of Exercise per Session:    Stress:      Feeling of Stress :    Social Connections:      Frequency of Communication with Friends and Family:      Frequency of Social Gatherings with Friends and Family:      Attends [employer] Services:      Active Member of Clubs or Organizations:      Attends " Club or Organization Meetings:      Marital Status:    Intimate Partner Violence:      Fear of Current or Ex-Partner:      Emotionally Abused:      Physically Abused:      Sexually Abused:        Family History:  No family history on file.    Review of Systems:  Answers for HPI/ROS submitted by the patient on 8/23/2021  General Symptoms: No  Skin Symptoms: Yes  HENT Symptoms: Yes  EYE SYMPTOMS: Yes  HEART SYMPTOMS: Yes  LUNG SYMPTOMS: Yes  INTESTINAL SYMPTOMS: Yes  URINARY SYMPTOMS: No  REPRODUCTIVE SYMPTOMS: No  SKELETAL SYMPTOMS: Yes  BLOOD SYMPTOMS: Yes  NERVOUS SYSTEM SYMPTOMS: No  MENTAL HEALTH SYMPTOMS: No  Ear pain: No  Ear discharge: No  Hearing loss: No  Tinnitus: No  Nosebleeds: No  Congestion: Yes  Sinus pain: No  Trouble swallowing: No   Voice hoarseness: No  Mouth sores: No  Sore throat: No  Tooth pain: No  Gum tenderness: Yes  Bleeding gums: No  Change in taste: No  Change in sense of smell: No  Dry mouth: Yes  Hearing aid used: No  Neck lump: No  Changes in hair: No  Changes in moles/birth marks: No  Itching: Yes  Rashes: Yes  Changes in nails: No  Acne: No  Change in facial hair: No  Warts: No  Non-healing sores: No  Scarring: No  Flaking of skin: Yes  Color changes of hands/feet in cold : No  Sun sensitivity: Yes  Skin thickening: No  Eye pain: Yes  Vision loss: No  Dry eyes: Yes  Watery eyes: No  Eye bulging: No  Double vision: No  Flashing of lights: No  Spots: No  Floaters: No  Redness: Yes  Crossed eyes: No  Tunnel Vision: No  Yellowing of eyes: No  Eye irritation: Yes  Chest pain or pressure: No  Fast or irregular heartbeat: Yes  Pain in legs with walking: Yes  Trouble breathing while lying down: No  Fingers or toes appear blue: No  High blood pressure: No  Low blood pressure: Yes  Fainting: No  Murmurs: No  Pacemaker: No  Varicose veins: No  Wake up at night with shortness of breath: No  Light-headedness: Yes  Exercise intolerance: Yes  Cough: No  Sputum or phlegm: Yes  Coughing up blood:  "No  Difficulty breating or shortness of breath: Yes  Snoring: No  Wheezing: No  Difficulty breathing on exertion: Yes  Nighttime Cough: No  Difficulty breathing when lying flat: No  Heart burn or indigestion: No  Nausea: No  Vomiting: No  Abdominal pain: No  Bloating: No  Constipation: Yes  Diarrhea: No  Blood in stool: No  Black stools: No  Rectal or Anal pain: No  Fecal incontinence: No  Yellowing of skin or eyes: No  Vomit with blood: No  Change in stools: No  Back pain: Yes  Muscle aches: Yes  Neck pain: No  Swollen joints: Yes  Joint pain: Yes  Bone pain: No  Muscle cramps: No  Muscle weakness: No  Joint stiffness: Yes  Bone fracture: No  Edema or swelling: Yes  Anemia: No  Swollen glands: No  Easy bleeding or bruising: Yes    Physical Examination:  Vitals: Ht 1.854 m (6' 1\")   Wt 133.8 kg (295 lb)   BMI 38.92 kg/m    BMI= Body mass index is 38.92 kg/m .         Downing Total Score 8/24/2021   Total score - Downing 13       RADHA Total Score: 21 (08/24/21 0850)    GENERAL APPEARANCE: healthy, alert, no distress and cooperative  EYES: Eyes grossly normal to inspection  RESP: Unlabored, easy breathing with normal conversational speech  CV: color normal  NEURO: Alert and oriented x3, mentation intact and speech normal  PSYCH: mentation appears normal and affect normal/bright  Mallampati Class: Unable to examine  Tonsillar Stage: Unable to examine    Impression/Plan:  1. Sleep disturbance  2. Atrial fibrillation with RVR (H)  - SLEEP EVALUATION & MANAGEMENT REFERRAL - ADULT -  - Comprehensive Sleep Study; Future  3. Witnessed apneic spells  4. Daytime somnolence  5. Obesity (BMI 30-39.9)  6. Pulmonary hypertension (H)    This is a pleasant 86-year-old male with a PMH pertinent for COPD, HTN, RBBB, coronary artery disease, new onset atrial fibrillation and acute systolic CHF who presents today, accompanied by his wife, with reports of frequent fragmented sleep and difficulty falling back to sleep for several years.  " His wife reports that she has witnessed him choking during his sleep at times.  He was recently hospitalized on 07/07/2021 to 07/12/2021 for new onset atrial fibrillation with RVR and acute congestive heart failure. It was noted that he was observed stopping breathing during his sleep in the hospital. His TTE showed a newly reduced EF of 50%, was started on metoprolol, verapamil, and Eliquis.  During his stay there was concern for possible SHANKAR with overnight oximetry performed on 7/10, no alarms overnight with strip recorder.  He and his wife denies symptoms of snoring, however, his wife has noted an occasional episode of choking during his sleep in the afternoon.  His [place] score is 13 which is consistent with excessive daytime somnolence.  His sleep is fragmented and will often awaken after 3 to 4 hours get out of bed and be active working on something for a few hours and then may return to sleep.  We discussed this as an element of poor sleep hygiene and may be conditioning himself to have ongoing fragmented sleep.  He is unaware of what is waking him at night.  It is noted that the patient does have mild pulmonary hypertension on echocardiography from 7/8/2021.  His STOP-BANG score is 6 which is indicative of high risk of SHANKAR.  With regard to his awakening during the night, we did discuss the use of a trial of melatonin OTC 3 mg-10 mg varying his dose.  He did express concern of being able to sleep long enough during his PSG.  He will contact me in the event he would prefer to use a mild sleep aid the night of the study.    We discussed the pathophysiology of obstructive sleep apnea, central sleep apnea, and hypoventilation and the risks associated with untreated SHANKAR.  We also discussed the pros and cons of in lab polysomnography versus home sleep testing.  The patient has elected to undergo in lab polysomnography (PSG) to further evaluate his symptoms of possible obstructive sleep apnea/central sleep  apnea/hypoventilation.    Literature provided regarding sleep apnea and sleep hygiene.      He will follow up with me in approximately two weeks after his sleep study has been competed to review the results and discuss plan of care.       Polysomnography/HST reviewed.  Obstructive sleep apnea reviewed.  Complications of untreated sleep apnea were reviewed.    60 minutes were spent on the date of the encounter doing chart review, history and exam, documentation and further activities as noted above.     KATERIN Dixon CNP  Sleep Medicine    CC: Estuardo Dugan      This note was written with the assistance of the Dragon voice-dictation technology software. The final document, although reviewed, may contain errors. For corrections, please contact the office.

## 2021-08-24 NOTE — PATIENT INSTRUCTIONS
Your BMI is Body mass index is 38.92 kg/m .  Weight management is a personal decision.  If you are interested in exploring weight loss strategies, the following discussion covers the approaches that may be successful. Body mass index (BMI) is one way to tell whether you are at a healthy weight, overweight, or obese. It measures your weight in relation to your height.  A BMI of 18.5 to 24.9 is in the healthy range. A person with a BMI of 25 to 29.9 is considered overweight, and someone with a BMI of 30 or greater is considered obese. More than two-thirds of American adults are considered overweight or obese.  Being overweight or obese increases the risk for further weight gain. Excess weight may lead to heart disease and diabetes.  Creating and following plans for healthy eating and physical activity may help you improve your health.  Weight control is part of healthy lifestyle and includes exercise, emotional health, and healthy eating habits. Careful eating habits lifelong are the mainstay of weight control. Though there are significant health benefits from weight loss, long-term weight loss with diet alone may be very difficult to achieve- studies show long-term success with dietary management in less than 10% of people. Attaining a healthy weight may be especially difficult to achieve in those with severe obesity. In some cases, medications, devices and surgical management might be considered.  What can you do?  If you are overweight or obese and are interested in methods for weight loss, you should discuss this with your provider.     Consider reducing daily calorie intake by 500 calories.     Keep a food journal.     Avoiding skipping meals, consider cutting portions instead.    Diet combined with exercise helps maintain muscle while optimizing fat loss. Strength training is particularly important for building and maintaining muscle mass. Exercise helps reduce stress, increase energy, and improves fitness.  Increasing exercise without diet control, however, may not burn enough calories to loose weight.       Start walking three days a week 10-20 minutes at a time    Work towards walking thirty minutes five days a week     Eventually, increase the speed of your walking for 1-2 minutes at time    In addition, we recommend that you review healthy lifestyles and methods for weight loss available through the National Institutes of Health patient information sites:  http://win.niddk.nih.gov/publications/index.htm    And look into health and wellness programs that may be available through your health insurance provider, employer, local community center, or tatum club.    Weight management plan: Patient was referred to their PCP to discuss a diet and exercise plan.    Patient Education     What Are Snoring and Obstructive Sleep Apnea?  If you ve ever had a stuffed-up nose, you know the feeling of trying to breathe through a very narrow passageway. This is what happens in your throat when you snore. While you sleep, structures in your throat partly block your air passage. This makes the passage narrow and hard to breathe through. In some cases, the entire passage may become blocked so you can t breathe at all. This is called obstructive sleep apnea.      Snoring       Obstructive sleep apnea      Snoring  If your throat structures are too large or the muscles relax too much during sleep, the air passage may be partly blocked for a brief moment. But the air eventually passes through. As air from the nose or mouth passes around this blockage, the throat structures vibrate. This causes the familiar sound of snoring. This snoring sound can be loud and may wake up others, or even themselves, during the night. Snoring gets worse as more and more of the air passage is blocked.   Obstructive sleep apnea  If the structures partly or completely block the throat, air can t flow to the lungs at all. This is called hypopnea (decreased  "breathing) or apnea (meaning  no breathing ). The lungs aren t getting fresh air. So the brain tells the body to wake up just enough to tighten the muscles and unblock the air passage. With a loud gasp, breathing starts again. This process may be repeated over and over again during the night. This can make your sleep fragmented with lighter stages of sleep. You may not remember waking up many times during the night however due to lighter sleep you will most likely feel tired the next day. The lack of sleep and fresh air can also strain your lungs, heart, and other organs. This may lead to problems such as high blood pressure, diabetes, behavioral disorders, heart attack, or stroke.   Problems in the nose and jaw  Problems in the structure of the nose may block breathing. A crooked (deviated) septum or swollen turbinates can make snoring worse or lead to apnea. Also, a receding jaw may make the tongue sit too far back. Then it s more likely to block the airway when you re asleep.   [employer] last reviewed this educational content on 9/1/2019 2000-2021 [employer]. All rights reserved. This information is not intended as a substitute for professional medical care. Always follow your healthcare professional's instructions.    Patient Education     Tips for Sleep Hygiene  \"Sleep hygiene\" means having good sleep habits.Follow these tips to sleep better at night:     Get on a schedule. Go to bed and get up at about the same time every day.    Listen to your body. Only try to sleep when you actually feel tired or sleepy.    Be patient. If you haven't been able to get to sleep after about 30 minutes or more, get up and do something calming or boring until you feel sleepy. Then return to bed and try again.    Don't have caffeine (coffee, tea, cola drinks, chocolate and some medicines), alcohol or nicotine (cigarettes). These can make it harder for you to fall asleep and stay asleep.    Use your bed for " "sleeping only. That means no TV, computer or homework in bed, especially during the evening and before bedtime.    Don't nap during the day. If you must nap, make sure it is for less than 20 minutes.    Create sleep rituals that remind your body it is time to sleep. Examples include breathing exercises, stretching or reading a book.    Avoid all electronic media (smart phone, computer, tablet) within 2 hours of bed time. The \"blue light\" in these devices activates the part of the brain that keeps you awake.    Dim the lights at night.    Get early morning sources of light (walk in the sunshine) to help set sleep patterns at night.    Try a bath or shower before bed. Having a warm bath 1 to 2 hours before bedtime can help you feel sleepy. Hot baths can make you alert, so be mindful of the temperature.    Don't watch the clock. Checking the clock during the night can wake you up. It can also lead to negative thoughts such as, \"I will never fall asleep,\" which can increase anxiety and sleeplessness.    Use a sleep diary. Track your sleep schedule to know your sleep patterns and to see where you can improve.    Get regular exercise every day. Try not to do heavy exercise in the 4 hours before bedtime.    Eat a healthy, balanced diet.    Try eating a light, healthy snack before bed, but avoid eating a heavy meal.    Create the right sleeping area. A cool, dark, quiet room is best. If needed, try earplugs, fans and blackout curtains.    Keep your daytime routine the same even if you have a bad night sleep. Avoiding activities the next day can make it harder to sleep.  For informational purposes only. Not to replace the advice of your health care provider.   Copyright   2013 [employer]. All rights reserved. [employer] 610693 - 01/16.                "

## 2021-08-31 ENCOUNTER — HOSPITAL ENCOUNTER (OUTPATIENT)
Dept: CARDIOLOGY | Facility: HOSPITAL | Age: 86
Discharge: HOME | End: 2021-08-31
Attending: INTERNAL MEDICINE | Admitting: INTERNAL MEDICINE
Payer: COMMERCIAL

## 2021-08-31 DIAGNOSIS — E66.01 MORBID OBESITY WITH BMI OF 40.0-44.9, ADULT (H): ICD-10-CM

## 2021-08-31 DIAGNOSIS — I48.19 PERSISTENT ATRIAL FIBRILLATION (H): ICD-10-CM

## 2021-08-31 DIAGNOSIS — I48.91 ATRIAL FIBRILLATION WITH RVR (H): ICD-10-CM

## 2021-08-31 DIAGNOSIS — I50.30 NYHA CLASS 3 HEART FAILURE WITH PRESERVED EJECTION FRACTION (H): ICD-10-CM

## 2021-08-31 DIAGNOSIS — I48.91 ATRIAL FIBRILLATION WITH RAPID VENTRICULAR RESPONSE (H): ICD-10-CM

## 2021-08-31 PROCEDURE — 93227 XTRNL ECG REC<48 HR R&I: CPT | Performed by: INTERNAL MEDICINE

## 2021-08-31 PROCEDURE — 93225 XTRNL ECG REC<48 HRS REC: CPT

## 2021-09-02 ENCOUNTER — TRANSFERRED RECORDS (OUTPATIENT)
Dept: HEALTH INFORMATION MANAGEMENT | Facility: CLINIC | Age: 86
End: 2021-09-02

## 2021-09-10 ENCOUNTER — TELEPHONE (OUTPATIENT)
Dept: SLEEP MEDICINE | Facility: CLINIC | Age: 86
End: 2021-09-10

## 2021-09-10 NOTE — TELEPHONE ENCOUNTER
Reason for call:  Other   Patient called regarding (reason for call): call back  Additional comments: Patient is calling to schedule sleep study would like a call back to get that schedule     Phone number to reach patient:  Cell number on file:    Telephone Information:   Mobile [phone]       Best Time:  Anytime    Can we leave a detailed message on this number?  YES    Travel screening: Not Applicable

## 2021-09-24 DIAGNOSIS — I48.19 PERSISTENT ATRIAL FIBRILLATION (H): Primary | ICD-10-CM

## 2021-10-04 ENCOUNTER — TRANSFERRED RECORDS (OUTPATIENT)
Dept: HEALTH INFORMATION MANAGEMENT | Facility: CLINIC | Age: 86
End: 2021-10-04

## 2021-10-07 ENCOUNTER — OFFICE VISIT (OUTPATIENT)
Dept: VASCULAR SURGERY | Facility: CLINIC | Age: 86
End: 2021-10-07
Attending: STUDENT IN AN ORGANIZED HEALTH CARE EDUCATION/TRAINING PROGRAM
Payer: COMMERCIAL

## 2021-10-07 VITALS
SYSTOLIC BLOOD PRESSURE: 98 MMHG | TEMPERATURE: 98.1 F | DIASTOLIC BLOOD PRESSURE: 70 MMHG | HEART RATE: 80 BPM | RESPIRATION RATE: 16 BRPM

## 2021-10-07 DIAGNOSIS — I50.31 ACUTE DIASTOLIC CONGESTIVE HEART FAILURE (H): ICD-10-CM

## 2021-10-07 DIAGNOSIS — L12.0 BULLOUS PEMPHIGOID (H): ICD-10-CM

## 2021-10-07 DIAGNOSIS — E66.01 MORBID OBESITY WITH BMI OF 40.0-44.9, ADULT (H): Primary | ICD-10-CM

## 2021-10-07 DIAGNOSIS — I87.2 VENOUS (PERIPHERAL) INSUFFICIENCY: ICD-10-CM

## 2021-10-07 PROCEDURE — 99205 OFFICE O/P NEW HI 60 MIN: CPT | Performed by: STUDENT IN AN ORGANIZED HEALTH CARE EDUCATION/TRAINING PROGRAM

## 2021-10-07 PROCEDURE — G0463 HOSPITAL OUTPT CLINIC VISIT: HCPCS

## 2021-10-07 RX ORDER — MYCOPHENOLATE MOFETIL 500 MG/1
1000 TABLET ORAL 2 TIMES DAILY
Status: ON HOLD | COMMUNITY
Start: 2021-09-02 | End: 2023-03-02

## 2021-10-07 ASSESSMENT — PAIN SCALES - GENERAL: PAINLEVEL: NO PAIN (0)

## 2021-10-07 NOTE — PATIENT INSTRUCTIONS
Lymphedema Therapy  A referral was sent to [facility]. You will receive a phone call in 1-2 business days to schedule you appointment. If for some reason you do not hear from them or wish to schedule sooner please call [phone] to schedule.  [facility] - [address], [address]  [place] [address]    Other locations available with [facility]      SWELLING/LYMPHEDEMA THERAPY TREATMENT     - What to expect your first visit     Based on your initial evaluation, you will have an individualized program designed by a certified lymphedema therapist, with specialized training in swelling.     It will consist of discussing your prior activity level, goals and limitations.     The therapist will assess your edema, evaluate for swelling, ,measure your motion and strength.      - Treatment usually includes     Swelling education and prevention strategies.     Swelling/lymphedema massage- A special decompressive massage to help improve the lymphatic drainage.     Swelling/lymphatic stimulation exercises     Bandaging or compression garments- To provide increased tissue pressure in the swollen extremity.     Home exercise program instruction     Stretching exercises     Education in how to independently manage edema.        - Follow up care     ONCE FORMAL THERAPY HAS BEEN COMPLETED, THE PATIENT WILL BE EXPECTED TO WEAR THE APPROPRIATE COMPRESSION BANDAGES, BE CONSISTENT WITH THE SKIN CARE PROGRAM  AND PERFORM THE PRESCRIBED SELF -MASSAGE AND /OR EXERCISES AS PRESCRIBED.            For Velcro compression:  [facility] (Please call to make an appointment)  [facility]  [address], [address]  [place], [place].  Phone: [phone]    [facility]  (Off the shelf items)  [address], [address]  [place], [place]   Phone: [phone]    Other [facility] Locations:  (Please call ahead- depending on location may be limited in what products are available)    [facility]  [address]  [place] [address]  Phone [phone]    [facility]  [address]  [place] [address]  Phone: [phone]    [facility]   [address]  [place] [address]  Phone: [phone]    [facility]  [address]  [place] [address]  Phone: [phone]    [facility]  [address]  [address]  [place] [address]  Phone: [phone]    [facility]  [address]   [place] [address]   Phone: [phone]    [facility]  [address]  [place] [address] [phone]    [facility]  [address] [place] [address]   Phone: [phone]                Swelling in the legs can be caused by many reasons. No matter what the reason, treatment usually includes some type of compression. You should wear your compression socks as much as you can. Your compression should be put on first thing in the morning. Take the compression off at night. It is especially important to wear them with long periods of sitting/standing, long car rides or if you will be flying. Going without compression for even brief periods of time can be damaging to your legs and your health.  Compression socks should get replaced usually every 4-6 months. They do not need to be worn at night while in bed. If we have seen you in the last year, we can refill your sock prescription, otherwise your primary care provider is able to refill them for you. Call us with any problems or questions.   Compression Velcro may need to be replaced every 9-12 months.  Often the liners and socks need to be  "replaced every three or four months.  The neoprene velcro may last up to 2 years.  If the velcro becomes worn a tailor may be able to repair it for you inexpensively.    If you do a lot of standing, it is good to do calf raises to help keep the blood pumping. If you sit a lot at work, it is good to get up periodically to walk around. Elevation of the foot of your bed 4-6\" helps the blood return back to where it is needed.   Please call us if you have any questions [phone]    Thank you for choosing [facility].      "

## 2021-10-07 NOTE — PROGRESS NOTES
Leg Swelling Consult         I have been asked to see Nishant Gilliam referred by Aditya Mehta MD     Date of Service: October 7, 2021     Chief Complaint:  Bilateral leg swelling    History of Present Illness:   Mr. Gilliam presents to the [facility] clinic for evaluation of bilateral lower extremity swelling. He has a history of HTN, BPH, morbid obesity, atrial fibrillation with RVR, CHF, bullous pemphigoid and clinically diagnosed venous insufficiency.    Nishant presents with his wife today for an initial evaluation. He states that he has had swelling in both his legs for several years now. He has tried wearing compression in the past, however felt that the stockings had constriction points and dug into his foot/toes at times. For his leg swelling, he has been prescribed lasix in the past which helped with the swelling, however when the dose was increased, caused a difficult bout of bullous pemphigoid.    Patient has been following with a dermatologist, Dr. Bala Hughes for management of bullous pemphigoid and was on prednisone daily, which has been tapered down. He is also on mycophenalate.  He also follows with Cardiology for his atrial fibrillation, and is now on daily bumex for heart failure.    Patient does endorse a smoking history and quit many years ago when he was 46 years of age. Prior to this, he smoked 2 packs per day. He has had BPH for many years and is on medications for this. He denies any bowel concerns other than being mildly constipated every so often.    He has not seen lymphedema therapy in the past for his leg swelling. He has also not had venous competency imaging done.      Past Medical History:    Past Medical History:   Diagnosis Date     COPD (chronic obstructive pulmonary disease) (H)      Coronary artery disease      Hypertension      Right bundle branch block     complete        Surgical History:   Past Surgical History:   Procedure Laterality Date     HC KNEE SCOPE,  DIAGNOSTIC      Description: Arthroscopy Knee Left;  Recorded: 06/27/2009;  Comments: details unk.     HERNIA REPAIR       ZZHC CORONARY STENT PERCUT, INITIAL VESSEL      Description: Cath Stent Placement;  Recorded: 02/24/2014;  Comments: stenting to the LAD in 1999. 2nd done years later.        Medications:    Current Outpatient Medications   Medication     apixaban ANTICOAGULANT (ELIQUIS) 5 MG tablet     budesonide-formoteroL (SYMBICORT) 160-4.5 mcg/actuation inhaler     bumetanide (BUMEX) 1 MG tablet     cholecalciferol, vitamin D3, (VITAMIN D3) 25 mcg (1,000 unit) capsule     doxazosin (CARDURA) 8 MG tablet     finasteride (PROSCAR) 5 mg tablet     fluticasone (FLONASE) 50 MCG/ACT nasal spray     LORazepam (ATIVAN) 0.5 MG tablet     metoprolol tartrate (LOPRESSOR) 100 MG tablet     minocycline (DYNACIN) 100 MG tablet     mycophenolate (GENERIC EQUIVALENT) 500 MG tablet     nitroglycerin (NITROSTAT) 0.4 MG SL tablet     pravastatin (PRAVACHOL) 20 MG tablet     spironolactone (ALDACTONE) 25 MG tablet     No current facility-administered medications for this visit.        Allergies:    Allergies   Allergen Reactions     Furosemide Rash     pemphigoid     Cat Hair Std Allergenic Ext [Cat Hair Extract] Unknown     Sulfa Drugs Unknown        Family history: No family history on file.     Social History:   Social History     Tobacco Use     Smoking status: Former Smoker     Years: 30.00     Types: Cigarettes     Smokeless tobacco: Never Used   Substance Use Topics     Alcohol use: No     Drug use: No          Labs:      I personally reviewed the following lab results today and those on care everywhere, if indicated     No results found for: CRP   Erythrocyte Sedimentation Rate   Date Value Ref Range Status   05/04/2021 3 0 - 15 mm/hr Final      Last Renal Panel:  Sodium   Date Value Ref Range Status   07/16/2021 141 136 - 145 mmol/L Final     Potassium   Date Value Ref Range Status   07/16/2021 4.4 3.5 - 5.0 mmol/L  Final     Chloride   Date Value Ref Range Status   07/16/2021 103 98 - 107 mmol/L Final     Carbon Dioxide (CO2)   Date Value Ref Range Status   07/16/2021 27 22 - 31 mmol/L Final     Anion Gap   Date Value Ref Range Status   07/16/2021 11 5 - 18 mmol/L Final     Glucose   Date Value Ref Range Status   07/16/2021 103 70 - 125 mg/dL Final     Urea Nitrogen   Date Value Ref Range Status   07/16/2021 25 8 - 28 mg/dL Final     Creatinine   Date Value Ref Range Status   07/16/2021 1.10 0.70 - 1.30 mg/dL Final     GFR Estimate   Date Value Ref Range Status   07/16/2021 61 >60 mL/min/1.73m2 Final     Comment:     As of July 11, 2021, eGFR is calculated by the CKD-EPI creatinine equation, without race adjustment. eGFR can be influenced by muscle mass, exercise, and diet. The reported eGFR is an estimation only and is only applicable if the renal function is stable.   07/10/2021 >60 >60 mL/min/1.73m2 Final     Calcium   Date Value Ref Range Status   07/16/2021 9.1 8.5 - 10.5 mg/dL Final     Albumin   Date Value Ref Range Status   03/09/2021 3.8 3.5 - 5.0 g/dL Final      Lab Results   Component Value Date    WBC 10.4 07/08/2021     Lab Results   Component Value Date    RBC 4.80 07/08/2021     Lab Results   Component Value Date    HGB 14.8 07/08/2021     Lab Results   Component Value Date    HCT 45.9 07/08/2021     No components found for: MCT  Lab Results   Component Value Date    MCV 96 07/08/2021     Lab Results   Component Value Date    MCH 30.8 07/08/2021     Lab Results   Component Value Date    MCHC 32.2 07/08/2021     Lab Results   Component Value Date    RDW 15.0 07/08/2021     Lab Results   Component Value Date     07/12/2021      No results found for: A1C   TSH   Date Value Ref Range Status   07/07/2021 0.54 0.30 - 5.00 uIU/mL Final           Imaging:     I personally reviewed the following imaging results today and those on care everywhere, if indicated     No results found for this visit on 10/07/21.                 Review of Symptoms:  Full 12 point review of systems is negative, except as noted above.     Physical Exam:     Vital Signs: BP 98/70   Pulse 80   Temp 98.1  F (36.7  C) (Oral)   Resp 16  There is no height or weight on file to calculate BMI.   Wt Readings from Last 2 Encounters:   08/24/21 295 lb (133.8 kg)   08/23/21 295 lb (133.8 kg)        Circumferential volume measures:    Circumferential Measures 10/7/2021   Right just above MTP 31.2   Right Ankle 33   Right Widest Calf 56.4   Right Knee to Ankle 40   Left - just above MTP 28.7   Left Ankle 32.5   Left Widest Calf 53.7   Left Knee to Ankle 40               General: In no apparent distress, sitting in wheelchair     Psych: Alert and oriented X 3. Affect normal.     HEENT: Atraumatic, normocephalic     Respiratory:  Breathing comfortably on room air    Abdominal: Normal bowel sounds without any pain, guarding or rigidity. No inguinal lymphadenopathy palpated.  Exam compromised by body habitus.    Musculoskeletal:  Normal range of motion in hips, knees and ankles bilaterally.  There is no active joint synovitis, erythema, swelling or joint laxity.      Neurological:  Sensation is intact to pinprick and light touch in both legs Strength testing is normal in hip flexion, knee flexion, knee extension, ankle dorsiflexion and great toe extension bilaterally. Deep tendon reflexes knee jerks and ankle jerks are normal bilaterally, decreased bilaterally, not present bilaterally and normal in knee jerk testing and decreased in ankle jerk testing.     Vascular: Dorsalis pedis and posterior tibialis pulses are strong and equal bilaterally to manual palpation and audible with handheld doppler with biphasic sounds bilaterally. There are no significant telangietasias, medial ankle venous flares, venous varicosities and spider veins.      Integumentary: Skin of the legs is  dry, taut, shiny, erythematous, fibrotic, scarred and thickened.  Nails are thickened. 2+  pitting edema up to knee level bilaterally. Healed pemphigus scars are visible in both lower extremities. Negative Stemmer's sign bilaterally. No open skin wounds/lesions present on inspection.    Impression:     1.   Venous insufficiency  2.   Bilateral lower extremity swelling  3.   Morbid obesity  4.   Bullous Pemphigoid    Plan:     1. Type of swelling was reviewed in detail with the patient.  Questions were answered and education was completed.     2. Patient appears to have swelling due to likely venous insufficiency, though there was some difficulty appreciating distal arteries of left foot using doppler. Due to patient's smoking history, will obtain ABIs to further evaluate. In addition, he has not had venous competency imaging done, so this was ordered today as well.  3. A lymphedema referral was placed today as patient would benefit from therapy techniques to help reduce swelling. Patient may likely need a short period of wrapping, and this was discussed with them.   4. Patient would benefit from velcro compression for easier donning/doffing. An orthotics referral was placed to help with fitting. Patient to be fitted after he sees Lymphedema Therapy and measures stabilize.  5. Patient will follow up in 3-4 months, or when needed.  If any questions or concerns they are to contact the clinic.      Thank you very much for referring Nishant T TERRI Gilliam  If you have any questions please feel free to contact me at [phone].          Mimi Albert MD  Wound Care and Lymphedema   [facility]   [phone]

## 2021-10-07 NOTE — PROGRESS NOTES
Swelling started gradually a couple years ago. Right worse than left. Started on lasix and had an allergic reaction and developed an autoimmune disorder. Also has congestive heart failure.     He used to wear compression stockings but it was getting painful where it was digging into his leg. Has never been to lymphedema.     He is on 2 topical ointments.

## 2021-10-15 DIAGNOSIS — I50.33 ACUTE ON CHRONIC DIASTOLIC HEART FAILURE (H): ICD-10-CM

## 2021-10-15 DIAGNOSIS — I48.91 ATRIAL FIBRILLATION WITH RVR (H): ICD-10-CM

## 2021-10-15 NOTE — TELEPHONE ENCOUNTER
Reason for Call:  Medication or medication refill:    Do you use a [facility]?  Name of the pharmacy and phone number for the current request:    [facility]    If PCP wants to [facility]   [address] [place] [address]  Fax:  [phone]  Name of the medication requested:   Eliquis 5mg   Bumetanide 1mg    Other request: n/a    Can we leave a detailed message on this number? YES    Phone number patient can be reached at: Other phone number:  [phone]    Best Time: anytime    Call taken on 10/15/2021 at 10:59 AM by Sara Velez

## 2021-10-16 ENCOUNTER — HEALTH MAINTENANCE LETTER (OUTPATIENT)
Age: 86
End: 2021-10-16

## 2021-10-16 RX ORDER — BUMETANIDE 1 MG/1
1 TABLET ORAL DAILY
Qty: 90 TABLET | Refills: 3 | Status: SHIPPED | OUTPATIENT
Start: 2021-10-16 | End: 2021-12-16

## 2021-10-28 ENCOUNTER — HOSPITAL ENCOUNTER (OUTPATIENT)
Dept: PHYSICAL THERAPY | Facility: CLINIC | Age: 86
Setting detail: THERAPIES SERIES
End: 2021-10-28
Attending: STUDENT IN AN ORGANIZED HEALTH CARE EDUCATION/TRAINING PROGRAM
Payer: COMMERCIAL

## 2021-10-28 DIAGNOSIS — L12.0 BULLOUS PEMPHIGOID (H): ICD-10-CM

## 2021-10-28 DIAGNOSIS — E66.01 MORBID OBESITY WITH BMI OF 40.0-44.9, ADULT (H): ICD-10-CM

## 2021-10-28 DIAGNOSIS — I87.2 VENOUS (PERIPHERAL) INSUFFICIENCY: ICD-10-CM

## 2021-10-28 DIAGNOSIS — I50.31 ACUTE DIASTOLIC CONGESTIVE HEART FAILURE (H): ICD-10-CM

## 2021-10-28 PROCEDURE — 97140 MANUAL THERAPY 1/> REGIONS: CPT | Mod: GP | Performed by: PHYSICAL THERAPIST

## 2021-10-28 PROCEDURE — 97535 SELF CARE MNGMENT TRAINING: CPT | Mod: GP | Performed by: PHYSICAL THERAPIST

## 2021-10-28 PROCEDURE — 97162 PT EVAL MOD COMPLEX 30 MIN: CPT | Mod: GP | Performed by: PHYSICAL THERAPIST

## 2021-10-28 NOTE — PROGRESS NOTES
10/28/21 1100   Rehab Discipline   Discipline PT   Type of Visit   Type of visit Initial Edema Evaluation   General Information   Start of care 10/28/21   Referring physician Dr. Mimi Albert   Orders Evaluate and treat as indicated   Order date 10/07/21   Medical diagnosis Obesity, Venous insufficiency, CHF, Bullous pemphygoid, lymphedema   Onset of illness / date of surgery 01/01/20   Edema onset 01/01/20   Affected body parts LLE;RLE   Edema etiology Chronic Venous Insufficiency  (CHF, obesity)   Edema etiology comments Swelling started in ankles January 2020, pt took lasix PRN.  He started getting a rash.  Swelling started getting worse December 2020 so Lasix increased but that caused Bullous pemphigoid.  Pt is seeing dermatologist for that.  Pt is now off prednisone but on another medicatiton to decrease auto-immune response.  Pt has tried compression socks. He uses them at home most days but not today.  Sometimes when he has blisters he is not able to wear them.  Pt just got them over the counter.  Pt doesn't sleep well. He moves around at night. Pt naps during the day in a recliner.    Pertinent history of current problem (PT: include personal factors and/or comorbidities that impact the POC; OT: include additional occupational profile info) Scheduled for vascular US next week, both arterial and venous.  Pt was doing home therapy after hospitalization for A-fib but has not continued. He has L knee pain from OA. Pt has CHF.   Surgical / medical history reviewed Yes   Prior level of functional mobility Pt was using a cane before hospitalization.  Pt has a 4WW walker at home but doesn't use much.   Prior treatment Elevation;Diuretics;Compression garments   Community support Family / friend caregiver   Patient role / employment history Retired   Psychosocial concerns Impaired sleep;Impaired coping;Impaired body image   Living environment House / [facility]   Living environment comments Lives with spouse who is  very supportive, drives and has assisted with medical needs. Spouse is working but schedule is flexible   Current assistive devices 4 wheeled walker;Standard cane  (transport WC)   General observations Pt arrives in transport chair, spouse present appearing in good health   Fall Risk Screen   Fall screen completed by PT   Have you fallen 2 or more times in the past year? No   Have you fallen and had an injury in the past year? Yes  (see comments)   Is patient a fall risk? Yes   Fall screen comments Fell into the bathtub yesterday, laid there for about 4hrs, L elbow cut that still is bleeding.     System Outcome Measures   Outcome Measures Lymphedema   Lymphedema Life Impact Scale (score range 0-72). A higher score indicates greater impairment. 41   Subjective Report   Patient report of symptoms Swelling over the past 2 years has really affected mobility as pt was in wraps for the blisters and hard to do normal movement.   Precautions   Precautions Cardiac Edema/CHF   Patient / Family Goals   Patient / family goals statement Be able to get shoes on, legs lighter for mobility   Pain   Patient currently in pain Yes   Pain location Sore all over from yesterday fall, cut on the L forearm   Pain comments feet and legs do hurt sometimes, mostly at night   Vitals Signs   Weight 297   Cognitive Status   Orientation Orientation to person, place and time   Level of consciousness Alert   Follows commands and answers questions 100% of the time   Personal safety and judgement Intact   Memory Intact   Cognitive status comments Pt is [place], also feeling a little off from fall.    Edema Exam / Assessment   Skin condition Pitting;Venous distention;Dryness   Skin condition comments Pt with some venous distension in feet.  He has hyperpigmentation of the L LE and foot from healed bullous pemphygoid blisters,  R  leg is bigger but not had the problems with the blisters. Pt does have 1 blister on the R LE,  light drainage about the size of  2 quarters and silver dollar. Fibrotic hyperplasia of tissue noted.   Pitting 2+;3+   Pitting location R foot 3+, L foot and B pre-tib 2+   Stemmer sign Positive   Ulceration comments no, just blister which is pink with light clear drainage   Girth Measurements   Girth Measurements Refer to separate girth measurement flowsheet   Volume LE   Right LE (mL) 6665.5   Left LE (mL) 6772.94   Range of Motion   ROM comments B knee extension very limited bilaterally (20-30degrees)   Strength   Strength Other   Strength comments weakness reported and demonstrated. Mulitple attempts to stand from low transport chair.    Posture   Posture Forward head position;Protracted shoulders;Kyphosis   Palpation   Palpation not tender to assessment    Activities of Daily Living   Activities of Daily Living not addressed, spouse helps with wound cares when pt has more blisters and compression socks.  They are thinking of grab bars in bathroom where pt fell.   Bed Mobility   Bed mobility min A  supine to sitting, increased time, uses arms to help move legs   Transfers   Transfers Increased time, low surfaces are hard   Gait / Locomotion   Gait / Locomotion not assessed. PT moves around house with furniture. Has 4WW but not using, does take a cane sometimes.    Vascular Assessment   Vascular Assessment Comments will have US next week   Coordination   Coordination Gross motor coordination appropriate   Muscle Tone   Muscle tone No deficits were identified   Planned Edema Interventions   Planned edema interventions Manual lymph drainage;Gradient compression bandaging;Fit for compression garment;Exercises;Precautions to prevent infection / exacerbation;Education;Manual therapy;ADL training;Home management program development;Skin care / precautions   Clinical Impression   Criteria for skilled therapeutic intervention met Yes   Therapy diagnosis Lymphedema   Influenced by the following impairments / conditions Stage 2   Functional limitations  due to impairments / conditions fit of shoes, gait tolerance, bed mobility, sleep, leisure activity   Clinical Presentation Evolving/Changing   Clinical Presentation Rationale Pt with increased edema that has not responded to compression socks, elevating and diuretics.    Clinical Decision Making (Complexity) Moderate complexity   Treatment Frequency 3x/week  (4 weeks, 2/xmo for 2mo)   Treatment duration 120 days   Patient / family and/or staff in agreement with plan of care Yes   Risks and benefits of therapy have been explained Yes   Clinical impression comments Pt will beneift from intensive treatment to reduce edema before transition to velcro garments   Education Assessment   Preferred learning style Demonstration;Pictures / video;Reading;Listening   Barriers to learning No barriers   Goals   Edema Eval Goals 1;2;3;4   Goal 1   Goal identifier Home Program   Goal description Pt, and spouse as needed, will be independent with skin care, elevation, exericse and self massage for better edema management to prevent infection and additional fibrotic skin change.    Target date 01/25/22   Goal 2   Goal identifier LLIS   Goal description Pt will score 33 or less to reflect the MICD in impact of edema on quality of life.     Target date 01/25/22   Goal 3   Goal identifier Volume   Goal description Pt will have 10% reduction in B LE volume for improved bed mobility and prepare for garment fitting.    Target date 01/25/22   Goal 4   Goal identifier Compression   Goal description Pt will use compression garments to better manage chronic swelling and allow him to fit into his walking shoes.   Target date 02/25/22   Total Evaluation Time   PT Lily, Moderate Complexity Minutes (45854) 30

## 2021-11-02 ENCOUNTER — ANCILLARY PROCEDURE (OUTPATIENT)
Dept: VASCULAR ULTRASOUND | Facility: CLINIC | Age: 86
End: 2021-11-02
Attending: STUDENT IN AN ORGANIZED HEALTH CARE EDUCATION/TRAINING PROGRAM
Payer: COMMERCIAL

## 2021-11-02 DIAGNOSIS — I50.31 ACUTE DIASTOLIC CONGESTIVE HEART FAILURE (H): ICD-10-CM

## 2021-11-02 DIAGNOSIS — I87.2 VENOUS (PERIPHERAL) INSUFFICIENCY: ICD-10-CM

## 2021-11-02 DIAGNOSIS — L12.0 BULLOUS PEMPHIGOID (H): ICD-10-CM

## 2021-11-02 DIAGNOSIS — E66.01 MORBID OBESITY WITH BMI OF 40.0-44.9, ADULT (H): ICD-10-CM

## 2021-11-02 PROCEDURE — 93922 UPR/L XTREMITY ART 2 LEVELS: CPT

## 2021-11-02 PROCEDURE — 93970 EXTREMITY STUDY: CPT

## 2021-11-11 DIAGNOSIS — T50.2X5A DIURETIC-INDUCED HYPOKALEMIA: Primary | ICD-10-CM

## 2021-11-11 DIAGNOSIS — E87.6 DIURETIC-INDUCED HYPOKALEMIA: Primary | ICD-10-CM

## 2021-11-12 ENCOUNTER — LAB (OUTPATIENT)
Dept: LAB | Facility: CLINIC | Age: 86
End: 2021-11-12
Payer: COMMERCIAL

## 2021-11-12 DIAGNOSIS — E87.6 DIURETIC-INDUCED HYPOKALEMIA: ICD-10-CM

## 2021-11-12 DIAGNOSIS — T50.2X5A DIURETIC-INDUCED HYPOKALEMIA: ICD-10-CM

## 2021-11-12 LAB
ANION GAP SERPL CALCULATED.3IONS-SCNC: 10 MMOL/L (ref 5–18)
BUN SERPL-MCNC: 28 MG/DL (ref 8–28)
CALCIUM SERPL-MCNC: 9.7 MG/DL (ref 8.5–10.5)
CHLORIDE BLD-SCNC: 103 MMOL/L (ref 98–107)
CO2 SERPL-SCNC: 30 MMOL/L (ref 22–31)
CREAT SERPL-MCNC: 1.28 MG/DL (ref 0.7–1.3)
GFR SERPL CREATININE-BSD FRML MDRD: 51 ML/MIN/1.73M2
GLUCOSE BLD-MCNC: 108 MG/DL (ref 70–125)
POTASSIUM BLD-SCNC: 4.7 MMOL/L (ref 3.5–5)
SODIUM SERPL-SCNC: 143 MMOL/L (ref 136–145)

## 2021-11-12 PROCEDURE — 80048 BASIC METABOLIC PNL TOTAL CA: CPT

## 2021-11-12 PROCEDURE — 36415 COLL VENOUS BLD VENIPUNCTURE: CPT

## 2021-11-17 ENCOUNTER — HOSPITAL ENCOUNTER (OUTPATIENT)
Dept: PHYSICAL THERAPY | Facility: CLINIC | Age: 86
Setting detail: THERAPIES SERIES
End: 2021-11-17
Attending: ORTHOPAEDIC SURGERY
Payer: COMMERCIAL

## 2021-11-17 PROCEDURE — 97535 SELF CARE MNGMENT TRAINING: CPT | Mod: GP | Performed by: PHYSICAL THERAPIST

## 2021-11-17 PROCEDURE — 97140 MANUAL THERAPY 1/> REGIONS: CPT | Mod: GP | Performed by: PHYSICAL THERAPIST

## 2021-11-29 ENCOUNTER — HOSPITAL ENCOUNTER (OUTPATIENT)
Dept: PHYSICAL THERAPY | Facility: CLINIC | Age: 86
Setting detail: THERAPIES SERIES
End: 2021-11-29
Attending: ORTHOPAEDIC SURGERY
Payer: COMMERCIAL

## 2021-11-29 PROCEDURE — 97140 MANUAL THERAPY 1/> REGIONS: CPT | Mod: GP | Performed by: PHYSICAL THERAPIST

## 2021-11-30 ENCOUNTER — OFFICE VISIT (OUTPATIENT)
Dept: CARDIOLOGY | Facility: CLINIC | Age: 86
End: 2021-11-30
Attending: INTERNAL MEDICINE
Payer: COMMERCIAL

## 2021-11-30 VITALS
BODY MASS INDEX: 38.83 KG/M2 | SYSTOLIC BLOOD PRESSURE: 112 MMHG | HEIGHT: 73 IN | HEART RATE: 64 BPM | DIASTOLIC BLOOD PRESSURE: 62 MMHG | RESPIRATION RATE: 20 BRPM | WEIGHT: 293 LBS

## 2021-11-30 DIAGNOSIS — I50.30 NYHA CLASS 3 HEART FAILURE WITH PRESERVED EJECTION FRACTION (H): ICD-10-CM

## 2021-11-30 DIAGNOSIS — I25.10 ATHEROSCLEROSIS OF NATIVE CORONARY ARTERY OF NATIVE HEART WITHOUT ANGINA PECTORIS: ICD-10-CM

## 2021-11-30 DIAGNOSIS — I48.19 PERSISTENT ATRIAL FIBRILLATION (H): ICD-10-CM

## 2021-11-30 DIAGNOSIS — E66.01 MORBID OBESITY WITH BMI OF 40.0-44.9, ADULT (H): Primary | ICD-10-CM

## 2021-11-30 DIAGNOSIS — I10 ESSENTIAL HYPERTENSION: ICD-10-CM

## 2021-11-30 PROCEDURE — 99214 OFFICE O/P EST MOD 30 MIN: CPT | Performed by: INTERNAL MEDICINE

## 2021-11-30 ASSESSMENT — MIFFLIN-ST. JEOR: SCORE: 2072.92

## 2021-11-30 NOTE — PATIENT INSTRUCTIONS
1. Follow through with sleep apnea testing and treatment recommendations  2. Use the Ace wraps on your legs on a daily basis, removing them at bedtime.  3. No medication changes today  4. Continue your efforts at decreasing your food intake to help affect weight loss.

## 2021-11-30 NOTE — PROGRESS NOTES
Cardiology Clinic Office Note    Assessment / Plan:    1.  Persistent atrial fibrillation, rate controlled on recent monitor.  Suspect orthostatic hypotension on recent fall, advised to rise more slowly and to pause before activity after rising.  Continue rate control and anticoagulation.  2.  Probable obstructive sleep apnea.  Again emphasized the importance of evaluation and potentially treatment to significantly improve his quality of life  3.  Morbid obesity.  Encouraged efforts at weight loss.      Follow-up 6 months  ______________________________________________________________________    Subjective:    I had the opportunity to see Nishant Gilliam at the [facility]. Nishant Gilliam is a 86 year old male with a history of coronary artery disease, status post coronary intervention more than 2 decades ago.    He has a history of aortic sclerosis but no aortic stenosis.  He presented 7/2021 with rapid atrial fibrillation, developing after he was diagnosed with bullous pemphigoid, and sudden worsening of edema in his legs.  He was treated with rate control using metoprolol and verapamil, and anticoagulation with Eliquis.  Holter monitor confirmed adequate rate control, and sleep apnea evaluation was suggested.  He returns today for routine follow-up, with his wife.    After I saw him last time he did wind up canceling his sleep apnea evaluation.  He feels that his stamina, and strength have been steadily declining since that time.  He has used Ace wraps on his legs once, but felt they were cumbersome and uncomfortable.  He had a fall standing rapidly from a commode with loss of consciousness.  He was stuck in the bathtub for 4 hours on this occasion till his wife returned home.  He feels that his sleep difficulties are due to pain in his legs    ______________________________________________________________________    Problem List:  Patient Active Problem List   Diagnosis      Anxiety     Hearing Loss     Left Ventricular Hypertrophy     Hiatal Hernia     Lumbar Disc Degeneration     Nicotine Dependence - In Remission     Squamous Cell Carcinoma Of The Skin     Basal Cell Carcinoma Of The Skin     Pure hypercholesterolemia     Essential hypertension     Coronary Artery Disease     Complete Right Bundle Branch Block     Chronic Obstructive Pulmonary Disease     Benign Polyps Of The Large Intestine     Frequency of urination     Morbid obesity with BMI of 40.0-44.9, adult (H)     Venous (peripheral) insufficiency     Bullous pemphigoid     Atrial fibrillation with rapid ventricular response (H)     Acute congestive heart failure, unspecified heart failure type (H)     Acute diastolic congestive heart failure (H)     Leukocytosis     NYHA class 3 heart failure with preserved ejection fraction (H)     Persistent atrial fibrillation (H)     Medical History:  Past Medical History:   Diagnosis Date     COPD (chronic obstructive pulmonary disease) (H)      Coronary artery disease      Hypertension      Right bundle branch block     complete     Surgical History:  Past Surgical History:   Procedure Laterality Date     HC KNEE SCOPE, DIAGNOSTIC      Description: Arthroscopy Knee Left;  Recorded: 06/27/2009;  Comments: details unk.     HERNIA REPAIR       ZZHC CORONARY STENT PERCUT, INITIAL VESSEL      Description: Cath Stent Placement;  Recorded: 02/24/2014;  Comments: stenting to the LAD in 1999. 2nd done years later.     Social History:  Social History     Socioeconomic History     Marital status:      Spouse name: Not on file     Number of children: Not on file     Years of education: Not on file     Highest education level: Not on file   Occupational History     Not on file   Tobacco Use     Smoking status: Former Smoker     Years: 30.00     Types: Cigarettes     Smokeless tobacco: Never Used   Substance and Sexual Activity     Alcohol use: No     Drug use: No     Sexual activity:  Not on file   Other Topics Concern     Not on file   Social History Narrative    Lives with his wife.     Social Determinants of Health     Financial Resource Strain: Not on file   Food Insecurity: Not on file   Transportation Needs: Not on file   Physical Activity: Not on file   Stress: Not on file   Social Connections: Not on file   Intimate Partner Violence: Not on file   Housing Stability: Not on file     Sleep History:  Poorly restorative, sleeping for 2 to 3 hours then awakening, then returning to sleep after a couple of hours.  Exercise History:  With physical therapy, walks with a walker to a limited degree around his home only.    Review of Systems:        Positive for shortness of breath with exertion, fainting, leg swelling, falls, muscle pain, and daytime sleepiness.  12 point review of systems otherwise negative.        Family History:  No family history on file.      Allergies:  Allergies   Allergen Reactions     Furosemide Rash     pemphigoid     Cat Hair Std Allergenic Ext [Cat Hair Extract] Unknown     Sulfa Drugs Unknown     Medications:  Current Outpatient Medications   Medication Sig Dispense Refill     apixaban ANTICOAGULANT (ELIQUIS) 5 MG tablet Take 1 tablet (5 mg) by mouth 2 times daily 180 tablet 3     budesonide-formoteroL (SYMBICORT) 160-4.5 mcg/actuation inhaler [BUDESONIDE-FORMOTEROL (SYMBICORT) 160-4.5 MCG/ACTUATION INHALER] USE 2 INHALATIONS TWICE A DAY (Patient taking differently: Inhale 2 puffs into the lungs daily ) 1 Inhaler 3     bumetanide (BUMEX) 1 MG tablet Take 1 tablet (1 mg) by mouth daily 90 tablet 3     cholecalciferol, vitamin D3, (VITAMIN D3) 25 mcg (1,000 unit) capsule [CHOLECALCIFEROL, VITAMIN D3, (VITAMIN D3) 25 MCG (1,000 UNIT) CAPSULE]        doxazosin (CARDURA) 8 MG tablet Take 0.5 tablets (4 mg) by mouth At Bedtime 90 tablet 3     finasteride (PROSCAR) 5 mg tablet [FINASTERIDE (PROSCAR) 5 MG TABLET] TAKE 1 TABLET DAILY 90 tablet 3     fluticasone (FLONASE) 50  "MCG/ACT nasal spray Spray 1 spray into both nostrils daily (Patient taking differently: Spray 1 spray into both nostrils daily as needed ) 9.9 mL 0     LORazepam (ATIVAN) 0.5 MG tablet [LORAZEPAM (ATIVAN) 0.5 MG TABLET] TAKE 1/2 TO 1 TABLET BY MOUTH THREE TIMES DAILY AS NEEDED FOR ANXIOUSNESS 30 tablet 5     metoprolol tartrate (LOPRESSOR) 100 MG tablet Take 1 tablet (100 mg) by mouth 2 times daily 60 tablet 5     minocycline (DYNACIN) 100 MG tablet Take 1 tablet by mouth 2 times daily       mycophenolate (GENERIC EQUIVALENT) 500 MG tablet 2,000 mg daily        nitroglycerin (NITROSTAT) 0.4 MG SL tablet [NITROGLYCERIN (NITROSTAT) 0.4 MG SL TABLET] PLACE ONE TABLET UNDER THE TONGUE EVERY 5 MINUTES AS NEEDED FOR CHEST PAIN 25 tablet 3     pravastatin (PRAVACHOL) 20 MG tablet [PRAVASTATIN (PRAVACHOL) 20 MG TABLET] Take 1 tablet (20 mg total) by mouth every evening. 90 tablet 3     spironolactone (ALDACTONE) 25 MG tablet Take 1 tablet (25 mg) by mouth daily 30 tablet 5       Objective:   Wt Readings from Last 3 Encounters:   11/30/21 132.9 kg (293 lb)   08/24/21 133.8 kg (295 lb)   08/23/21 133.8 kg (295 lb)     Vital signs:  /62 (BP Location: Right arm, Patient Position: Sitting, Cuff Size: Adult Large)   Pulse 64   Resp 20   Ht 1.854 m (6' 1\")   Wt 132.9 kg (293 lb)   BMI 38.66 kg/m        Physical Exam:    General Appearance : Awake, Alert, No acute distress  HEENT: No Scleral icterus; the mucous membranes were pink and moist.  Conjunctivae slightly injected  Neck:  No cervical bruits, jugular venous distention, or thyromegaly   Chest: The spine was straight. Chest wall symmetric  Lungs: Respirations unlabored; the lungs are clear to auscultation.  No wheezing   Cardiovascular:   Nonpalpable point of maximal impulse.  Auscultation reveals irregularly irregular first and second heart sounds with no murmurs, rubs, or gallops.  Carotid, radial, and dorsalis pedal pulses are intact and symmetric.  Abdomen: " Bees.  No organomegaly, masses, bruits, or tenderness. Bowels sounds are present  Extremities:  No clubbing, cyanosis.  3-4+ bilateral below the knee edema  Skin: No rash, bruising  Musculoskeletal: No tenderness.  Neurologic: Alert and oriented ×3. Speech is fluent.    Lab Results:  LIPIDS:  Lab Results   Component Value Date    CHOL 136 03/09/2021    CHOL 140 12/03/2018    CHOL 154 07/03/2017     Lab Results   Component Value Date    HDL 53 03/09/2021    HDL 48 12/03/2018    HDL 46 07/03/2017     Lab Results   Component Value Date    LDL 65 03/09/2021    LDL 70 12/03/2018    LDL 86 07/03/2017     Lab Results   Component Value Date    TRIG 91 03/09/2021    TRIG 110 12/03/2018    TRIG 110 07/03/2017       24 Holter monitor 8/2021:    Abnormal Holter monitor tracing with evidence of persistent atrial fibrillation throughout the recording.    Ventricular response in atrial fibrillation appears to be well controlled.    No significant symptoms were reported.    No significant ventricular arrhythmia.    No profound bradycardia or pauses.    Echocardiogram 7/2021:  Left ventricle ejection fraction is mildly decreased. The estimated left ventricular ejection fraction is 50% without obvious wall motion abnormality.    Normal right ventricular size and systolic function.    Trace mitral and mild tricuspid insufficiency. Mild pulmonary hypertension noted.    When compared to the previous study dated 6/29/2015, left ventricular systolic function may be slightly reduced on current study.              MAYE KIM MD [facility]  [phone]    This note created using Dragon voice recognition software.  Sound alike errors may have escaped editing.

## 2021-12-01 ENCOUNTER — HOSPITAL ENCOUNTER (OUTPATIENT)
Dept: PHYSICAL THERAPY | Facility: CLINIC | Age: 86
Setting detail: THERAPIES SERIES
End: 2021-12-01
Attending: ORTHOPAEDIC SURGERY
Payer: COMMERCIAL

## 2021-12-01 DIAGNOSIS — R60.0 LOCALIZED EDEMA: ICD-10-CM

## 2021-12-01 DIAGNOSIS — I87.2 VENOUS (PERIPHERAL) INSUFFICIENCY: Primary | ICD-10-CM

## 2021-12-01 PROCEDURE — 97140 MANUAL THERAPY 1/> REGIONS: CPT | Mod: GP | Performed by: PHYSICAL THERAPIST

## 2021-12-03 ENCOUNTER — HOSPITAL ENCOUNTER (OUTPATIENT)
Dept: PHYSICAL THERAPY | Facility: CLINIC | Age: 86
Setting detail: THERAPIES SERIES
End: 2021-12-03
Attending: ORTHOPAEDIC SURGERY
Payer: COMMERCIAL

## 2021-12-03 PROCEDURE — 97140 MANUAL THERAPY 1/> REGIONS: CPT | Mod: GP | Performed by: PHYSICAL THERAPIST

## 2021-12-06 ENCOUNTER — HOSPITAL ENCOUNTER (OUTPATIENT)
Dept: PHYSICAL THERAPY | Facility: CLINIC | Age: 86
Setting detail: THERAPIES SERIES
End: 2021-12-06
Attending: ORTHOPAEDIC SURGERY
Payer: COMMERCIAL

## 2021-12-06 PROCEDURE — 97140 MANUAL THERAPY 1/> REGIONS: CPT | Mod: GP | Performed by: PHYSICAL THERAPIST

## 2021-12-08 ENCOUNTER — HOSPITAL ENCOUNTER (OUTPATIENT)
Dept: PHYSICAL THERAPY | Facility: CLINIC | Age: 86
Setting detail: THERAPIES SERIES
End: 2021-12-08
Attending: ORTHOPAEDIC SURGERY
Payer: COMMERCIAL

## 2021-12-08 PROCEDURE — 97140 MANUAL THERAPY 1/> REGIONS: CPT | Mod: GP | Performed by: PHYSICAL THERAPIST

## 2021-12-10 ENCOUNTER — HOSPITAL ENCOUNTER (OUTPATIENT)
Dept: PHYSICAL THERAPY | Facility: CLINIC | Age: 86
Setting detail: THERAPIES SERIES
End: 2021-12-10
Attending: ORTHOPAEDIC SURGERY
Payer: COMMERCIAL

## 2021-12-10 PROCEDURE — 97140 MANUAL THERAPY 1/> REGIONS: CPT | Mod: GP | Performed by: PHYSICAL THERAPIST

## 2021-12-13 ENCOUNTER — HOSPITAL ENCOUNTER (OUTPATIENT)
Dept: PHYSICAL THERAPY | Facility: CLINIC | Age: 86
Setting detail: THERAPIES SERIES
End: 2021-12-13
Attending: ORTHOPAEDIC SURGERY
Payer: COMMERCIAL

## 2021-12-13 PROCEDURE — 97140 MANUAL THERAPY 1/> REGIONS: CPT | Mod: GP | Performed by: PHYSICAL THERAPIST

## 2021-12-14 ENCOUNTER — OFFICE VISIT (OUTPATIENT)
Dept: INTERNAL MEDICINE | Facility: CLINIC | Age: 86
End: 2021-12-14
Payer: COMMERCIAL

## 2021-12-14 VITALS — HEART RATE: 83 BPM | DIASTOLIC BLOOD PRESSURE: 76 MMHG | SYSTOLIC BLOOD PRESSURE: 110 MMHG

## 2021-12-14 DIAGNOSIS — F51.01 PRIMARY INSOMNIA: ICD-10-CM

## 2021-12-14 DIAGNOSIS — F41.9 ANXIETY: ICD-10-CM

## 2021-12-14 DIAGNOSIS — I10 ESSENTIAL HYPERTENSION: ICD-10-CM

## 2021-12-14 DIAGNOSIS — E66.01 MORBID OBESITY WITH BMI OF 40.0-44.9, ADULT (H): ICD-10-CM

## 2021-12-14 DIAGNOSIS — Z51.81 ENCOUNTER FOR THERAPEUTIC DRUG MONITORING: ICD-10-CM

## 2021-12-14 DIAGNOSIS — N18.31 CHRONIC KIDNEY DISEASE, STAGE 3A (H): ICD-10-CM

## 2021-12-14 DIAGNOSIS — Z00.00 HEALTH CARE MAINTENANCE: ICD-10-CM

## 2021-12-14 DIAGNOSIS — I87.2 VENOUS (PERIPHERAL) INSUFFICIENCY: ICD-10-CM

## 2021-12-14 DIAGNOSIS — I50.33 ACUTE ON CHRONIC DIASTOLIC HEART FAILURE (H): ICD-10-CM

## 2021-12-14 DIAGNOSIS — L12.0 BULLOUS PEMPHIGOID (H): Primary | ICD-10-CM

## 2021-12-14 LAB
ANION GAP SERPL CALCULATED.3IONS-SCNC: 13 MMOL/L (ref 5–18)
BUN SERPL-MCNC: 17 MG/DL (ref 8–28)
CALCIUM SERPL-MCNC: 10 MG/DL (ref 8.5–10.5)
CHLORIDE BLD-SCNC: 99 MMOL/L (ref 98–107)
CO2 SERPL-SCNC: 27 MMOL/L (ref 22–31)
CREAT SERPL-MCNC: 1.17 MG/DL (ref 0.7–1.3)
GFR SERPL CREATININE-BSD FRML MDRD: 57 ML/MIN/1.73M2
GLUCOSE BLD-MCNC: 102 MG/DL (ref 70–125)
MAGNESIUM SERPL-MCNC: 2.2 MG/DL (ref 1.8–2.6)
POTASSIUM BLD-SCNC: 4.7 MMOL/L (ref 3.5–5)
SODIUM SERPL-SCNC: 139 MMOL/L (ref 136–145)

## 2021-12-14 PROCEDURE — 99214 OFFICE O/P EST MOD 30 MIN: CPT | Performed by: INTERNAL MEDICINE

## 2021-12-14 PROCEDURE — 36415 COLL VENOUS BLD VENIPUNCTURE: CPT | Performed by: INTERNAL MEDICINE

## 2021-12-14 PROCEDURE — 83735 ASSAY OF MAGNESIUM: CPT | Performed by: INTERNAL MEDICINE

## 2021-12-14 PROCEDURE — 80048 BASIC METABOLIC PNL TOTAL CA: CPT | Performed by: INTERNAL MEDICINE

## 2021-12-14 RX ORDER — DOXAZOSIN 8 MG/1
4 TABLET ORAL AT BEDTIME
Qty: 90 TABLET | Refills: 3 | Status: CANCELLED | OUTPATIENT
Start: 2021-12-14

## 2021-12-14 RX ORDER — BUDESONIDE AND FORMOTEROL FUMARATE DIHYDRATE 160; 4.5 UG/1; UG/1
AEROSOL RESPIRATORY (INHALATION)
Qty: 10.2 G | Refills: 11 | Status: SHIPPED | OUTPATIENT
Start: 2021-12-14 | End: 2024-04-02

## 2021-12-14 RX ORDER — LORAZEPAM 0.5 MG/1
TABLET ORAL
Qty: 30 TABLET | Refills: 3 | Status: SHIPPED | OUTPATIENT
Start: 2021-12-14 | End: 2022-03-21

## 2021-12-14 RX ORDER — DOXAZOSIN 4 MG/1
4 TABLET ORAL AT BEDTIME
Qty: 90 TABLET | Refills: 3 | Status: SHIPPED | OUTPATIENT
Start: 2021-12-14 | End: 2022-10-18

## 2021-12-14 NOTE — PATIENT INSTRUCTIONS
1.  Increase Bumex to 2 mg daily    2.  Change doxazosin to 4 mg for new prescription.    3.  Recheck 4 to 6 weeks    4.  Refill Symbicort and lorazepam.    5.  Advise sleep study

## 2021-12-16 RX ORDER — BUMETANIDE 1 MG/1
2 TABLET ORAL DAILY
Qty: 180 TABLET | Refills: 3
Start: 2021-12-16 | End: 2022-03-23

## 2021-12-16 NOTE — PROGRESS NOTES
ASSESSMENT:  1. Bullous pemphigoid  He sees Dr. Bala Hughes.  He has some blistering on the top of his feet, otherwise symptoms have been under good control.  He is on mycophenolate 2000 mg daily.  He is off of prednisone    2. Venous (peripheral) insufficiency  He has been referred for lymphedema management.  He now has compressive devices and wraps.  He has been on Bumex 1 mg daily.  He still has massive peripheral edema.  Unfortunately, it is felt that his bullous pemphigoid could have been triggered by a furosemide allergy    3. Anxiety  He has used lorazepam primarily in the evening for anxiousness.  Minimizing use is advised given his age and postural instability  - LORazepam (ATIVAN) 0.5 MG tablet; [LORAZEPAM (ATIVAN) 0.5 MG TABLET] TAKE 1/2 TO 1 TABLET BY MOUTH THREE TIMES DAILY AS NEEDED FOR ANXIOUSNESS  Dispense: 30 tablet; Refill: 3    4. Primary insomnia  See above.  I would be concerned about underlying sleep apnea.  A sleep study was advised.  - LORazepam (ATIVAN) 0.5 MG tablet; [LORAZEPAM (ATIVAN) 0.5 MG TABLET] TAKE 1/2 TO 1 TABLET BY MOUTH THREE TIMES DAILY AS NEEDED FOR ANXIOUSNESS  Dispense: 30 tablet; Refill: 3    5. Essential hypertension  Blood pressure is good on current regimen.  He has been cutting an 8 mg doxazosin pill.  This will be switched to 4 mg when out for convenience  - doxazosin (CARDURA) 4 MG tablet; Take 1 tablet (4 mg) by mouth At Bedtime  Dispense: 90 tablet; Refill: 3  -Spironolactone 25 mg daily  -Metoprolol heart rate 100 mg twice daily      7. Chronic obstructive pulmonary disease, unspecified COPD type (H)  O2 sat currently is acceptable at 96%.  He is chronically short of breath with minor activity.  He is on Symbicort  - budesonide-formoterol (SYMBICORT) 160-4.5 MCG/ACT Inhaler; [BUDESONIDE-FORMOTEROL (SYMBICORT) 160-4.5 MCG/ACTUATION INHALER] USE 2 INHALATIONS TWICE A DAY  Dispense: 10.2 g; Refill: 11    8. Health care maintenance  Health maintenance and screening  issues were reviewed  - REVIEW OF HEALTH MAINTENANCE PROTOCOL ORDERS    9. Chronic kidney disease, stage 3a (H)  Electrolytes and renal function will be rechecked  - Basic metabolic panel  (Ca, Cl, CO2, Creat, Gluc, K, Na, BUN); Future  - Basic metabolic panel  (Ca, Cl, CO2, Creat, Gluc, K, Na, BUN)    10. Encounter for therapeutic drug monitoring  See above  - Basic metabolic panel  (Ca, Cl, CO2, Creat, Gluc, K, Na, BUN); Future  - Magnesium; Future  - Basic metabolic panel  (Ca, Cl, CO2, Creat, Gluc, K, Na, BUN)  - Magnesium    11. Morbid obesity with BMI of 40.0-44.9, adult (H)  This contributes to his peripheral edema dyspnea, and heightened falling risk    12.  Right olecranon bursitis:  He reports several recent fluid accumulation in the right olecranon area with drainage.  This now has improved.  He had been on minocycline for his skin at the time which may have helped with the bursitis    PLAN:  Patient Instructions   1.  Increase Bumex to 2 mg daily    2.  Change doxazosin to 4 mg for new prescription.    3.  Recheck 4 to 6 weeks    4.  Refill Symbicort and lorazepam.    5.  Advise sleep study    Orders Placed This Encounter   Procedures     REVIEW OF HEALTH MAINTENANCE PROTOCOL ORDERS     Basic metabolic panel  (Ca, Cl, CO2, Creat, Gluc, K, Na, BUN)     Magnesium     Medications Discontinued During This Encounter   Medication Reason     minocycline (DYNACIN) 100 MG tablet      fluticasone (FLONASE) 50 MCG/ACT nasal spray      budesonide-formoteroL (SYMBICORT) 160-4.5 mcg/actuation inhaler Reorder     LORazepam (ATIVAN) 0.5 MG tablet Reorder       Return in about 8 weeks (around 2/8/2022) for Follow up.    ASSESSED PROBLEMS:    See above      CHIEF COMPLAINT:  Follow-up bullous pemphigoid, peripheral edema, and other concerns    HISTORY OF PRESENT ILLNESS:  Nishant is a 86 year old male seen with his daughter for follow-up of multiple concerns.  He has a history of bullous pemphigoid.  Symptoms currently are  under fairly good control with mycophenolate.  He still has some blistering of the feet but otherwise skin is clear.  He is off of prednisone.    He did have a recent fall in his bathroom and had difficulty getting up for 4 hours.  He did have an emergency room evaluation after this.  Daughter has purchased an apple watch which records falls.    He has a history of COPD.  His oxygen saturation currently is good, but he notes shortness of breath with minor exertion.  This undoubtedly is aggravated by morbid obesity.  He has been on Symbicort but recently ran out.    He is on metoprolol and doxazosin for hypertension.  Blood pressure is good on this regimen.  He also uses spironolactone and he is on Bumex for peripheral edema.  Unfortunately, it was felt that furosemide may have contributed to his initial development of bullous pemphigoid.    He notes chronic urinary frequency and urgency.  He is on finasteride in addition to the doxazosin    He is on Eliquis due to persistent atrial fibrillation with metoprolol for rate control.    He has a diagnosis of congestive heart failure with preserved ejection fraction.  BNP was 133 in July    REVIEW OF SYSTEMS:  See above  Comprehensive review of systems is otherwise negative.    PFSH:   and retired.  Lives with his wife.  Mobility is becoming an increasing issue    TOBACCO USE:  History   Smoking Status     Former Smoker     Years: 30.00     Types: Cigarettes   Smokeless Tobacco     Never Used       VITALS:  Vitals:    12/14/21 1534   BP: 110/76   BP Location: Left arm   Patient Position: Sitting   Cuff Size: Adult Large   Pulse: 83     Wt Readings from Last 3 Encounters:   11/30/21 132.9 kg (293 lb)   08/24/21 133.8 kg (295 lb)   08/23/21 133.8 kg (295 lb)       PHYSICAL EXAM:  Constitutional:   Reveals an alert pleasant obese male seen in a wheelchair, .  Vitals: per nursing notes.  HEENT: Atraumatic  Eyes:  EOMs full, PERRL.  Oropharynx:   Mouth and throat clear,  no thrush or exudate.  Neck:  Supple, no carotid bruits or adenopathy.  Back:  No spine or CVA pain.  Thorax:  No bony deformities.  Lungs: Diminished air movement with faint expiratory rhonchi.  No focal rales or wheezes  Cardiac: Irregularly irregular rate and rhythm, normal S1, S2, no murmur or gallop.  Abdomen:  Soft, active bowel sounds without bruits, mass, or tenderness.  Massively obese.  Extremities: Legs are wrapped to below the knees.  He does have prominent pitting edema above the knees bilaterally  Skin:  No jaundice, peripheral cyanosis or lesions to suggest malignancy.  Neuro:  Alert and oriented.   No gross focal deficits.  Psychiatric:  Memory intact, mood appropriate.    DATA REVIEWED:  Additional History from Old Records Summarized (2): Dermatology and lymphedema records reviewed  Decision to Obtain Records (1): None.   Radiology Tests Summarized or Ordered (1): None.  Labs Reviewed or Ordered (1):  labs reviewed and ordered  Medicine Test Summarized or Ordered (1): Echocardiogram and Holter monitor reviewed  Independent Review of EKG or X-RAY(2 each): None.    The visit lasted a total of 35 minutes face to face with the patient. Over 50% of the time was spent counseling and educating the patient about management of peripheral edema  .    Dragon dictation was used for this note. Speech recognition errors are a possibility.     MEDICATIONS:  Current Outpatient Medications   Medication Sig Dispense Refill     budesonide-formoterol (SYMBICORT) 160-4.5 MCG/ACT Inhaler [BUDESONIDE-FORMOTEROL (SYMBICORT) 160-4.5 MCG/ACTUATION INHALER] USE 2 INHALATIONS TWICE A DAY 10.2 g 11     doxazosin (CARDURA) 4 MG tablet Take 1 tablet (4 mg) by mouth At Bedtime 90 tablet 3     LORazepam (ATIVAN) 0.5 MG tablet [LORAZEPAM (ATIVAN) 0.5 MG TABLET] TAKE 1/2 TO 1 TABLET BY MOUTH THREE TIMES DAILY AS NEEDED FOR ANXIOUSNESS 30 tablet 3     apixaban ANTICOAGULANT (ELIQUIS) 5 MG tablet Take 1 tablet (5 mg) by mouth 2 times  daily 180 tablet 3     bumetanide (BUMEX) 1 MG tablet Take 1 tablet (1 mg) by mouth daily 90 tablet 3     cholecalciferol, vitamin D3, (VITAMIN D3) 25 mcg (1,000 unit) capsule [CHOLECALCIFEROL, VITAMIN D3, (VITAMIN D3) 25 MCG (1,000 UNIT) CAPSULE]        doxazosin (CARDURA) 8 MG tablet Take 0.5 tablets (4 mg) by mouth At Bedtime 90 tablet 3     finasteride (PROSCAR) 5 mg tablet [FINASTERIDE (PROSCAR) 5 MG TABLET] TAKE 1 TABLET DAILY 90 tablet 3     metoprolol tartrate (LOPRESSOR) 100 MG tablet Take 1 tablet (100 mg) by mouth 2 times daily 60 tablet 5     mycophenolate (GENERIC EQUIVALENT) 500 MG tablet 2,000 mg daily        nitroglycerin (NITROSTAT) 0.4 MG SL tablet [NITROGLYCERIN (NITROSTAT) 0.4 MG SL TABLET] PLACE ONE TABLET UNDER THE TONGUE EVERY 5 MINUTES AS NEEDED FOR CHEST PAIN 25 tablet 3     pravastatin (PRAVACHOL) 20 MG tablet [PRAVASTATIN (PRAVACHOL) 20 MG TABLET] Take 1 tablet (20 mg total) by mouth every evening. 90 tablet 3     spironolactone (ALDACTONE) 25 MG tablet Take 1 tablet (25 mg) by mouth daily 30 tablet 5       Answers for HPI/ROS submitted by the patient on 12/14/2021  Current status of COPD symptom:: no change  Status of fatigue and dyspnea with ambulation:: same as usual  Status of dyspnea:: none  Increase or change in cough or sputum:: rarely  Have you noticed a change in your sputum/phlegm?: No  Have you experienced a recent fever?: No  Baseline ambulation without stopping to rest:: the length of 3-5 rooms  Number of flights of stairs without resting:: None  Have you had any Emergency Room, Urgent Care visits or a Hospital admission because of your COPD since your last office visit?: No  Do you take an aspirin every day?: No  How many servings of fruits and vegetables do you eat daily?: 2-3  On average, how many sweetened beverages do you drink each day (Examples: soda, juice, sweet tea, etc.  Do NOT count diet or artificially sweetened beverages)?: 1  How many minutes a day do you  exercise enough to make your heart beat faster?: 9 or less  How many days a week do you exercise enough to make your heart beat faster?: 7  How many days per week do you miss taking your medication?: 0

## 2021-12-17 ENCOUNTER — HOSPITAL ENCOUNTER (OUTPATIENT)
Dept: PHYSICAL THERAPY | Facility: CLINIC | Age: 86
Setting detail: THERAPIES SERIES
End: 2021-12-17
Attending: ORTHOPAEDIC SURGERY
Payer: COMMERCIAL

## 2021-12-17 PROCEDURE — 97140 MANUAL THERAPY 1/> REGIONS: CPT | Mod: GP | Performed by: PHYSICAL THERAPIST

## 2021-12-17 NOTE — PROGRESS NOTES
"[facility]    Outpatient Physical Therapy Progress Note  Patient: Nishant Gilliam  : 1935    Beginning/End Dates of Reporting Period:  10/28/21 to 21    Referring Provider: Dr. Mimi Albert    Therapy Diagnosis: Lymphedema     Client Self Report: Wraps in place, completed by spouse since last session. MD doubled diuretics dose to \"help with thighs\" at Wednesday appointment. Continues to have increased blistering to BLE.    Objective Measurements:  Objective Measure: R LE [id number] sitting  Details: 21:4196.43mL (-8.5% from eval at last measure)  Objective Measure: L LE [id number] sitting  Details: 21:3970.25mL (-11.5% from eval at last measure)  Objective Measure: Edema  Details: Pitting superior to knee, appears reduced below knee, no pitting to foot, soft mobile edema to pre-tibial arera     Outcome Measures (most recent score):       Goals:  Goal Identifier Home Program   Goal Description Pt, and spouse as needed, will be independent with skin care, elevation, exericse and self massage for better edema management to prevent infection and additional fibrotic skin change.    Target Date 22   Date Met      Progress (detail required for progress note): In progress: spouse able to apply GCB independently, pt with good compliance keeping wraps in place. Pt admits to intermittent completion of self massage     Goal Identifier LLIS   Goal Description Pt will score 33 or less to reflect the MICD in impact of edema on quality of life.     Target Date 22   Date Met      Progress (detail required for progress note): to be completed once garments arrive     Goal Identifier Volume   Goal Description Pt will have 10% reduction in B LE volume for improved bed mobility and prepare for garment fitting.    Target Date 22   Date Met      Progress (detail required for progress " note): (at last measure) RLE -8.5%; LLE -11.5%     Goal Identifier Compression   Goal Description Pt will use compression garments to better manage chronic swelling and allow him to fit into his walking shoes.   Target Date 02/25/22   Date Met      Progress (detail required for progress note): BLE velcro garments ordered         Plan:  Continue therapy per current plan of care.    Discharge:  No

## 2021-12-20 ENCOUNTER — HOSPITAL ENCOUNTER (OUTPATIENT)
Dept: PHYSICAL THERAPY | Facility: CLINIC | Age: 86
Setting detail: THERAPIES SERIES
End: 2021-12-20
Attending: ORTHOPAEDIC SURGERY
Payer: COMMERCIAL

## 2021-12-20 ENCOUNTER — TELEPHONE (OUTPATIENT)
Dept: SLEEP MEDICINE | Facility: CLINIC | Age: 86
End: 2021-12-20
Payer: COMMERCIAL

## 2021-12-20 PROCEDURE — 97140 MANUAL THERAPY 1/> REGIONS: CPT | Mod: GP | Performed by: PHYSICAL THERAPIST

## 2021-12-20 PROCEDURE — 97535 SELF CARE MNGMENT TRAINING: CPT | Mod: GP | Performed by: PHYSICAL THERAPIST

## 2021-12-20 NOTE — TELEPHONE ENCOUNTER
Reason for Call:  Other appointment    Detailed comments: pt calling to schedule sleep study.     Phone Number Patient can be reached at: Cell number on file:    Telephone Information:   Mobile [phone]       Best Time: any    Can we leave a detailed message on this number? YES    Call taken on 12/20/2021 at 12:32 PM by Joycelyn White

## 2021-12-22 ENCOUNTER — HOSPITAL ENCOUNTER (OUTPATIENT)
Dept: PHYSICAL THERAPY | Facility: CLINIC | Age: 86
Setting detail: THERAPIES SERIES
End: 2021-12-22
Attending: ORTHOPAEDIC SURGERY
Payer: COMMERCIAL

## 2021-12-22 PROCEDURE — 97140 MANUAL THERAPY 1/> REGIONS: CPT | Mod: GP | Performed by: PHYSICAL THERAPIST

## 2021-12-22 PROCEDURE — 97535 SELF CARE MNGMENT TRAINING: CPT | Mod: GP | Performed by: PHYSICAL THERAPIST

## 2021-12-27 ENCOUNTER — APPOINTMENT (OUTPATIENT)
Dept: URGENT CARE | Facility: CLINIC | Age: 86
End: 2021-12-27
Payer: COMMERCIAL

## 2021-12-28 ENCOUNTER — HOSPITAL ENCOUNTER (OUTPATIENT)
Dept: PHYSICAL THERAPY | Facility: CLINIC | Age: 86
Setting detail: THERAPIES SERIES
End: 2021-12-28
Attending: STUDENT IN AN ORGANIZED HEALTH CARE EDUCATION/TRAINING PROGRAM
Payer: COMMERCIAL

## 2021-12-28 NOTE — PROGRESS NOTES
[facility]    Outpatient Physical Therapy Progress Note  Patient: Nishant Gilliam  : 1935    Beginning/End Dates of Reporting Period:  10/28/21 to 21    Referring Provider: Dr. Mimi Albert    Therapy Diagnosis: Lymphedema     Client Self Report: Pt has an update for CLT. 21 his weight was 285.  Blisters have dried up as pt went back on an antibiotic.      Objective Measurements:  Objective Measure: R LE [id number], no board  Details: 5606.96mL, -15.9%  Objective Measure: L LE [id number], no board  Details: 5872.7mL, -13.3%  Objective Measure: Edema  Details: refill noted with 1+ pitting in the R ankle, pt has contours but not deep on legs from straps of velcro wraps.    Outcome Measures (most recent score):  Lymphedema Life Impact Scale (score range 0-72). A higher score indicates greater impairment.: 28    Goals:  Goal Identifier Home Program   Goal Description Pt, and spouse as needed, will be independent with skin care, elevation, exericse and self massage for better edema management to prevent infection and additional fibrotic skin change.    Target Date 22   Date Met  21   Progress (detail required for progress note):       Goal Identifier LLIS   Goal Description Pt will score 33 or less to reflect the MICD in impact of edema on quality of life.     Target Date 22   Date Met  21   Progress (detail required for progress note): 28     Goal Identifier Volume   Goal Description Pt will have 10% reduction in B LE volume for improved bed mobility and prepare for garment fitting.    Target Date 22   Date Met  21   Progress (detail required for progress note):       Goal Identifier Compression   Goal Description Pt will use compression garments to better manage chronic swelling and allow him to fit into his walking shoes.   Target Date 22   Date  Met      Progress (detail required for progress note): Has been using velcro wraps for 9 days, able to fit in shoes, mild refill on R>L foot and ankle     Plan:  Pt has attended 13 visit. He participated in intensive GCB treatment with support of spouse at home. Pt experienced nice reduction >10% bilaterally and has been fit with velcro compression wraps which spouse also helps to don.  LLIS score has decreased greater than MICD and pt reports that this is one treatment that he can see and feel the positive outcomes. Pt will return for recheck in 1 month.  Plan 1x/month up to 3 months as needed to ensure successful home management    Discharge:  No

## 2022-01-10 ENCOUNTER — TRANSFERRED RECORDS (OUTPATIENT)
Dept: HEALTH INFORMATION MANAGEMENT | Facility: CLINIC | Age: 87
End: 2022-01-10
Payer: COMMERCIAL

## 2022-01-20 ENCOUNTER — OFFICE VISIT (OUTPATIENT)
Dept: VASCULAR SURGERY | Facility: CLINIC | Age: 87
End: 2022-01-20
Attending: STUDENT IN AN ORGANIZED HEALTH CARE EDUCATION/TRAINING PROGRAM
Payer: COMMERCIAL

## 2022-01-20 VITALS — HEART RATE: 76 BPM | SYSTOLIC BLOOD PRESSURE: 108 MMHG | DIASTOLIC BLOOD PRESSURE: 72 MMHG | TEMPERATURE: 97.7 F

## 2022-01-20 DIAGNOSIS — I50.31 ACUTE DIASTOLIC CONGESTIVE HEART FAILURE (H): ICD-10-CM

## 2022-01-20 DIAGNOSIS — M79.89 SWELLING OF BOTH LOWER EXTREMITIES: ICD-10-CM

## 2022-01-20 DIAGNOSIS — E66.01 MORBID OBESITY WITH BMI OF 40.0-44.9, ADULT (H): Primary | ICD-10-CM

## 2022-01-20 DIAGNOSIS — L12.0 BULLOUS PEMPHIGOID (H): ICD-10-CM

## 2022-01-20 DIAGNOSIS — I87.2 VENOUS (PERIPHERAL) INSUFFICIENCY: ICD-10-CM

## 2022-01-20 PROCEDURE — G0463 HOSPITAL OUTPT CLINIC VISIT: HCPCS

## 2022-01-20 PROCEDURE — 99214 OFFICE O/P EST MOD 30 MIN: CPT | Performed by: STUDENT IN AN ORGANIZED HEALTH CARE EDUCATION/TRAINING PROGRAM

## 2022-01-20 RX ORDER — MINOCYCLINE HYDROCHLORIDE 100 MG/1
CAPSULE ORAL
COMMUNITY
Start: 2022-01-11 | End: 2022-06-03 | Stop reason: ALTCHOICE

## 2022-01-20 ASSESSMENT — PAIN SCALES - GENERAL: PAINLEVEL: SEVERE PAIN (6)

## 2022-01-20 NOTE — PROGRESS NOTES
Leg Swelling Follow Up     Date of Service: January 20, 2022     Date last seen by Dr. Albert:  10/7/21    PCP: Aditya Mehta MD       Impression:         1.   Venous insufficiency  2.   Bilateral lower extremity swelling  3.   Morbid obesity  4.   Bullous Pemphigoid    Plan:    1. Type of swelling was reviewed in detail with the patient.  Questions were answered and education was completed. Patient has had significant improvement in his measures/lower extremity swelling since his last visit.   2. ABIs done after last visit were normal.   3. Continue velcro compression, weekly wrapping and home exercises including manual drainage techniques.  4.  Increased skin pigmentation/brusing noted at this visit. Patient should follow with PCP regarding concerns for minocycline causing this. Based on history again today in clinic, he could have a component of lumbar stenosis compromising autonomic NS which may be contributing to his skin changes and bilateral thigh pain. A repeat lumbar XR was done today as patient hasn't had XRs since 2015, and referral to [facility] was placed today for further evaluation/management, as he may benefit from another CS injection.  5. Patient will follow up in 4 months, or when needed.  If any questions or concerns they are to contact the clinic.          On day of encounter time spent in chart review and with patient in consultation, exam, education and coordination of care, excluding procedures:  50 minutes          ---------------------------------------------------------------------------------------------------------------------     History of Present Illness:   Mr. Gilliam presents to the [facility] for follow up of bilateral lower extremity swelling. He has a history of HTN, BPH, morbid obesity, atrial fibrillation with RVR, CHF, bullous pemphigoid and clinically diagnosed venous insufficiency.     When Nishant initially presented, he stated that he has had swelling in  both his legs for several years now. He had tried wearing compression in the past, however felt that the stockings had constriction points and dug into his foot/toes at times. For his leg swelling, he has been prescribed lasix in the past which helped with the swelling, however when the dose was increased, caused a difficult bout of bullous pemphigoid.     Patient has been following with a dermatologist, Dr. Bala Hughes for management of bullous pemphigoid and was on prednisone daily, which was been tapered down. He is also on mycophenalate.  He also follows with Cardiology for his atrial fibrillation, and is now on daily bumex for heart failure.     Patient does endorse a smoking history and quit many years ago when he was 46 years of age. Prior to this, he smoked 2 packs per day. He has had BPH for many years and is on medications for this.     Since his last visit, patient has been following with Dermatology and seeing lymphedema therapy. At his last Dermatology visit with Dr. Hughes, he had lost 11 lbs and legs were less edematous. Continuing mycophenolate, clobetasol and minocycline BID for bullous pemphigoid.  Patient reports that he has noticed a significant reduction in his leg swelling since his last visit. His measures today are significantly lower and support this. He finished his last session of lymphedema therapy in Dec with instructions to continue a home program. He has continued with velcro compression daily and wraps weekly in addition to manual lymphatic drainage techniques (not as regular with this).   He has noticed quite a bit of increased pigmentation vs bruising in his legs over the last few months. He is unsure of whether it is the minocycline versus anything else. He re-endorses a history of low back pain, spinal stenosis and bilateral thigh pain that has been chronic.  There has been no new numbness, tingling or weakness that is in addition to his baseline.      Past Medical History:     Past Medical History:   Diagnosis Date     COPD (chronic obstructive pulmonary disease) (H)      Coronary artery disease      Hypertension      Right bundle branch block     complete        Surgical History:   Past Surgical History:   Procedure Laterality Date     HC KNEE SCOPE, DIAGNOSTIC      Description: Arthroscopy Knee Left;  Recorded: 06/27/2009;  Comments: details unk.     HERNIA REPAIR       ZZHC CORONARY STENT PERCUT, INITIAL VESSEL      Description: Cath Stent Placement;  Recorded: 02/24/2014;  Comments: stenting to the LAD in 1999. 2nd done years later.        Medications:    Current Outpatient Medications   Medication     apixaban ANTICOAGULANT (ELIQUIS) 5 MG tablet     budesonide-formoterol (SYMBICORT) 160-4.5 MCG/ACT Inhaler     bumetanide (BUMEX) 1 MG tablet     cholecalciferol, vitamin D3, (VITAMIN D3) 25 mcg (1,000 unit) capsule     doxazosin (CARDURA) 4 MG tablet     finasteride (PROSCAR) 5 mg tablet     LORazepam (ATIVAN) 0.5 MG tablet     metoprolol tartrate (LOPRESSOR) 100 MG tablet     minocycline (MINOCIN) 100 MG capsule     mycophenolate (GENERIC EQUIVALENT) 500 MG tablet     nitroglycerin (NITROSTAT) 0.4 MG SL tablet     pravastatin (PRAVACHOL) 20 MG tablet     spironolactone (ALDACTONE) 25 MG tablet     No current facility-administered medications for this visit.        Allergies:    Allergies   Allergen Reactions     Furosemide Rash     pemphigoid     Cat Hair Std Allergenic Ext [Cat Hair Extract] Unknown     Sulfa Drugs Unknown        Family history: No family history on file.     Social History:   Social History     Tobacco Use     Smoking status: Former Smoker     Years: 30.00     Types: Cigarettes     Smokeless tobacco: Never Used   Substance Use Topics     Alcohol use: No     Drug use: No          Review of Systems:     ROS negative except as noted in HPI.     Labs:      I personally reviewed the following lab results today and those on care everywhere, if indicated     No results  found for: CRP   Erythrocyte Sedimentation Rate   Date Value Ref Range Status   05/04/2021 3 0 - 15 mm/hr Final      Last Renal Panel:  Sodium   Date Value Ref Range Status   12/14/2021 139 136 - 145 mmol/L Final     Potassium   Date Value Ref Range Status   12/14/2021 4.7 3.5 - 5.0 mmol/L Final     Chloride   Date Value Ref Range Status   12/14/2021 99 98 - 107 mmol/L Final     Carbon Dioxide (CO2)   Date Value Ref Range Status   12/14/2021 27 22 - 31 mmol/L Final     Anion Gap   Date Value Ref Range Status   12/14/2021 13 5 - 18 mmol/L Final     Glucose   Date Value Ref Range Status   12/14/2021 102 70 - 125 mg/dL Final     Urea Nitrogen   Date Value Ref Range Status   12/14/2021 17 8 - 28 mg/dL Final     Creatinine   Date Value Ref Range Status   12/14/2021 1.17 0.70 - 1.30 mg/dL Final     GFR Estimate   Date Value Ref Range Status   12/14/2021 57 (L) >60 mL/min/1.73m2 Final     Comment:     As of July 11, 2021, eGFR is calculated by the CKD-EPI creatinine equation, without race adjustment. eGFR can be influenced by muscle mass, exercise, and diet. The reported eGFR is an estimation only and is only applicable if the renal function is stable.   07/10/2021 >60 >60 mL/min/1.73m2 Final     Calcium   Date Value Ref Range Status   12/14/2021 10.0 8.5 - 10.5 mg/dL Final     Albumin   Date Value Ref Range Status   03/09/2021 3.8 3.5 - 5.0 g/dL Final      Lab Results   Component Value Date    WBC 10.4 07/08/2021     Lab Results   Component Value Date    RBC 4.80 07/08/2021     Lab Results   Component Value Date    HGB 14.8 07/08/2021     Lab Results   Component Value Date    HCT 45.9 07/08/2021     No components found for: MCT  Lab Results   Component Value Date    MCV 96 07/08/2021     Lab Results   Component Value Date    MCH 30.8 07/08/2021     Lab Results   Component Value Date    MCHC 32.2 07/08/2021     Lab Results   Component Value Date    RDW 15.0 07/08/2021     Lab Results   Component Value Date      07/12/2021      No results found for: A1C   TSH   Date Value Ref Range Status   07/07/2021 0.54 0.30 - 5.00 uIU/mL Final        Imaging:     I personally reviewed the following imaging results today and those on care everywhere, if indicated     No results found for this visit on 01/20/22.            Physical Exam:     Vital Signs: /72   Pulse 76   Temp 97.7  F (36.5  C)  There is no height or weight on file to calculate BMI.   Wt Readings from Last 2 Encounters:   11/30/21 293 lb (132.9 kg)   08/24/21 295 lb (133.8 kg)   .    Circumferential volume measures:    Circumferential Measures 10/7/2021 1/20/2022   Right just above MTP 31.2 28.5   Right Ankle 33 29.7   Right Widest Calf 56.4 46.9   Right Thigh Up 10cm - 63   Right Knee to Ankle 40 42   Left - just above MTP 28.7 27   Left Ankle 32.5 28.3   Left Widest Calf 53.7 46.5   Left Thigh Up 10cm - 62.5   Left Knee to Ankle 40 41.5                           General: In no apparent distress, sitting in wheelchair      Psych: Alert and oriented X 3. Affect normal.      HEENT: Atraumatic, normocephalic      Respiratory:  Breathing comfortably on room air        Musculoskeletal:  Normal range of motion in hips, knees and ankles bilaterally.  There is no active joint synovitis, erythema, swelling or joint laxity.       Neurological:  Sensation is intact to  light touch in both legs      Vascular: Dorsalis pedis and posterior tibialis pulses are strong and equal bilaterally to manual palpation. Significant venous varicosities, bruising and signs of venous insufficiency.     Integumentary: Skin of the legs is  dry, taut, shiny, erythematous, fibrotic, scarred and thickened. Increased pigmentation/bruising noted since his last visit. Nails are thickened. 2+ pitting edema up to knee level bilaterally. Healed pemphigus scars are visible in both lower extremities. Negative Stemmer's sign bilaterally. No open skin wounds/lesions present on inspection.             Mimi Albert MD  Wound Care and Lymphedema   [facility]   [phone]

## 2022-01-20 NOTE — PATIENT INSTRUCTIONS
"CONTINUE COMPRESSION AND DAILY EXERCISES    Velcro Compression Wraps Instructions    1) Slide leg liner or Tubigrip onto the leg before applying the velcro warp.     2) Apply the velcro wrap over the leg liner. Pull bands to tighten for compression.     3) The top of the velcro warp should start just below the knee crease and the bottom should reach just above the ankle bone.    4) Angle each band individually to achieve a snug and wrinkle-free fit. Do not tuck the bands and the velcro should never touch the skin.    5) Lastly apply the anklet sock over the velcro wrap if instructed to do so. This should be worn over the velcro wrap due to sensitivity from the ribbed edge.    6) To remove the velcro wrap, undo each band and fold it back on itself. If done properly the garment should be ready for easy application.    7) To extend the life of velcro wraps, hand wash and hang dry. You should replace your velcro wraps every 1-2 years.         DO NOT STRAP VELCRO TO LEG LINER OR TUBRIGRIP!!    You should wear compression as much as you can. It is especially important to wear compression with long periods of sitting/standing, long car rides or if you will be flying. Compression socks should get refilled every 4-6 months. Compression Velcro should be replaced every 1-2 years. They do not need to be worn at night while in bed. We can refill your compression prescription for 1 year otherwise your primary is able to refill them for you.   Call us with any problems or questions.If you do a lot of standing it is good to do calf raises to help keep the blood pumping. If you sit a lot at work it is good to get up periodically to walk around. Elevation of the foot of your bed 4-6\" helps the blood return back towhere it is needed   Velcro compression should never hurt. If you experience any pain, immediatly remove and consult your physician. It should feel firm but comfortable. If pressure increases or decreases noticeably during " wear, loosen or tighten any bands.

## 2022-02-03 ENCOUNTER — TELEPHONE (OUTPATIENT)
Dept: INTERNAL MEDICINE | Facility: CLINIC | Age: 87
End: 2022-02-03

## 2022-02-03 DIAGNOSIS — Z51.81 ENCOUNTER FOR THERAPEUTIC DRUG MONITORING: Primary | ICD-10-CM

## 2022-02-03 NOTE — TELEPHONE ENCOUNTER
Reason for Call: Request for an order or referral:    Order or referral being requested:   Metobolic panel  Magnesium levels    Pt does have a lab appt scheduled for 02/25/2022    Please place orders    Date needed: as soon as possible    Has the patient been seen by the PCP for this problem? YES    Additional comments: n/a    Phone number Patient can be reached at:  Cell number on file:    Telephone Information:   Mobile [phone]       Best Time:  anytime    Can we leave a detailed message on this number?  YES    Call taken on 2/3/2022 at 8:26 AM by Sara Velez

## 2022-02-10 ENCOUNTER — THERAPY VISIT (OUTPATIENT)
Dept: SLEEP MEDICINE | Facility: CLINIC | Age: 87
End: 2022-02-10
Payer: COMMERCIAL

## 2022-02-10 DIAGNOSIS — R06.81 WITNESSED APNEIC SPELLS: ICD-10-CM

## 2022-02-10 DIAGNOSIS — R40.0 DAYTIME SOMNOLENCE: ICD-10-CM

## 2022-02-10 DIAGNOSIS — I27.20 PULMONARY HYPERTENSION (H): ICD-10-CM

## 2022-02-10 DIAGNOSIS — G47.9 SLEEP DISTURBANCE: ICD-10-CM

## 2022-02-10 DIAGNOSIS — E66.9 OBESITY (BMI 30-39.9): ICD-10-CM

## 2022-02-10 DIAGNOSIS — I48.91 ATRIAL FIBRILLATION WITH RVR (H): ICD-10-CM

## 2022-02-11 NOTE — PROGRESS NOTES
A note was left by farhad explaining that patient was not comfortable in the recliner. Farhad wrote a note saying he thought our [place] location would better suit patients needs. Patient also arrived thinking he would get his Ambien from staff. He decided to go home.  Patient will need to be rescheduled and asked what accomodation he needs and given Ambien Rx if warranted. May have to reach out to provider.

## 2022-02-11 NOTE — NURSING NOTE
After Patient arrived and was roomed, Patient had several issues that need to be addressed. Patient stated he was told that he would be provided with a sleep aid to help him achieve sleep during study, patient was told he was responsible for bring sleep aid to to study with him and technologist could not provide sleep aid. Patient stated he was never prescribe said sleep aid. Patient also describe himself as sleeping late into the day, which could not be facilitated without prior  set up.  Patient stated that since  his initial visit, he is now sleeping in recliner and would not be able to achieve sleep in a bed. Patient was told he could sleep in recliner although after sitting in recliner he determined that available recliner was not comfortable enough and he would not be able to achieve sleep in provided recliner.  Patient chose to NOT continue with with study and potentially reschedule to a facility that would better meet his needs. Patient was asked if he would contact provider to discuss said needs, to which he responded, he was unsure if he would contact ordering provider. Patient should be contacted to reschedule.

## 2022-02-25 ENCOUNTER — LAB (OUTPATIENT)
Dept: LAB | Facility: CLINIC | Age: 87
End: 2022-02-25
Payer: COMMERCIAL

## 2022-02-25 DIAGNOSIS — Z51.81 ENCOUNTER FOR THERAPEUTIC DRUG MONITORING: ICD-10-CM

## 2022-02-25 DIAGNOSIS — Z13.220 SCREENING FOR HYPERLIPIDEMIA: Primary | ICD-10-CM

## 2022-02-25 LAB
ALT SERPL W P-5'-P-CCNC: 20 U/L (ref 0–45)
ANION GAP SERPL CALCULATED.3IONS-SCNC: 13 MMOL/L (ref 5–18)
BUN SERPL-MCNC: 24 MG/DL (ref 8–28)
CALCIUM SERPL-MCNC: 9.4 MG/DL (ref 8.5–10.5)
CHLORIDE BLD-SCNC: 102 MMOL/L (ref 98–107)
CHOLEST SERPL-MCNC: 128 MG/DL
CO2 SERPL-SCNC: 26 MMOL/L (ref 22–31)
CREAT SERPL-MCNC: 1.19 MG/DL (ref 0.7–1.3)
CREAT UR-MCNC: 61 MG/DL
FASTING STATUS PATIENT QL REPORTED: NORMAL
GFR SERPL CREATININE-BSD FRML MDRD: 60 ML/MIN/1.73M2
GLUCOSE BLD-MCNC: 125 MG/DL (ref 70–125)
HDLC SERPL-MCNC: 48 MG/DL
LDLC SERPL CALC-MCNC: 56 MG/DL
MAGNESIUM SERPL-MCNC: 2 MG/DL (ref 1.8–2.6)
MICROALBUMIN UR-MCNC: 3.2 MG/DL (ref 0–1.99)
MICROALBUMIN/CREAT UR: 52.5 MG/G CR
POTASSIUM BLD-SCNC: 4.4 MMOL/L (ref 3.5–5)
SODIUM SERPL-SCNC: 141 MMOL/L (ref 136–145)
TRIGL SERPL-MCNC: 121 MG/DL

## 2022-02-25 PROCEDURE — 36415 COLL VENOUS BLD VENIPUNCTURE: CPT

## 2022-02-25 PROCEDURE — 84460 ALANINE AMINO (ALT) (SGPT): CPT

## 2022-02-25 PROCEDURE — 82043 UR ALBUMIN QUANTITATIVE: CPT

## 2022-02-25 PROCEDURE — 80061 LIPID PANEL: CPT

## 2022-02-25 PROCEDURE — 83735 ASSAY OF MAGNESIUM: CPT

## 2022-02-25 PROCEDURE — 80048 BASIC METABOLIC PNL TOTAL CA: CPT

## 2022-03-01 ENCOUNTER — OFFICE VISIT (OUTPATIENT)
Dept: INTERNAL MEDICINE | Facility: CLINIC | Age: 87
End: 2022-03-01
Payer: COMMERCIAL

## 2022-03-01 VITALS
WEIGHT: 285.4 LBS | DIASTOLIC BLOOD PRESSURE: 64 MMHG | HEART RATE: 73 BPM | SYSTOLIC BLOOD PRESSURE: 100 MMHG | BODY MASS INDEX: 37.65 KG/M2

## 2022-03-01 DIAGNOSIS — M17.32 POST-TRAUMATIC OSTEOARTHRITIS OF LEFT KNEE: Primary | ICD-10-CM

## 2022-03-01 DIAGNOSIS — M54.50 CHRONIC MIDLINE LOW BACK PAIN WITHOUT SCIATICA: ICD-10-CM

## 2022-03-01 DIAGNOSIS — L84 CORN OR CALLUS: ICD-10-CM

## 2022-03-01 DIAGNOSIS — G89.29 CHRONIC MIDLINE LOW BACK PAIN WITHOUT SCIATICA: ICD-10-CM

## 2022-03-01 DIAGNOSIS — I48.19 PERSISTENT ATRIAL FIBRILLATION (H): ICD-10-CM

## 2022-03-01 DIAGNOSIS — L12.0 BULLOUS PEMPHIGOID (H): ICD-10-CM

## 2022-03-01 DIAGNOSIS — N18.31 CHRONIC KIDNEY DISEASE, STAGE 3A (H): ICD-10-CM

## 2022-03-01 PROCEDURE — 99214 OFFICE O/P EST MOD 30 MIN: CPT | Performed by: INTERNAL MEDICINE

## 2022-03-01 RX ORDER — GABAPENTIN 100 MG/1
CAPSULE ORAL
Qty: 90 CAPSULE | Refills: 11 | Status: SHIPPED | OUTPATIENT
Start: 2022-03-01 | End: 2023-02-20

## 2022-03-01 NOTE — PATIENT INSTRUCTIONS
1.  Ortho referral for knee pain.  May need steroid or Synvisc injection.    2.  Podiatry referral for foot care.    3.  Gabapentin 100 mg at bedtime for three night, then 200 mg at bedtime, then 300 mg.  Note effect on pain.    4.  Labs were good.    5.  See in three months or as needed.

## 2022-03-02 PROBLEM — I50.9 ACUTE CONGESTIVE HEART FAILURE, UNSPECIFIED HEART FAILURE TYPE (H): Status: RESOLVED | Noted: 2021-07-10 | Resolved: 2022-03-02

## 2022-03-02 NOTE — PROGRESS NOTES
Answers for HPI/ROS submitted by the patient on 3/1/2022  Dyspnea:: with activity only  Status:: same as usual  Edema:: same as usual  Are you using more pillows than usual at night?: No  Do you cough at night?: No  Checking weight daily:: Yes  Weight change recently:: weight decrease  Heart Medication Side Effects:: fatigue  Frequency:: None  Do you take an aspirin every day?: No  How many servings of fruits and vegetables do you eat daily?: 2-3  On average, how many sweetened beverages do you drink each day (Examples: soda, juice, sweet tea, etc.  Do NOT count diet or artificially sweetened beverages)?: 1  How many minutes a day do you exercise enough to make your heart beat faster?: 9 or less  How many days a week do you exercise enough to make your heart beat faster?: 3 or less  How many days per week do you miss taking your medication?: 0  What is the reason for your visit today?: Follow up for various disorders  When did your symptoms begin?: More than a month  How would you describe these symptoms?: Severe  Are your symptoms:: Worsening  Have you had these symptoms before?: Yes  Have you tried or received treatment for these symptoms before?: Yes  Did that treatment work? : No      ASSESSMENT:  1. Post-traumatic osteoarthritis of left knee  Pérez complains of severe  left knee pain.  He does report an old injury.  X-ray from 2018 revealed advanced DJD of all 3 compartments.  He had a previous steroid injection which only helped for about 2 weeks.  Orthopedic referral is recommended.  Perhaps a new steroid injection or Synvisc would be of benefit.  He would not be a good candidate for joint replacement  - Orthopedic  Referral; Future    2. Corn or callus  He requests a podiatry referral.  He has a painful corn on the tip of a toe and also very thick dystrophic nails which he cannot manage on his own  - Orthopedic  Referral; Future    3. Chronic midline low back pain without sciatica  He has  chronic problems with low back pain which is aggravated by laying down.  He is not a good candidate for NSAIDs since he is on Eliquis and has significant issues with peripheral edema.  He will be given a trial of gabapentin at night  - gabapentin (NEURONTIN) 100 MG capsule; One in evening for three days, twice daily for three days, then three  tabs in evening.  Dispense: 90 capsule; Refill: 11    4. Bullous pemphigoid  He sees Dr. Bala Hughes.  Symptoms are much improved on mycophenolate and minocycline.  However, he is developing blackish discoloration of the skin which she attributes to the minocycline.  Minocycline will be cut to 100 mg daily.  He will discuss further management options with Dr. Knox    5. Chronic kidney disease, stage 3a (H)  He was concerned about a diagnosis of stage III renal failure on his chart.  This was discussed.  Last GFR was actually quite good for age at 59.    6. Persistent atrial fibrillation (H)  He is on Eliquis as an anticoagulant and metoprolol for rate control    7.  Essential hypertension:  Blood pressure is now actually low normal range    8.  Venous insufficiency:  Uses compressive stockings and is on Bumex 2 mg daily.  This is under much better control than when he was last seen    9.  Microalbuminuria: He previously was on losartan  Which was discontinued with low normal blood pressure.  Consideration should be given to restarting it if blood pressure allows.  Consideration could be given to switching Cardura to tamsulosin and beginning losartan in the future    PLAN:  Patient Instructions   1.  Ortho referral for knee pain.  May need steroid or Synvisc injection.    2.  Podiatry referral for foot care.    3.  Gabapentin 100 mg at bedtime for three night, then 200 mg at bedtime, then 300 mg.  Note effect on pain.    4.  Labs were good.    5.  See in three months or as needed.    6.  Follow-up with Dr. Hughes as planned for bullous pemphigoid    Orders Placed This Encounter    Procedures     Orthopedic  Referral     Orthopedic  Referral     There are no discontinued medications.    Return in about 3 months (around 6/1/2022) for Follow up.    ASSESSED PROBLEMS:  See above      CHIEF COMPLAINT:  Multiple concerns    HISTORY OF PRESENT ILLNESS:  Nishant is a 86 year old male seen with his wife for evaluation of multiple concerns.  He has a history of bullous pemphigoid.  He sees Dr. Bala Hughes for this.  He is now off of steroids.  He takes mycophenolate and has been on minocycline 200 mg daily.  Symptoms have dramatically improved.  However, he has noted blackish discoloration of the skin on multiple areas which has been attributed to the minocycline.    He also complains of severe left knee pain.  He does have an old injury.  X-ray from 2018 revealed advanced DJD of all 3 compartments.  He had a previous steroid injection but reports it only helped for about 2 weeks.  He is currently quite sedentary and came into the clinic in a wheelchair.  He has not a candidate for NSAIDs since he is on Eliquis and since he has major issues with peripheral edema    He remains on Bumex 2 mg daily for peripheral edema he also has been using compressive stockings.  Edema he is dramatically improved.    He had previous referral for a sleep study.  However, he could not sleep in the bed or recliner which was offered.  He uses a recliner at home.  He has chronic issues with low back pain aggravated by laying down.      He remains on Eliquis as an anticoagulant due to history of atrial fibrillation.  He is on metoprolol for rate control    Labs from 2/25 were reviewed.  Lipids were excellent with total cholesterol of 128.  Metabolic panel looked good.  He did have an elevated microalbumin.    He complains of a painful corn on his foot and difficulties with foot care due to thick nails.  He requests a podiatry referral      REVIEW OF SYSTEMS:  See above.  He is quite hard of  hearing.  Comprehensive review of systems is otherwise negative.    PFSH:  Seen with his wife.  She is about 20 years younger than Pérez    TOBACCO USE:  History   Smoking Status     Former Smoker     Years: 30.00     Types: Cigarettes   Smokeless Tobacco     Never Used       VITALS:  Vitals:    03/01/22 1530   BP: 100/64   BP Location: Left arm   Patient Position: Sitting   Cuff Size: Adult Large   Pulse: 73   Weight: 129.5 kg (285 lb 6.4 oz)     Wt Readings from Last 3 Encounters:   03/01/22 129.5 kg (285 lb 6.4 oz)   11/30/21 132.9 kg (293 lb)   08/24/21 133.8 kg (295 lb)       PHYSICAL EXAM:  Constitutional:   Reveals an alert obese hard of hearing man seen in a wheelchair.   Vitals: per nursing notes.  HEENT: Atraumatic.    Eyes:  no conjunctival hyperemia  Neck:  Supple, no carotid bruits or adenopathy.  Back:  No spine or CVA pain.  Thorax:  No bony deformities.  Lungs: Diminished air movement.  No focal rales or wheezes  Cardiac: Irregularly irregular rate and rhythm, normal S1, S2, no murmur or gallop.  Abdomen:  Soft, active bowel sounds without bruits, mass, or tenderness.  Obese.  Extremities: Trace peripheral edema with stasis changes.  Some superficial varicosities lower legs.  No foot ulcers.  No joint effusion noted left knee  Skin:   patchy hypopigmented areas on legs and arms.  Scattered seborrheic keratosis  Neuro:  Alert and oriented.   No gross focal deficits.  Psychiatric:  Memory intact, mood appropriate.    DATA REVIEWED:  Additional History from Old Records Summarized (2): Dermatology records reviewed.  Decision to Obtain Records (1): None.   Radiology Tests Summarized or Ordered (1):  left knee x-ray reviewed  Labs Reviewed or Ordered (1): Previsit labs reviewed  Medicine Test Summarized or Ordered (1): None.   Independent Review of EKG or X-RAY(2 each): None.    The visit lasted a total of 35 minutes face to face with the patient. Over 50% of the time was spent counseling and educating the  patient about multiple concerns        Dragon dictation was used for this note. Speech recognition errors are a possibility.     MEDICATIONS:  Current Outpatient Medications   Medication Sig Dispense Refill     apixaban ANTICOAGULANT (ELIQUIS) 5 MG tablet Take 1 tablet (5 mg) by mouth 2 times daily 180 tablet 3     budesonide-formoterol (SYMBICORT) 160-4.5 MCG/ACT Inhaler [BUDESONIDE-FORMOTEROL (SYMBICORT) 160-4.5 MCG/ACTUATION INHALER] USE 2 INHALATIONS TWICE A DAY 10.2 g 11     bumetanide (BUMEX) 1 MG tablet Take 2 tablets (2 mg) by mouth daily 180 tablet 3     cholecalciferol, vitamin D3, (VITAMIN D3) 25 mcg (1,000 unit) capsule [CHOLECALCIFEROL, VITAMIN D3, (VITAMIN D3) 25 MCG (1,000 UNIT) CAPSULE]        doxazosin (CARDURA) 4 MG tablet Take 1 tablet (4 mg) by mouth At Bedtime 90 tablet 3     finasteride (PROSCAR) 5 mg tablet [FINASTERIDE (PROSCAR) 5 MG TABLET] TAKE 1 TABLET DAILY 90 tablet 3     gabapentin (NEURONTIN) 100 MG capsule One in evening for three days, twice daily for three days, then three  tabs in evening. 90 capsule 11     LORazepam (ATIVAN) 0.5 MG tablet [LORAZEPAM (ATIVAN) 0.5 MG TABLET] TAKE 1/2 TO 1 TABLET BY MOUTH THREE TIMES DAILY AS NEEDED FOR ANXIOUSNESS 30 tablet 3     metoprolol tartrate (LOPRESSOR) 100 MG tablet Take 1 tablet (100 mg) by mouth 2 times daily 60 tablet 5     minocycline (MINOCIN) 100 MG capsule TAKE 1 CAPSULE BY MOUTH TWICE DAILY WITH FOOD AND WATER       mycophenolate (GENERIC EQUIVALENT) 500 MG tablet 2,000 mg daily        nitroglycerin (NITROSTAT) 0.4 MG SL tablet [NITROGLYCERIN (NITROSTAT) 0.4 MG SL TABLET] PLACE ONE TABLET UNDER THE TONGUE EVERY 5 MINUTES AS NEEDED FOR CHEST PAIN 25 tablet 3     pravastatin (PRAVACHOL) 20 MG tablet [PRAVASTATIN (PRAVACHOL) 20 MG TABLET] Take 1 tablet (20 mg total) by mouth every evening. 90 tablet 3     spironolactone (ALDACTONE) 25 MG tablet Take 1 tablet (25 mg) by mouth daily 30 tablet 5

## 2022-03-14 ENCOUNTER — TRANSFERRED RECORDS (OUTPATIENT)
Dept: HEALTH INFORMATION MANAGEMENT | Facility: CLINIC | Age: 87
End: 2022-03-14
Payer: COMMERCIAL

## 2022-03-19 DIAGNOSIS — F51.01 PRIMARY INSOMNIA: ICD-10-CM

## 2022-03-19 DIAGNOSIS — F41.9 ANXIETY: ICD-10-CM

## 2022-03-21 ENCOUNTER — MYC MEDICAL ADVICE (OUTPATIENT)
Dept: INTERNAL MEDICINE | Facility: CLINIC | Age: 87
End: 2022-03-21
Payer: COMMERCIAL

## 2022-03-21 ENCOUNTER — TRANSFERRED RECORDS (OUTPATIENT)
Dept: HEALTH INFORMATION MANAGEMENT | Facility: CLINIC | Age: 87
End: 2022-03-21
Payer: COMMERCIAL

## 2022-03-21 DIAGNOSIS — I50.33 ACUTE ON CHRONIC DIASTOLIC HEART FAILURE (H): ICD-10-CM

## 2022-03-21 RX ORDER — LORAZEPAM 0.5 MG/1
TABLET ORAL
Qty: 30 TABLET | Refills: 0 | Status: SHIPPED | OUTPATIENT
Start: 2022-03-21 | End: 2022-10-06

## 2022-03-21 NOTE — TELEPHONE ENCOUNTER
Routing refill request to provider for review/approval because:  Controlled substance request    Last Written Prescription Date:  12/14/21  Last Fill Quantity: 30,  # refills: 3   Last office visit provider:  3/1/22     Requested Prescriptions   Pending Prescriptions Disp Refills     LORazepam (ATIVAN) 0.5 MG tablet [Pharmacy Med Name: LORAZEPAM 0.5MG TABLETS] 30 tablet      Sig: TAKE 1/2 TO 1 TABLET BY MOUTH THREE TIMES DAILY AS NEEDED FOR ANXIOUSNESS       There is no refill protocol information for this order          Eda Kemp RN 03/21/22 12:08 PM

## 2022-03-23 RX ORDER — BUMETANIDE 1 MG/1
2 TABLET ORAL DAILY
Qty: 180 TABLET | Refills: 3 | Status: SHIPPED | OUTPATIENT
Start: 2022-03-23 | End: 2024-01-12

## 2022-04-08 NOTE — PROGRESS NOTES
[facility]    Outpatient Physical Therapy Discharge Note  Patient: Nishant Gilliam  : 1935    Beginning/End Dates of Reporting Period:  10/28/21 to 22    Referring Provider: Dr. Mimi Albert    Therapy Diagnosis: Lymphedema     Client Self Report: Pt has an update for CLT. 21 his weight was 285.  Blisters have dried up as pt went back on an antibiotic.      Objective Measurements:  Objective Measure: R LE [id number], no board  Details: 5606.96mL, -15.9%  Objective Measure: L LE [id number], no board  Details: 5872.7mL, -13.3%  Objective Measure: Edema  Details: refill noted with 1+ pitting in the R ankle, pt has contours but not deep on legs from straps of velcro wraps.    Outcome Measures (most recent score):  Lymphedema Life Impact Scale (score range 0-72). A higher score indicates greater impairment.: 28    Goals:  Goal Identifier Home Program   Goal Description Pt, and spouse as needed, will be independent with skin care, elevation, exericse and self massage for better edema management to prevent infection and additional fibrotic skin change.    Target Date 22   Date Met  21   Progress (detail required for progress note):       Goal Identifier LLIS   Goal Description Pt will score 33 or less to reflect the MICD in impact of edema on quality of life.     Target Date 22   Date Met  21   Progress (detail required for progress note): 28     Goal Identifier Volume   Goal Description Pt will have 10% reduction in B LE volume for improved bed mobility and prepare for garment fitting.    Target Date 22   Date Met  21   Progress (detail required for progress note):       Goal Identifier Compression   Goal Description Pt will use compression garments to better manage chronic swelling and allow him to fit into his walking shoes.   Target Date 22   Date  Met      Progress (detail required for progress note): Has been using velcro wraps for 9 days, able to fit in shoes, mild refill on R>L foot and ankle     Plan:  Discharge from therapy. Unable to assess longer term goal as pt had to cancel follow-up appt and did not reschedule.     Discharge:    Reason for Discharge: Patient has met 3/4 goals, did not follow-up    Equipment Issued: velcro wraps, bandaging supplies    Discharge Plan: Patient to continue home program.

## 2022-05-12 ENCOUNTER — OFFICE VISIT (OUTPATIENT)
Dept: VASCULAR SURGERY | Facility: CLINIC | Age: 87
End: 2022-05-12
Attending: STUDENT IN AN ORGANIZED HEALTH CARE EDUCATION/TRAINING PROGRAM
Payer: COMMERCIAL

## 2022-05-12 ENCOUNTER — PATIENT OUTREACH (OUTPATIENT)
Dept: ONCOLOGY | Facility: CLINIC | Age: 87
End: 2022-05-12
Payer: COMMERCIAL

## 2022-05-12 VITALS — DIASTOLIC BLOOD PRESSURE: 68 MMHG | SYSTOLIC BLOOD PRESSURE: 120 MMHG | HEART RATE: 75 BPM

## 2022-05-12 DIAGNOSIS — I87.2 VENOUS (PERIPHERAL) INSUFFICIENCY: ICD-10-CM

## 2022-05-12 DIAGNOSIS — E66.01 MORBID OBESITY WITH BMI OF 40.0-44.9, ADULT (H): Primary | ICD-10-CM

## 2022-05-12 DIAGNOSIS — I50.31 ACUTE DIASTOLIC CONGESTIVE HEART FAILURE (H): ICD-10-CM

## 2022-05-12 DIAGNOSIS — L12.0 BULLOUS PEMPHIGOID (H): ICD-10-CM

## 2022-05-12 DIAGNOSIS — M79.89 SWELLING OF BOTH LOWER EXTREMITIES: ICD-10-CM

## 2022-05-12 PROCEDURE — 99215 OFFICE O/P EST HI 40 MIN: CPT | Performed by: STUDENT IN AN ORGANIZED HEALTH CARE EDUCATION/TRAINING PROGRAM

## 2022-05-12 ASSESSMENT — PAIN SCALES - GENERAL: PAINLEVEL: NO PAIN (0)

## 2022-05-12 NOTE — PATIENT INSTRUCTIONS
A referral was sent to [facility] for lymphedema therapy.   You will receive a phone call in 1-2 business days to schedule your appointment.   If for some reason you do not hear from them or wish to schedule sooner, please call [phone] to schedule.    SWELLING/LYMPHEDEMA THERAPY TREATMENT     - What to expect your first visit     Based on your initial evaluation, you will have an individualized program designed by a certified lymphedema therapist, with specialized training in swelling.     It will consist of discussing your prior activity level, goals and limitations.     The therapist will assess your edema, evaluate for swelling, ,measure your motion and strength.      - Treatment usually includes     Swelling education and prevention strategies.     Swelling/lymphedema massage- A special decompressive massage to help improve the lymphatic drainage.     Swelling/lymphatic stimulation exercises     Bandaging or compression garments- To provide increased tissue pressure in the swollen extremity.     Home exercise program instruction     Stretching exercises     Education in how to independently manage edema.        - Follow up care     ONCE FORMAL THERAPY HAS BEEN COMPLETED, THE PATIENT WILL BE EXPECTED TO WEAR THE APPROPRIATE COMPRESSION BANDAGES, BE CONSISTENT WITH THE SKIN CARE PROGRAM  AND PERFORM THE PRESCRIBED SELF -MASSAGE AND /OR EXERCISES AS PRESCRIBED.

## 2022-05-12 NOTE — PROGRESS NOTES
Reginaldor received a call from [facility] intake team and they are not accepting lymphedema referrals.    Message routed to Dr. Albert/ KAMI De La Garza RN

## 2022-05-12 NOTE — PROGRESS NOTES
Leg Swelling Follow Up     Date of Service: May 12, 2022     Date last seen by Dr. Albert:  1/20/22    PCP: Aditya Mehta MD       Impression:         1.   Venous insufficiency  2.   Bilateral lower extremity swelling  3.   Morbid obesity  4.   Bullous Pemphigoid      Plan:     1.  Type of swelling was reviewed in detail with the patient.  Questions were answered and education was completed. Patient's measures are slightly up today.   2.        Continue velcro compression, wrapping and home exercises including manual drainage techniques.  3.       Home lymphedema therapy referral was placed today as patient's measures are increased with swelling exacerbation. There are no open wounds, but he would benefit from more frequent wrapping and structured therapy techniques to help control swelling.   4.   Patient will follow up in 7-8 months, or when needed.  If any questions or concerns they are to contact the clinic.            On day of encounter time spent in chart review and with patient in consultation, exam, education and coordination of care, excluding procedures:  50 minutes          ---------------------------------------------------------------------------------------------------------------------     History of Present Illness:   Mr. Gilliam presents to the [facility] clinic for follow up of bilateral lower extremity swelling. He has a history of HTN, BPH, morbid obesity, atrial fibrillation with RVR, CHF, bullous pemphigoid and clinically diagnosed venous insufficiency.     When Nishant initially presented, he stated that he has had swelling in both his legs for several years now. He had tried wearing compression in the past, however felt that the stockings had constriction points and dug into his foot/toes at times. For his leg swelling, he has been prescribed lasix in the past which helped with the swelling, however when the dose was increased, caused a difficult bout of bullous  pemphigoid.     Patient has been following with a dermatologist, Dr. Bala Hughes for management of bullous pemphigoid and was on prednisone daily, which was been tapered down. He is also on mycophenalate.  He also follows with Cardiology for his atrial fibrillation, and is now on daily bumex for heart failure.     Patient does endorse a smoking history and quit many years ago when he was 46 years of age. Prior to this, he smoked 2 packs per day. He has had BPH for many years and is on medications for this.      Since his last visit, patient has been continuing with weekly wrapping as done by wife, and manual drainage techniques, though these have not been as consistent. He is previous skin issues due to minocycline have resolved and his bullous pemphigoid is under better control. He has an exacerbation in swelling and measures are up today.     There has been no new numbness, tingling or weakness that is in addition to his baseline.       Past Medical History:    Past Medical History:   Diagnosis Date     COPD (chronic obstructive pulmonary disease) (H)      Coronary artery disease      Hypertension      Right bundle branch block     complete        Surgical History:   Past Surgical History:   Procedure Laterality Date      KNEE SCOPE, DIAGNOSTIC      Description: Arthroscopy Knee Left;  Recorded: 06/27/2009;  Comments: details unk.     HERNIA REPAIR       [facility] CORONARY STENT PERCUT, INITIAL VESSEL      Description: Cath Stent Placement;  Recorded: 02/24/2014;  Comments: stenting to the LAD in 1999. 2nd done years later.        Medications:    Current Outpatient Medications   Medication     apixaban ANTICOAGULANT (ELIQUIS) 5 MG tablet     budesonide-formoterol (SYMBICORT) 160-4.5 MCG/ACT Inhaler     bumetanide (BUMEX) 1 MG tablet     cholecalciferol, vitamin D3, (VITAMIN D3) 25 mcg (1,000 unit) capsule     doxazosin (CARDURA) 4 MG tablet     finasteride (PROSCAR) 5 MG tablet     gabapentin (NEURONTIN) 100 MG  capsule     LORazepam (ATIVAN) 0.5 MG tablet     metoprolol tartrate (LOPRESSOR) 100 MG tablet     minocycline (MINOCIN) 100 MG capsule     mycophenolate (GENERIC EQUIVALENT) 500 MG tablet     nitroglycerin (NITROSTAT) 0.4 MG SL tablet     pravastatin (PRAVACHOL) 20 MG tablet     spironolactone (ALDACTONE) 25 MG tablet     No current facility-administered medications for this visit.        Allergies:    Allergies   Allergen Reactions     Furosemide Rash     pemphigoid     Cat Hair Std Allergenic Ext [Cat Hair Extract] Unknown     Sulfa Drugs Unknown        Family history: No family history on file.     Social History:   Social History     Tobacco Use     Smoking status: Former Smoker     Years: 30.00     Types: Cigarettes     Smokeless tobacco: Never Used   Substance Use Topics     Alcohol use: No     Drug use: No          Review of Systems:     ROS negative except as noted in HPI.     Labs:      I personally reviewed the following lab results today and those on care everywhere, if indicated     No results found for: CRP   Erythrocyte Sedimentation Rate   Date Value Ref Range Status   05/04/2021 3 0 - 15 mm/hr Final      Last Renal Panel:  Sodium   Date Value Ref Range Status   02/25/2022 141 136 - 145 mmol/L Final     Potassium   Date Value Ref Range Status   02/25/2022 4.4 3.5 - 5.0 mmol/L Final     Chloride   Date Value Ref Range Status   02/25/2022 102 98 - 107 mmol/L Final     Carbon Dioxide (CO2)   Date Value Ref Range Status   02/25/2022 26 22 - 31 mmol/L Final     Anion Gap   Date Value Ref Range Status   02/25/2022 13 5 - 18 mmol/L Final     Glucose   Date Value Ref Range Status   02/25/2022 125 70 - 125 mg/dL Final     Urea Nitrogen   Date Value Ref Range Status   02/25/2022 24 8 - 28 mg/dL Final     Creatinine   Date Value Ref Range Status   02/25/2022 1.19 0.70 - 1.30 mg/dL Final     GFR Estimate   Date Value Ref Range Status   02/25/2022 60 (L) >60 mL/min/1.73m2 Final     Comment:     Effective December  21, 2021 eGFRcr in adults is calculated using the 2021 CKD-EPI creatinine equation which includes age and gender (Suad et al., [place], DOI: 10.1056/XURTlr6546573)   07/10/2021 >60 >60 mL/min/1.73m2 Final     Calcium   Date Value Ref Range Status   02/25/2022 9.4 8.5 - 10.5 mg/dL Final     Albumin   Date Value Ref Range Status   03/09/2021 3.8 3.5 - 5.0 g/dL Final      Lab Results   Component Value Date    WBC 10.4 07/08/2021     Lab Results   Component Value Date    RBC 4.80 07/08/2021     Lab Results   Component Value Date    HGB 14.8 07/08/2021     Lab Results   Component Value Date    HCT 45.9 07/08/2021     No components found for: MCT  Lab Results   Component Value Date    MCV 96 07/08/2021     Lab Results   Component Value Date    MCH 30.8 07/08/2021     Lab Results   Component Value Date    MCHC 32.2 07/08/2021     Lab Results   Component Value Date    RDW 15.0 07/08/2021     Lab Results   Component Value Date     07/12/2021      No results found for: A1C   TSH   Date Value Ref Range Status   07/07/2021 0.54 0.30 - 5.00 uIU/mL Final         Imaging:     I personally reviewed the following imaging results today and those on care everywhere, if indicated     No results found for this visit on 05/12/22.            Physical Exam:     Vital Signs: /68   Pulse 75  There is no height or weight on file to calculate BMI.   Wt Readings from Last 2 Encounters:   03/01/22 129.5 kg (285 lb 6.4 oz)   11/30/21 132.9 kg (293 lb)   .    Circumferential volume measures:    Circumferential Measures 10/7/2021 1/20/2022 5/12/2022   Right just above MTP 31.2 28.5 29   Right Ankle 33 29.7 32.6   Right Widest Calf 56.4 46.9 50   Right Thigh Up 10cm - 63 -   Right Knee to Ankle 40 42 42   Left - just above MTP 28.7 27 27.5   Left Ankle 32.5 28.3 30.3   Left Widest Calf 53.7 46.5 47.5   Left Thigh Up 10cm - 62.5 -   Left Knee to Ankle 40 41.5 41     General: In no apparent distress, sitting in wheelchair      Psych: Alert  and oriented X 3. Affect normal.      HEENT: Atraumatic, normocephalic      Respiratory:  Breathing comfortably on room air     Musculoskeletal:  Normal range of motion in hips, knees and ankles bilaterally.  There is no active joint synovitis, erythema, swelling or joint laxity.       Neurological:  Sensation is intact to  light touch in both legs      Vascular: Dorsalis pedis and posterior tibialis pulses are strong and equal bilaterally to manual palpation. Significant venous varicosities, bruising and signs of venous insufficiency.     Integumentary: Skin of the legs is  dry, taut, shiny, erythematous, fibrotic, scarred and thickened. Nails are thickened. 2+ pitting edema up to knee level bilaterally. Healed pemphigus scars are visible in both lower extremities. Negative Stemmer's sign bilaterally. No open skin wounds/lesions present on inspection.       Mimi Albert MD  Wound Care and Lymphedema   [facility]   [phone]

## 2022-05-16 ENCOUNTER — TRANSFERRED RECORDS (OUTPATIENT)
Dept: HEALTH INFORMATION MANAGEMENT | Facility: CLINIC | Age: 87
End: 2022-05-16
Payer: COMMERCIAL

## 2022-05-23 ENCOUNTER — TELEPHONE (OUTPATIENT)
Dept: VASCULAR SURGERY | Facility: CLINIC | Age: 87
End: 2022-05-23
Payer: COMMERCIAL

## 2022-05-23 DIAGNOSIS — M79.89 SWELLING OF BOTH LOWER EXTREMITIES: ICD-10-CM

## 2022-05-23 DIAGNOSIS — I87.2 VENOUS (PERIPHERAL) INSUFFICIENCY: Primary | ICD-10-CM

## 2022-06-03 ENCOUNTER — OFFICE VISIT (OUTPATIENT)
Dept: INTERNAL MEDICINE | Facility: CLINIC | Age: 87
End: 2022-06-03
Payer: COMMERCIAL

## 2022-06-03 VITALS — SYSTOLIC BLOOD PRESSURE: 112 MMHG | OXYGEN SATURATION: 97 % | DIASTOLIC BLOOD PRESSURE: 64 MMHG | HEART RATE: 85 BPM

## 2022-06-03 DIAGNOSIS — N18.31 CHRONIC KIDNEY DISEASE, STAGE 3A (H): ICD-10-CM

## 2022-06-03 DIAGNOSIS — Z51.81 ENCOUNTER FOR THERAPEUTIC DRUG MONITORING: ICD-10-CM

## 2022-06-03 DIAGNOSIS — I48.19 PERSISTENT ATRIAL FIBRILLATION (H): ICD-10-CM

## 2022-06-03 DIAGNOSIS — J30.2 SEASONAL ALLERGIC RHINITIS, UNSPECIFIED TRIGGER: Primary | ICD-10-CM

## 2022-06-03 DIAGNOSIS — I87.2 VENOUS (PERIPHERAL) INSUFFICIENCY: ICD-10-CM

## 2022-06-03 DIAGNOSIS — I10 ESSENTIAL HYPERTENSION: ICD-10-CM

## 2022-06-03 DIAGNOSIS — M17.0 PRIMARY OSTEOARTHRITIS OF BOTH KNEES: ICD-10-CM

## 2022-06-03 DIAGNOSIS — Z23 NEED FOR COVID-19 VACCINE: ICD-10-CM

## 2022-06-03 DIAGNOSIS — L12.0 BULLOUS PEMPHIGOID (H): ICD-10-CM

## 2022-06-03 LAB
ANION GAP SERPL CALCULATED.3IONS-SCNC: 12 MMOL/L (ref 5–18)
BUN SERPL-MCNC: 23 MG/DL (ref 8–28)
CALCIUM SERPL-MCNC: 9.7 MG/DL (ref 8.5–10.5)
CHLORIDE BLD-SCNC: 99 MMOL/L (ref 98–107)
CO2 SERPL-SCNC: 30 MMOL/L (ref 22–31)
CREAT SERPL-MCNC: 1.11 MG/DL (ref 0.7–1.3)
GFR SERPL CREATININE-BSD FRML MDRD: 65 ML/MIN/1.73M2
GLUCOSE BLD-MCNC: 126 MG/DL (ref 70–125)
POTASSIUM BLD-SCNC: 3.6 MMOL/L (ref 3.5–5)
SODIUM SERPL-SCNC: 141 MMOL/L (ref 136–145)

## 2022-06-03 PROCEDURE — 36415 COLL VENOUS BLD VENIPUNCTURE: CPT | Performed by: INTERNAL MEDICINE

## 2022-06-03 PROCEDURE — 0054A COVID-19,PF,PFIZER (12+ YRS): CPT | Performed by: INTERNAL MEDICINE

## 2022-06-03 PROCEDURE — 99214 OFFICE O/P EST MOD 30 MIN: CPT | Mod: 25 | Performed by: INTERNAL MEDICINE

## 2022-06-03 PROCEDURE — 80048 BASIC METABOLIC PNL TOTAL CA: CPT | Performed by: INTERNAL MEDICINE

## 2022-06-03 PROCEDURE — 91305 COVID-19,PF,PFIZER (12+ YRS): CPT | Performed by: INTERNAL MEDICINE

## 2022-06-03 RX ORDER — FLUTICASONE PROPIONATE 50 MCG
2 SPRAY, SUSPENSION (ML) NASAL DAILY
Qty: 11.1 ML | Refills: 4
Start: 2022-06-03 | End: 2023-02-20

## 2022-06-03 NOTE — TELEPHONE ENCOUNTER
Spoke with pt's spouse, Daniela, and asked if pt would be okay starting partial home care services with PT prior to starting lymphedema care as it is incredibly difficult to find lymphedema care right now.  I had reviewed the referral with Michael at [facility] and he stated they could start PT services first while they wait for lymphedema services to open up.    Daniela stated pt is not keen on PT as he has little to no cartilage on his joints which makes PT very painful.  She stated she would have a conversation with pt to see if he would be willing to try it out.

## 2022-06-03 NOTE — TELEPHONE ENCOUNTER
Patient's wife calling to follow up on HC referral as they have not heard anything; has this been sent out to other agencies?

## 2022-06-03 NOTE — PROGRESS NOTES
Answers for HPI/ROS submitted by the patient on 6/3/2022  Dyspnea:: with activity only  Status:: same as usual  Edema:: worse than usual  Are you using more pillows than usual at night?: No  Do you cough at night?: No  Checking weight daily:: Yes  Weight change recently:: None  Heart Medication Side Effects:: fatigue  Frequency:: None  Do you take an aspirin every day?: No  What is the reason for your visit today? : Follow up for various conditions  How many servings of fruits and vegetables do you eat daily?: 2-3  On average, how many sweetened beverages do you drink each day (Examples: soda, juice, sweet tea, etc.  Do NOT count diet or artificially sweetened beverages)?: 1  How many minutes a day do you exercise enough to make your heart beat faster?: 9 or less  How many days a week do you exercise enough to make your heart beat faster?: 3 or less  How many days per week do you miss taking your medication?: 0    ASSESSMENT:  1. Venous (peripheral) insufficiency  He is taking Bumex as prescribed and uses leg wraps although he is not consistent with use.  Home care was ordered by Dr. Albert several weeks ago, but they have not yet been contacted about this.  He was encouraged to check back on this since home care may be of benefit for keeping legs under good control    2. Bullous pemphigoid  He sees Dr. Bala Hughes.  He is currently on mycophenolate and doxycycline.  He had 1 recent blister, he otherwise has been doing well    3. Persistent atrial fibrillation (H)  Heart rate is acceptable.  He was referred for a sleep study due to concerns that sleep apnea could contribute to atrial fibs and cardiac disease.  He could not do the study since he sleeps in a recliner, and they did not have a similar recliner for him to use at the sleep clinic.  He reports that he would not use CPAP even if sleep apnea was diagnosed    4. Essential hypertension  Pressure is acceptable on current regimen    5. Chronic kidney disease,  stage 3a (H)  Renal function will be rechecked    6. Seasonal allergic rhinitis, unspecified trigger  He complains of chronic rhinitis.  He has been using a nasal saline spray.  He was advised to try nasal fluticasone.  He is uncomfortable with using antihistamines due to symptoms of obstructive uropathy  - fluticasone (FLONASE) 50 MCG/ACT nasal spray; Spray 2 sprays into both nostrils daily  Dispense: 11.1 mL; Refill: 4    7. Need for COVID-19 vaccine  Fourth COVID-vaccine is advised for today  - COVID-19,PF,PFIZER (12+ YRS)    8. Encounter for therapeutic drug monitoring  Monitoring labs are checked  - Basic metabolic panel  (Ca, Cl, CO2, Creat, Gluc, K, Na, BUN); Future  - Basic metabolic panel  (Ca, Cl, CO2, Creat, Gluc, K, Na, BUN)    9. Primary osteoarthritis of both knees  He was referred for orthopedic evaluation for knee pain related to advanced osteoarthritis.  He states they were going to check his insurance coverage for Synvisc.  They have not gotten back to him about this.  He would not be a good candidate for knee replacement given his morbid obesity and numerous comorbidities    PLAN:  Patient Instructions   1.  See Dr. Michelet Gonzalez for podiatry.    2.  Covid vaccine today    3.  Metabolic panel.  I will notify you of test results    4.  See in 3 months or as needed    5.  Nasal fluticasone.  2 sprays each nostril daily.  Note effect on allergic symptoms    6.  Continue local care of legs.  Contact Dr. Albert's office about the home care order    Orders Placed This Encounter   Procedures     COVID-19,PF,PFIZER (12+ YRS)     Basic metabolic panel  (Ca, Cl, CO2, Creat, Gluc, K, Na, BUN)     Medications Discontinued During This Encounter   Medication Reason     minocycline (MINOCIN) 100 MG capsule Alternate therapy       Return in about 3 months (around 9/3/2022) for Follow up.    ASSESSED PROBLEMS:  See above      CHIEF COMPLAINT:  Follow-up venous insufficiency and multiple other concerns    HISTORY OF  PRESENT ILLNESS:  Nishant is a 87 year old male seen with his wife for follow-up of venous insufficiency and multiple other concerns.  Remains on Bumex 2 mg daily.  He uses Velcro wraps to the lower legs for edema, but is rather erratic with use.  He sleeps in a recliner and has his legs in a dependent position most of the time.  He is very sedentary.    He has a history of bullous pemphigoid.  He has treated with mycophenolate and doxycycline.  He had recent blistering on the left foot, but otherwise symptoms have been under good control.  He sees Dr. Bala Hughes.    He was referred for orthopedic consultation at last visit to consider Synvisc injections of the knees.  He states that they were going to contact him back after researching his insurance coverage.  He has not heard yet.    He has a history of chronic atrial fibrillation.  Heart rate seems acceptable.  He was encouraged to have sleep evaluation done due to high probability of obstructive sleep apnea.  He did not have an initial sleep study done since he states he cannot sleep in a bed and needs a recliner.  He now reports that he would not use CPAP even if sleep apnea was diagnosed  REVIEW OF SYSTEMS:  See above.  Notes recent stress due to water damage in his house  Comprehensive review of systems is negative.    PFSH:  .  Retired teacher.    TOBACCO USE:  History   Smoking Status     Former Smoker     Years: 30.00     Types: Cigarettes   Smokeless Tobacco     Never Used       VITALS:  Vitals:    06/03/22 1231   BP: 112/64   BP Location: Left arm   Patient Position: Sitting   Cuff Size: Adult Large   Pulse: 85   SpO2: 97%     Wt Readings from Last 3 Encounters:   03/01/22 129.5 kg (285 lb 6.4 oz)   11/30/21 132.9 kg (293 lb)   08/24/21 133.8 kg (295 lb)       PHYSICAL EXAM:  Constitutional:   Reveals an alert obese man with appropriate affect.  He does seem rather short of breath,   Vitals: per nursing notes.  HEENT: Atraumatic  Eyes: No  conjunctival hyperemia  Oropharynx:   Mouth and throat clear, no thrush or exudate.  Neck:  Supple, no carotid bruits or adenopathy.  Back:  No spine or CVA pain.  Thorax:  No bony deformities.  Lungs: Clear to A&P without rales or wheezes.  Somewhat diminished air move  Cardiac:   Irregularly irregular rate and rhythm, normal S1, S2, no murmur or gallop.  Abdomen:  Soft, active bowel sounds without bruits, mass, or tenderness.  Obese  Extremities:   1-2+ peripheral edema, stasis changes.  No foot ulcers noted.    Skin:  No jaundice, peripheral cyanosis or lesions to suggest malignancy.  No bullous lesions detected.  Scattered seborrheic keratosis  Neuro:  Alert and oriented.   No gross focal deficits.  Psychiatric:  Memory intact, mood appropriate.    DATA REVIEWED:  Additional History from Old Records Summarized (2): None.  Decision to Obtain Records (1): None.   Radiology Tests Summarized or Ordered (1): None.  Labs Reviewed or Ordered (1): Labs reviewed and ordered  Medicine Test Summarized or Ordered (1): None.   Independent Review of EKG or X-RAY(2 each): None.    The visit lasted a total of 35 minutes face to face with the patient. Over 50% of the time was spent counseling and educating the patient about management of peripheral edema and multiple other concerns  .    Dragon dictation was used for this note. Speech recognition errors are a possibility.     MEDICATIONS:  Current Outpatient Medications   Medication Sig Dispense Refill     apixaban ANTICOAGULANT (ELIQUIS) 5 MG tablet Take 1 tablet (5 mg) by mouth 2 times daily 180 tablet 3     budesonide-formoterol (SYMBICORT) 160-4.5 MCG/ACT Inhaler [BUDESONIDE-FORMOTEROL (SYMBICORT) 160-4.5 MCG/ACTUATION INHALER] USE 2 INHALATIONS TWICE A DAY 10.2 g 11     bumetanide (BUMEX) 1 MG tablet Take 2 tablets (2 mg) by mouth daily 180 tablet 3     cholecalciferol, vitamin D3, (VITAMIN D3) 25 mcg (1,000 unit) capsule [CHOLECALCIFEROL, VITAMIN D3, (VITAMIN D3) 25 MCG  (1,000 UNIT) CAPSULE]        doxazosin (CARDURA) 4 MG tablet Take 1 tablet (4 mg) by mouth At Bedtime 90 tablet 3     finasteride (PROSCAR) 5 MG tablet Take 1 tablet (5 mg) by mouth daily 90 tablet 3     fluticasone (FLONASE) 50 MCG/ACT nasal spray Spray 2 sprays into both nostrils daily 11.1 mL 4     gabapentin (NEURONTIN) 100 MG capsule One in evening for three days, twice daily for three days, then three  tabs in evening. 90 capsule 11     LORazepam (ATIVAN) 0.5 MG tablet TAKE 1/2 TO 1 TABLET BY MOUTH THREE TIMES DAILY AS NEEDED FOR ANXIOUSNESS 30 tablet 0     metoprolol tartrate (LOPRESSOR) 100 MG tablet Take 1 tablet (100 mg) by mouth 2 times daily 60 tablet 5     mycophenolate (GENERIC EQUIVALENT) 500 MG tablet 2,000 mg daily        nitroglycerin (NITROSTAT) 0.4 MG SL tablet [NITROGLYCERIN (NITROSTAT) 0.4 MG SL TABLET] PLACE ONE TABLET UNDER THE TONGUE EVERY 5 MINUTES AS NEEDED FOR CHEST PAIN 25 tablet 3     pravastatin (PRAVACHOL) 20 MG tablet Take 1 tablet (20 mg) by mouth every evening 90 tablet 3     spironolactone (ALDACTONE) 25 MG tablet Take 1 tablet (25 mg) by mouth daily 30 tablet 5

## 2022-06-03 NOTE — PATIENT INSTRUCTIONS
See Dr. Michelet Gonzalez for podiatry.    2.  Covid vaccine today    3.  Metabolic panel.  I will notify you of test results    4.  See in 3 months or as needed

## 2022-06-06 ENCOUNTER — TELEPHONE (OUTPATIENT)
Dept: VASCULAR SURGERY | Facility: CLINIC | Age: 87
End: 2022-06-06
Payer: COMMERCIAL

## 2022-06-06 NOTE — TELEPHONE ENCOUNTER
Writer informed Daniela that [facility] was unable to accept P[facility]rez due to diagnosis of bullous pemphigoid.  Daniela was disappointed as the HC referral was not for treating that and that Pérez doesn't even have any present.

## 2022-06-06 NOTE — TELEPHONE ENCOUNTER
Writer spoke with pt's spouse, Daniela, they are interested in starting services even with 20% of costs not covered. Writer will have [facility] call Daniela to discuss costs and starting services.

## 2022-06-06 NOTE — TELEPHONE ENCOUNTER
"Write received another call from Lupe at [facility] - she stated that pt's diagnosis of \"bullous pemphigoid\" was something that they do not have much experience in treating so they will not be able to accept patient for services.  "

## 2022-06-06 NOTE — TELEPHONE ENCOUNTER
Writer spoke with Lupe at [facility] - she stated that pt's insurance would cover 80% costs.  She wasn't sure if they would want to pursue HC with those costs. Writer will call pt to discuss.

## 2022-06-23 NOTE — TELEPHONE ENCOUNTER
Faxed Home care referral to the following agencies:    [facility]  [facility]  [facility]  [facility]

## 2022-06-23 NOTE — TELEPHONE ENCOUNTER
"[facility] will not accept patient for services due to previous diagnosis of \"boulous pemphigoid\"  "

## 2022-06-24 ENCOUNTER — TELEPHONE (OUTPATIENT)
Dept: INTERNAL MEDICINE | Facility: CLINIC | Age: 87
End: 2022-06-24

## 2022-06-24 NOTE — TELEPHONE ENCOUNTER
Reason for Call: Request for an order or referral:    Order or referral being requested:   Delay start of care for Physical Therapy for 06/26/2022 per patient request    Date needed: as soon as possible    Has the patient been seen by the PCP for this problem? YES    Additional comments: anytime    Phone number Patient can be reached at:  Other phone number:  [phone]    Best Time:  anytime    Can we leave a detailed message on this number?  YES    Call taken on 6/24/2022 at 4:14 PM by Sara Velez

## 2022-06-24 NOTE — TELEPHONE ENCOUNTER
[facility] left voicemail indicating that they can accept patient for home care services.  They will need an updated home care referral as the referral has .

## 2022-06-24 NOTE — TELEPHONE ENCOUNTER
Spoke to [facility].  Verbal ok given to start home care services.  Home care will start in the next 1-2 days.

## 2022-06-26 ENCOUNTER — NURSE TRIAGE (OUTPATIENT)
Dept: NURSING | Facility: CLINIC | Age: 87
End: 2022-06-26

## 2022-06-26 ENCOUNTER — MEDICAL CORRESPONDENCE (OUTPATIENT)
Dept: HEALTH INFORMATION MANAGEMENT | Facility: CLINIC | Age: 87
End: 2022-06-26

## 2022-06-26 NOTE — TELEPHONE ENCOUNTER
Caller: Mabel KABA, [facility]  Phone #: [phone]    Request verbal orders for Start of Care - physical therapy. Clinician will be coming out today at 10:30 AM.    [facility] gave verbal orders per 6/24 notes by nehal Church RN to start home care services in the next 1-2 days.  Caller verbalized understanding and will fax over home care orders to clinic.    Lila Foster RN/[facility] Nurse Advisor

## 2022-07-05 ENCOUNTER — TELEPHONE (OUTPATIENT)
Dept: INTERNAL MEDICINE | Facility: CLINIC | Age: 87
End: 2022-07-05

## 2022-07-05 NOTE — TELEPHONE ENCOUNTER
The Home Care/Assisted Living/Nursing Facility is calling regarding an established patient.  Has the patient seen Home Care in the past or is currently residing in Assisted Living or Nursing Facility? Yes.     Anette calling from [employer] (occupational therapist) requesting the following orders that are within the Home Care, Assisted Living or Nursing Home Eval and Treatment standing order and can be signed as standing order signature required by RN.    Preferred Call Back Number: [phone]    PT/OT/Speech Therapy    Any additional Orders:  Are there any orders requested, not stated above, that are outside of the standing order and must be routed to a licensed practitioner for approval?    No    Writer has verified [facility] will send fax to have orders signed.

## 2022-07-05 NOTE — TELEPHONE ENCOUNTER
Reason for Call:  Home Health Care    Anette with [facility] called regarding (reason for call): verbal orders    Orders are needed for this patient.     OT: 1 time a week for 1 week, 2  times for 3 weeks    Pt Provider: Dr Mehta    Phone Number Homecare Nurse can be reached at: [phone]    Can we leave a detailed message on this number? YES      Call taken on 7/5/2022 at 9:20 AM by Pam J. Behr

## 2022-07-11 NOTE — TELEPHONE ENCOUNTER
Uli is calling wondering if the patient could use vasopneumatic pump for bilateral legs.  Anette can be reached at [phone].

## 2022-07-11 NOTE — TELEPHONE ENCOUNTER
Writer spoke with Anette. She works with lymphedema PT and is wondering if Dr. Albert would like to order a compression pump for the pt? Anette thinks he would benefit from one. Anette was updated that Dr. Albert is back in clinic on Thursday and it will be addressed then.

## 2022-07-15 ENCOUNTER — TELEPHONE (OUTPATIENT)
Dept: INTERNAL MEDICINE | Facility: CLINIC | Age: 87
End: 2022-07-15

## 2022-07-18 NOTE — TELEPHONE ENCOUNTER
Prior Authorization Approval    Authorization Effective Date: 6/18/2022  Authorization Expiration Date: 7/18/2023  Medication: Eliquis 5MG tablets  Approved Dose/Quantity:    Reference #:     Insurance Company: [employer] - Phone [phone] Fax [phone]  Expected CoPay:       CoPay Card Available:      Foundation Assistance Needed:    Which Pharmacy is filling the prescription (Not needed for infusion/clinic administered): [id number] - [place], [place] - [address] [facility]  Pharmacy Notified: Yes  Patient Notified: Yes

## 2022-07-21 NOTE — TELEPHONE ENCOUNTER
Per Dr. Roosevelt chávez to order lymphedema pump.  Order placed and faxed to [facility].     Left voicemail for Anette with OT with update.

## 2022-07-27 ENCOUNTER — TELEPHONE (OUTPATIENT)
Dept: INTERNAL MEDICINE | Facility: CLINIC | Age: 87
End: 2022-07-27

## 2022-07-27 NOTE — TELEPHONE ENCOUNTER
Reason for Call: Request for an order or referral:    Order or referral being requested:   Discharge OT as none visit over the phone per patient request as goal has been met.     Date needed: as soon as possible    Has the patient been seen by the PCP for this problem? YES    Additional comments: N/A    Phone number Patient can be reached at:  Other phone number:  [facility] - [phone]    Best Time:  ANY    Can we leave a detailed message on this number?  YES    Call taken on 7/27/2022 at 1:04 PM by Hitesh Merrill

## 2022-07-28 NOTE — TELEPHONE ENCOUNTER
The Home Care/Assisted Living/Nursing Facility is calling regarding an established patient.  Has the patient seen Home Care in the past or is currently residing in Assisted Living or Nursing Facility? Yes.     Anette calling from [employer] requesting the following orders that are within the Home Care, Assisted Living or Nursing Home Eval and Treatment standing order and can be signed as standing order signature required by RN.    Preferred Call Back Number: [phone]    PT/OT/Speech Therapy  Discharge OT     Any additional Orders:  Are there any orders requested, not stated above, that are outside of the standing order and must be routed to a licensed practitioner for approval?    No    Writer has verified [employer] will send fax to have orders signed.  Left secure voicemail as requested.

## 2022-08-01 ENCOUNTER — TELEPHONE (OUTPATIENT)
Dept: VASCULAR SURGERY | Facility: CLINIC | Age: 87
End: 2022-08-01

## 2022-08-01 NOTE — TELEPHONE ENCOUNTER
Radha with [employer] requesting an addendum to 5/12/22 office notes; in order for compression pump to be covered, there needs to be a lymphedema diagnosis listed in patient's chart.   PH: [phone] ext [phone]  FAX: [phone]

## 2022-09-06 ENCOUNTER — OFFICE VISIT (OUTPATIENT)
Dept: INTERNAL MEDICINE | Facility: CLINIC | Age: 87
End: 2022-09-06
Payer: COMMERCIAL

## 2022-09-06 VITALS
OXYGEN SATURATION: 94 % | DIASTOLIC BLOOD PRESSURE: 60 MMHG | HEART RATE: 72 BPM | WEIGHT: 250.7 LBS | SYSTOLIC BLOOD PRESSURE: 98 MMHG | BODY MASS INDEX: 33.08 KG/M2

## 2022-09-06 DIAGNOSIS — R63.4 ABNORMAL WEIGHT LOSS: ICD-10-CM

## 2022-09-06 DIAGNOSIS — J31.0 CHRONIC RHINITIS: ICD-10-CM

## 2022-09-06 DIAGNOSIS — I48.19 PERSISTENT ATRIAL FIBRILLATION (H): Primary | ICD-10-CM

## 2022-09-06 DIAGNOSIS — I50.32 CHRONIC DIASTOLIC CONGESTIVE HEART FAILURE (H): ICD-10-CM

## 2022-09-06 DIAGNOSIS — I89.0 LYMPHEDEMA: ICD-10-CM

## 2022-09-06 DIAGNOSIS — L12.0 BULLOUS PEMPHIGOID (H): ICD-10-CM

## 2022-09-06 DIAGNOSIS — H01.00A BLEPHARITIS OF BOTH UPPER AND LOWER EYELID OF RIGHT EYE, UNSPECIFIED TYPE: ICD-10-CM

## 2022-09-06 DIAGNOSIS — N18.31 CHRONIC KIDNEY DISEASE, STAGE 3A (H): ICD-10-CM

## 2022-09-06 LAB
ALBUMIN SERPL BCG-MCNC: 3.9 G/DL (ref 3.5–5.2)
ALP SERPL-CCNC: 80 U/L (ref 40–129)
ALT SERPL W P-5'-P-CCNC: 14 U/L (ref 10–50)
ANION GAP SERPL CALCULATED.3IONS-SCNC: 13 MMOL/L (ref 7–15)
AST SERPL W P-5'-P-CCNC: 27 U/L (ref 10–50)
BILIRUB SERPL-MCNC: 1 MG/DL
BUN SERPL-MCNC: 21.7 MG/DL (ref 8–23)
CALCIUM SERPL-MCNC: 10 MG/DL (ref 8.8–10.2)
CHLORIDE SERPL-SCNC: 97 MMOL/L (ref 98–107)
CREAT SERPL-MCNC: 1.2 MG/DL (ref 0.67–1.17)
DEPRECATED HCO3 PLAS-SCNC: 30 MMOL/L (ref 22–29)
ERYTHROCYTE [DISTWIDTH] IN BLOOD BY AUTOMATED COUNT: 13 % (ref 10–15)
GFR SERPL CREATININE-BSD FRML MDRD: 59 ML/MIN/1.73M2
GLUCOSE SERPL-MCNC: 114 MG/DL (ref 70–99)
HCT VFR BLD AUTO: 44.4 % (ref 40–53)
HGB BLD-MCNC: 13.8 G/DL (ref 13.3–17.7)
MCH RBC QN AUTO: 30 PG (ref 26.5–33)
MCHC RBC AUTO-ENTMCNC: 31.1 G/DL (ref 31.5–36.5)
MCV RBC AUTO: 97 FL (ref 78–100)
PLATELET # BLD AUTO: 172 10E3/UL (ref 150–450)
POTASSIUM SERPL-SCNC: 3.5 MMOL/L (ref 3.4–5.3)
PROT SERPL-MCNC: 6.6 G/DL (ref 6.4–8.3)
RBC # BLD AUTO: 4.6 10E6/UL (ref 4.4–5.9)
SODIUM SERPL-SCNC: 140 MMOL/L (ref 136–145)
TSH SERPL DL<=0.005 MIU/L-ACNC: 0.87 UIU/ML (ref 0.3–4.2)
WBC # BLD AUTO: 8.3 10E3/UL (ref 4–11)

## 2022-09-06 PROCEDURE — 84443 ASSAY THYROID STIM HORMONE: CPT | Performed by: INTERNAL MEDICINE

## 2022-09-06 PROCEDURE — 85027 COMPLETE CBC AUTOMATED: CPT | Performed by: INTERNAL MEDICINE

## 2022-09-06 PROCEDURE — 36415 COLL VENOUS BLD VENIPUNCTURE: CPT | Performed by: INTERNAL MEDICINE

## 2022-09-06 PROCEDURE — 99214 OFFICE O/P EST MOD 30 MIN: CPT | Performed by: INTERNAL MEDICINE

## 2022-09-06 PROCEDURE — 80053 COMPREHEN METABOLIC PANEL: CPT | Performed by: INTERNAL MEDICINE

## 2022-09-06 RX ORDER — MOXIFLOXACIN 5 MG/ML
1 SOLUTION/ DROPS OPHTHALMIC 3 TIMES DAILY
Qty: 3 ML | Refills: 1 | Status: SHIPPED | OUTPATIENT
Start: 2022-09-06 | End: 2023-02-20

## 2022-09-06 RX ORDER — IPRATROPIUM BROMIDE 21 UG/1
2 SPRAY, METERED NASAL 3 TIMES DAILY
Qty: 30 ML | Refills: 4 | Status: ON HOLD | OUTPATIENT
Start: 2022-09-06 | End: 2023-03-02

## 2022-09-06 NOTE — PROGRESS NOTES
Answers for HPI/ROS submitted by the patient on 9/4/2022  Dyspnea:: with activity only  Status:: same as usual  Edema:: same as usual  Are you using more pillows than usual at night?: No  Do you cough at night?: No  Checking weight daily:: No  Weight change recently:: weight decrease  Heart Medication Side Effects:: dizziness, fatigue  Frequency:: None  What is the reason for your visit today? : Follow up on several medical conditions  How many servings of fruits and vegetables do you eat daily?: 2-3  On average, how many sweetened beverages do you drink each day (Examples: soda, juice, sweet tea, etc.  Do NOT count diet or artificially sweetened beverages)?: 1  How many minutes a day do you exercise enough to make your heart beat faster?: 9 or less  How many days a week do you exercise enough to make your heart beat faster?: 3 or less  How many days per week do you miss taking your medication?: 0    ASSESSMENT:  1. Chronic diastolic congestive heart failure (H)  Pérez had a minor elevation of BNP to 133 last year.  Ejection fraction was 50% by echo in 7/21.  He currently seems well compensated from a cardiac standpoint    2. Chronic kidney disease, stage 3a (H)  GFR was actually up to 65 when checked in June.  Previous values were slightly lower.  Renal function will be reassessed today    3. Persistent atrial fibrillation (H)  Heart rate is in the desired range.    4. Bullous pemphigoid  He sees Dr. Bala Hughes.  He noted some worsening of dermatitis with lowering doxycycline dose.  This has improved with going up to 100 mg twice daily.  He has noted a decrease in appetite and wonders if it could be related to the antibiotic.    5. Lymphedema  This has considerably improved.  Weight loss is a major contributing factor.  He is using Velcro wraps on the legs    6. Abnormal weight loss  Weight is down 50 pounds from his height with a recent rather rapid decrease of 20 pounds.  This is beneficial in terms of  management of peripheral edema and shortness of breath.  I suspect medications could be contributing to anorexia.  He will have labs drawn to screen for other possible adverse causes of weight loss.  - TSH; Future  - CBC with platelets; Future  - Comprehensive metabolic panel; Future  - TSH  - CBC with platelets  - Comprehensive metabolic panel    7. Blepharitis of both upper and lower eyelid of right eye, unspecified type  He notes chronic mattering of the eyes with irritation of the lives.  Exam is suggestive of bilateral blepharitis involving the right eye more than left  - moxifloxacin (VIGAMOX) 0.5 % ophthalmic solution; Place 1 drop into the right eye 3 times daily  Dispense: 3 mL; Refill: 1    8. Chronic rhinitis  He complains of chronic rhinitis and drainage.  He has not noted significant improvement with Flonase and only minor improvement with saline irrigation.  He is interested in trying Atrovent.  - ipratropium (ATROVENT) 0.03 % nasal spray; Spray 2 sprays into both nostrils 3 times daily As needed for congestion  Dispense: 30 mL; Refill: 4    PLAN:  Patient Instructions   Moxifloxin eye drops three times daily for blepharitis until improved.    Cleanse lids twice daily    Try Atrovent nasal spray four times daily as needed for nasal congestion    Flu shot and new Covid vaccine this fall.    Continue other medications at current doses.    See in three to six months or as needed.    Labs were done for medication monitoring and to assess for potential causes of weight loss.  He will be notified of test results.    Orders Placed This Encounter   Procedures     TSH     CBC with platelets     Comprehensive metabolic panel     There are no discontinued medications.    Return in about 6 months (around 3/6/2023) for Follow up.    ASSESSED PROBLEMS:  See above  CHIEF COMPLAINT:  Follow-up peripheral edema and multiple other concerns    HISTORY OF PRESENT ILLNESS:  Nishant is a 87 year old male seen with his wife  for follow-up of peripheral edema, bullous pemphigoid, and other concerns.  He sees Dr. Bala Hughes for management of bullous pemphigoid.  Overall, he is doing much better.  Reports doxycycline recently was increased back up to 100 mg twice daily due to worsening dermatitis.    Pérez has noted a 50 pound weight loss over the past several years with more sudden drop of 20 pounds.  He reports appetite has decreased.  There is no associated abdominal pain.    He came into the room in a wheelchair.  He is no longer going up stairs in his home due to shortness of breath.  He has had chronic issues with peripheral edema.  This has improved with Velcro wraps and Bumex.  However, concern has been raised that sulfa allergy could contribute to his bullous pemphigoid.    He has a history of diastolic heart failure.  BNP was mildly elevated last year at 133.  Cardiac echo revealed an ejection fraction of 50%.    He notes irritation of the eyelids right greater than left with some mattering and redness of the right eye    He complains of chronic problems with postnasal drip.  He has tried Flonase without success.  He uses saline irrigations which are of some temporary benefit.    He has a history of chronic atrial fibrillation.  He is on Eliquis as an anticoagulant.  Metoprolol 100 mg twice daily is used for rate control    He notes chronic urinary frequency and urgency.  He is on doxazosin 4 mg at bedtime and finasteride 5 mg.      REVIEW OF SYSTEMS:  See above  Comprehensive review of systems is otherwise negative.    PFSH:   and retired.  Seen with his wife    TOBACCO USE:  History   Smoking Status     Former Smoker     Years: 30.00     Types: Cigarettes   Smokeless Tobacco     Never Used       VITALS:  Vitals:    09/06/22 1257   BP: 98/60   BP Location: Left arm   Patient Position: Sitting   Cuff Size: Adult Large   Pulse: 72   SpO2: 94%   Weight: 113.7 kg (250 lb 11.2 oz)     Wt Readings from Last 3 Encounters:    09/06/22 113.7 kg (250 lb 11.2 oz)   03/01/22 129.5 kg (285 lb 6.4 oz)   11/30/21 132.9 kg (293 lb)       PHYSICAL EXAM:  Constitutional:   Reveals an alert pleasant man seen in a wheelchair.  He appears rather frail, but not in acute distress vitals: per nursing notes.  HEENT: Atraumatic  Eyes: Conjunctival hyperemia with irritated eyelids bilaterally  Oropharynx:   Mouth and throat clear, no thrush or exudate.  Neck:  Supple, no carotid bruits or adenopathy.  Back:  No spine or CVA pain.  Thorax:  No bony deformities.  Lungs: Diminished air movement.  No audible wheezes or rales  Cardiac:    irregularly irregular rate and rhythm, normal S1, S2, no murmur or gallop.  Abdomen:  Soft, active bowel sounds without bruits, mass, or tenderness.  Extremities:   Trace to 1+ peripheral edema.  No foot ulcers.  Skin:  No jaundice, peripheral cyanosis or lesions to suggest malignancy.  Superficial ecchymoses noted.  No bullous lesions.  Areas of hyperpigmentation noted  Neuro:  Alert and oriented.   No gross focal deficits.  Psychiatric:  Memory intact, mood appropriate.    DATA REVIEWED:  Additional History from Old Records Summarized (2): Dermatology records reviewed  Decision to Obtain Records (1): None.   Radiology Tests Summarized or Ordered (1): None.  Labs Reviewed or Ordered (1):  labs reviewed and ordered  Medicine Test Summarized or Ordered (1):  echocardiogram and previous angiogram reviewed  Independent Review of EKG or X-RAY(2 each): None.    The visit lasted a total of 35 minutes face to face with the patient. Over 50% of the time was spent counseling and educating the patient about multiple concerns as outlined above      Dragon dictation was used for this note. Speech recognition errors are a possibility.     MEDICATIONS:  Current Outpatient Medications   Medication Sig Dispense Refill     apixaban ANTICOAGULANT (ELIQUIS) 5 MG tablet Take 1 tablet (5 mg) by mouth 2 times daily 180 tablet 3      budesonide-formoterol (SYMBICORT) 160-4.5 MCG/ACT Inhaler [BUDESONIDE-FORMOTEROL (SYMBICORT) 160-4.5 MCG/ACTUATION INHALER] USE 2 INHALATIONS TWICE A DAY 10.2 g 11     bumetanide (BUMEX) 1 MG tablet Take 2 tablets (2 mg) by mouth daily 180 tablet 3     cholecalciferol, vitamin D3, (VITAMIN D3) 25 mcg (1,000 unit) capsule [CHOLECALCIFEROL, VITAMIN D3, (VITAMIN D3) 25 MCG (1,000 UNIT) CAPSULE]        doxazosin (CARDURA) 4 MG tablet Take 1 tablet (4 mg) by mouth At Bedtime 90 tablet 3     finasteride (PROSCAR) 5 MG tablet Take 1 tablet (5 mg) by mouth daily 90 tablet 3     fluticasone (FLONASE) 50 MCG/ACT nasal spray Spray 2 sprays into both nostrils daily 11.1 mL 4     gabapentin (NEURONTIN) 100 MG capsule One in evening for three days, twice daily for three days, then three  tabs in evening. 90 capsule 11     ipratropium (ATROVENT) 0.03 % nasal spray Spray 2 sprays into both nostrils 3 times daily As needed for congestion 30 mL 4     LORazepam (ATIVAN) 0.5 MG tablet TAKE 1/2 TO 1 TABLET BY MOUTH THREE TIMES DAILY AS NEEDED FOR ANXIOUSNESS 30 tablet 0     metoprolol tartrate (LOPRESSOR) 100 MG tablet Take 1 tablet (100 mg) by mouth 2 times daily 60 tablet 5     moxifloxacin (VIGAMOX) 0.5 % ophthalmic solution Place 1 drop into the right eye 3 times daily 3 mL 1     mycophenolate (GENERIC EQUIVALENT) 500 MG tablet 2,000 mg daily        nitroglycerin (NITROSTAT) 0.4 MG SL tablet [NITROGLYCERIN (NITROSTAT) 0.4 MG SL TABLET] PLACE ONE TABLET UNDER THE TONGUE EVERY 5 MINUTES AS NEEDED FOR CHEST PAIN 25 tablet 3     pravastatin (PRAVACHOL) 20 MG tablet Take 1 tablet (20 mg) by mouth every evening 90 tablet 3     spironolactone (ALDACTONE) 25 MG tablet Take 1 tablet (25 mg) by mouth daily 30 tablet 5

## 2022-09-25 ENCOUNTER — HEALTH MAINTENANCE LETTER (OUTPATIENT)
Age: 87
End: 2022-09-25

## 2022-10-06 DIAGNOSIS — I50.31 ACUTE DIASTOLIC CONGESTIVE HEART FAILURE (H): ICD-10-CM

## 2022-10-06 DIAGNOSIS — I10 ESSENTIAL HYPERTENSION: ICD-10-CM

## 2022-10-06 RX ORDER — METOPROLOL TARTRATE 100 MG
100 TABLET ORAL 2 TIMES DAILY
Qty: 180 TABLET | Refills: 3 | Status: ON HOLD | OUTPATIENT
Start: 2022-10-06 | End: 2023-03-02

## 2022-10-06 RX ORDER — SPIRONOLACTONE 25 MG/1
25 TABLET ORAL DAILY
Qty: 90 TABLET | Refills: 3 | Status: ON HOLD | OUTPATIENT
Start: 2022-10-06 | End: 2023-03-02

## 2022-10-06 NOTE — TELEPHONE ENCOUNTER
"Routing refill request to provider for review/approval because:  A break in medication  Labs out of range:  Creatinine    Last Written Prescription Date:  7/22/21  Last Fill Quantity: 30,  # refills: 5   Last office visit provider:  9/6/22     Requested Prescriptions   Pending Prescriptions Disp Refills     spironolactone (ALDACTONE) 25 MG tablet 30 tablet 5     Sig: Take 1 tablet (25 mg) by mouth daily       Diuretics (Including Combos) Protocol Failed - 10/6/2022  9:28 AM        Failed - Normal serum creatinine on file in past 12 months     Recent Labs   Lab Test 09/06/22  1346   CR 1.20*              Passed - Blood pressure under 140/90 in past 12 months     BP Readings from Last 3 Encounters:   09/06/22 98/60   06/03/22 112/64   05/12/22 120/68                 Passed - Recent (12 mo) or future (30 days) visit within the authorizing provider's specialty     Patient has had an office visit with the authorizing provider or a provider within the authorizing providers department within the previous 12 mos or has a future within next 30 days. See \"Patient Info\" tab in inbasket, or \"Choose Columns\" in Meds & Orders section of the refill encounter.              Passed - Medication is active on med list        Passed - Patient is age 18 or older        Passed - Normal serum potassium on file in past 12 months     Recent Labs   Lab Test 09/06/22  1346   POTASSIUM 3.5                    Passed - Normal serum sodium on file in past 12 months     Recent Labs   Lab Test 09/06/22  1346                  Signed Prescriptions Disp Refills    metoprolol tartrate (LOPRESSOR) 100 MG tablet 180 tablet 3     Sig: Take 1 tablet (100 mg) by mouth 2 times daily       Beta-Blockers Protocol Passed - 10/6/2022  2:29 PM        Passed - Blood pressure under 140/90 in past 12 months     BP Readings from Last 3 Encounters:   09/06/22 98/60   06/03/22 112/64   05/12/22 120/68                 Passed - Patient is age 6 or older        " "Passed - Recent (12 mo) or future (30 days) visit within the authorizing provider's specialty     Patient has had an office visit with the authorizing provider or a provider within the authorizing providers department within the previous 12 mos or has a future within next 30 days. See \"Patient Info\" tab in inbasket, or \"Choose Columns\" in Meds & Orders section of the refill encounter.              Passed - Medication is active on med list             Jonnie Kingston RN 10/06/22 2:32 PM  "

## 2022-10-06 NOTE — TELEPHONE ENCOUNTER
"Last Written Prescription Date:  7/22/21  Last Fill Quantity: 60,  # refills: 5   Last office visit provider:  9/6/22     Requested Prescriptions   Pending Prescriptions Disp Refills     spironolactone (ALDACTONE) 25 MG tablet 30 tablet 5     Sig: Take 1 tablet (25 mg) by mouth daily       Diuretics (Including Combos) Protocol Failed - 10/6/2022  9:28 AM        Failed - Normal serum creatinine on file in past 12 months     Recent Labs   Lab Test 09/06/22  1346   CR 1.20*              Passed - Blood pressure under 140/90 in past 12 months     BP Readings from Last 3 Encounters:   09/06/22 98/60   06/03/22 112/64   05/12/22 120/68                 Passed - Recent (12 mo) or future (30 days) visit within the authorizing provider's specialty     Patient has had an office visit with the authorizing provider or a provider within the authorizing providers department within the previous 12 mos or has a future within next 30 days. See \"Patient Info\" tab in inbasket, or \"Choose Columns\" in Meds & Orders section of the refill encounter.              Passed - Medication is active on med list        Passed - Patient is age 18 or older        Passed - Normal serum potassium on file in past 12 months     Recent Labs   Lab Test 09/06/22  1346   POTASSIUM 3.5                    Passed - Normal serum sodium on file in past 12 months     Recent Labs   Lab Test 09/06/22  1346                    metoprolol tartrate (LOPRESSOR) 100 MG tablet 60 tablet 5     Sig: Take 1 tablet (100 mg) by mouth 2 times daily       Beta-Blockers Protocol Passed - 10/6/2022  2:29 PM        Passed - Blood pressure under 140/90 in past 12 months     BP Readings from Last 3 Encounters:   09/06/22 98/60   06/03/22 112/64   05/12/22 120/68                 Passed - Patient is age 6 or older        Passed - Recent (12 mo) or future (30 days) visit within the authorizing provider's specialty     Patient has had an office visit with the authorizing provider or " "a provider within the authorizing providers department within the previous 12 mos or has a future within next 30 days. See \"Patient Info\" tab in inbasket, or \"Choose Columns\" in Meds & Orders section of the refill encounter.              Passed - Medication is active on med list             Jonnie Kingston RN 10/06/22 2:30 PM  "

## 2022-10-24 ENCOUNTER — TRANSFERRED RECORDS (OUTPATIENT)
Dept: HEALTH INFORMATION MANAGEMENT | Facility: CLINIC | Age: 87
End: 2022-10-24

## 2022-11-04 DIAGNOSIS — I50.33 ACUTE ON CHRONIC DIASTOLIC HEART FAILURE (H): ICD-10-CM

## 2022-11-05 RX ORDER — BUMETANIDE 1 MG/1
TABLET ORAL
Qty: 90 TABLET | Refills: 3 | OUTPATIENT
Start: 2022-11-05

## 2022-11-21 ENCOUNTER — IMMUNIZATION (OUTPATIENT)
Dept: NURSING | Facility: CLINIC | Age: 87
End: 2022-11-21
Payer: COMMERCIAL

## 2022-11-21 PROCEDURE — 0124A COVID-19,PF,PFIZER BOOSTER BIVALENT: CPT

## 2022-11-21 PROCEDURE — 91312 COVID-19,PF,PFIZER BOOSTER BIVALENT: CPT

## 2023-02-15 ENCOUNTER — TRANSFERRED RECORDS (OUTPATIENT)
Dept: HEALTH INFORMATION MANAGEMENT | Facility: CLINIC | Age: 88
End: 2023-02-15

## 2023-02-20 ENCOUNTER — HOSPITAL ENCOUNTER (INPATIENT)
Facility: HOSPITAL | Age: 88
LOS: 10 days | Discharge: HOSPICE | DRG: 682 | End: 2023-03-02
Attending: EMERGENCY MEDICINE | Admitting: STUDENT IN AN ORGANIZED HEALTH CARE EDUCATION/TRAINING PROGRAM
Payer: MEDICARE

## 2023-02-20 ENCOUNTER — APPOINTMENT (OUTPATIENT)
Dept: CT IMAGING | Facility: HOSPITAL | Age: 88
DRG: 682 | End: 2023-02-20
Attending: EMERGENCY MEDICINE
Payer: MEDICARE

## 2023-02-20 DIAGNOSIS — L30.4 INTERTRIGO: ICD-10-CM

## 2023-02-20 DIAGNOSIS — N28.9 RENAL INSUFFICIENCY: ICD-10-CM

## 2023-02-20 DIAGNOSIS — I48.91 ATRIAL FIBRILLATION WITH RVR (H): ICD-10-CM

## 2023-02-20 DIAGNOSIS — M54.50 MIDLINE LOW BACK PAIN WITHOUT SCIATICA, UNSPECIFIED CHRONICITY: ICD-10-CM

## 2023-02-20 DIAGNOSIS — M62.81 GENERALIZED MUSCLE WEAKNESS: ICD-10-CM

## 2023-02-20 DIAGNOSIS — J31.0 CHRONIC RHINITIS: Primary | ICD-10-CM

## 2023-02-20 DIAGNOSIS — E86.0 DEHYDRATION: ICD-10-CM

## 2023-02-20 LAB
ALBUMIN UR-MCNC: NEGATIVE MG/DL
ANION GAP SERPL CALCULATED.3IONS-SCNC: 12 MMOL/L (ref 7–15)
APPEARANCE UR: CLEAR
BASOPHILS # BLD AUTO: 0 10E3/UL (ref 0–0.2)
BASOPHILS NFR BLD AUTO: 0 %
BILIRUB UR QL STRIP: NEGATIVE
BUN SERPL-MCNC: 36.3 MG/DL (ref 8–23)
CALCIUM SERPL-MCNC: 9.9 MG/DL (ref 8.8–10.2)
CHLORIDE SERPL-SCNC: 106 MMOL/L (ref 98–107)
COLOR UR AUTO: YELLOW
CREAT SERPL-MCNC: 1.72 MG/DL (ref 0.67–1.17)
DEPRECATED HCO3 PLAS-SCNC: 30 MMOL/L (ref 22–29)
EOSINOPHIL # BLD AUTO: 0 10E3/UL (ref 0–0.7)
EOSINOPHIL NFR BLD AUTO: 0 %
ERYTHROCYTE [DISTWIDTH] IN BLOOD BY AUTOMATED COUNT: 14.4 % (ref 10–15)
GFR SERPL CREATININE-BSD FRML MDRD: 38 ML/MIN/1.73M2
GLUCOSE SERPL-MCNC: 102 MG/DL (ref 70–99)
GLUCOSE UR STRIP-MCNC: NEGATIVE MG/DL
HCT VFR BLD AUTO: 45.5 % (ref 40–53)
HGB BLD-MCNC: 13.9 G/DL (ref 13.3–17.7)
HGB UR QL STRIP: NEGATIVE
HYALINE CASTS: 3 /LPF
IMM GRANULOCYTES # BLD: 0 10E3/UL
IMM GRANULOCYTES NFR BLD: 1 %
KETONES UR STRIP-MCNC: NEGATIVE MG/DL
LEUKOCYTE ESTERASE UR QL STRIP: NEGATIVE
LYMPHOCYTES # BLD AUTO: 1 10E3/UL (ref 0.8–5.3)
LYMPHOCYTES NFR BLD AUTO: 14 %
MCH RBC QN AUTO: 28.6 PG (ref 26.5–33)
MCHC RBC AUTO-ENTMCNC: 30.5 G/DL (ref 31.5–36.5)
MCV RBC AUTO: 94 FL (ref 78–100)
MONOCYTES # BLD AUTO: 1.1 10E3/UL (ref 0–1.3)
MONOCYTES NFR BLD AUTO: 16 %
MUCOUS THREADS #/AREA URNS LPF: PRESENT /LPF
NEUTROPHILS # BLD AUTO: 4.8 10E3/UL (ref 1.6–8.3)
NEUTROPHILS NFR BLD AUTO: 69 %
NITRATE UR QL: NEGATIVE
NRBC # BLD AUTO: 0 10E3/UL
NRBC BLD AUTO-RTO: 1 /100
PH UR STRIP: 5 [PH] (ref 5–7)
PLATELET # BLD AUTO: 65 10E3/UL (ref 150–450)
POTASSIUM SERPL-SCNC: 5 MMOL/L (ref 3.4–5.3)
RBC # BLD AUTO: 4.86 10E6/UL (ref 4.4–5.9)
RBC URINE: 1 /HPF
SODIUM SERPL-SCNC: 148 MMOL/L (ref 136–145)
SP GR UR STRIP: 1.01 (ref 1–1.03)
TROPONIN T SERPL HS-MCNC: 37 NG/L
TROPONIN T SERPL HS-MCNC: 37 NG/L
UROBILINOGEN UR STRIP-MCNC: <2 MG/DL
WBC # BLD AUTO: 6.9 10E3/UL (ref 4–11)
WBC URINE: 2 /HPF

## 2023-02-20 PROCEDURE — 250N000013 HC RX MED GY IP 250 OP 250 PS 637: Performed by: EMERGENCY MEDICINE

## 2023-02-20 PROCEDURE — 258N000003 HC RX IP 258 OP 636: Performed by: STUDENT IN AN ORGANIZED HEALTH CARE EDUCATION/TRAINING PROGRAM

## 2023-02-20 PROCEDURE — 81001 URINALYSIS AUTO W/SCOPE: CPT | Performed by: EMERGENCY MEDICINE

## 2023-02-20 PROCEDURE — 250N000013 HC RX MED GY IP 250 OP 250 PS 637: Performed by: STUDENT IN AN ORGANIZED HEALTH CARE EDUCATION/TRAINING PROGRAM

## 2023-02-20 PROCEDURE — 93005 ELECTROCARDIOGRAM TRACING: CPT | Performed by: EMERGENCY MEDICINE

## 2023-02-20 PROCEDURE — 96361 HYDRATE IV INFUSION ADD-ON: CPT

## 2023-02-20 PROCEDURE — 80048 BASIC METABOLIC PNL TOTAL CA: CPT | Performed by: EMERGENCY MEDICINE

## 2023-02-20 PROCEDURE — 99222 1ST HOSP IP/OBS MODERATE 55: CPT | Performed by: STUDENT IN AN ORGANIZED HEALTH CARE EDUCATION/TRAINING PROGRAM

## 2023-02-20 PROCEDURE — 70450 CT HEAD/BRAIN W/O DYE: CPT

## 2023-02-20 PROCEDURE — 99285 EMERGENCY DEPT VISIT HI MDM: CPT | Mod: 25

## 2023-02-20 PROCEDURE — 36415 COLL VENOUS BLD VENIPUNCTURE: CPT | Performed by: EMERGENCY MEDICINE

## 2023-02-20 PROCEDURE — 120N000001 HC R&B MED SURG/OB

## 2023-02-20 PROCEDURE — 250N000011 HC RX IP 250 OP 636: Performed by: EMERGENCY MEDICINE

## 2023-02-20 PROCEDURE — 84484 ASSAY OF TROPONIN QUANT: CPT | Performed by: EMERGENCY MEDICINE

## 2023-02-20 PROCEDURE — 85025 COMPLETE CBC W/AUTO DIFF WBC: CPT | Performed by: EMERGENCY MEDICINE

## 2023-02-20 PROCEDURE — 258N000003 HC RX IP 258 OP 636: Performed by: EMERGENCY MEDICINE

## 2023-02-20 PROCEDURE — 250N000012 HC RX MED GY IP 250 OP 636 PS 637: Performed by: STUDENT IN AN ORGANIZED HEALTH CARE EDUCATION/TRAINING PROGRAM

## 2023-02-20 PROCEDURE — 96374 THER/PROPH/DIAG INJ IV PUSH: CPT

## 2023-02-20 RX ORDER — LORAZEPAM 0.5 MG/1
0.25 TABLET ORAL 3 TIMES DAILY PRN
Status: DISCONTINUED | OUTPATIENT
Start: 2023-02-20 | End: 2023-03-02 | Stop reason: HOSPADM

## 2023-02-20 RX ORDER — DOXAZOSIN 4 MG/1
4 TABLET ORAL AT BEDTIME
Status: DISCONTINUED | OUTPATIENT
Start: 2023-02-20 | End: 2023-03-02 | Stop reason: HOSPADM

## 2023-02-20 RX ORDER — ONDANSETRON 2 MG/ML
4 INJECTION INTRAMUSCULAR; INTRAVENOUS EVERY 6 HOURS PRN
Status: DISCONTINUED | OUTPATIENT
Start: 2023-02-20 | End: 2023-03-02 | Stop reason: HOSPADM

## 2023-02-20 RX ORDER — AMOXICILLIN 250 MG
1 CAPSULE ORAL 2 TIMES DAILY PRN
Status: DISCONTINUED | OUTPATIENT
Start: 2023-02-20 | End: 2023-02-23

## 2023-02-20 RX ORDER — CLOTRIMAZOLE 1 %
CREAM (GRAM) TOPICAL 2 TIMES DAILY
Status: DISCONTINUED | OUTPATIENT
Start: 2023-02-21 | End: 2023-02-23

## 2023-02-20 RX ORDER — MYCOPHENOLATE MOFETIL 500 MG/1
1000 TABLET ORAL 2 TIMES DAILY
Status: DISCONTINUED | OUTPATIENT
Start: 2023-02-20 | End: 2023-03-02 | Stop reason: HOSPADM

## 2023-02-20 RX ORDER — PRAVASTATIN SODIUM 20 MG
20 TABLET ORAL EVERY EVENING
Status: DISCONTINUED | OUTPATIENT
Start: 2023-02-20 | End: 2023-02-23

## 2023-02-20 RX ORDER — CLOTRIMAZOLE 1 %
CREAM (GRAM) TOPICAL 2 TIMES DAILY
Status: COMPLETED | OUTPATIENT
Start: 2023-02-20 | End: 2023-02-20

## 2023-02-20 RX ORDER — ACETAMINOPHEN 650 MG/1
650 SUPPOSITORY RECTAL EVERY 6 HOURS PRN
Status: DISCONTINUED | OUTPATIENT
Start: 2023-02-20 | End: 2023-02-26

## 2023-02-20 RX ORDER — IPRATROPIUM BROMIDE 21 UG/1
2 SPRAY, METERED NASAL 3 TIMES DAILY
Status: DISCONTINUED | OUTPATIENT
Start: 2023-02-20 | End: 2023-02-22

## 2023-02-20 RX ORDER — FINASTERIDE 5 MG/1
5 TABLET, FILM COATED ORAL AT BEDTIME
Status: DISCONTINUED | OUTPATIENT
Start: 2023-02-20 | End: 2023-03-02 | Stop reason: HOSPADM

## 2023-02-20 RX ORDER — BUDESONIDE AND FORMOTEROL FUMARATE DIHYDRATE 160; 4.5 UG/1; UG/1
2 AEROSOL RESPIRATORY (INHALATION)
Status: DISCONTINUED | OUTPATIENT
Start: 2023-02-20 | End: 2023-03-02 | Stop reason: HOSPADM

## 2023-02-20 RX ORDER — AMOXICILLIN 250 MG
2 CAPSULE ORAL 2 TIMES DAILY PRN
Status: DISCONTINUED | OUTPATIENT
Start: 2023-02-20 | End: 2023-02-23

## 2023-02-20 RX ORDER — NIACINAMIDE 500 MG
1000 TABLET ORAL DAILY
Status: ON HOLD | COMMUNITY
End: 2023-03-02

## 2023-02-20 RX ORDER — CLOTRIMAZOLE 1 %
CREAM (GRAM) TOPICAL 2 TIMES DAILY
Status: DISCONTINUED | OUTPATIENT
Start: 2023-02-20 | End: 2023-02-20

## 2023-02-20 RX ORDER — DOXYCYCLINE 100 MG/1
100 CAPSULE ORAL 2 TIMES DAILY
Status: ON HOLD | COMMUNITY
End: 2023-03-02

## 2023-02-20 RX ORDER — ONDANSETRON 4 MG/1
4 TABLET, ORALLY DISINTEGRATING ORAL EVERY 6 HOURS PRN
Status: DISCONTINUED | OUTPATIENT
Start: 2023-02-20 | End: 2023-03-02 | Stop reason: HOSPADM

## 2023-02-20 RX ORDER — ACETAMINOPHEN 325 MG/1
650 TABLET ORAL EVERY 6 HOURS PRN
Status: DISCONTINUED | OUTPATIENT
Start: 2023-02-20 | End: 2023-02-26

## 2023-02-20 RX ORDER — DOXYCYCLINE 100 MG/1
100 CAPSULE ORAL 2 TIMES DAILY
Status: DISCONTINUED | OUTPATIENT
Start: 2023-02-20 | End: 2023-02-23

## 2023-02-20 RX ORDER — ONDANSETRON 2 MG/ML
4 INJECTION INTRAMUSCULAR; INTRAVENOUS ONCE
Status: COMPLETED | OUTPATIENT
Start: 2023-02-20 | End: 2023-02-20

## 2023-02-20 RX ORDER — SODIUM CHLORIDE 9 MG/ML
INJECTION, SOLUTION INTRAVENOUS CONTINUOUS
Status: DISCONTINUED | OUTPATIENT
Start: 2023-02-20 | End: 2023-02-21

## 2023-02-20 RX ORDER — METOPROLOL TARTRATE 100 MG
100 TABLET ORAL 2 TIMES DAILY
Status: DISCONTINUED | OUTPATIENT
Start: 2023-02-20 | End: 2023-02-24

## 2023-02-20 RX ORDER — VITAMIN B COMPLEX
25 TABLET ORAL DAILY
Status: DISCONTINUED | OUTPATIENT
Start: 2023-02-21 | End: 2023-03-01

## 2023-02-20 RX ADMIN — ONDANSETRON 4 MG: 2 INJECTION INTRAMUSCULAR; INTRAVENOUS at 16:40

## 2023-02-20 RX ADMIN — SODIUM CHLORIDE 500 ML: 9 INJECTION, SOLUTION INTRAVENOUS at 16:19

## 2023-02-20 RX ADMIN — SODIUM CHLORIDE: 9 INJECTION, SOLUTION INTRAVENOUS at 22:26

## 2023-02-20 RX ADMIN — DOXYCYCLINE HYCLATE 100 MG: 100 CAPSULE ORAL at 22:22

## 2023-02-20 RX ADMIN — CLOTRIMAZOLE: 1 CREAM TOPICAL at 16:41

## 2023-02-20 RX ADMIN — MYCOPHENOLATE MOFETIL 1000 MG: 500 TABLET, FILM COATED ORAL at 22:22

## 2023-02-20 RX ADMIN — SODIUM CHLORIDE 1000 ML: 9 INJECTION, SOLUTION INTRAVENOUS at 20:10

## 2023-02-20 RX ADMIN — APIXABAN 5 MG: 5 TABLET, FILM COATED ORAL at 22:21

## 2023-02-20 RX ADMIN — PRAVASTATIN SODIUM 20 MG: 20 TABLET ORAL at 22:22

## 2023-02-20 RX ADMIN — FINASTERIDE 5 MG: 5 TABLET, FILM COATED ORAL at 22:22

## 2023-02-20 ASSESSMENT — ACTIVITIES OF DAILY LIVING (ADL)
ADLS_ACUITY_SCORE: 35

## 2023-02-20 ASSESSMENT — ENCOUNTER SYMPTOMS
FATIGUE: 1
SPEECH DIFFICULTY: 1
FREQUENCY: 1
APPETITE CHANGE: 1
ABDOMINAL PAIN: 0
WEAKNESS: 1

## 2023-02-20 NOTE — ED PROVIDER NOTES
EMERGENCY DEPARTMENT ENCOUNTER      NAME: Nishant Gilliam  AGE: 87 year old male  YOB: 1935  MRN: [id number]  EVALUATION DATE & TIME: 2/20/2023  3:29 PM    PCP: Aditya Mehta    ED PROVIDER: Shivam Mendenhall M.D.      Chief Complaint   Patient presents with     Syncope     Near syncope. Wife states last 1-2 weeks pt not eating or drinking much and becoming very weak. Wife unable to help     Generalized Weakness         FINAL IMPRESSION:  1.  Generalized weakness.  2.  Dehydration.  3.  Acute renal insufficiency.    ED COURSE & MEDICAL DECISION MAKING:    3:41 PM I met with the patient to gather history and to perform my initial exam. We discussed plans for the ED course, including diagnostic testing and treatment. PPE worn: Face shield, N95 mask, surgical mask, surgical gown, and gloves.  Wife notes general weakness for the last 2 weeks also decreased oral intake over the last 2 weeks and dry mouth and decreased urination with signs of dehydration.  Weakness has progressed to the point that wife is unable to take care of him.  6:37 PM.  Platelet count down to 65,000 from previous 172,000.  Sodium increased from 1 40-1 48 BUN/creatinine also increased to 36 and 1.72 from previous 22 and 1.2.  EKG showing chronic atrial fibrillation.  Head CT showing atrophy.  Urinalysis still pending.  Second troponin pending after initial troponin 37.  Plan admit patient to [facility].  Results communicated to the patient we will page the admitting service.  7:06 PM.  Spoke with the hospitalist who agreed with patient admission to [facility] inpatient.    Pertinent Labs & Imaging studies reviewed. (See chart for details)  87 year old male presents to the Emergency Department for evaluation of general weakness.    At the conclusion of the encounter I discussed the results of all of the tests and the disposition. The questions were answered. The patient or family acknowledged understanding and was  agreeable with the care plan.       Medical Decision Making    History:    Supplemental history from: Documented in chart, if applicable    External Record(s) reviewed: Both inpatient and outpatient computer records reviewed.    Work Up:    Chart documentation includes differential considered and any EKGs or imaging independently interpreted by provider, where specified.  Differential diagnoses include stroke, electrolyte abnormality, infection, dehydration, etc.    In additional to work up documented, I considered the following work up: Documented in chart, if applicable.    External consultation:    Discussion of management with another provider: Documented in chart, if applicable    Complicating factors:    Care impacted by chronic illness: Anticoagulated State, Cancer/Chemotherapy, Chronic Kidney Disease, Chronic Lung Disease, Heart Disease, Hyperlipidemia and Hypertension    Care affected by social determinants of health: N/A    Disposition considerations: Patient will probably require admission because of his weakness.        MEDICATIONS GIVEN IN THE EMERGENCY:  Medications   clotrimazole (LOTRIMIN) 1 % cream ( Topical Given 2/20/23 1641)   0.9% sodium chloride BOLUS (0 mLs Intravenous Stopped 2/20/23 1729)   ondansetron (ZOFRAN) injection 4 mg (4 mg Intravenous Given 2/20/23 1640)       NEW PRESCRIPTIONS STARTED AT TODAY'S ER VISIT  New Prescriptions    No medications on file          =================================================================    HPI    Patient information was obtained from: Patient's Wife and Patient    Use of : N/A     Nishant Gilliam is a 87 year old male with a pertinent medical history of acute diastolic CHF, heart failure with preserved ejection fraction, CAD, left ventricular hypertrophy, atrial fibrillation, lymphedema, COPD, CKD, HTN, pure hypercholesterolemia, basal cell carcinoma of the skin, squamous cell carcinoma of the skin, leukocytosis, bullous pemphigoid,  "morbid obesity, who presents to this ED via EMS for evaluation of multiple complaints.    Patient's wife reports that recently the patient has developed generalized weakness, fatigue, decreased appetite, and has had a decrease in urinary frequency. She also mentions \"everything has slowed down for the patient, including both muscularly and speech wise. And the patient has also recently been having trouble looking for words. But he has not had any intellectual changes.\" She additionally reports that recently the patient has developed a right lower quadrant rash, which she states appears like a yeast infection. She endorses the patient recently taking Doxycycline for bullous pemphigoid. She endorses a patient PMH of lymphedema, which she mentions she helps treat for the patient by placing edema wraps around both of his legs. She denies any other patient complications at this time.    Patient denies any recent abdominal pain.    Patient and patient's wife do not identify any waxing or waning symptoms otherwise, exacerbating or alleviating features, associated symptoms except as mentioned. Patient and patient's wife deny any additional pain related complaints.    REVIEW OF SYSTEMS   Review of Systems   Constitutional: Positive for appetite change (decreased) and fatigue.   Gastrointestinal: Negative for abdominal pain.   Genitourinary: Positive for frequency (decreased frequency).   Musculoskeletal:        Positive for slowed muscle movement.   Skin: Positive for rash (RLQ abdomen).   Neurological: Positive for speech difficulty (slowed speech and word-finding difficulty) and weakness (generalized).   Psychiatric/Behavioral: Positive for behavioral problems.   All other systems reviewed and are negative.       PAST MEDICAL HISTORY:  Past Medical History:   Diagnosis Date     COPD (chronic obstructive pulmonary disease) (H)      Coronary artery disease      Hypertension      Right bundle branch block     complete "       PAST SURGICAL HISTORY:  Past Surgical History:   Procedure Laterality Date      KNEE SCOPE, DIAGNOSTIC      Description: Arthroscopy Knee Left;  Recorded: 06/27/2009;  Comments: details unk.     HERNIA REPAIR       ZZHC CORONARY STENT PERCUT, INITIAL VESSEL      Description: Cath Stent Placement;  Recorded: 02/24/2014;  Comments: stenting to the LAD in 1999. 2nd done years later.           CURRENT MEDICATIONS:    apixaban ANTICOAGULANT (ELIQUIS) 5 MG tablet  budesonide-formoterol (SYMBICORT) 160-4.5 MCG/ACT Inhaler  bumetanide (BUMEX) 1 MG tablet  cholecalciferol, vitamin D3, (VITAMIN D3) 25 mcg (1,000 unit) capsule  doxazosin (CARDURA) 4 MG tablet  finasteride (PROSCAR) 5 MG tablet  fluticasone (FLONASE) 50 MCG/ACT nasal spray  gabapentin (NEURONTIN) 100 MG capsule  ipratropium (ATROVENT) 0.03 % nasal spray  LORazepam (ATIVAN) 0.5 MG tablet  metoprolol tartrate (LOPRESSOR) 100 MG tablet  moxifloxacin (VIGAMOX) 0.5 % ophthalmic solution  mycophenolate (GENERIC EQUIVALENT) 500 MG tablet  nitroglycerin (NITROSTAT) 0.4 MG SL tablet  pravastatin (PRAVACHOL) 20 MG tablet  spironolactone (ALDACTONE) 25 MG tablet        ALLERGIES:  Allergies   Allergen Reactions     Furosemide Rash     pemphigoid     Cat Hair Std Allergenic Ext [Cat Hair Extract] Unknown     Sulfa Drugs Unknown       FAMILY HISTORY:  No family history on file.    SOCIAL HISTORY:   Social History     Socioeconomic History     Marital status:    Tobacco Use     Smoking status: Former     Years: 30.00     Types: Cigarettes     Smokeless tobacco: Never   Substance and Sexual Activity     Alcohol use: No     Drug use: No   Social History Narrative    Lives with his wife.   Former smoker.  No current drugs, alcohol, tobacco.    VITALS:  BP 97/64   Pulse 64   Resp 15   Ht 1.829 m (6')   SpO2 95%   BMI 34.00 kg/m      PHYSICAL EXAM    Vital Signs:  BP 97/64   Pulse 64   Resp 15   Ht 1.829 m (6')   SpO2 95%   BMI 34.00 kg/m    General:  On  entering the room patient is in no apparent distress.    Neck:  Neck supple with full range of motion and nontender.    Back:  Back and spine are nontender.  No costovertebral angle tenderness.    HEENT:  Oropharynx clear with dry tongue and mucous membranes.  HEENT unremarkable.    Pulmonary:  Chest clear to auscultation without rhonchi rales or wheezing.    Cardiovascular:  Cardiac regular rate and rhythm without murmurs rubs or gallops.    Abdomen:  Abdomen soft nontender.  There is no rebound or guarding.    Muskuloskeletal:  he moves all 4 without any difficulty and has normal neurovascular exams.  Extremities without clubbing, cyanosis.  Chronic 4+ edema.  This is in both ankles.  Legs and calves are nontender.    Neuro:  he is alert and oriented ×3 and moves all extremities symmetrically.    Psych:  Normal affect.    Skin:  Unremarkable and warm and dry.       LAB:  All pertinent labs reviewed and interpreted.  Labs Ordered and Resulted from Time of ED Arrival to Time of ED Departure   BASIC METABOLIC PANEL - Abnormal       Result Value    Sodium 148 (*)     Potassium 5.0      Chloride 106      Carbon Dioxide (CO2) 30 (*)     Anion Gap 12      Urea Nitrogen 36.3 (*)     Creatinine 1.72 (*)     Calcium 9.9      Glucose 102 (*)     GFR Estimate 38 (*)    TROPONIN T, HIGH SENSITIVITY - Abnormal    Troponin T, High Sensitivity 37 (*)    CBC WITH PLATELETS AND DIFFERENTIAL - Abnormal    WBC Count 6.9      RBC Count 4.86      Hemoglobin 13.9      Hematocrit 45.5      MCV 94      MCH 28.6      MCHC 30.5 (*)     RDW 14.4      Platelet Count 65 (*)     % Neutrophils 69      % Lymphocytes 14      % Monocytes 16      % Eosinophils 0      % Basophils 0      % Immature Granulocytes 1      NRBCs per 100 WBC 1 (*)     Absolute Neutrophils 4.8      Absolute Lymphocytes 1.0      Absolute Monocytes 1.1      Absolute Eosinophils 0.0      Absolute Basophils 0.0      Absolute Immature Granulocytes 0.0      Absolute NRBCs 0.0      ROUTINE UA WITH MICROSCOPIC REFLEX TO CULTURE   TROPONIN T, HIGH SENSITIVITY       RADIOLOGY:  Reviewed all pertinent imaging. Please see official radiology report.  Head CT w/o contrast   Final Result   IMPRESSION:   1.  No CT evidence for acute intracranial process.   2.  Brain atrophy and presumed chronic microvascular ischemic changes as above.                 EKG:    Chronic atrial fibrillation at 68, low voltages, right bundle branch block, possible old septal wall MI.  Very similar to previous EKG July 7, 2021.    I have independently reviewed and interpreted the EKG(s) documented above.    PROCEDURES:   None.       I, Power Mason, am serving as a scribe to document services personally performed by Dr. Mendenhall based on my observation and the provider's statements to me. I, Shivam Mendenhall MD attest that Power Mason is acting in a scribe capacity, has observed my performance of the services and has documented them in accordance with my direction.    Shivam Mendenhall M.D.  Emergency Medicine  [facility] EMERGENCY DEPARTMENT  [facility] [facility] [address]  [phone]  Dept: [phone]       Shivam Mendenhall MD  02/20/23 4929       Shivam Mendenhall MD  02/20/23 1027

## 2023-02-20 NOTE — ED NOTES
Bed: [place] [address]  Expected date:   Expected time:   Means of arrival:   Comments:  SPF - 87 M near syncope

## 2023-02-21 ENCOUNTER — APPOINTMENT (OUTPATIENT)
Dept: PHYSICAL THERAPY | Facility: HOSPITAL | Age: 88
DRG: 682 | End: 2023-02-21
Attending: STUDENT IN AN ORGANIZED HEALTH CARE EDUCATION/TRAINING PROGRAM
Payer: MEDICARE

## 2023-02-21 ENCOUNTER — APPOINTMENT (OUTPATIENT)
Dept: OCCUPATIONAL THERAPY | Facility: HOSPITAL | Age: 88
DRG: 682 | End: 2023-02-21
Attending: STUDENT IN AN ORGANIZED HEALTH CARE EDUCATION/TRAINING PROGRAM
Payer: MEDICARE

## 2023-02-21 ENCOUNTER — APPOINTMENT (OUTPATIENT)
Dept: RADIOLOGY | Facility: HOSPITAL | Age: 88
DRG: 682 | End: 2023-02-21
Attending: STUDENT IN AN ORGANIZED HEALTH CARE EDUCATION/TRAINING PROGRAM
Payer: MEDICARE

## 2023-02-21 ENCOUNTER — APPOINTMENT (OUTPATIENT)
Dept: ULTRASOUND IMAGING | Facility: HOSPITAL | Age: 88
DRG: 682 | End: 2023-02-21
Attending: STUDENT IN AN ORGANIZED HEALTH CARE EDUCATION/TRAINING PROGRAM
Payer: MEDICARE

## 2023-02-21 ENCOUNTER — APPOINTMENT (OUTPATIENT)
Dept: SPEECH THERAPY | Facility: HOSPITAL | Age: 88
DRG: 682 | End: 2023-02-21
Attending: STUDENT IN AN ORGANIZED HEALTH CARE EDUCATION/TRAINING PROGRAM
Payer: MEDICARE

## 2023-02-21 LAB
ANION GAP SERPL CALCULATED.3IONS-SCNC: 12 MMOL/L (ref 7–15)
BASOPHILS # BLD AUTO: 0 10E3/UL (ref 0–0.2)
BASOPHILS NFR BLD AUTO: 0 %
BUN SERPL-MCNC: 33.2 MG/DL (ref 8–23)
CALCIUM SERPL-MCNC: 9.4 MG/DL (ref 8.8–10.2)
CHLORIDE SERPL-SCNC: 107 MMOL/L (ref 98–107)
CK SERPL-CCNC: 28 U/L (ref 39–308)
CREAT SERPL-MCNC: 1.65 MG/DL (ref 0.67–1.17)
DEPRECATED HCO3 PLAS-SCNC: 29 MMOL/L (ref 22–29)
EOSINOPHIL # BLD AUTO: 0 10E3/UL (ref 0–0.7)
EOSINOPHIL NFR BLD AUTO: 0 %
ERYTHROCYTE [DISTWIDTH] IN BLOOD BY AUTOMATED COUNT: 14.6 % (ref 10–15)
GFR SERPL CREATININE-BSD FRML MDRD: 40 ML/MIN/1.73M2
GLUCOSE SERPL-MCNC: 74 MG/DL (ref 70–99)
HCT VFR BLD AUTO: 46.2 % (ref 40–53)
HGB BLD-MCNC: 14.2 G/DL (ref 13.3–17.7)
IMM GRANULOCYTES # BLD: 0 10E3/UL
IMM GRANULOCYTES NFR BLD: 1 %
LYMPHOCYTES # BLD AUTO: 1 10E3/UL (ref 0.8–5.3)
LYMPHOCYTES NFR BLD AUTO: 18 %
MCH RBC QN AUTO: 28.9 PG (ref 26.5–33)
MCHC RBC AUTO-ENTMCNC: 30.7 G/DL (ref 31.5–36.5)
MCV RBC AUTO: 94 FL (ref 78–100)
MONOCYTES # BLD AUTO: 1 10E3/UL (ref 0–1.3)
MONOCYTES NFR BLD AUTO: 17 %
NEUTROPHILS # BLD AUTO: 3.8 10E3/UL (ref 1.6–8.3)
NEUTROPHILS NFR BLD AUTO: 64 %
NRBC # BLD AUTO: 0.1 10E3/UL
NRBC BLD AUTO-RTO: 1 /100
PATH REPORT.COMMENTS IMP SPEC: NORMAL
PATH REPORT.COMMENTS IMP SPEC: NORMAL
PATH REPORT.FINAL DX SPEC: NORMAL
PATH REPORT.MICROSCOPIC SPEC OTHER STN: NORMAL
PATH REPORT.RELEVANT HX SPEC: NORMAL
PLATELET # BLD AUTO: 57 10E3/UL (ref 150–450)
POTASSIUM SERPL-SCNC: 5.1 MMOL/L (ref 3.4–5.3)
RBC # BLD AUTO: 4.91 10E6/UL (ref 4.4–5.9)
RETICS # AUTO: 0.06 10E6/UL (ref 0.01–0.11)
RETICS/RBC NFR AUTO: 1.3 % (ref 0.8–2.7)
SODIUM SERPL-SCNC: 148 MMOL/L (ref 136–145)
TSH SERPL DL<=0.005 MIU/L-ACNC: 2.42 UIU/ML (ref 0.3–4.2)
WBC # BLD AUTO: 5.8 10E3/UL (ref 4–11)

## 2023-02-21 PROCEDURE — 80048 BASIC METABOLIC PNL TOTAL CA: CPT | Performed by: STUDENT IN AN ORGANIZED HEALTH CARE EDUCATION/TRAINING PROGRAM

## 2023-02-21 PROCEDURE — 82607 VITAMIN B-12: CPT | Performed by: PSYCHIATRY & NEUROLOGY

## 2023-02-21 PROCEDURE — 85045 AUTOMATED RETICULOCYTE COUNT: CPT | Performed by: STUDENT IN AN ORGANIZED HEALTH CARE EDUCATION/TRAINING PROGRAM

## 2023-02-21 PROCEDURE — 250N000013 HC RX MED GY IP 250 OP 250 PS 637: Performed by: STUDENT IN AN ORGANIZED HEALTH CARE EDUCATION/TRAINING PROGRAM

## 2023-02-21 PROCEDURE — 250N000012 HC RX MED GY IP 250 OP 636 PS 637: Performed by: STUDENT IN AN ORGANIZED HEALTH CARE EDUCATION/TRAINING PROGRAM

## 2023-02-21 PROCEDURE — 76770 US EXAM ABDO BACK WALL COMP: CPT

## 2023-02-21 PROCEDURE — 36415 COLL VENOUS BLD VENIPUNCTURE: CPT | Performed by: PSYCHIATRY & NEUROLOGY

## 2023-02-21 PROCEDURE — 99223 1ST HOSP IP/OBS HIGH 75: CPT | Performed by: PSYCHIATRY & NEUROLOGY

## 2023-02-21 PROCEDURE — 84443 ASSAY THYROID STIM HORMONE: CPT | Performed by: PSYCHIATRY & NEUROLOGY

## 2023-02-21 PROCEDURE — 83615 LACTATE (LD) (LDH) ENZYME: CPT | Performed by: INTERNAL MEDICINE

## 2023-02-21 PROCEDURE — 97165 OT EVAL LOW COMPLEX 30 MIN: CPT | Mod: GO

## 2023-02-21 PROCEDURE — 85025 COMPLETE CBC W/AUTO DIFF WBC: CPT | Performed by: STUDENT IN AN ORGANIZED HEALTH CARE EDUCATION/TRAINING PROGRAM

## 2023-02-21 PROCEDURE — 97162 PT EVAL MOD COMPLEX 30 MIN: CPT | Mod: GP

## 2023-02-21 PROCEDURE — 71045 X-RAY EXAM CHEST 1 VIEW: CPT

## 2023-02-21 PROCEDURE — 99233 SBSQ HOSP IP/OBS HIGH 50: CPT | Performed by: INTERNAL MEDICINE

## 2023-02-21 PROCEDURE — 258N000002 HC RX IP 258 OP 250: Performed by: INTERNAL MEDICINE

## 2023-02-21 PROCEDURE — 85060 BLOOD SMEAR INTERPRETATION: CPT | Performed by: PATHOLOGY

## 2023-02-21 PROCEDURE — 82550 ASSAY OF CK (CPK): CPT | Performed by: PSYCHIATRY & NEUROLOGY

## 2023-02-21 PROCEDURE — G0378 HOSPITAL OBSERVATION PER HR: HCPCS

## 2023-02-21 PROCEDURE — 97535 SELF CARE MNGMENT TRAINING: CPT | Mod: GO

## 2023-02-21 PROCEDURE — 36415 COLL VENOUS BLD VENIPUNCTURE: CPT | Performed by: STUDENT IN AN ORGANIZED HEALTH CARE EDUCATION/TRAINING PROGRAM

## 2023-02-21 PROCEDURE — 258N000003 HC RX IP 258 OP 636: Performed by: INTERNAL MEDICINE

## 2023-02-21 PROCEDURE — 97530 THERAPEUTIC ACTIVITIES: CPT | Mod: GP

## 2023-02-21 PROCEDURE — 120N000001 HC R&B MED SURG/OB

## 2023-02-21 PROCEDURE — 92610 EVALUATE SWALLOWING FUNCTION: CPT | Mod: GN

## 2023-02-21 RX ORDER — SODIUM CHLORIDE 450 MG/100ML
INJECTION, SOLUTION INTRAVENOUS CONTINUOUS
Status: DISCONTINUED | OUTPATIENT
Start: 2023-02-21 | End: 2023-02-22

## 2023-02-21 RX ORDER — SODIUM CHLORIDE 9 MG/ML
INJECTION, SOLUTION INTRAVENOUS CONTINUOUS
Status: DISCONTINUED | OUTPATIENT
Start: 2023-02-21 | End: 2023-02-21

## 2023-02-21 RX ADMIN — SODIUM CHLORIDE: 9 INJECTION, SOLUTION INTRAVENOUS at 13:11

## 2023-02-21 RX ADMIN — MYCOPHENOLATE MOFETIL 1000 MG: 500 TABLET, FILM COATED ORAL at 10:27

## 2023-02-21 RX ADMIN — CLOTRIMAZOLE: 1 CREAM TOPICAL at 20:38

## 2023-02-21 RX ADMIN — PRAVASTATIN SODIUM 20 MG: 20 TABLET ORAL at 20:36

## 2023-02-21 RX ADMIN — SODIUM CHLORIDE: 4.5 INJECTION, SOLUTION INTRAVENOUS at 22:26

## 2023-02-21 RX ADMIN — APIXABAN 5 MG: 5 TABLET, FILM COATED ORAL at 10:27

## 2023-02-21 RX ADMIN — MYCOPHENOLATE MOFETIL 1000 MG: 500 TABLET, FILM COATED ORAL at 20:41

## 2023-02-21 RX ADMIN — DOXYCYCLINE HYCLATE 100 MG: 100 CAPSULE ORAL at 20:35

## 2023-02-21 RX ADMIN — DOXYCYCLINE HYCLATE 100 MG: 100 CAPSULE ORAL at 10:27

## 2023-02-21 ASSESSMENT — ACTIVITIES OF DAILY LIVING (ADL)
ADLS_ACUITY_SCORE: 37
ADLS_ACUITY_SCORE: 43
ADLS_ACUITY_SCORE: 35
ADLS_ACUITY_SCORE: 37
DEPENDENT_IADLS:: CLEANING;COOKING;LAUNDRY;SHOPPING;MEAL PREPARATION;MEDICATION MANAGEMENT;MONEY MANAGEMENT;TRANSPORTATION
ADLS_ACUITY_SCORE: 37
ADLS_ACUITY_SCORE: 35
ADLS_ACUITY_SCORE: 37
ADLS_ACUITY_SCORE: 35
ADLS_ACUITY_SCORE: 43
ADLS_ACUITY_SCORE: 35
ADLS_ACUITY_SCORE: 35
ADLS_ACUITY_SCORE: 37

## 2023-02-21 NOTE — CONSULTS
NEUROLOGY CONSULTATION NOTE     Nishant Gilliam,  1935, MRN [id number] Date: 2023     [facility]   Code status:  No CPR- Do NOT Intubate   PCP: Aditya Mehta, None      ASSESSMENT & PLAN   Diagnosis code: Encephalopathy    Encephalopathy  Rule out Parkinson's disease  Generalized weakness  Elevated creatinine      Patient's exam is not very suggestive of Parkinson's disease    Suspect patient has a mild encephalopathy that could be causing a lot of his symptoms    Encephalopathy most likely related to elevated creatinine.  Defer treatment to primary team.    Head CT negative for structural lesions.  Check MRI brain to rule out cerebrovascular disease    EEG    Check B12/TSH    Check CK for generalized weakness    UA negative    Speech therapy consultation and physical therapy if needed.       Chief Complaint   Patient presents with     Syncope     Near syncope. Wife states last 1-2 weeks pt not eating or drinking much and becoming very weak. Wife unable to help     Generalized Weakness        HISTORY OF PRESENT ILLNESS     We have been requested by Dr. Carreon to evaluate Nishant Gilliam who is a 87 year old  male for generalized weakness slowed speech.  This 87-year-old male with history of atrial fibrillation, COPD, CKD, he reports that the symptoms came on a few days before admission.  He reports that everything is slow down.  His motion and speech is slowed.  He feels more sleepy.  Has difficulty expressing himself.  No other associated symptoms.  Does have some trouble swallowing though reports that it is more related to some bleeding in his throat.  Labs did show dehydration and acute kidney injury.  Patient been admitted for further evaluation.  Neurology's been consulted to rule out Parkinson's disease.  There is no family history of Parkinson's disease.  Has never had any other parkinsonian symptoms.  Does complain of some tremors but these are mainly with activity and has been there for  10 years.  Both upper extremities are involved.  Does complain of some numbness in his feet related to spinal cord issues.  Denies any new leg weakness other than the generalized weakness.     PAST MEDICAL & SURGICAL HISTORY     Medical History  Past Medical History:   Diagnosis Date     COPD (chronic obstructive pulmonary disease) (H)      Coronary artery disease      Hypertension      Right bundle branch block     complete     Surgical History  Past Surgical History:   Procedure Laterality Date      KNEE SCOPE, DIAGNOSTIC      Description: Arthroscopy Knee Left;  Recorded: 06/27/2009;  Comments: details unk.     HERNIA REPAIR       ZZHC CORONARY STENT PERCUT, INITIAL VESSEL      Description: Cath Stent Placement;  Recorded: 02/24/2014;  Comments: stenting to the LAD in 1999. 2nd done years later.        SOCIAL HISTORY     Social History     Tobacco Use     Smoking status: Former     Years: 30.00     Types: Cigarettes     Smokeless tobacco: Never   Substance Use Topics     Alcohol use: No     Drug use: No        FAMILY HISTORY     Reviewed, and noncontributory.     ALLERGIES     Allergies   Allergen Reactions     Furosemide Rash     pemphigoid     Cat Hair Std Allergenic Ext [Cat Hair Extract] Unknown     Sulfa Drugs Unknown        REVIEW OF SYSTEMS     12 system ROS was done and other than the HPI this was negative.  Pertinent positives noted in the HPI     HOME & HOSPITAL MEDICATIONS     Prior to Admission Medications  Medications Prior to Admission   Medication Sig Dispense Refill Last Dose     apixaban ANTICOAGULANT (ELIQUIS) 5 MG tablet Take 1 tablet (5 mg) by mouth 2 times daily 180 tablet 3 2/20/2023 at am     budesonide-formoterol (SYMBICORT) 160-4.5 MCG/ACT Inhaler [BUDESONIDE-FORMOTEROL (SYMBICORT) 160-4.5 MCG/ACTUATION INHALER] USE 2 INHALATIONS TWICE A DAY 10.2 g 11 2/19/2023 at am     bumetanide (BUMEX) 1 MG tablet Take 2 tablets (2 mg) by mouth daily 180 tablet 3 2/20/2023 at am     cholecalciferol,  vitamin D3, (VITAMIN D3) 25 mcg (1,000 unit) capsule Take 1 capsule by mouth daily   2/19/2023 at am     doxazosin (CARDURA) 4 MG tablet Take 1 tablet (4 mg) by mouth At Bedtime 90 tablet 3 2/19/2023 at [place]     doxycycline hyclate (VIBRAMYCIN) 100 MG capsule Take 100 mg by mouth 2 times daily   2/19/2023 at am     finasteride (PROSCAR) 5 MG tablet Take 1 tablet (5 mg) by mouth daily (Patient taking differently: Take 5 mg by mouth At Bedtime) 90 tablet 3 2/19/2023 at [place]     ipratropium (ATROVENT) 0.03 % nasal spray Spray 2 sprays into both nostrils 3 times daily As needed for congestion 30 mL 4 2/20/2023     LORazepam (ATIVAN) 0.5 MG tablet TAKE 1/2 TO 1 TABLET BY MOUTH THREE TIMES DAILY AS NEEDED FOR ANXIETY 30 tablet 0 2/16/2023     metoprolol tartrate (LOPRESSOR) 100 MG tablet Take 1 tablet (100 mg) by mouth 2 times daily 180 tablet 3 2/19/2023 at [place]     niacinamide 500 MG tablet Take 1,000 mg by mouth daily   2/19/2023     pravastatin (PRAVACHOL) 20 MG tablet Take 1 tablet (20 mg) by mouth every evening 90 tablet 3 2/19/2023 at [place]     sodium chloride (OCEAN) 0.65 % nasal spray Spray 1 spray into both nostrils daily as needed for congestion   2/20/2023     spironolactone (ALDACTONE) 25 MG tablet Take 1 tablet (25 mg) by mouth daily 90 tablet 3 2/19/2023 at am     mycophenolate (GENERIC EQUIVALENT) 500 MG tablet Take 1,000 mg by mouth 2 times daily          Hospital Medications    apixaban ANTICOAGULANT  5 mg Oral BID     budesonide-formoterol  2 puff Inhalation 2 times daily     clotrimazole   Topical BID     doxazosin  4 mg Oral At Bedtime     doxycycline hyclate  100 mg Oral BID     finasteride  5 mg Oral At Bedtime     ipratropium  2 spray Both Nostrils TID     metoprolol tartrate  100 mg Oral BID     mycophenolate  1,000 mg Oral BID     pravastatin  20 mg Oral QPM     Vitamin D3  25 mcg Oral Daily        PHYSICAL EXAM     Vital signs  Temp:  [97.7  F (36.5  C)-98.8  F (37.1  C)] 97.7  F (36.5  C)  Pulse:   "[59-75] 67  Resp:  [14-30] 22  BP: ()/(51-72) 95/54  SpO2:  [93 %-96 %] 93 %    PHYSICAL EXAMINATION  VITALS: BP 95/54 (BP Location: Right arm)   Pulse 67   Temp 97.7  F (36.5  C) (Oral)   Resp 22   Ht 1.829 m (6' 0.01\")   Wt 118.5 kg (261 lb 3.9 oz)   SpO2 93%   BMI 35.42 kg/m    GENERAL -Health appearing, No apparent distress  EYES- No scleral icterus, no eyelid droop, Pupils - see Neuro section  HEENT - Normocephalic, atraumatic, hard of hearing; Oral mucosa moist and pink in color. External Ears and nose intact.   Neck - supple with no obvious lymphadenopathy or thyromegaly  PULM - Good spontaneous respiratory effort; Normal palpation of chest.   CV-significant swelling and redness in the legs.  MSK- Gait - see Neuro section; Strength and tone- see Neuro section; Range of motion grossly intact.  PSYCH -cooperative    Neurological  Mental status - Patient is awake and grossly oriented to self, place and time. Attention span is normal. Language is fluent and follows commands appropriately.   Cranial nerves - CN II-XII intact. Pupils are reactive and symmetric; EOMI, VFIT, NLF symmetric  Motor - There is no pronator drift. Motor exam shows 5/5 strength in all extremities.  Tone - Tone is symmetric bilaterally in upper and lower extremities.  Reflexes - Reflexes are absent.  Sensation - Sensory exam is grossly intact to light touch, pain.  Coordination - Finger to nose is without dysmetria.  Gait and station --unable to safely ambulate.  Formal gait testing cannot be done due to safety concerns from ongoing medical issues.       DIAGNOSTIC STUDIES     Pertinent Radiology   Head CT  IMPRESSION:  1.  No CT evidence for acute intracranial process.  2.  Brain atrophy and presumed chronic microvascular ischemic changes as above.    LABS  Component      Latest Ref Rng & Units 9/6/2022 2/20/2023   WBC      4.0 - 11.0 10e3/uL  6.9   RBC Count      4.40 - 5.90 10e6/uL  4.86   Hemoglobin      13.3 - 17.7 g/dL  " 13.9   Hematocrit      40.0 - 53.0 %  45.5   MCV      78 - 100 fL  94   MCH      26.5 - 33.0 pg  28.6   MCHC      31.5 - 36.5 g/dL  30.5 (L)   RDW      10.0 - 15.0 %  14.4   Platelet Count      150 - 450 10e3/uL  65 (L)   % Neutrophils      %  69   % Lymphocytes      %  14   % Monocytes      %  16   % Eosinophils      %  0   % Basophils      %  0   % Immature Granulocytes      %  1   NRBCs per 100 WBC      <1 /100  1 (H)   Absolute Neutrophils      1.6 - 8.3 10e3/uL  4.8   Absolute Lymphocytes      0.8 - 5.3 10e3/uL  1.0   Absolute Monocytes      0.0 - 1.3 10e3/uL  1.1   Absolute Eosinophils      0.0 - 0.7 10e3/uL  0.0   Absolute Basophils      0.0 - 0.2 10e3/uL  0.0   Absolute Immature Granulocytes      <=0.4 10e3/uL  0.0   Absolute NRBCs      10e3/uL  0.0   Color Urine      Colorless, Straw, Light Yellow, Yellow  Yellow   Appearance Urine      Clear  Clear   Glucose Urine      Negative mg/dL  Negative   Bilirubin Urine      Negative  Negative   Ketones Urine      Negative mg/dL  Negative   Specific Gravity Urine      1.001 - 1.030  1.013   Blood Urine      Negative  Negative   pH Urine      5.0 - 7.0  5.0   Protein Albumin Urine      Negative mg/dL  Negative   Urobilinogen mg/dL      <2.0 mg/dL  <2.0   Nitrite Urine      Negative  Negative   Leukocyte Esterase Urine      Negative  Negative   Mucus Urine      None Seen /LPF  Present (A)   RBC Urine      <=2 /HPF  1   WBC Urine      <=5 /HPF  2   Hyaline Casts      <=2 /LPF  3 (H)   Sodium      136 - 145 mmol/L  148 (H)   Potassium      3.4 - 5.3 mmol/L  5.0   Chloride      98 - 107 mmol/L  106   Carbon Dioxide (CO2)      22 - 29 mmol/L  30 (H)   Anion Gap      7 - 15 mmol/L  12   Urea Nitrogen      8.0 - 23.0 mg/dL  36.3 (H)   Creatinine      0.67 - 1.17 mg/dL  1.72 (H)   Calcium      8.8 - 10.2 mg/dL  9.9   Glucose      70 - 99 mg/dL  102 (H)   GFR Estimate      >60 mL/min/1.73m2  38 (L)   TSH      0.30 - 4.20 uIU/mL 0.87      Recent Results (from the past 24  hour(s))   Troponin T, High Sensitivity    Collection Time: 02/20/23  6:49 PM   Result Value Ref Range    Troponin T, High Sensitivity 37 (H) <=22 ng/L   UA with Microscopic reflex to Culture    Collection Time: 02/20/23 11:14 PM    Specimen: Urine, Clean Catch   Result Value Ref Range    Color Urine Yellow Colorless, Straw, Light Yellow, Yellow    Appearance Urine Clear Clear    Glucose Urine Negative Negative mg/dL    Bilirubin Urine Negative Negative    Ketones Urine Negative Negative mg/dL    Specific Gravity Urine 1.013 1.001 - 1.030    Blood Urine Negative Negative    pH Urine 5.0 5.0 - 7.0    Protein Albumin Urine Negative Negative mg/dL    Urobilinogen Urine <2.0 <2.0 mg/dL    Nitrite Urine Negative Negative    Leukocyte Esterase Urine Negative Negative    Mucus Urine Present (A) None Seen /LPF    RBC Urine 1 <=2 /HPF    WBC Urine 2 <=5 /HPF    Hyaline Casts Urine 3 (H) <=2 /LPF   Basic metabolic panel    Collection Time: 02/21/23 10:23 AM   Result Value Ref Range    Sodium 148 (H) 136 - 145 mmol/L    Potassium 5.1 3.4 - 5.3 mmol/L    Chloride 107 98 - 107 mmol/L    Carbon Dioxide (CO2) 29 22 - 29 mmol/L    Anion Gap 12 7 - 15 mmol/L    Urea Nitrogen 33.2 (H) 8.0 - 23.0 mg/dL    Creatinine 1.65 (H) 0.67 - 1.17 mg/dL    Calcium 9.4 8.8 - 10.2 mg/dL    Glucose 74 70 - 99 mg/dL    GFR Estimate 40 (L) >60 mL/min/1.73m2   CBC with platelets and differential    Collection Time: 02/21/23 10:23 AM   Result Value Ref Range    WBC Count 5.8 4.0 - 11.0 10e3/uL    RBC Count 4.91 4.40 - 5.90 10e6/uL    Hemoglobin 14.2 13.3 - 17.7 g/dL    Hematocrit 46.2 40.0 - 53.0 %    MCV 94 78 - 100 fL    MCH 28.9 26.5 - 33.0 pg    MCHC 30.7 (L) 31.5 - 36.5 g/dL    RDW 14.6 10.0 - 15.0 %    Platelet Count 57 (L) 150 - 450 10e3/uL    % Neutrophils 64 %    % Lymphocytes 18 %    % Monocytes 17 %    % Eosinophils 0 %    % Basophils 0 %    % Immature Granulocytes 1 %    NRBCs per 100 WBC 1 (H) <1 /100    Absolute Neutrophils 3.8 1.6 - 8.3  10e3/uL    Absolute Lymphocytes 1.0 0.8 - 5.3 10e3/uL    Absolute Monocytes 1.0 0.0 - 1.3 10e3/uL    Absolute Eosinophils 0.0 0.0 - 0.7 10e3/uL    Absolute Basophils 0.0 0.0 - 0.2 10e3/uL    Absolute Immature Granulocytes 0.0 <=0.4 10e3/uL    Absolute NRBCs 0.1 10e3/uL   Reticulocyte count    Collection Time: 02/21/23 10:23 AM   Result Value Ref Range    % Reticulocyte 1.3 0.8 - 2.7 %    Absolute Reticulocyte 0.062 0.010 - 0.110 10e6/uL       Total time spent for face to face visit, reviewing labs/imaging studies, counseling and coordination of care was: Over 80 min More than 50% of this time was spent on counseling and coordination of care.    Patient is hard of hearing which requires extra time.  Reviewing chart.  Coordination of care with the primary.  Counseling family.    Patel Hunter MD  Neurologist  [facility] Neurology [facility]  Tel:- [phone]

## 2023-02-21 NOTE — PHARMACY-ADMISSION MEDICATION HISTORY
Pharmacy Note - Admission Medication History    Pertinent Provider Information:      ______________________________________________________________________    Prior To Admission (PTA) med list completed and updated in EMR.       PTA Med List   Medication Sig Last Dose     apixaban ANTICOAGULANT (ELIQUIS) 5 MG tablet Take 1 tablet (5 mg) by mouth 2 times daily 2/20/2023 at am     budesonide-formoterol (SYMBICORT) 160-4.5 MCG/ACT Inhaler [BUDESONIDE-FORMOTEROL (SYMBICORT) 160-4.5 MCG/ACTUATION INHALER] USE 2 INHALATIONS TWICE A DAY 2/19/2023 at am     bumetanide (BUMEX) 1 MG tablet Take 2 tablets (2 mg) by mouth daily 2/20/2023 at am     cholecalciferol, vitamin D3, (VITAMIN D3) 25 mcg (1,000 unit) capsule Take 1 capsule by mouth daily 2/19/2023 at am     doxazosin (CARDURA) 4 MG tablet Take 1 tablet (4 mg) by mouth At Bedtime 2/19/2023 at [facility]     doxycycline hyclate (VIBRAMYCIN) 100 MG capsule Take 100 mg by mouth 2 times daily 2/19/2023 at am     finasteride (PROSCAR) 5 MG tablet Take 1 tablet (5 mg) by mouth daily (Patient taking differently: Take 5 mg by mouth At Bedtime) 2/19/2023 at [facility]     ipratropium (ATROVENT) 0.03 % nasal spray Spray 2 sprays into both nostrils 3 times daily As needed for congestion 2/20/2023     LORazepam (ATIVAN) 0.5 MG tablet TAKE 1/2 TO 1 TABLET BY MOUTH THREE TIMES DAILY AS NEEDED FOR ANXIETY 2/16/2023     metoprolol tartrate (LOPRESSOR) 100 MG tablet Take 1 tablet (100 mg) by mouth 2 times daily 2/19/2023 at [facility]     niacinamide 500 MG tablet Take 1,000 mg by mouth daily 2/19/2023     pravastatin (PRAVACHOL) 20 MG tablet Take 1 tablet (20 mg) by mouth every evening 2/19/2023 at [facility]     sodium chloride (OCEAN) 0.65 % nasal spray Spray 1 spray into both nostrils daily as needed for congestion 2/20/2023     spironolactone (ALDACTONE) 25 MG tablet Take 1 tablet (25 mg) by mouth daily 2/19/2023 at am       Information source(s): Patient, Family member, Prescription bottles and  [place]  Method of interview communication: in-person    Summary of Changes to PTA Med List  New: doxycycline, saline spray, niacinamide  Discontinued: flonase, gabapentin, moxifloxacin, nitroglycerin  Changed: mycophenolate    Patient was asked about OTC/herbal products specifically.  PTA med list reflects this.    In the past week, patient estimated taking medication this percent of the time:  greater than 90%.    Medication Affordability:  Not including over the counter (OTC) medications, was there a time in the past 12 months when you did not take your medications as prescribed because of cost?: No    Allergies were reviewed, assessed, and updated with the patient.      Medications available for use during hospital stay: atrovent. Family is able to bring in Symbicort inhaler for use.     The information provided in this note is only as accurate as the sources available at the time of the update(s).    Thank you for the opportunity to participate in the care of this patient.    NESTOR CHU [facility]  2/20/2023 8:11 PM

## 2023-02-21 NOTE — H&P
[facility]    History and Physical - Hospitalist Service       Date of Admission:  2/20/2023    Assessment & Plan      Nishant Gilliam is a 87 year old male admitted on 2/20/2023. Nishant Gilliam is a 87 year old male with  HFpEF, Afib, COPD, CKD, HTN, HLD presented with generalized weakness. He has decreased po intake, fatigue, and decreased urine output.  Wife states that 'everything has slowed down for the patient- both his motion and speech, and he is more sleepy'. Denied fever, chills, nausea, vomiting, abdominal pain, diarrhea. She noted that he is having more trouble swallowing which contributes to his decreased intake. In the ED, lab is shows dehydration and NOREEN. Pt is to be admitted for further management.    Dehydration, NOREEN on CKD   - Prerenal from decreased intake.   - Judicious IVF  - f/u BMP    Generalized weakness  - decreased appetite, weight loss, dysphagia  - CT-head unremarkable. No focal weakness  - SLP eval  - RD consult  - PT/OT  - f/u CXR. CT-abd/pelvis with contrast for screen for occult malignancy deferred due to NOREEN    Elevated troponin  - No chest pain, EKG: afib. Trops flat  - likely 2/2 demand ischemia    HFpEF  - will hold bumex due to low BP    PAfib  - c/w PTA Eliquis  - will hold metoprolol due to borderline BP for now. Resume when able    COPD  - c/w PTA bronchodilators  - oxygen as needed- might benefit from OP sleep study    Essential hypertension  - borderline. Hold metoprolol for now  - monitor vital signs per protocol    Thrombocytopenia  - f/u cbc, peripheral smear    HLD  - c/w PTA pravachol    Bullous pemphigoid  - c/w PTA meds    Lymphedema  - OT consult    Seasonal allergy  - c/w PTA meds    Yeast infection of groin  - lotrimin bid              Diet: Combination Diet Mechanical/Dental Soft Diet; Mildly Thick (level 2); No Caffeine Diet  DVT Prophylaxis: DOAC  Mendes Catheter: Not present  Lines: None     Cardiac Monitoring: None  Code Status: No  CPR- Do NOT Intubate    Clinically Significant Risk Factors Present on Admission         # Hypernatremia: Highest Na = 148 mmol/L in last 2 days, will monitor as appropriate       # Drug Induced Coagulation Defect: home medication list includes an anticoagulant medication  # Thrombocytopenia: Lowest platelets = 65 in last 2 days, will monitor for bleeding   # Hypertension: home medication list includes antihypertensive(s)              Disposition Plan      Expected Discharge Date: 02/22/2023                  Leatha Pham MD  Hospitalist Service  [facility]  Securely message with [employer] (more info)  Text page via [email or web address] Paging/Directory     ______________________________________________________________________    Chief Complaint   Generalized weakness x 2weeks      History of Present Illness   Nishant Gilliam is a 87 year old male with  HFpEF, Afib, COPD, CKD, HTN, HLD presented with generalized weakness. He has decreased po intake, fatigue, and decreased urine output.  Wife states that 'everything has slowed down for the patient- both his motion and speech, and he is more sleepy'. Denied fever, chills, nausea, vomiting, abdominal pain, diarrhea. She noted that he is having more trouble swallowing which contributes to his decreased intake. Spouse noted that he has been loosing weight. In the ED, lab is shows dehydration and NOREEN. Pt to be admitted for further management.      Past Medical History    Past Medical History:   Diagnosis Date     COPD (chronic obstructive pulmonary disease) (H)      Coronary artery disease      Hypertension      Right bundle branch block     complete       Past Surgical History   Past Surgical History:   Procedure Laterality Date      KNEE SCOPE, DIAGNOSTIC      Description: Arthroscopy Knee Left;  Recorded: 06/27/2009;  Comments: details unk.     HERNIA REPAIR       ZZHC CORONARY STENT PERCUT, INITIAL VESSEL      Description: Cath Stent Placement;   Recorded: 02/24/2014;  Comments: stenting to the LAD in 1999. 2nd done years later.       Prior to Admission Medications   Prior to Admission Medications   Prescriptions Last Dose Informant Patient Reported? Taking?   LORazepam (ATIVAN) 0.5 MG tablet 2/16/2023  No Yes   Sig: TAKE 1/2 TO 1 TABLET BY MOUTH THREE TIMES DAILY AS NEEDED FOR ANXIETY   apixaban ANTICOAGULANT (ELIQUIS) 5 MG tablet 2/20/2023 at am  No Yes   Sig: Take 1 tablet (5 mg) by mouth 2 times daily   budesonide-formoterol (SYMBICORT) 160-4.5 MCG/ACT Inhaler 2/19/2023 at am  No Yes   Sig: [BUDESONIDE-FORMOTEROL (SYMBICORT) 160-4.5 MCG/ACTUATION INHALER] USE 2 INHALATIONS TWICE A DAY   bumetanide (BUMEX) 1 MG tablet 2/20/2023 at am  No Yes   Sig: Take 2 tablets (2 mg) by mouth daily   cholecalciferol, vitamin D3, (VITAMIN D3) 25 mcg (1,000 unit) capsule 2/19/2023 at am  Yes Yes   Sig: Take 1 capsule by mouth daily   doxazosin (CARDURA) 4 MG tablet 2/19/2023 at [facility]  No Yes   Sig: Take 1 tablet (4 mg) by mouth At Bedtime   doxycycline hyclate (VIBRAMYCIN) 100 MG capsule 2/19/2023 at am  Yes Yes   Sig: Take 100 mg by mouth 2 times daily   finasteride (PROSCAR) 5 MG tablet 2/19/2023 at [facility]  No Yes   Sig: Take 1 tablet (5 mg) by mouth daily   Patient taking differently: Take 5 mg by mouth At Bedtime   ipratropium (ATROVENT) 0.03 % nasal spray 2/20/2023  No Yes   Sig: Spray 2 sprays into both nostrils 3 times daily As needed for congestion   metoprolol tartrate (LOPRESSOR) 100 MG tablet 2/19/2023 at [facility]  No Yes   Sig: Take 1 tablet (100 mg) by mouth 2 times daily   mycophenolate (GENERIC EQUIVALENT) 500 MG tablet   Yes No   Sig: Take 1,000 mg by mouth 2 times daily   niacinamide 500 MG tablet 2/19/2023  Yes Yes   Sig: Take 1,000 mg by mouth daily   pravastatin (PRAVACHOL) 20 MG tablet 2/19/2023 at [facility]  No Yes   Sig: Take 1 tablet (20 mg) by mouth every evening   sodium chloride (OCEAN) 0.65 % nasal spray 2/20/2023  Yes Yes   Sig: Spray 1 spray into both  nostrils daily as needed for congestion   spironolactone (ALDACTONE) 25 MG tablet 2/19/2023 at am  No Yes   Sig: Take 1 tablet (25 mg) by mouth daily      Facility-Administered Medications: None        Review of Systems    The 10 point Review of Systems is negative other than noted in the HPI or here.      Physical Exam   Vital Signs:     BP: 122/62 Pulse: 64   Resp: 25 SpO2: 95 % O2 Device: (S) Nasal cannula Oxygen Delivery: (S) 2 LPM  Weight: 0 lbs 0 oz    GEN: Alert and oriented. Not in acute distress.  HEENT: Atraumatic, mucous membrane- moist and pink.  Chest: Bilateral air entry.  CVS: S1S2 regular. No murmurs, rubs or gallops.  Abdomen: Soft. Non-tender, non-distended. No organomegaly. No guarding or rigidity. Bowel sounds active.   Extremities: venous stasis changes, + b/l LE edema  CNS: No focal neurologic deficit. No involuntary movements.  Skin: No skin rash, no cyanosis or clubbing.     Medical Decision Making             Data     I have personally reviewed the following data over the past 24 hrs:    6.9  \   13.9   / 65 (L)     148 (H) 106 36.3 (H) /  102 (H)   5.0 30 (H) 1.72 (H) \       Trop: 37 (H) BNP: N/A

## 2023-02-21 NOTE — ED NOTES
Pt called out requesting to have hob raised higher and pillow behind back. Pt assisted to sit up x 2 staff, pillows x2 placed behind his back. Pt voices no other needs.

## 2023-02-21 NOTE — ED TRIAGE NOTES
Pt arrives to  via ambulance d/t near syncopal episode and generalized weakness. Per wife, pt has been getting weaker over time. Within last 1-2 weeks pt has not been eating or drinking much. He is not able to move much either and wife is unable to care for him as he is requiring more care. She also states thinks he's lost weight and has been having low BP too. Wife states patient seems more confused over time such as taking longer to respond. Pt is alert and was able to answer all questions correctly to staff but is hard of hearing. Hx of CHF and a-fib. Will continue to monitor.      Triage Assessment     Row Name 02/20/23 1533       Cardiac WDL    Cardiac WDL X;rhythm    Pulse Rate & Regularity apical pulse irregular    Row Name 02/20/23 1531       Triage Assessment (Adult)    Airway WDL WDL       Respiratory WDL    Respiratory WDL WDL       Skin Circulation/Temperature WDL    Skin Circulation/Temperature WDL X  dry       Peripheral/Neurovascular WDL    Peripheral Neurovascular WDL X  bilateral leg edema       Cognitive/Neuro/Behavioral WDL    Cognitive/Neuro/Behavioral WDL WDL

## 2023-02-21 NOTE — PROGRESS NOTES
[facility]  OUTPATIENT PHYSICAL THERAPY EVALUATION  PLAN OF TREATMENT FOR OUTPATIENT REHABILITATION  (COMPLETE FOR INITIAL CLAIMS ONLY)  Patient's Last Name, First Name, M.I.  YOB: 1935  Nishant Tsang                        Provider's Name  [facility] Medical Record No.  [id number]                             Onset Date:  02/20/23   Start of Care Date:      Type:     _X_PT   ___OT   ___SLP Medical Diagnosis:                 PT Diagnosis:  decreased functional mobility Visits from SOC:  1     See note for plan of treatment, functional goals and certification details    I CERTIFY THE NEED FOR THESE SERVICES FURNISHED UNDER        THIS PLAN OF TREATMENT AND WHILE UNDER MY CARE     (Physician co-signature of this document indicates review and certification of the therapy plan).

## 2023-02-21 NOTE — ED NOTES
Expressed to pt and daughter need to staff off back side to prevent worsening of sore. Pt reports does not like his side. Will try slight angles.

## 2023-02-21 NOTE — PROGRESS NOTES
Speech-Language Pathology: Clinical Swallow Evaluation     02/21/23 1300   Appointment Info   Signing Clinician's Name / Credentials (SLP) Odette Jha MA CCC-SLP   General Information   Onset of Illness/Injury or Date of Surgery 02/20/23   Referring Physician Dr. Pham   Pertinent History of Current Problem Per EMR: 87 year old male admitted on 2/20/2023. Nishant Gilliam is a 87 year old male with  HFpEF, Afib, COPD, CKD, HTN, HLD presented with generalized weakness. He has decreased po intake, fatigue, and decreased urine output.  Wife states that 'everything has slowed down for the patient- both his motion and speech, and he is more sleepy'. Denied fever, chills, nausea, vomiting, abdominal pain, diarrhea. She noted that he is having more trouble swallowing which contributes to his decreased intake. In the ED, lab is shows dehydration and NOREEN. Pt is to be admitted for further management.   General Observations Alert and cooperative, wife present; Patient upright in bed, lunch just arrived.   Type of Evaluation   Type of Evaluation Swallow Evaluation   Oral Motor   Oral Musculature generally intact   Dentition (Oral Motor)   Dentition (Oral Motor) significant number of missing teeth;adequate dentition   Facial Symmetry (Oral Motor)   Facial Symmetry (Oral Motor) WNL   Lip Function (Oral Motor)   Lip Range of Motion (Oral Motor)   (WFL)   Lip Strength (Oral Motor) WFL   Tongue Function (Oral Motor)   Tongue Strength (Oral Motor) WFL;strength decreased  (c/w with age group)   Tongue Coordination/Speed (Oral Motor) reduced rate  (WFL; consistent with age)   Tongue ROM (Oral Motor)   (WFL)   General Swallowing Observations   Past History of Dysphagia Patient's wife reports patient has had a change in swallow function in the last 2-3 weeks. She reports he had an episode of feeling like a large pill was sticking initially. Patient now reports feeling of phlegm production in response to eating/drinking and some  coughing up phlegm. Patient's wife reports he has not had a cough that appears typical of going down the wrong tube. She reports today's meal intake and ability is the best in a few days.   Comment, General Swallowing Observations RN reported patient coughed and choked earlier on thickened liquids.   Current Diet/Method of Nutritional Intake (General Swallowing Observations, NIS) mildly thick liquids (level 2);easy to chew (level 7)   Swallowing Evaluation Clinical swallow evaluation   Clinical Swallow Evaluation   Feeding Assistance set up only required   Clinical Swallow Evaluation Textures Trialed thin liquids;pureed;solid foods   Clinical Swallow Eval: Thin Liquid Texture Trial   Mode of Presentation, Thin Liquids cup;straw;self-fed   Volume of Liquid or Food Presented 6 ounces   Oral Phase of Swallow WFL;other (see comments)  (slow and effortful but functional)   Pharyngeal Phase of Swallow intact  (no overt s/s aspiration; 'trigger' swallows noted at times;)   Clinical Swallow Evaluation: Solid Food Texture Trial   Mode of Presentation self-fed   Volume Presented 1/2 turkey burger   Oral Phase effortful AP movement;WFL   Pharyngeal Phase intact  (c/o feeling phlegm; does not endorse food feeling like its sticking during this trial)   Esophageal Phase of Swallow   Esophageal comments Patient does report some symptoms of pills and intake sticking at times. Wife reports patient has a history of reflux, not currently being treated.   Swallowing Recommendations   Diet Consistency Recommendations thin liquids (level 0);easy to chew (level 7)   Supervision Level for Intake distant supervision needed   Mode of Delivery Recommendations bolus size, small;slow rate of intake;no straws   Medication Administration Recommendations, Swallowing (SLP) crushed in puree as able; whole in puree if not able to crush   Instrumental Assessment Recommendations instrumental evaluation not recommended at this time  (SLP to follow for  necessity and complete as appropriate)   Comment, Swallowing Recommendations Bedside Swallow Study completed. Patient had no overt s/s aspiration with any intake. He does present with mild, vague symptoms of dysphagia with 'trigger' swallows, effortful swallow or grimace intermittently. Oral motor function was slow but functional. Mastication was slow due to reduced dentition but functional and complete. Hyolaryngeal elevation appears intact upon visualization and palpation. Recommend diet of Easy to Chew with Thin liquids with strategies of slow rate of intake, clear phlegm prior to intake and alternate solids and liquids. SLP to follow for dysphagia management and determine if VFSS is warranted.   General Therapy Interventions   Planned Therapy Interventions Dysphagia Treatment   Dysphagia treatment Modified diet education;Instruction of safe swallow strategies;Compensatory strategies for swallowing   Clinical Impression   Criteria for Skilled Therapeutic Interventions Met (SLP Eval) Yes, treatment indicated   Risks & Benefits of therapy have been explained evaluation/treatment results reviewed;participants included;patient;spouse/significant other   Clinical Impression Comments No overt s/s aspiration, subtle signs of dysphagia.   SLP Total Evaluation Time   Eval: oral/pharyngeal swallow function, clinical swallow Minutes (21547) 20   SLP Goals   Therapy Frequency (SLP Eval) 5 times/wk   SLP Predicted Duration/Target Date for Goal Attainment 02/28/23   SLP Goals Swallow;SLP Goal 1   SLP: Safely tolerate diet without signs/symptoms of aspiration Regular diet;Thin liquids;Easy to chew diet  (Regular versus ETC;)   SLP: Goal 1 Patient will participate in VFSS if further concerns of aspiration or dysphagia arise.   SLP Discharge Planning   SLP Plan f/u diet tolerance and VFSS if appropriate (CXR suspicious for location but not type of abnormality)   SLP Discharge Recommendation Transitional Care Facility   SLP  Rationale for DC Rec below baseline strength reported, swallow close to baseline   SLP Brief overview of current status  ETC and Thin Liquids with slow rate of intake, alternate solids and liquids and monitor for s/s aspiration. SLP to follow for possible VFSS and diet tolerance.   Total Session Time   Total Session Time (sum of timed and untimed services) 20

## 2023-02-21 NOTE — UTILIZATION REVIEW
"Admission Status; Secondary Review Determination     Under the authority of the Utilization Management Committee, the utilization review process indicated a secondary review on Nishant Gilliam.  The review outcome is based on review of the medical records, discussions with staff, and applying clinical experience noted on the date of the review.     (x) Observation Status Appropriate - This patient does not meet hospital inpatient criteria and is placed in observation status. If this patient's primary payer is Medicare and was admitted as an inpatient, Condition Code 44 should be used and patient status changed to \"observation\".     RATIONALE FOR DETERMINATION   87-year-old male with history of diastolic heart failure, atrial fibrillation, COPD, chronic kidney disease and hypertension presented with generalized weakness, decreased oral intake and decreased urine output with soft blood pressures and evidence of dehydration with NOREEN.  Is receiving IV fluids, holding blood pressure medications.    The severity of illness, intensity of service provided, expected LOS and risk for adverse outcome make the care appropriate for further observation; however, doesn't meet criteria for hospital inpatient admission. Dr Carreon notified of this determination and agrees with downgrade of status.      The information on this document is developed by the utilization review team in order for the business office to ensure compliance.  This only denotes the appropriateness of proper admission status and does not reflect the quality of care rendered.         The definitions of Inpatient Status and Observation Status used in making the determination above are those provided in the CMS Coverage Manual, Chapter 1 and Chapter 6, section 70.4.      Sincerely,  Boris Tabor MD  Utilization Review  Physician Advisor  [facility]  "

## 2023-02-21 NOTE — PROGRESS NOTES
02/21/23 1054   Appointment Info   Signing Clinician's Name / Credentials (PT) Zulema Gomez, PT       Present no   Living Environment   People in Home spouse   Current Living Arrangements house  (stays on main level)   Home Accessibility stairs to enter home;stairs within home   Number of Stairs, Main Entrance 3   Stair Railings, Main Entrance railing on right side (ascending)   Living Environment Comments walk in shower w/GBs, shower chair & detachable shower nozzle.   Self-Care   Equipment Currently Used at Home other (see comments)  (4WW, transport w/c)   Activity/Exercise/Self-Care Comment las 24 hrs pt unable to get OOB recliner, wife assessed with answering question due to pt [place]   General Information   Onset of Illness/Injury or Date of Surgery 02/20/23   Referring Physician Dr. Leatha Pham   Patient/Family Therapy Goals Statement (PT) none stated   Pertinent History of Current Problem (include personal factors and/or comorbidities that impact the POC) admit weakness, increased difficulty swallowing, pt dehydrated and NOREEN.  Pt has h/o A-fib, COPD, CKD, HTN   Existing Precautions/Restrictions other (see comments)  (significant [place])   General Observations pt in ER on bed, wife present   Cognition   Affect/Mental Status (Cognition) WFL   Cognitive Status Comments pt has significant [place]   Pain Assessment   Patient Currently in Pain Yes, see Vital Sign flowsheet  (back, buttocks, L knee)   Integumentary/Edema   Integumentary/Edema Comments B LE ex, per wife wears compression stocking, but has them off today   Range of Motion (ROM)   ROM Comment B LE WFL   Strength (Manual Muscle Testing)   Strength Comments generalized weakness BLEs   Bed Mobility   Impairments Contributing to Impaired Bed Mobility decreased strength   Assistive Device (Bed Mobility) draw sheet;other (see comments);bed rails  (HOB elevated)   Comment, (Bed Mobility) increased A and use of draw sheet to protect  wound on buttocks, pt reports lighteadeness while in bed, did not worsen with sitting   Transfers   Impairments Contributing to Impaired Transfers pain;decreased strength   Comment, (Transfers) pt forward flexed, arm in arm minAx2 with bed elevated   Gait/Stairs (Locomotion)   Assistive Device (Gait) other (see comments)  (arm in arm)   Distance in Feet marched in place x2 BLE   Deviations/Abnormal Patterns (Gait) weight shifting decreased;antalgic   Comment, (Gait/Stairs) L knee crepitus and buckling PT blocking Lknee in standing   Balance   Balance Comments unsteady   Clinical Impression   Criteria for Skilled Therapeutic Intervention Yes, treatment indicated   PT Diagnosis (PT) decreased functional mobility   Influenced by the following impairments weakness, dizziness,pain   Functional limitations due to impairments bed mob, trasnfers, gait w/FWW   Clinical Presentation (PT Evaluation Complexity) Evolving/Changing   Clinical Presentation Rationale presents as medically diagnosed   Clinical Decision Making (Complexity) moderate complexity   Planned Therapy Interventions (PT) bed mobility training;gait training;strengthening;transfer training;progressive activity/exercise   Anticipated Equipment Needs at Discharge (PT) other (see comments)  (pt has 4WW and trasnport chair at home)   PT Total Evaluation Time   PT Eval, Moderate Complexity Minutes (32959) 12   Physical Therapy Goals   PT Frequency Daily   PT Predicted Duration/Target Date for Goal Attainment 02/28/23   PT Goals Bed Mobility;Transfers;Gait   PT: Bed Mobility Minimal assist;Supine to/from sit;Rolling   PT: Transfers Minimal assist;Sit to/from stand;Bed to/from chair;Assistive device   PT: Gait Minimal assist;Rolling walker;10 feet   Interventions   Interventions Quick Adds Therapeutic Activity   Therapeutic Activity   Therapeutic Activities: dynamic activities to improve functional performance Minutes (16222) 12  (cotx with OT total time 24 min)    Symptoms Noted During/After Treatment Fatigue;Dizziness;Increased pain   Treatment Detail/Skilled Intervention sat EOB u1acmitzd, pt reporting increased back pain with sitting, unable to get BP in sitting, sit<>stand x2 with arm in arm and FWW , pt forward flexed and L knee blocked, pt able to side steps x4 with min/mod and FWW , pt mod Ax2 for sit>supine and positioning in bed   PT Discharge Planning   PT Plan bed mob, trasnfers with 4WW(bring), progress to gait w/ 4WW   PT Discharge Recommendation (DC Rec) Transitional Care Facility   PT Rationale for DC Rec pt min/mod Ax2 with mobility to return home pt need to be able to transfer with SBA   PT Brief overview of current status pt min/mod Ax2   Total Session Time   Timed Code Treatment Minutes 12   Total Session Time (sum of timed and untimed services) 24

## 2023-02-21 NOTE — ED NOTES
Pt sat up on side of bed to urinate. Pt assisted back to bed x4 staff, pt heavy assist. Pt tolerated well. Hob up 45 degrees, bed in low position, sr up x3, call light with in reach, pt's heels cont to be floated on pillows. Pt  cont to refuse to be turned. Pt cont to state he is unable to sleep on his side. Pt states he sleeps in a lift recliner at home.

## 2023-02-21 NOTE — ED NOTES
[facility] ED Handoff Report    ED Chief Complaint: Pt arrives to  via ambulance d/t near syncopal episode and generalized weakness. Per wife, pt has been getting weaker over time. Within last 1-2 weeks pt has not been eating or drinking much. He is not able to move much either and wife is unable to care for him as he is requiring more care. She also states thinks he's lost weight and has been having low BP too. Wife states patient seems more confused over time such as taking longer to respond. Pt is alert and was able to answer all questions correctly to staff but is hard of hearing. Hx of CHF and a-fib. Will continue to monitor.      Triage Assessment       Row Name 02/20/23 1533       Cardiac WDL    Cardiac WDL X;rhythm    Pulse Rate & Regularity apical pulse irregular      Row Name 02/20/23 1531       Triage Assessment (Adult)    Airway WDL WDL       Respiratory WDL    Respiratory WDL WDL       Skin Circulation/Temperature WDL    Skin Circulation/Temperature WDL X  dry       Peripheral/Neurovascular WDL    Peripheral Neurovascular WDL X  bilateral leg edema       Cognitive/Neuro/Behavioral WDL    Cognitive/Neuro/Behavioral WDL WDL                      ED Diagnosis:  (E86.0) Dehydration  Comment: ifv running  Plan: see mar    (N28.9) Renal insufficiency    (M62.81) Generalized muscle weakness  Comment: unable to ambulate at home.  Plan: pt/ot       PMH:    Past Medical History:   Diagnosis Date    COPD (chronic obstructive pulmonary disease) (H)     Coronary artery disease     Hypertension     Right bundle branch block     complete        Code Status:  No CPR- Do NOT Intubate     Falls Risk: Yes Band: Applied    Current Living Situation/Residence: lives with their son or daughter     Elimination Status: Continent: Yes     Activity Level: 2 assist    Patients Preferred Language:  English     Needed: No    Vital Signs:  /63   Pulse 69   Temp 98.8  F (37.1  C) (Oral)   Resp 30   Ht  1.829 m (6')   SpO2 93%   BMI 34.00 kg/m       Cardiac Rhythm: afib    Pain Score: 0/10    Is the Patient Confused:  No    Last Food or Drink: 02/21/23 at 1200    Focused Assessment:  skin and strenghth    Tests Performed: Done: Labs and Imaging  Labs Ordered and Resulted from Time of ED Arrival to Time of ED Departure   ROUTINE UA WITH MICROSCOPIC REFLEX TO CULTURE - Abnormal       Result Value    Color Urine Yellow      Appearance Urine Clear      Glucose Urine Negative      Bilirubin Urine Negative      Ketones Urine Negative      Specific Gravity Urine 1.013      Blood Urine Negative      pH Urine 5.0      Protein Albumin Urine Negative      Urobilinogen Urine <2.0      Nitrite Urine Negative      Leukocyte Esterase Urine Negative      Mucus Urine Present (*)     RBC Urine 1      WBC Urine 2      Hyaline Casts Urine 3 (*)    BASIC METABOLIC PANEL - Abnormal    Sodium 148 (*)     Potassium 5.0      Chloride 106      Carbon Dioxide (CO2) 30 (*)     Anion Gap 12      Urea Nitrogen 36.3 (*)     Creatinine 1.72 (*)     Calcium 9.9      Glucose 102 (*)     GFR Estimate 38 (*)    TROPONIN T, HIGH SENSITIVITY - Abnormal    Troponin T, High Sensitivity 37 (*)    CBC WITH PLATELETS AND DIFFERENTIAL - Abnormal    WBC Count 6.9      RBC Count 4.86      Hemoglobin 13.9      Hematocrit 45.5      MCV 94      MCH 28.6      MCHC 30.5 (*)     RDW 14.4      Platelet Count 65 (*)     % Neutrophils 69      % Lymphocytes 14      % Monocytes 16      % Eosinophils 0      % Basophils 0      % Immature Granulocytes 1      NRBCs per 100 WBC 1 (*)     Absolute Neutrophils 4.8      Absolute Lymphocytes 1.0      Absolute Monocytes 1.1      Absolute Eosinophils 0.0      Absolute Basophils 0.0      Absolute Immature Granulocytes 0.0      Absolute NRBCs 0.0     TROPONIN T, HIGH SENSITIVITY - Abnormal    Troponin T, High Sensitivity 37 (*)    BASIC METABOLIC PANEL - Abnormal    Sodium 148 (*)     Potassium 5.1      Chloride 107      Carbon  Dioxide (CO2) 29      Anion Gap 12      Urea Nitrogen 33.2 (*)     Creatinine 1.65 (*)     Calcium 9.4      Glucose 74      GFR Estimate 40 (*)    CBC WITH PLATELETS AND DIFFERENTIAL - Abnormal    WBC Count 5.8      RBC Count 4.91      Hemoglobin 14.2      Hematocrit 46.2      MCV 94      MCH 28.9      MCHC 30.7 (*)     RDW 14.6      Platelet Count 57 (*)     % Neutrophils 64      % Lymphocytes 18      % Monocytes 17      % Eosinophils 0      % Basophils 0      % Immature Granulocytes 1      NRBCs per 100 WBC 1 (*)     Absolute Neutrophils 3.8      Absolute Lymphocytes 1.0      Absolute Monocytes 1.0      Absolute Eosinophils 0.0      Absolute Basophils 0.0      Absolute Immature Granulocytes 0.0      Absolute NRBCs 0.1     RETICULOCYTE COUNT - Normal    % Reticulocyte 1.3      Absolute Reticulocyte 0.062     MORPHOLOGY TRACKING   BLOOD MORPHOLOGY PATHOLOGIST REVIEW   LAB BLOOD MORPHOLOGY PATHOLOGIST REVIEW      US Renal Complete Non-Vascular   Final Result   IMPRESSION:   1.  Mildly echogenic renal cortices which could reflect the sequela of chronic medical renal disease. No hydronephrosis.      2.  Large right pleural effusion and moderate right upper quadrant ascites.      XR Chest Port 1 View   Final Result   IMPRESSION: Opacity at right lung base represents pleural fluid with possible atelectasis/consolidation of right lower and middle lobes. Right upper lobe and left lung are clear. Heart size likely normal.      Head CT w/o contrast   Final Result   IMPRESSION:   1.  No CT evidence for acute intracranial process.   2.  Brain atrophy and presumed chronic microvascular ischemic changes as above.         Treatments Provided:  see mar    Family Dynamics/Concerns: Yes; please explain: daughter    Family Updated On Visitor Policy: Yes    Plan of Care Communicated to Family: Yes        Belongings Checklist Done and Signed by Patient: No    Medications sent with patient: no    Covid: asymptomatic ,  negative    Additional Information: none    RN: Darlene Avery RN 2/21/2023 1:17 PM

## 2023-02-21 NOTE — PROGRESS NOTES
"Occupational Therapy     02/21/23 1051   Appointment Info   Signing Clinician's Name / Credentials (OT) Jessika Lorene OTR/L   Quick Adds   Quick Adds Certification   Living Environment   People in Home spouse   Current Living Arrangements house   Home Accessibility stairs to enter home;stairs within home   Number of Stairs, Main Entrance 3   Stair Railings, Main Entrance railing on right side (ascending)   Number of Stairs, Within Home, Primary   (pt doesn't have to go upstairs; has been sleeping on main level in recliner. (bathroom & shower on main level))   Living Environment Comments walk in shower w/GBs, shower chair & detachable shower nozzle.   Self-Care   Usual Activity Tolerance fair  (spouse reports weaker past few weeks & was in his recliner for the past 24 hrs (using urinal).)   Current Activity Tolerance poor   Equipment Currently Used at Home other (see comments)  (4WW; has transport chair for appointments/community distances.)   Activity/Exercise/Self-Care Comment Prior to ~2 wks ago, pt was amb to bathroom & back to recliner using 4WW; Pt participates in ADLs as able-completes UB drsg & most of his shower once seated. A w/all IADLs. (pt sleeps in recliner chair/it isn't a lift chair/regular recliner)   General Information   Onset of Illness/Injury or Date of Surgery 02/20/23   Referring Physician Leatha Pham   Patient/Family Therapy Goal Statement (OT) none stated   General Observations and Info Per chart\"  \"87 year old male admitted on 2/20/2023. Nishant Gilliam is a 87 year old male with  HFpEF, Afib, COPD, CKD, HTN, HLD presented with generalized weakness. He has decreased po intake, fatigue, and decreased urine output.  Wife states that 'everything has slowed down for the patient- both his motion and speech, and he is more sleepy'. Denied fever, chills, nausea, vomiting, abdominal pain, diarrhea. She noted that he is having more trouble swallowing which contributes to his decreased " "intake.\"   Cognitive Status Examination   Orientation Status orientation to person, place and time   Follows Commands WNL  (needs repeating d/t [place])   Sensory   Sensory Quick Adds sensation intact  (in UEs)   Pain Assessment   Patient Currently in Pain   (chronic knee pain; pt has sore on buttocks)   Posture   Posture forward head position;kyphosis   Range of Motion Comprehensive   General Range of Motion no range of motion deficits identified   Strength Comprehensive (MMT)   General Manual Muscle Testing (MMT) Assessment no strength deficits identified   Comment, General Manual Muscle Testing (MMT) Assessment pt does demo generalizedweakness throughout; fatigues easily.   Coordination   Upper Extremity Coordination No deficits were identified  (R hand dominant)   Bed Mobility   Comment (Bed Mobility) mod A of 2 sup to/from sitting   Transfers   Transfer Comments min A of 2   Balance   Balance Comments flexed posture; crepitis knees/chronic (per spouse bone-on-bone B knees)   Activities of Daily Living   BADL Assessment/Intervention lower body dressing   Lower Body Dressing Assessment/Training   Comment, (Lower Body Dressing) dependent donning B socks & shoes   Clinical Impression   Criteria for Skilled Therapeutic Interventions Met (OT) Yes, treatment indicated   OT Diagnosis decreassed ADLs   OT Problem List-Impairments impacting ADL activity tolerance impaired;balance;mobility;strength   Assessment of Occupational Performance 3-5 Performance Deficits   Identified Performance Deficits bed mob, trfs, standing tolerance   Planned Therapy Interventions (OT) ADL retraining;transfer training;strengthening;progressive activity/exercise   Clinical Decision Making Complexity (OT) low complexity   Anticipated Equipment Needs Upon Discharge (OT)   (to be further assessed)   Risk & Benefits of therapy have been explained evaluation/treatment results reviewed;care plan/treatment goals reviewed;risks/benefits " reviewed;current/potential barriers reviewed;participants included;patient;spouse/significant other   OT Total Evaluation Time   OT Eval, Low Complexity Minutes (64946) 12   OT Goals   Therapy Frequency (OT) Daily   OT Predicted Duration/Target Date for Goal Attainment 02/28/23   OT Goals Transfers;OT Goal 1;OT Goal 2   OT: Transfer Minimal assist   OT: Goal 1 Pt to demo @ least 3 minutes standing tolerance w/CGA & AD in prep ADLs.   OT: Goal 2 Pt to participate in 10 minutes B UE ex for increased activity tolerance for ADLs.   Interventions   Interventions Quick Adds Therapeutic Activity   Self-Care/Home Management   Self-Care/Home Mgmt/ADL, Compensatory, Meal Prep Minutes (26898) 15   Symptoms Noted During/After Treatment (Meal Preparation/Planning Training) fatigue   Treatment Detail/Skilled Intervention Additional standing trials x3-trialing arm in arm & FWW. Static stands ~1 minute in duration w/flexed posture. L knee can buckle/supported for safety. Min A of 2 side stepping w/FWW towards HOB. Pt c/o of dizziness throughout session-didn't seem to worsen but remained constant. Initially seated BP 88/61. Pt sat > 5 minutes. Standing BP 99/68, HR 60-81 (monitor showing AFib & PVCs). 92% on O2. Pt needing frequent rest breaks. Pt positioned on his side (per nurse request d/t buttocks wound) w/pillow btwn knees & call light in reach.   OT Discharge Planning   OT Discharge Recommendation (DC Rec) Transitional Care Facility   Total Session Time   Timed Code Treatment Minutes 15   Total Session Time (sum of timed and untimed services) 27        [facility]  OUTPATIENT OCCUPATIONAL THERAPY  EVALUATION  PLAN OF TREATMENT FOR OUTPATIENT REHABILITATION  (COMPLETE FOR INITIAL CLAIMS ONLY)  Patient's Last Name, First Name, M.I.  YOB: 1935  Nishant Tsang                          Provider's Name  [facility] Medical Record No.  [id number]                              Onset Date:  02/20/23   Start of Care Date:      Type:     ___PT   _X_OT   ___SLP Medical Diagnosis:                       OT Diagnosis:  decreassed ADLs Visits from SOC:  1     See note for plan of treatment, functional goals and certification details    I CERTIFY THE NEED FOR THESE SERVICES FURNISHED UNDER        THIS PLAN OF TREATMENT AND WHILE UNDER MY CARE     (Physician co-signature of this document indicates review and certification of the therapy plan).

## 2023-02-21 NOTE — ED NOTES
Jazmin Messaged md due to pt requesting md to see pt. Pt states one of the reasons he came to the hospital was due to when he drinks fluids it causes phlem build up in his throat, causing him to cough. Pt states he is now coughing up blood tinged phlem.

## 2023-02-21 NOTE — PROGRESS NOTES
[facility]    Medicine Progress Note - Hospitalist Service    Date of Admission:  2/20/2023    Assessment & Plan      Nishant Gilliam is a 87 year old male admitted on 2/20/2023. Nishant Gilliam is a 87 year old male with  HFpEF, Afib, COPD, CKD, HTN, HLD presented with generalized weakness. He has decreased po intake, fatigue, and decreased urine output.  Wife states that 'everything has slowed down for the patient- both his motion and speech, and he is more sleepy'. Denied fever, chills, nausea, vomiting, abdominal pain, diarrhea. She noted that he is having more trouble swallowing which contributes to his decreased intake. In the ED, lab is shows dehydration and NOREEN. Pt is to be admitted for further management.    Dehydration, NOREEN on CKD   - Prerenal from decreased intake.   - Judicious IVF  - f/u BMP-improving    Generalized weakness  Slow response, speech, motion with some tremor  - decreased appetite, weight loss, dysphagia  - CT-head unremarkable. No focal weakness  - SLP eval  - RD consult  - PT/OT  - f/u CXR. CT-abd/pelvis with contrast for screen for occult malignancy deferred due to NOREEN  -denies h/o cva or any other neurological disease  -Neurology consultation for ?Parkinson     Dysphagia  -SLP eval for modified diet    Elevated troponin  - No chest pain, EKG: afib. Trops flat  - likely 2/2 demand ischemia    HFpEF  - will hold bumex due to low BP  -gentle ivf due to poor oral intak    PAfib  - c/w PTA Eliquis  -  metoprolol    COPD  - c/w PTA bronchodilators  - oxygen as needed- might benefit from OP sleep study    Essential hypertension  - borderline. Hold metoprolol for now  - monitor vital signs per protocol    Thrombocytopenia  - f/u cbc, peripheral smear    HLD  - c/w PTA pravachol    Bullous pemphigoid  - c/w PTA meds    Lymphedema  - OT consult    Seasonal allergy  - c/w PTA meds    Yeast infection of groin  - lotrimin bid    Decubitus ulcer stage 2  -Wound care  consultation    Impaired hearing       Diet: Combination Diet Mechanical/Dental Soft Diet; Mildly Thick (level 2); No Caffeine Diet    DVT Prophylaxis: DOAC  Mendes Catheter: Not present  Lines: None     Cardiac Monitoring: None  Code Status: No CPR- Do NOT Intubate      Clinically Significant Risk Factors Present on Admission         # Hypernatremia: Highest Na = 148 mmol/L in last 2 days, will monitor as appropriate       # Drug Induced Coagulation Defect: home medication list includes an anticoagulant medication  # Thrombocytopenia: Lowest platelets = 65 in last 2 days, will monitor for bleeding   # Hypertension: home medication list includes antihypertensive(s)              Disposition Plan      Expected Discharge Date: 02/22/2023              pt/ot: tcu  sw consultation    Payton Carreon MD  Hospitalist Service  [facility]  Securely message with [email or web address] (more info)  Text page via [employer] Paging/Directory   ______________________________________________________________________    Interval History   Feeling a little better, still poor appetite, slow speech, motion and response, general body ache, c/o postnasal dripping bother him and affected his swallow, no f/c, no n/v, no cp/sob.    D/wed with pt/wife, nurse, SLP at bedside.    Physical Exam   Vital Signs:     BP: 95/60 Pulse: 63   Resp: 26 SpO2: 94 % O2 Device: (S) Nasal cannula Oxygen Delivery: (S) 2 LPM  Weight: 0 lbs 0 oz    General.  Awake alert not in acute distress. Hard hearing; slow motion with mild tremor  HEENT.  Pupils equal round react to light, anicteric, EOM intact.  Neck supple no JVD.  CVS regular rhythm no murmur gallops.  Lungs.  Clear to auscultation bilateral no wheezing or rales.  Abdomen.  Soft nontender bowel sounds present.  Extremities.  B/l legs edema++.  Neurological.  Awake and alert. General weakness  Skin no rash. No pallor.  Psych. flat mood.     Medical Decision Making       60 MINUTES SPENT BY ME on the  date of service doing chart review, history, exam, documentation & further activities per the note.      Data     I have personally reviewed the following data over the past 24 hrs:    5.8  \   14.2   / 57 (L)     148 (H) 107 33.2 (H) /  74   5.1 29 1.65 (H) \       Trop: 37 (H) BNP: N/A       Ferritin:  N/A % Retic:  1.3 LDH:  N/A       Imaging results reviewed over the past 24 hrs:   Recent Results (from the past 24 hour(s))   Head CT w/o contrast    Narrative    EXAM: CT HEAD W/O CONTRAST  LOCATION: [facility]  DATE/TIME: 2/20/2023 5:26 PM    INDICATION: Confusion, altered mental status, general weakness  COMPARISON: None.  TECHNIQUE: Routine CT Head without IV contrast. Multiplanar reformats. Dose reduction techniques were used.    FINDINGS:  INTRACRANIAL CONTENTS: No intracranial hemorrhage, extraaxial collection, or mass effect.  No CT evidence of acute infarct. Moderate presumed chronic small vessel ischemic changes. Chronic mineralization right posterior thalamus. Mild to moderate   generalized volume loss. No hydrocephalus.     VISUALIZED ORBITS/SINUSES/MASTOIDS: No intraorbital abnormality. No paranasal sinus mucosal disease. No middle ear or mastoid effusion.    BONES/SOFT TISSUES: No acute abnormality.      Impression    IMPRESSION:  1.  No CT evidence for acute intracranial process.  2.  Brain atrophy and presumed chronic microvascular ischemic changes as above.   XR Chest Port 1 View    Narrative    EXAM: XR CHEST PORT 1 VIEW  LOCATION: [facility]  DATE/TIME: 2/21/2023 12:54 AM    INDICATION: dysphagia, frequent choking.  COMPARISON: None.      Impression    IMPRESSION: Opacity at right lung base represents pleural fluid with possible atelectasis/consolidation of right lower and middle lobes. Right upper lobe and left lung are clear. Heart size likely normal.   US Renal Complete Non-Vascular    Narrative    EXAM: US RENAL COMPLETE  NON-VASCULAR  LOCATION: [facility]  DATE/TIME: 2/21/2023 3:19 AM    INDICATION: NOREEN  COMPARISON: None.  TECHNIQUE: Routine Bilateral Renal and Bladder Ultrasound.    FINDINGS:    RIGHT KIDNEY: 11.7 x 4.6 x 6.0 cm. Mildly echogenic cortex. No hydronephrosis.     LEFT KIDNEY: 9.4 x 5.6 x 4.9 cm. Mildly echogenic cortex. No hydronephrosis.     BLADDER: Mildly distended.    Note is made of a large right pleural effusion and a moderate amount of right upper quadrant ascites.      Impression    IMPRESSION:  1.  Mildly echogenic renal cortices which could reflect the sequela of chronic medical renal disease. No hydronephrosis.    2.  Large right pleural effusion and moderate right upper quadrant ascites.

## 2023-02-22 ENCOUNTER — APPOINTMENT (OUTPATIENT)
Dept: SPEECH THERAPY | Facility: HOSPITAL | Age: 88
DRG: 682 | End: 2023-02-22
Payer: MEDICARE

## 2023-02-22 ENCOUNTER — APPOINTMENT (OUTPATIENT)
Dept: NEUROLOGY | Facility: HOSPITAL | Age: 88
DRG: 682 | End: 2023-02-22
Attending: PSYCHIATRY & NEUROLOGY
Payer: MEDICARE

## 2023-02-22 LAB
ANION GAP SERPL CALCULATED.3IONS-SCNC: 12 MMOL/L (ref 7–15)
BUN SERPL-MCNC: 32.1 MG/DL (ref 8–23)
CALCIUM SERPL-MCNC: 9.8 MG/DL (ref 8.8–10.2)
CHLORIDE SERPL-SCNC: 108 MMOL/L (ref 98–107)
CREAT SERPL-MCNC: 1.68 MG/DL (ref 0.67–1.17)
DEPRECATED HCO3 PLAS-SCNC: 29 MMOL/L (ref 22–29)
ERYTHROCYTE [DISTWIDTH] IN BLOOD BY AUTOMATED COUNT: 14.6 % (ref 10–15)
GFR SERPL CREATININE-BSD FRML MDRD: 40 ML/MIN/1.73M2
GLUCOSE SERPL-MCNC: 85 MG/DL (ref 70–99)
HCT VFR BLD AUTO: 45.2 % (ref 40–53)
HGB BLD-MCNC: 13.5 G/DL (ref 13.3–17.7)
LACTATE SERPL-SCNC: 1.4 MMOL/L (ref 0.7–2)
LDH SERPL L TO P-CCNC: 143 U/L (ref 0–250)
MCH RBC QN AUTO: 28.4 PG (ref 26.5–33)
MCHC RBC AUTO-ENTMCNC: 29.9 G/DL (ref 31.5–36.5)
MCV RBC AUTO: 95 FL (ref 78–100)
PLATELET # BLD AUTO: 59 10E3/UL (ref 150–450)
POTASSIUM SERPL-SCNC: 4.2 MMOL/L (ref 3.4–5.3)
RBC # BLD AUTO: 4.75 10E6/UL (ref 4.4–5.9)
SODIUM SERPL-SCNC: 149 MMOL/L (ref 136–145)
VIT B12 SERPL-MCNC: 1062 PG/ML (ref 232–1245)
WBC # BLD AUTO: 7.1 10E3/UL (ref 4–11)

## 2023-02-22 PROCEDURE — 83615 LACTATE (LD) (LDH) ENZYME: CPT | Performed by: INTERNAL MEDICINE

## 2023-02-22 PROCEDURE — 250N000012 HC RX MED GY IP 250 OP 636 PS 637: Performed by: STUDENT IN AN ORGANIZED HEALTH CARE EDUCATION/TRAINING PROGRAM

## 2023-02-22 PROCEDURE — C9113 INJ PANTOPRAZOLE SODIUM, VIA: HCPCS | Performed by: PHYSICIAN ASSISTANT

## 2023-02-22 PROCEDURE — 80048 BASIC METABOLIC PNL TOTAL CA: CPT | Performed by: INTERNAL MEDICINE

## 2023-02-22 PROCEDURE — G0378 HOSPITAL OBSERVATION PER HR: HCPCS

## 2023-02-22 PROCEDURE — G0463 HOSPITAL OUTPT CLINIC VISIT: HCPCS | Mod: 25

## 2023-02-22 PROCEDURE — 83605 ASSAY OF LACTIC ACID: CPT | Performed by: INTERNAL MEDICINE

## 2023-02-22 PROCEDURE — 99222 1ST HOSP IP/OBS MODERATE 55: CPT | Performed by: INTERNAL MEDICINE

## 2023-02-22 PROCEDURE — 250N000013 HC RX MED GY IP 250 OP 250 PS 637: Performed by: STUDENT IN AN ORGANIZED HEALTH CARE EDUCATION/TRAINING PROGRAM

## 2023-02-22 PROCEDURE — 250N000013 HC RX MED GY IP 250 OP 250 PS 637: Performed by: INTERNAL MEDICINE

## 2023-02-22 PROCEDURE — 120N000001 HC R&B MED SURG/OB

## 2023-02-22 PROCEDURE — 85027 COMPLETE CBC AUTOMATED: CPT | Performed by: INTERNAL MEDICINE

## 2023-02-22 PROCEDURE — 250N000011 HC RX IP 250 OP 636: Performed by: PHYSICIAN ASSISTANT

## 2023-02-22 PROCEDURE — 95816 EEG AWAKE AND DROWSY: CPT

## 2023-02-22 PROCEDURE — 250N000011 HC RX IP 250 OP 636: Performed by: STUDENT IN AN ORGANIZED HEALTH CARE EDUCATION/TRAINING PROGRAM

## 2023-02-22 PROCEDURE — 95816 EEG AWAKE AND DROWSY: CPT | Mod: 26 | Performed by: PSYCHIATRY & NEUROLOGY

## 2023-02-22 PROCEDURE — 250N000013 HC RX MED GY IP 250 OP 250 PS 637: Performed by: PHYSICIAN ASSISTANT

## 2023-02-22 PROCEDURE — 99232 SBSQ HOSP IP/OBS MODERATE 35: CPT | Performed by: PSYCHIATRY & NEUROLOGY

## 2023-02-22 PROCEDURE — 258N000001 HC RX 258: Performed by: INTERNAL MEDICINE

## 2023-02-22 PROCEDURE — 99233 SBSQ HOSP IP/OBS HIGH 50: CPT | Performed by: INTERNAL MEDICINE

## 2023-02-22 PROCEDURE — 36415 COLL VENOUS BLD VENIPUNCTURE: CPT | Performed by: INTERNAL MEDICINE

## 2023-02-22 PROCEDURE — 92526 ORAL FUNCTION THERAPY: CPT | Mod: GN

## 2023-02-22 RX ORDER — SODIUM CHLORIDE 450 MG/100ML
INJECTION, SOLUTION INTRAVENOUS CONTINUOUS
Status: DISCONTINUED | OUTPATIENT
Start: 2023-02-22 | End: 2023-02-22

## 2023-02-22 RX ORDER — BISACODYL 10 MG
10 SUPPOSITORY, RECTAL RECTAL DAILY PRN
Status: DISCONTINUED | OUTPATIENT
Start: 2023-02-22 | End: 2023-03-02 | Stop reason: HOSPADM

## 2023-02-22 RX ORDER — QUETIAPINE FUMARATE 25 MG/1
25 TABLET, FILM COATED ORAL 2 TIMES DAILY PRN
Status: DISCONTINUED | OUTPATIENT
Start: 2023-02-22 | End: 2023-03-02 | Stop reason: HOSPADM

## 2023-02-22 RX ORDER — IPRATROPIUM BROMIDE 21 UG/1
2 SPRAY, METERED NASAL 3 TIMES DAILY PRN
Status: DISCONTINUED | OUTPATIENT
Start: 2023-02-22 | End: 2023-02-23

## 2023-02-22 RX ORDER — POLYETHYLENE GLYCOL 3350 17 G/17G
17 POWDER, FOR SOLUTION ORAL DAILY
Status: DISCONTINUED | OUTPATIENT
Start: 2023-02-22 | End: 2023-02-26

## 2023-02-22 RX ADMIN — ACETAMINOPHEN 650 MG: 325 TABLET ORAL at 10:09

## 2023-02-22 RX ADMIN — DEXTROSE AND SODIUM CHLORIDE: 5; 450 INJECTION, SOLUTION INTRAVENOUS at 13:34

## 2023-02-22 RX ADMIN — DEXTROSE AND SODIUM CHLORIDE: 5; 450 INJECTION, SOLUTION INTRAVENOUS at 23:44

## 2023-02-22 RX ADMIN — SENNOSIDES AND DOCUSATE SODIUM 2 TABLET: 50; 8.6 TABLET ORAL at 00:59

## 2023-02-22 RX ADMIN — MYCOPHENOLATE MOFETIL 1000 MG: 500 TABLET, FILM COATED ORAL at 20:25

## 2023-02-22 RX ADMIN — PANTOPRAZOLE SODIUM 40 MG: 40 INJECTION, POWDER, FOR SOLUTION INTRAVENOUS at 16:10

## 2023-02-22 RX ADMIN — POLYETHYLENE GLYCOL 3350 17 G: 17 POWDER, FOR SOLUTION ORAL at 17:17

## 2023-02-22 RX ADMIN — CLOTRIMAZOLE: 1 CREAM TOPICAL at 20:25

## 2023-02-22 RX ADMIN — Medication 1 MG: at 00:59

## 2023-02-22 RX ADMIN — CLOTRIMAZOLE: 1 CREAM TOPICAL at 09:38

## 2023-02-22 RX ADMIN — ONDANSETRON 4 MG: 2 INJECTION INTRAMUSCULAR; INTRAVENOUS at 05:03

## 2023-02-22 RX ADMIN — BUDESONIDE AND FORMOTEROL FUMARATE DIHYDRATE 2 PUFF: 160; 4.5 AEROSOL RESPIRATORY (INHALATION) at 20:22

## 2023-02-22 RX ADMIN — METOPROLOL TARTRATE 100 MG: 100 TABLET, FILM COATED ORAL at 20:25

## 2023-02-22 RX ADMIN — BUDESONIDE AND FORMOTEROL FUMARATE DIHYDRATE 2 PUFF: 160; 4.5 AEROSOL RESPIRATORY (INHALATION) at 11:52

## 2023-02-22 RX ADMIN — BISACODYL 10 MG: 10 SUPPOSITORY RECTAL at 11:51

## 2023-02-22 RX ADMIN — DOXYCYCLINE HYCLATE 100 MG: 100 CAPSULE ORAL at 09:39

## 2023-02-22 RX ADMIN — PRAVASTATIN SODIUM 20 MG: 20 TABLET ORAL at 20:25

## 2023-02-22 ASSESSMENT — ACTIVITIES OF DAILY LIVING (ADL)
ADLS_ACUITY_SCORE: 43

## 2023-02-22 NOTE — PROGRESS NOTES
Patient repositioned for comfort, denied pain upon reassessment. VSS. On 2L of O2 nasal cannula. Patient alert and oriented x4. Patient reports generalized weakness but still able to ambulate to the bathroom with assist of one. Lower extremities are red with +3 pitting edema.     Patient having minimal PO intake due to dysphagia and anxiety with this. On IVF.    No signs of bleeding overnight. Cut still present on uvula but no bleeding. Continuing to monitor.    Patient reports constipation, last went 2/17 per pt report. Given PRN stool softener. Patient developed nausea when attempting to have a bowel movement. Given PRN zofran. No BM overnight.

## 2023-02-22 NOTE — PLAN OF CARE
Goal Outcome Evaluation:      Plan of Care Reviewed With: patient, spouse      He said that he constantly feels like he needs to have a BM and has painful abdomen. Dr Carreon ordered a suppository and it was given.    Pt is coughing up bloody sputum. It is bright red. It is unknown to this writer if blood is from throat or lung tissue.     Pt c/o sore throat.

## 2023-02-22 NOTE — CONSULTS
[facility] Nurse Inpatient Assessment     Consulted for: Sacrum    Patient History (according to provider note(s):      Nishant Gilliam is a 87 year old male admitted on 2/20/2023. Nishant Gilliam is a 87 year old male with  HFpEF, Afib, COPD, CKD, HTN, HLD presented with generalized weakness. He has decreased po intake, fatigue, and decreased urine output.  Wife states that 'everything has slowed down for the patient- both his motion and speech, and he is more sleepy'. Denied fever, chills, nausea, vomiting, abdominal pain, diarrhea. She noted that he is having more trouble swallowing which contributes to his decreased intake. In the ED, lab is shows dehydration and NOREEN. Pt is to be admitted for further management.    Areas Assessed:      Skin Injury Location: Sacrum    2/22      Skin injury due to: Moisture associated skin damage (MASD)  Skin history and plan of care:   Patient has a divot in this area, maceration from both sides of the crevice rubbing together, mirrored outlines of epidermal ridging, likely a chronic moisture issue. Redness is blanchable, scant area of purpura noted at the upper margin near 12 oclock. No open areas noted at this time, Mepilex placed.   Affected area:      Skin assessment: Intact     Measurements (length x width x depth, in cm) NA     Color: normal and consistent with surrounding tissue     Temperature  normal      Drainage: none .      Color: none      Odor: none  Pain: absent, none  Pain interventions prior to dressing change: slow and gentle cares   Treatment goal: Protection  STATUS: initial assessment  Supplies ordered: supplies stored on unit       Treatment Plan:     Sacral Area  1. Cleanse area with bath wipes, pat dry  2. Apply sacral mepilex over area, look under mepilex each shift  3. Replace mepilex every 3 days or PRN if soiled, saturated, falls off  4. No briefs in bed, put isoflex pump on bed    Orders: Written    RECOMMEND PRIMARY  TEAM ORDER: None, at this time  Education provided: plan of care and Off-loading pressure  Discussed plan of care with: Patient  WOC nurse follow-up plan: weekly  Notify WOC if wound(s) deteriorate.  Nursing to notify the Provider(s) and re-consult the WOC Nurse if new skin concern.    DATA:     Current support surface: Standard  Standard gel/foam mattress (IsoFlex, Atmos air, etc)  Containment of urine/stool: Incontinence Protocol  BMI: Body mass index is 35.42 kg/m .   Active diet order: Orders Placed This Encounter      Combination Diet Mechanical/Dental Soft Diet; Thin Liquids (level 0); No Caffeine Diet     Output: I/O last 3 completed shifts:  In: 200 [P.O.:200]  Out: -      Labs: Recent Labs   Lab 02/22/23  0820   HGB 13.5   WBC 7.1     Pressure injury risk assessment:   Sensory Perception: 3-->slightly limited  Moisture: 4-->rarely moist  Activity: 3-->walks occasionally  Mobility: 2-->very limited  Nutrition: 2-->probably inadequate  Friction and Shear: 2-->potential problem  Antwon Score: 16    LESLY JOLLEYN RN CWOCN  Pager no longer is use, please contact through [employer]   Jazmin group: WOC Nurse

## 2023-02-22 NOTE — PROGRESS NOTES
PRIMARY DIAGNOSIS: ACUTE PAIN  OUTPATIENT/OBSERVATION GOALS TO BE MET BEFORE DISCHARGE:   1. Pain Status: Improved with use of alternative comfort measures i.e.: positioning    Patient reports back and knee pain, especially with movement and touch. Declined PRN tylenol.    2. Return to near baseline physical activity: No    3. Cleared for discharge by consultants (if involved): No    Discharge Planner Nurse   Safe discharge environment identified: No  Barriers to discharge: Yes       Entered by: Avril Caldwell RN 02/22/2023 4:25 AM     Please review provider order for any additional goals.   Nurse to notify provider when observation goals have been met and patient is ready for discharge.

## 2023-02-22 NOTE — PROGRESS NOTES
Meet with patient's wife at bedside and addressed her concerns. Spent >30 minutes.    BP improved to 105/60; temp is a problem with measurement (pt cannot close mouth well). Nurse is trying to measure again.     I will change patient admission to inpatient due to complicated medical conditions as indicated in my progress notes earlier. Expect multiple days in hospital.

## 2023-02-22 NOTE — PROGRESS NOTES
NEUROLOGY PROGRESS NOTE     Nishant Gilliam,  1935, MRN [id number] Date: 2023     [facility]   Code status:  No CPR- Do NOT Intubate   PCP: Aditya Mehta, None      ASSESSMENT & PLAN   Diagnosis code: Encephalopathy    Encephalopathy  Rule out Parkinson's disease  Generalized weakness  Elevated creatinine      Patient's exam is not very suggestive of Parkinson's disease    Suspect patient has a mild encephalopathy that could be causing a lot of his symptoms    Encephalopathy most likely related to elevated creatinine.  Defer treatment to primary team.    Head CT negative for structural lesions.      Check MRI brain to rule out cerebrovascular disease-patient refused.    EEG suggestive of mild encephalopathy.    Check B12/TSH-normal    Check CK for generalized weakness-decreased.    UA negative    Speech therapy consultation to evaluate swallowing/speech difficulty and physical therapy if needed.    Suspect patient's swallowing/speech difficulty is more related to Pseudohemoptysis.    We will sign off.  Please call with any further questions.       Chief Complaint   Patient presents with     Syncope     Near syncope. Wife states last 1-2 weeks pt not eating or drinking much and becoming very weak. Wife unable to help     Generalized Weakness        HISTORY OF PRESENT ILLNESS     We have been requested by Dr. Carreon to evaluate Nishant Gilliam who is a 87 year old  male for generalized weakness slowed speech.  This 87-year-old male with history of atrial fibrillation, COPD, CKD, he reports that the symptoms came on a few days before admission.  He reports that everything is slow down.  His motion and speech is slowed.  He feels more sleepy.  Has difficulty expressing himself.  No other associated symptoms.  Does have some trouble swallowing though reports that it is more related to some bleeding in his throat.  Labs did show dehydration and acute kidney injury.  Patient been admitted for further  evaluation.  Neurology's been consulted to rule out Parkinson's disease.  There is no family history of Parkinson's disease.  Has never had any other parkinsonian symptoms.  Does complain of some tremors but these are mainly with activity and has been there for 10 years.  Both upper extremities are involved.  Does complain of some numbness in his feet related to spinal cord issues.  Denies any new leg weakness other than the generalized weakness.    2/22/23  Patient continues to have blood in his sputum.  This affects his ability to speak.  Reports no other new neurological symptoms.  EEG was done.  MRI has not been able to be done.  Patient is refusing the MRI.  No other new symptoms.     PAST MEDICAL & SURGICAL HISTORY     Medical History  Past Medical History:   Diagnosis Date     COPD (chronic obstructive pulmonary disease) (H)      Coronary artery disease      Hypertension      Right bundle branch block     complete     Surgical History  Past Surgical History:   Procedure Laterality Date     HC KNEE SCOPE, DIAGNOSTIC      Description: Arthroscopy Knee Left;  Recorded: 06/27/2009;  Comments: details unk.     HERNIA REPAIR       ZZHC CORONARY STENT PERCUT, INITIAL VESSEL      Description: Cath Stent Placement;  Recorded: 02/24/2014;  Comments: stenting to the LAD in 1999. 2nd done years later.        SOCIAL HISTORY     Social History     Tobacco Use     Smoking status: Former     Years: 30.00     Types: Cigarettes     Smokeless tobacco: Never   Substance Use Topics     Alcohol use: No     Drug use: No        FAMILY HISTORY     Reviewed, and noncontributory.     ALLERGIES     Allergies   Allergen Reactions     Furosemide Rash     pemphigoid     Cat Hair Std Allergenic Ext [Cat Hair Extract] Unknown     Sulfa Drugs Unknown        REVIEW OF SYSTEMS     12 system ROS was done and other than the HPI this was negative.  Pertinent positives noted in the HPI     HOME & HOSPITAL MEDICATIONS     Prior to Admission  Medications  Medications Prior to Admission   Medication Sig Dispense Refill Last Dose     apixaban ANTICOAGULANT (ELIQUIS) 5 MG tablet Take 1 tablet (5 mg) by mouth 2 times daily 180 tablet 3 2/20/2023 at am     budesonide-formoterol (SYMBICORT) 160-4.5 MCG/ACT Inhaler [BUDESONIDE-FORMOTEROL (SYMBICORT) 160-4.5 MCG/ACTUATION INHALER] USE 2 INHALATIONS TWICE A DAY 10.2 g 11 2/19/2023 at am     bumetanide (BUMEX) 1 MG tablet Take 2 tablets (2 mg) by mouth daily 180 tablet 3 2/20/2023 at am     cholecalciferol, vitamin D3, (VITAMIN D3) 25 mcg (1,000 unit) capsule Take 1 capsule by mouth daily   2/19/2023 at am     doxazosin (CARDURA) 4 MG tablet Take 1 tablet (4 mg) by mouth At Bedtime 90 tablet 3 2/19/2023 at [place]     doxycycline hyclate (VIBRAMYCIN) 100 MG capsule Take 100 mg by mouth 2 times daily   2/19/2023 at am     finasteride (PROSCAR) 5 MG tablet Take 1 tablet (5 mg) by mouth daily (Patient taking differently: Take 5 mg by mouth At Bedtime) 90 tablet 3 2/19/2023 at [place]     ipratropium (ATROVENT) 0.03 % nasal spray Spray 2 sprays into both nostrils 3 times daily As needed for congestion (Patient taking differently: Spray 2 sprays into both nostrils 3 times daily as needed As needed for congestion) 30 mL 4 2/20/2023     LORazepam (ATIVAN) 0.5 MG tablet TAKE 1/2 TO 1 TABLET BY MOUTH THREE TIMES DAILY AS NEEDED FOR ANXIETY 30 tablet 0 2/16/2023     metoprolol tartrate (LOPRESSOR) 100 MG tablet Take 1 tablet (100 mg) by mouth 2 times daily 180 tablet 3 2/19/2023 at [place]     niacinamide 500 MG tablet Take 1,000 mg by mouth daily   2/19/2023     pravastatin (PRAVACHOL) 20 MG tablet Take 1 tablet (20 mg) by mouth every evening 90 tablet 3 2/19/2023 at [place]     sodium chloride (OCEAN) 0.65 % nasal spray Spray 1 spray into both nostrils daily as needed for congestion   2/20/2023     spironolactone (ALDACTONE) 25 MG tablet Take 1 tablet (25 mg) by mouth daily 90 tablet 3 2/19/2023 at am     mycophenolate (GENERIC  "EQUIVALENT) 500 MG tablet Take 1,000 mg by mouth 2 times daily          Hospital Medications    [Held by provider] apixaban ANTICOAGULANT  5 mg Oral BID     budesonide-formoterol  2 puff Inhalation 2 times daily     clotrimazole   Topical BID     doxazosin  4 mg Oral At Bedtime     doxycycline hyclate  100 mg Oral BID     finasteride  5 mg Oral At Bedtime     metoprolol tartrate  100 mg Oral BID     mycophenolate  1,000 mg Oral BID     pantoprazole  40 mg Intravenous Daily with breakfast     pravastatin  20 mg Oral QPM     Vitamin D3  25 mcg Oral Daily        PHYSICAL EXAM     Vital signs  Temp:  [93.3  F (34.1  C)-98.3  F (36.8  C)] 93.3  F (34.1  C)  Pulse:  [67-79] 72  Resp:  [18-22] 18  BP: ()/(52-68) 105/65  SpO2:  [92 %-95 %] 95 %    PHYSICAL EXAMINATION  VITALS: /65 (BP Location: Left arm, Patient Position: Semi-Carreon's, Cuff Size: Adult Large)   Pulse 72   Temp (!) 93.3  F (34.1  C) (Axillary)   Resp 18   Ht 1.829 m (6' 0.01\")   Wt 118.5 kg (261 lb 3.9 oz)   SpO2 95%   BMI 35.42 kg/m    GENERAL -Health appearing, No apparent distress  EYES- No scleral icterus, no eyelid droop, Pupils - see Neuro section  HEENT - Normocephalic, atraumatic, hard of hearing; Oral mucosa moist and pink in color. External Ears and nose intact.   Neck - supple with no obvious lymphadenopathy or thyromegaly  PULM - Good spontaneous respiratory effort; Normal palpation of chest.   CV-significant swelling and redness in the legs.  MSK- Gait - see Neuro section; Strength and tone- see Neuro section; Range of motion grossly intact.  PSYCH -cooperative    Neurological  Mental status - Patient is awake and grossly oriented to self, place and time. Attention span is normal. Language is fluent and follows commands appropriately.   Cranial nerves - CN II-XII intact. Pupils are reactive and symmetric; EOMI, VFIT, NLF symmetric  Motor - There is no pronator drift. Motor exam shows 5/5 strength in all extremities.  Tone - " Tone is symmetric bilaterally in upper and lower extremities.  Reflexes - Reflexes are absent.  Sensation - Sensory exam is grossly intact to light touch, pain.  Coordination - Finger to nose is without dysmetria.  Gait and station --unable to safely ambulate.  Formal gait testing cannot be done due to safety concerns from ongoing medical issues.  Exam similar to yesterday.     DIAGNOSTIC STUDIES     Pertinent Radiology   Head CT  IMPRESSION:  1.  No CT evidence for acute intracranial process.  2.  Brain atrophy and presumed chronic microvascular ischemic changes as above.    EEG  IMPRESSION/REPORT/PLAN  This is an abnormal EEG during wakefulness and drowsiness due to a generalized mildly slow background suggestive of mild generalized cerebral dysfunction.  The EEG is suggestive of a mild encephalopathy.  No electrographic seizures were seen during the recording.  Further clinical correlation is needed.     Please note that the absence of epileptiform abnormalities does not exclude the possibility of epilepsy in any patient.      LABS  Component      Latest Ref Rng & Units 9/6/2022 2/20/2023   WBC      4.0 - 11.0 10e3/uL  6.9   RBC Count      4.40 - 5.90 10e6/uL  4.86   Hemoglobin      13.3 - 17.7 g/dL  13.9   Hematocrit      40.0 - 53.0 %  45.5   MCV      78 - 100 fL  94   MCH      26.5 - 33.0 pg  28.6   MCHC      31.5 - 36.5 g/dL  30.5 (L)   RDW      10.0 - 15.0 %  14.4   Platelet Count      150 - 450 10e3/uL  65 (L)   % Neutrophils      %  69   % Lymphocytes      %  14   % Monocytes      %  16   % Eosinophils      %  0   % Basophils      %  0   % Immature Granulocytes      %  1   NRBCs per 100 WBC      <1 /100  1 (H)   Absolute Neutrophils      1.6 - 8.3 10e3/uL  4.8   Absolute Lymphocytes      0.8 - 5.3 10e3/uL  1.0   Absolute Monocytes      0.0 - 1.3 10e3/uL  1.1   Absolute Eosinophils      0.0 - 0.7 10e3/uL  0.0   Absolute Basophils      0.0 - 0.2 10e3/uL  0.0   Absolute Immature Granulocytes      <=0.4  10e3/uL  0.0   Absolute NRBCs      10e3/uL  0.0   Color Urine      Colorless, Straw, Light Yellow, Yellow  Yellow   Appearance Urine      Clear  Clear   Glucose Urine      Negative mg/dL  Negative   Bilirubin Urine      Negative  Negative   Ketones Urine      Negative mg/dL  Negative   Specific Gravity Urine      1.001 - 1.030  1.013   Blood Urine      Negative  Negative   pH Urine      5.0 - 7.0  5.0   Protein Albumin Urine      Negative mg/dL  Negative   Urobilinogen mg/dL      <2.0 mg/dL  <2.0   Nitrite Urine      Negative  Negative   Leukocyte Esterase Urine      Negative  Negative   Mucus Urine      None Seen /LPF  Present (A)   RBC Urine      <=2 /HPF  1   WBC Urine      <=5 /HPF  2   Hyaline Casts      <=2 /LPF  3 (H)   Sodium      136 - 145 mmol/L  148 (H)   Potassium      3.4 - 5.3 mmol/L  5.0   Chloride      98 - 107 mmol/L  106   Carbon Dioxide (CO2)      22 - 29 mmol/L  30 (H)   Anion Gap      7 - 15 mmol/L  12   Urea Nitrogen      8.0 - 23.0 mg/dL  36.3 (H)   Creatinine      0.67 - 1.17 mg/dL  1.72 (H)   Calcium      8.8 - 10.2 mg/dL  9.9   Glucose      70 - 99 mg/dL  102 (H)   GFR Estimate      >60 mL/min/1.73m2  38 (L)   TSH      0.30 - 4.20 uIU/mL 0.87      Recent Results (from the past 24 hour(s))   Vitamin B12    Collection Time: 02/21/23  5:17 PM   Result Value Ref Range    Vitamin B12 1,062 232 - 1,245 pg/mL   TSH with free T4 reflex    Collection Time: 02/21/23  5:17 PM   Result Value Ref Range    TSH 2.42 0.30 - 4.20 uIU/mL   CK total    Collection Time: 02/21/23  5:17 PM   Result Value Ref Range    CK 28 (L) 39 - 308 U/L   Basic metabolic panel    Collection Time: 02/22/23  8:20 AM   Result Value Ref Range    Sodium 149 (H) 136 - 145 mmol/L    Potassium 4.2 3.4 - 5.3 mmol/L    Chloride 108 (H) 98 - 107 mmol/L    Carbon Dioxide (CO2) 29 22 - 29 mmol/L    Anion Gap 12 7 - 15 mmol/L    Urea Nitrogen 32.1 (H) 8.0 - 23.0 mg/dL    Creatinine 1.68 (H) 0.67 - 1.17 mg/dL    Calcium 9.8 8.8 - 10.2 mg/dL     Glucose 85 70 - 99 mg/dL    GFR Estimate 40 (L) >60 mL/min/1.73m2   CBC with platelets    Collection Time: 02/22/23  8:20 AM   Result Value Ref Range    WBC Count 7.1 4.0 - 11.0 10e3/uL    RBC Count 4.75 4.40 - 5.90 10e6/uL    Hemoglobin 13.5 13.3 - 17.7 g/dL    Hematocrit 45.2 40.0 - 53.0 %    MCV 95 78 - 100 fL    MCH 28.4 26.5 - 33.0 pg    MCHC 29.9 (L) 31.5 - 36.5 g/dL    RDW 14.6 10.0 - 15.0 %    Platelet Count 59 (L) 150 - 450 10e3/uL       Total time spent for face to face visit, reviewing labs/imaging studies, counseling and coordination of care was: Over 35 min More than 50% of this time was spent on counseling and coordination of care.    Counseling patient.  Patient is hard of hearing which requires extra time.  Reviewing test results.    Patel Hunter MD  Neurologist  [facility]  Tel:- [phone]

## 2023-02-22 NOTE — DISCHARGE INSTRUCTIONS
Sacral Area  1. Cleanse area with bath wipes, pat dry  2. Apply sacral mepilex over area, look under mepilex each shift  3. Replace mepilex every 3 days or PRN if soiled, saturated, falls off  4. No briefs in bed, put isoflex pump on bed

## 2023-02-22 NOTE — PROGRESS NOTES
PRIMARY DIAGNOSIS: GENERALIZED WEAKNESS    OUTPATIENT/OBSERVATION GOALS TO BE MET BEFORE DISCHARGE  1. Orthostatic performed: N/A    2. Tolerating PO medications: Yes but reports dysphagia and pain with swallowing    3. Return to near baseline physical activity: No    4. Cleared for discharge by consultants (if involved): No    Discharge Planner Nurse   Safe discharge environment identified: No  Barriers to discharge: Yes       Entered by: Avril Caldwell RN 02/22/2023 4:23 AM     Please review provider order for any additional goals.   Nurse to notify provider when observation goals have been met and patient is ready for discharge.

## 2023-02-22 NOTE — CONSULTS
[facility] Inpatient Consult Note    Patient: Nishant Gilliam  MRN: [id number]  Date of Service: 2/22/2023      Reason for Visit    Thrombocytopenia  Failure to thrive    Assessment    1.  A very pleasant 87-year-old gentleman with moderately severe thrombocytopenia.  2.  Normal hemoglobin, white count and mean corpuscular volume.  3.  Ultrasound suggesting pleural effusion as well as ascites.  This could indicate some degree of hepatic insufficiency or congestive heart failure.  4.  Known history of mild pulmonary hypertension.  5.  Mild/moderate renal insufficiency.    Plan:    1.  At this time his hematological findings are most likely reflective of his other systemic disorders.  I think he may have slight hypersplenism from either congestive heart failure or some hepatic involvement from congestion.  To rule out anything serious we will order LDH level.  If it is significantly elevated we may have to look at TTP/HUS although it appears unlikely.  2.  Treat the underlying congestive heart issue.  3.  Check ultrasound of the liver with Doppler to make sure that he does not have any portal hypertension.  4.  Avoid any hepatotoxic drugs.  5.  Trend his platelet count.  I doubt they will change much.  6.  We will follow-up on his blood count.    Staging History    Cancer Staging   No matching staging information was found for the patient.      History  Mr. Nishant Gilliam is a 87 year old gentleman who has been admitted with weakness, failure to thrive, general slowing this and found to be slightly hypotensive in the emergency room.  He was also noted to have thrombocytopenia with a platelet count around 60,000.  His previous platelet count had been normal.  In addition to thrombocytopenia he was noted to have slightly elevated creatinine and BUN.  Indicating some degree of dehydration and poor p.o. intake.  He has known history of atrial fibrillation.  His CT scan was showing some  cerebral atrophy.  His initial troponin was 37.  No significant symptoms reported from his thrombocytopenia including no bleeding or ecchymosis etc.    He has been seen by hospitalist team, neurology etc.  Neurology does not think that he has Parkinson's.  His work-up otherwise has been negative.    His repeat platelet count have continued to be in the 50-60 range.  His white count, hemoglobin have been normal.  Creatinine is stable although still not normal.    He has no known history of any hematological problem prior.  He does have significant history of coronary disease congestive heart failure and COPD.    Review of systems.    A 14 point review of systems was obtained.  Positive findings noted in the history.  Rest of the review of system is otherwise negative.      Past History  Past Medical History:   Diagnosis Date     COPD (chronic obstructive pulmonary disease) (H)      Coronary artery disease      Hypertension      Right bundle branch block     complete     Past Surgical History:   Procedure Laterality Date     HC KNEE SCOPE, DIAGNOSTIC      Description: Arthroscopy Knee Left;  Recorded: 06/27/2009;  Comments: details unk.     HERNIA REPAIR       ZZHC CORONARY STENT PERCUT, INITIAL VESSEL      Description: Cath Stent Placement;  Recorded: 02/24/2014;  Comments: stenting to the LAD in 1999. 2nd done years later.     No family history on file.  Social History     Socioeconomic History     Marital status:    Tobacco Use     Smoking status: Former     Years: 30.00     Types: Cigarettes     Smokeless tobacco: Never   Substance and Sexual Activity     Alcohol use: No     Drug use: No   Social History Narrative    Lives with his wife.       Allergies    Allergies   Allergen Reactions     Furosemide Rash     pemphigoid     Cat Hair Std Allergenic Ext [Cat Hair Extract] Unknown     Sulfa Drugs Unknown          Physical Exam    /64 (BP Location: Left arm)   Pulse 73   Temp (!) 96  F (35.6  C)  "(Temporal)   Resp 18   Ht 1.829 m (6' 0.01\")   Wt 118.5 kg (261 lb 3.9 oz)   SpO2 94%   BMI 35.42 kg/m      GENERAL: Somewhat sleepy.  But able to communicate.  Oriented to place and time to some extent.    HEAD: Atraumatic and normocephalic.    EYES: COLE, EOMI. No pallor. No icterus.    Oral cavity: no mucosal lesion or tonsillar enlargement.  Incomplete dentition.    NECK: supple. JVP normal.No thyroid enlargement.    LYMPH NODES: No palpable, cervical, axillary or inguinal lymphadenopathy.    CHEST: clear to auscultation bilaterally. Symmetrical breath movements bilaterally.    CVS: S1 and S2 are irregularly irregular in rate and rhythm.  Soft systolic murmur but no gallop or rub heard.  2+-3+ bilateral peripheral edema.    ABDOMEN: Soft. Not tender. Not distended. No palpable hepatomegaly or splenomegaly. No other mass palpable. Bowel sounds heard.    EXTREMITIES: Warm.  Bilateral lower extremity edema.  Some stasis dermatosis as well.    NEUROLOGICAL: Full evaluation was not done.  He does appear to be slightly slow.    SKIN: no rash, or bruising or purpura.      Lab Results  Recent Results (from the past 24 hour(s))   Basic metabolic panel    Collection Time: 02/22/23  8:20 AM   Result Value Ref Range    Sodium 149 (H) 136 - 145 mmol/L    Potassium 4.2 3.4 - 5.3 mmol/L    Chloride 108 (H) 98 - 107 mmol/L    Carbon Dioxide (CO2) 29 22 - 29 mmol/L    Anion Gap 12 7 - 15 mmol/L    Urea Nitrogen 32.1 (H) 8.0 - 23.0 mg/dL    Creatinine 1.68 (H) 0.67 - 1.17 mg/dL    Calcium 9.8 8.8 - 10.2 mg/dL    Glucose 85 70 - 99 mg/dL    GFR Estimate 40 (L) >60 mL/min/1.73m2   CBC with platelets    Collection Time: 02/22/23  8:20 AM   Result Value Ref Range    WBC Count 7.1 4.0 - 11.0 10e3/uL    RBC Count 4.75 4.40 - 5.90 10e6/uL    Hemoglobin 13.5 13.3 - 17.7 g/dL    Hematocrit 45.2 40.0 - 53.0 %    MCV 95 78 - 100 fL    MCH 28.4 26.5 - 33.0 pg    MCHC 29.9 (L) 31.5 - 36.5 g/dL    RDW 14.6 10.0 - 15.0 %    Platelet Count " 59 (L) 150 - 450 10e3/uL   Lactic acid whole blood    Collection Time: 02/22/23  2:23 PM   Result Value Ref Range    Lactic Acid 1.4 0.7 - 2.0 mmol/L        Imaging Results    US Renal Complete Non-Vascular    Result Date: 2/21/2023  EXAM: US RENAL COMPLETE NON-VASCULAR LOCATION: [facility] DATE/TIME: 2/21/2023 3:19 AM INDICATION: NOREEN COMPARISON: None. TECHNIQUE: Routine Bilateral Renal and Bladder Ultrasound. FINDINGS: RIGHT KIDNEY: 11.7 x 4.6 x 6.0 cm. Mildly echogenic cortex. No hydronephrosis. LEFT KIDNEY: 9.4 x 5.6 x 4.9 cm. Mildly echogenic cortex. No hydronephrosis. BLADDER: Mildly distended. Note is made of a large right pleural effusion and a moderate amount of right upper quadrant ascites.     IMPRESSION: 1.  Mildly echogenic renal cortices which could reflect the sequela of chronic medical renal disease. No hydronephrosis. 2.  Large right pleural effusion and moderate right upper quadrant ascites.    XR Chest Port 1 View    Result Date: 2/21/2023  EXAM: XR CHEST PORT 1 VIEW LOCATION: [facility] DATE/TIME: 2/21/2023 12:54 AM INDICATION: dysphagia, frequent choking. COMPARISON: None.     IMPRESSION: Opacity at right lung base represents pleural fluid with possible atelectasis/consolidation of right lower and middle lobes. Right upper lobe and left lung are clear. Heart size likely normal.    Head CT w/o contrast    Result Date: 2/20/2023  EXAM: CT HEAD W/O CONTRAST LOCATION: [facility] DATE/TIME: 2/20/2023 5:26 PM INDICATION: Confusion, altered mental status, general weakness COMPARISON: None. TECHNIQUE: Routine CT Head without IV contrast. Multiplanar reformats. Dose reduction techniques were used. FINDINGS: INTRACRANIAL CONTENTS: No intracranial hemorrhage, extraaxial collection, or mass effect.  No CT evidence of acute infarct. Moderate presumed chronic small vessel ischemic changes. Chronic mineralization right posterior  thalamus. Mild to moderate generalized volume loss. No hydrocephalus. VISUALIZED ORBITS/SINUSES/MASTOIDS: No intraorbital abnormality. No paranasal sinus mucosal disease. No middle ear or mastoid effusion. BONES/SOFT TISSUES: No acute abnormality.     IMPRESSION: 1.  No CT evidence for acute intracranial process. 2.  Brain atrophy and presumed chronic microvascular ischemic changes as above.    EEG Awake or Drowsy Routine    Result Date: 2/22/2023  EEG report DATE Feb 22, 2023 CLINICAL HISTORY This is a 87 year old male with slowness of thought/aphasia. The EEG is done to look for underlying epilepsy/encephalopathy. INTRODUCTION This is a routine EEG performed utilizing standard 10- 20 system of electrode placement with 19 channels of recording including one channel of EKG monitor. The patient was studied in awake and drowsy state. Photic stimulation was done. EEG TIME: 31 min DESCRIPTION OF THE RECORD The EEG background consists of a generalized 6-7 Hz background rhythm that is symmetric.  The posterior dominant rhythm is not well formed.  No sharp rhythmic activity to suggest electrographic seizures was noted.  No convincing epileptiform abnormalities were noted.  No significant abnormal background change with photic stimulation. IMPRESSION/REPORT/PLAN This is an abnormal EEG during wakefulness and drowsiness due to a generalized mildly slow background suggestive of mild generalized cerebral dysfunction.  The EEG is suggestive of a mild encephalopathy.  No electrographic seizures were seen during the recording.  Further clinical correlation is needed. Please note that the absence of epileptiform abnormalities does not exclude the possibility of epilepsy in any patient.        Signed by: Julian Kirkland MD, MD    This note has been dictated using voice recognition software. Any grammatical or context distortions are unintentional and inherent to the software

## 2023-02-22 NOTE — CONSULTS
GI CONSULT NOTE      Name: Nishant Gilliam  Medical Record #: [id number]  YOB: 1935  Date of Admission: 2/20/2023  Date/Time: 2/22/2023/12:48 PM     CHIEF COMPLAINT: Pill dysphagia    HISTORY OF PRESENT ILLNESS: This is a 87 year old year old White patient seen at the request of Dr. Carreon with a history of CHF, hypertension, chronic kidney disease, coronary artery disease, COPD, A-fib on Eliquis prior to admission.  He was admitted February 20 through the emergency room with generalized weakness, near syncope, decreased appetite, weight loss, dehydration, dysphagia.    He has had difficulty swallowing for the last couple of weeks or longer, mostly with large pills such as CellCept and doxycycline taken for bullous pemphigoid.  The doxycycline capsule was being opened and he would sometimes swallow sharp capsule fragments.  Now the inside contents are being sprinkled onto food.  He denies having trouble with swallowing food or liquids, but his oral intake in general has been decreased.  He has postnasal drip with phlegm in the back of his throat and this seems to affect his appetite and ability to swallow.    Most of the history is taken from the patient's wife since he is quite fatigued and mildly confused, also very hard of hearing.  The patient apparently had a prior EGD about 30 years ago with findings of hiatal hernia, no past history of peptic ulcer disease.  That report cannot be verified.  He apparently took omeprazole for many years for reflux but stopped omeprazole about 2 years ago.  He does not recently complain of having heartburn symptoms.  He has chronic constipation, usually having a bowel movement every other day but now no stool for 4 days.  With this he feels some discomfort.  No rectal bleeding.  Wife reports he has lost about 20 pounds of weight recently.  Last year he lost weight with decreased appetite, and later symptoms improved.    Yesterday he was coughing bloody phlegm, and  a cut was noticed on his uvula per nursing notes.  He did have a bedside swallow evaluation with speech-language pathology without overt signs of aspiration, however a video swallow study is potentially planned for tomorrow per patient's wife.    He had a screening colonoscopy in July 2007 with findings of a small polyp which was not retrieved.  Random colon biopsy was normal.    PAST MEDICAL HISTORY:  Past Medical History:   Diagnosis Date     COPD (chronic obstructive pulmonary disease) (H)      Coronary artery disease      Hypertension      Right bundle branch block     complete       FAMILY HISTORY:  No family history on file.    SOCIAL HISTORY:  Social History     Socioeconomic History     Marital status:      Spouse name: Not on file     Number of children: Not on file     Years of education: Not on file     Highest education level: Not on file   Occupational History     Not on file   Tobacco Use     Smoking status: Former     Years: 30.00     Types: Cigarettes     Smokeless tobacco: Never   Substance and Sexual Activity     Alcohol use: No     Drug use: No     Sexual activity: Not on file   Other Topics Concern     Not on file   Social History Narrative    Lives with his wife.     Social Determinants of Health     Financial Resource Strain: Not on file   Food Insecurity: Not on file   Transportation Needs: Not on file   Physical Activity: Not on file   Stress: Not on file   Social Connections: Not on file   Intimate Partner Violence: Not on file   Housing Stability: Not on file       MEDICATIONS PRIOR TO ADMISSION:   Medications Prior to Admission   Medication Sig Dispense Refill Last Dose     apixaban ANTICOAGULANT (ELIQUIS) 5 MG tablet Take 1 tablet (5 mg) by mouth 2 times daily 180 tablet 3 2/20/2023 at am     budesonide-formoterol (SYMBICORT) 160-4.5 MCG/ACT Inhaler [BUDESONIDE-FORMOTEROL (SYMBICORT) 160-4.5 MCG/ACTUATION INHALER] USE 2 INHALATIONS TWICE A DAY 10.2 g 11 2/19/2023 at am      "bumetanide (BUMEX) 1 MG tablet Take 2 tablets (2 mg) by mouth daily 180 tablet 3 2/20/2023 at am     cholecalciferol, vitamin D3, (VITAMIN D3) 25 mcg (1,000 unit) capsule Take 1 capsule by mouth daily   2/19/2023 at am     doxazosin (CARDURA) 4 MG tablet Take 1 tablet (4 mg) by mouth At Bedtime 90 tablet 3 2/19/2023 at [facility]     doxycycline hyclate (VIBRAMYCIN) 100 MG capsule Take 100 mg by mouth 2 times daily   2/19/2023 at am     finasteride (PROSCAR) 5 MG tablet Take 1 tablet (5 mg) by mouth daily (Patient taking differently: Take 5 mg by mouth At Bedtime) 90 tablet 3 2/19/2023 at [place]     ipratropium (ATROVENT) 0.03 % nasal spray Spray 2 sprays into both nostrils 3 times daily As needed for congestion 30 mL 4 2/20/2023     LORazepam (ATIVAN) 0.5 MG tablet TAKE 1/2 TO 1 TABLET BY MOUTH THREE TIMES DAILY AS NEEDED FOR ANXIETY 30 tablet 0 2/16/2023     metoprolol tartrate (LOPRESSOR) 100 MG tablet Take 1 tablet (100 mg) by mouth 2 times daily 180 tablet 3 2/19/2023 at [place]     niacinamide 500 MG tablet Take 1,000 mg by mouth daily   2/19/2023     pravastatin (PRAVACHOL) 20 MG tablet Take 1 tablet (20 mg) by mouth every evening 90 tablet 3 2/19/2023 at [place]     sodium chloride (OCEAN) 0.65 % nasal spray Spray 1 spray into both nostrils daily as needed for congestion   2/20/2023     spironolactone (ALDACTONE) 25 MG tablet Take 1 tablet (25 mg) by mouth daily 90 tablet 3 2/19/2023 at am     mycophenolate (GENERIC EQUIVALENT) 500 MG tablet Take 1,000 mg by mouth 2 times daily             ALLERGIES: Furosemide, Cat hair std allergenic ext [cat hair extract], and Sulfa drugs    REVIEW OF SYSTEMS (ROS): Complete review of systems negative other than listed in HPI.    PHYSICAL EXAM:    BP (!) 87/52 (BP Location: Right arm)   Pulse 72   Temp (!) 93.3  F (34.1  C) (Axillary)   Resp 18   Ht 1.829 m (6' 0.01\")   Wt 118.5 kg (261 lb 3.9 oz)   SpO2 95%   BMI 35.42 kg/m      GENERAL: No obvious distress.  He was sleeping very " deeply and did awake briefly for questioning, but history is quite limited and difficult.  Wife is at his bedside.    SKIN: No jaundice.  There is mild erythema on both lower extremities.    NECK: Supple without adenopathy    EYES: No scleral icterus    LUNGS: Clear to auscultation bilaterally    HEART: Regular rate and rhythm, S1 and S2 present, moderate lower extremity edema    ABDOMEN: Soft, non-distended, non-tender, no guarding/rebound/mass, bowel sounds normal, no obvious organomegaly    MUSKULOSKELETAL:  Warm and well perfused    NEUROLOGIC: Somnolent, very hard of hearing    PSYCHIATRIC: Flat affect    LAB DATA:  Recent Labs   Lab Test 02/22/23  0820 02/21/23  1023 02/20/23  1619 07/08/21  0501 07/07/21  1317   WBC 7.1 5.8 6.9   < > 12.3*   HGB 13.5 14.2 13.9   < > 15.9   MCV 95 94 94   < > 96   PLT 59* 57* 65*   < > 173   INR  --   --   --   --  1.04    < > = values in this interval not displayed.     Recent Labs   Lab Test 02/22/23  0820 02/21/23  1023 02/20/23  1619   * 148* 148*   POTASSIUM 4.2 5.1 5.0   CHLORIDE 108* 107 106   CO2 29 29 30*   BUN 32.1* 33.2* 36.3*   CR 1.68* 1.65* 1.72*   ANIONGAP 12 12 12   GUILLERMO 9.8 9.4 9.9   GLC 85 74 102*     Recent Labs   Lab Test 02/20/23  2314 09/06/22  1346 02/25/22  1340 03/09/21  1150 12/03/18  1543   ALBUMIN  --  3.9  --  3.8 3.8   BILITOTAL  --  1.0  --  0.7 0.8   ALT  --  14 20 20 30   AST  --  27  --  20 24   ALKPHOS  --  80  --  120 141*   PROTEIN Negative  --   --  Negative  --        IMAGING:  US Renal Complete Non-Vascular    Result Date: 2/21/2023  EXAM: US RENAL COMPLETE NON-VASCULAR LOCATION: [facility] DATE/TIME: 2/21/2023 3:19 AM INDICATION: NOREEN COMPARISON: None. TECHNIQUE: Routine Bilateral Renal and Bladder Ultrasound. FINDINGS: RIGHT KIDNEY: 11.7 x 4.6 x 6.0 cm. Mildly echogenic cortex. No hydronephrosis. LEFT KIDNEY: 9.4 x 5.6 x 4.9 cm. Mildly echogenic cortex. No hydronephrosis. BLADDER: Mildly distended. Note is  made of a large right pleural effusion and a moderate amount of right upper quadrant ascites.     IMPRESSION: 1.  Mildly echogenic renal cortices which could reflect the sequela of chronic medical renal disease. No hydronephrosis. 2.  Large right pleural effusion and moderate right upper quadrant ascites.    XR Chest Port 1 View    Result Date: 2/21/2023  EXAM: XR CHEST PORT 1 VIEW LOCATION: [facility] DATE/TIME: 2/21/2023 12:54 AM INDICATION: dysphagia, frequent choking. COMPARISON: None.     IMPRESSION: Opacity at right lung base represents pleural fluid with possible atelectasis/consolidation of right lower and middle lobes. Right upper lobe and left lung are clear. Heart size likely normal.    Head CT w/o contrast    Result Date: 2/20/2023  EXAM: CT HEAD W/O CONTRAST LOCATION: [facility] DATE/TIME: 2/20/2023 5:26 PM INDICATION: Confusion, altered mental status, general weakness COMPARISON: None. TECHNIQUE: Routine CT Head without IV contrast. Multiplanar reformats. Dose reduction techniques were used. FINDINGS: INTRACRANIAL CONTENTS: No intracranial hemorrhage, extraaxial collection, or mass effect.  No CT evidence of acute infarct. Moderate presumed chronic small vessel ischemic changes. Chronic mineralization right posterior thalamus. Mild to moderate generalized volume loss. No hydrocephalus. VISUALIZED ORBITS/SINUSES/MASTOIDS: No intraorbital abnormality. No paranasal sinus mucosal disease. No middle ear or mastoid effusion. BONES/SOFT TISSUES: No acute abnormality.     IMPRESSION: 1.  No CT evidence for acute intracranial process. 2.  Brain atrophy and presumed chronic microvascular ischemic changes as above.    ASSESSMENT:  1. Dysphagia, mostly to pills and seems to be esophageal dysphagia.  He does have a prior hiatal hernia apparently seen on EGD approximately 30 years ago, and he could have reflux or esophagitis.  He could have a stricture but would  expect trouble swallowing solid foods as well.  Less likely would be pill esophagitis related to taking doxycycline, or oropharyngeal irritation related to the sharp capsule edges with apparent tear on his uvula.  A video swallow study is planned for tomorrow.  If this is unrevealing, would next pursue an esophagram and possibly eventual EGD.  If dilation is needed, we'd need his platelets higher and Eliquis on hold a bit longer (was just given yesterday).  2. Constipation- chronic but slightly worse lately, maybe due to decrease oral intake.  Agree with dulcolax suppository which was started, but will also add daily MiraLax.  3.  Thrombocytopenia- heme consult pending  4.  Afib- Eliquis on hold since yesterday    Principal Problem:    Generalized muscle weakness  Active Problems:    Dehydration    Renal insufficiency    PLAN:  Discussed with Dr. Rivas Newsome.  45 minutes of total time was spent providing patient care, including patient evaluation, reviewing documentation/test results, and .    1.  Start empiric PPI.  2.  Await results of video swallow study tomorrow.  3.  Consider possible barium esophagram if VSS normal.  4.  GI following.                                                 Shayna Stanford PA-C  Thank you for the opportunity to participate in the care of this patient.   Please feel free to call me with any questions or concerns.  Phone number [phone].                    CC: [facility], Aditya Mehta

## 2023-02-22 NOTE — PLAN OF CARE
"  Problem: Plan of Care - These are the overarching goals to be used throughout the patient stay.    Goal: Absence of Hospital-Acquired Illness or Injury  Outcome: Progressing  Intervention: Prevent Skin Injury  Recent Flowsheet Documentation  Taken 2/22/2023 1333 by Traci Lao, RN  Body Position: (wife said they just took out pillow from under side so he could eat)   weight shifting   supine  Goal: Optimal Comfort and Wellbeing  Outcome: Progressing  Intervention: Monitor Pain and Promote Comfort  Recent Flowsheet Documentation  Taken 2/22/2023 1333 by Traci Lao RN  Pain Management Interventions: (pt had suppository) rest  Goal: Readiness for Transition of Care  Outcome: Progressing     Problem: Risk for Delirium  Goal: Optimal Coping  Outcome: Progressing   Goal Outcome Evaluation:         Writer assumed care of pt at approx 1300. Pt drowsy, said is very tired from sleeping only 2 hrs last night. Declined physical therapy this afternoon for this reason. \"I just want to rest\". Wife at bedside this afternoon, supportive. Pt ate some of his tomato soup and some water. Several blankets placed on pt as skin is cool--unable to get oral or  axillary temp--Dr Carreon was at bedside, aware. Iv fluid infusing as ordered. Bp improved, see vs flowsheet. Pt reports abdominal discomfort--says feels like he hasn't had a bm in 4-5 days. Pt received suppository this afternoon--see mar. Reminded pt to use call light for needs. Staff will continue to monitor pt.                "

## 2023-02-22 NOTE — PROGRESS NOTES
Bedside EEG was performed that included photic stimulation. The patient was both awake and drowsy during the recording.  EEG #   Skins-Intact  EEG system was used.[id number]

## 2023-02-22 NOTE — PROGRESS NOTES
[facility]    Medicine Progress Note - Hospitalist Service    Date of Admission:  2/20/2023    Assessment & Plan   Nishant Gilliam is a 87 year old male admitted on 2/20/2023. Nishant Gilliam is a 87 year old male with  HFpEF, Afib, COPD, CKD, HTN, HLD presented with generalized weakness. He has decreased po intake, fatigue, and decreased urine output.  Wife states that 'everything has slowed down for the patient- both his motion and speech, and he is more sleepy'. Denied fever, chills, nausea, vomiting, abdominal pain, diarrhea. She noted that he is having more trouble swallowing which contributes to his decreased intake. In the ED, lab is shows dehydration and NOREEN. Pt is to be admitted for further management.    Overnight: bleeding from iv site and throat, hold Elqiuis. Platelet low.     Dehydration, NOREEN on CKD   - Prerenal from decreased intake.   -  IVF  - if not improving, might consider nephrologist consultation    Generalized weakness  Slow response, speech, motion with some tremor  Encephalopathy, Metabolic-treat underlying diseases  - decreased appetite, weight loss, dysphagia  - CT-head unremarkable. No focal weakness  - SLP eval  - RD consult  - PT/OT  - f/u CXR. CT-abd/pelvis with contrast for screen for occult malignancy deferred due to NOREEN  -denies h/o cva or any other neurological disease  -Neurology consultation appreciated: not likely to be Parkinson. ?Encephalopathy.    Dysphagia  -SLP eval for modified diet  -c/o food stuck feeling in esophagus   -GI consultation for possible EGD to r/o malignancy or esophagitis, ulcer, stricture etc  -might consider ct of chest/abd when cr improves    Thrombocytopenia  -bleeding from iv site and throat  -hold Eliquis  -consult hemo    Elevated troponin  - No chest pain, EKG: afib. Trops flat  - likely 2/2 demand ischemia    HFpEF  - will hold bumex due to low BP  -gentle ivf due to poor oral intak    PAfib  - hold PTA Eliquis due to low  "platelet and bleeding from iv site and throat  -  metoprolol    COPD  - c/w PTA bronchodilators  - oxygen as needed- might benefit from OP sleep study    Essential hypertension  - borderline. Hold metoprolol for now  - monitor vital signs per protocol    Thrombocytopenia  - f/u cbc,   -peripheral smear:Platelets are moderately decreased in number with normal morphology.   -hemo consultation: ?eliquis to continue     HLD  - c/w PTA pravachol    Bullous pemphigoid  - c/w PTA meds    Legs edema  Lymphedema  -Venous insufficiency  - OT consult    Seasonal allergy  - c/w PTA meds    Yeast infection of groin  - lotrimin bid    Decubitus ulcer stage 2  -Wound care consultation    Impaired hearing    Addendum:  Lower bp and hypothermia  -no clear e/o infection  -most likely due to poor oral intake   -change ivf to d5 1/2ns at 100cc/h         Diet: Combination Diet Mechanical/Dental Soft Diet; Thin Liquids (level 0); No Caffeine Diet    DVT Prophylaxis: DOAC on hold due to bleeding as above, no heparin due to thrombocytopenia  Mendes Catheter: Not present  Lines: None     Cardiac Monitoring: None  Code Status: No CPR- Do NOT Intubate      Clinically Significant Risk Factors Present on Admission         # Hypernatremia: Highest Na = 148 mmol/L in last 2 days, will monitor as appropriate       # Drug Induced Coagulation Defect: home medication list includes an anticoagulant medication  # Thrombocytopenia: Lowest platelets = 57 in last 2 days, will monitor for bleeding   # Hypertension: home medication list includes antihypertensive(s)      # Obesity: Estimated body mass index is 35.42 kg/m  as calculated from the following:    Height as of this encounter: 1.829 m (6' 0.01\").    Weight as of this encounter: 118.5 kg (261 lb 3.9 oz).           Disposition Plan     Expected Discharge Date: 02/22/2023      Destination: inpatient rehabilitation facility;home with help/services        > 2 days  Hemo, GI consultations " pending    Payton Carreon MD  Hospitalist Service  [facility]  Securely message with [employer] (more info)  Text page via [employer] Paging/Directory   ______________________________________________________________________    Interval History   Feeling poor, with bloody spits, feeling things stuck in esophagus, weakness, slow response/speech/motion    Physical Exam   Vital Signs: Temp: 98  F (36.7  C) Temp src: Oral BP: 96/55 Pulse: 77   Resp: 18 SpO2: 95 % O2 Device: Nasal cannula Oxygen Delivery: 2 LPM  Weight: 261 lbs 3.92 oz    General.  Awake alert  not in acute distress. Malnourished, slow speech/response and motion  HEENT.  Pupils equal round react to light, anicteric, EOM intact.  Neck supple no JVD.  CVS regular rhythm no murmur gallops.  Lungs.  Clear to auscultation bilateral no wheezing or rales.  Abdomen.  Soft nontender bowel sounds present.  Extremities.  + edema  Neurological.  General weakness, slow and mild slurred speech  Skin + rash.   Psych. flat mood.     Medical Decision Making       60 MINUTES SPENT BY ME on the date of service doing chart review, history, exam, documentation & further activities per the note.      Data     I have personally reviewed the following data over the past 24 hrs:    7.1  \   13.5   / 59 (L)     149 (H) 108 (H) 32.1 (H) /  85   4.2 29 1.68 (H) \       TSH: 2.42 T4: N/A A1C: N/A       Ferritin:  N/A % Retic:  1.3 LDH:  N/A       Imaging results reviewed over the past 24 hrs:   No results found for this or any previous visit (from the past 24 hour(s)).

## 2023-02-22 NOTE — PROGRESS NOTES
Pt was bleeding through IV site and also has low platelet count. Dr. Welch notifed, Eliquis held. Low bp this shift. Metoprolol held as well for low Bp. 0.45% NS restarted after new PIV was placed.Per report and patient, pt is able to swallow one pill at a time with pudding. Duriing med administration with pudding, patient was having significant difficulty swallowing and reported that one pill is stuck. The rest of evening PO meds held. Pt night need another swallow study done.pt isn't drinking or eating much, complainting of choking feeling in throat.   During MRI questionaire, pt admitted to having foreign object in eye. Xray ordered to verify but pt refused to have xray or MRI done, complains that he is unable lay down flat for long to get the test done. Blood noted on kleenex at bedside table tonight, pt reported that he was coughing blood, Dr. Dove notified, will continue to evaluate and treat.

## 2023-02-22 NOTE — PROVIDER NOTIFICATION
"Patient reports dysphagia and anxiety about this. Patient given PO medication, reports pain but did not cough, able to swallow crushed medication in ice cream. Still reports feeling \"something is caught in my throat\". Upon inspection of mouth, mucosa looks red and dry. There is a small cut on uvula, could explain blood in phlegm from earlier. Dr. Dove updated and at beside to evaluate.    VSS. PRN melatonin given. Patient comfortable and sleeping upon re-assessment. No new orders. Will continue to monitor.  "

## 2023-02-22 NOTE — PROGRESS NOTES
Care Management Follow Up    Length of Stay (days): 1    Expected Discharge Date: 02/23/2023     Concerns to be Addressed: discharge planning     Patient plan of care discussed at interdisciplinary rounds: Yes    Anticipated Discharge Disposition:  TCU     Anticipated Discharge Services:    Anticipated Discharge DME:      Patient/family educated on Medicare website which has current facility and service quality ratings:  yes  Education Provided on the Discharge Plan:  yes  Patient/Family in Agreement with the Plan:  yes    Referrals Placed by CM/SW:    Service Provider Request Status Selected Services Address Phone Fax Patient Preferred   [facility] ()  Pending - Request Sent N/A [address], [facility] [address] [phone] --   [facility] ([facility])  Pending - Request Sent N/A [address] [facility] [address] [phone] --   [facility] ([facility])  Pending - Request Sent N/A [address] [place] [facility] [address] [phone] --   [facility])  Pending - Request Sent N/A [address] [place] [id number] [phone] --   [facility] ([facility])  Pending - Request Sent N/A [address] [place] [address] [phone]         Private pay costs discussed: costs of TCU on observation status    Additional Information:  SW met with pt and wife to discuss TCU recommendations. Pt stated understanding and it was an OK idea. Pt wife provided with list from medicare/gov and they provided SW with first 5 choices for TCU for pt. Pt wife expressed understanding that pt is currently on observation status and the costs are different then if he was inpatient. referalls sent      LANEY Lopez

## 2023-02-23 ENCOUNTER — APPOINTMENT (OUTPATIENT)
Dept: SPEECH THERAPY | Facility: HOSPITAL | Age: 88
DRG: 682 | End: 2023-02-23
Payer: MEDICARE

## 2023-02-23 ENCOUNTER — APPOINTMENT (OUTPATIENT)
Dept: RADIOLOGY | Facility: HOSPITAL | Age: 88
DRG: 682 | End: 2023-02-23
Attending: INTERNAL MEDICINE
Payer: MEDICARE

## 2023-02-23 ENCOUNTER — APPOINTMENT (OUTPATIENT)
Dept: ULTRASOUND IMAGING | Facility: HOSPITAL | Age: 88
DRG: 682 | End: 2023-02-23
Attending: INTERNAL MEDICINE
Payer: MEDICARE

## 2023-02-23 ENCOUNTER — APPOINTMENT (OUTPATIENT)
Dept: CT IMAGING | Facility: HOSPITAL | Age: 88
DRG: 682 | End: 2023-02-23
Attending: INTERNAL MEDICINE
Payer: MEDICARE

## 2023-02-23 ENCOUNTER — APPOINTMENT (OUTPATIENT)
Dept: CARDIOLOGY | Facility: HOSPITAL | Age: 88
DRG: 682 | End: 2023-02-23
Attending: INTERNAL MEDICINE
Payer: MEDICARE

## 2023-02-23 LAB
ALBUMIN BODY FLUID SOURCE: NORMAL
ALBUMIN FLD-MCNC: 2.1 G/DL
ALBUMIN SERPL BCG-MCNC: 3.3 G/DL (ref 3.5–5.2)
ALP SERPL-CCNC: 141 U/L (ref 40–129)
ALT SERPL W P-5'-P-CCNC: 17 U/L (ref 10–50)
AMMONIA PLAS-SCNC: 32 UMOL/L (ref 16–60)
ANION GAP SERPL CALCULATED.3IONS-SCNC: 12 MMOL/L (ref 7–15)
APPEARANCE FLD: ABNORMAL
APTT PPP: 44 SECONDS (ref 22–38)
AST SERPL W P-5'-P-CCNC: 29 U/L (ref 10–50)
BILIRUB DIRECT SERPL-MCNC: 0.7 MG/DL (ref 0–0.3)
BILIRUB SERPL-MCNC: 1.3 MG/DL
BUN SERPL-MCNC: 29.1 MG/DL (ref 8–23)
CALCIUM SERPL-MCNC: 10.1 MG/DL (ref 8.8–10.2)
CELL COUNT BODY FLUID SOURCE: ABNORMAL
CHLORIDE SERPL-SCNC: 108 MMOL/L (ref 98–107)
CK SERPL-CCNC: 26 U/L (ref 39–308)
COLOR FLD: YELLOW
CORTIS SERPL-MCNC: 9.7 UG/DL
CREAT SERPL-MCNC: 1.7 MG/DL (ref 0.67–1.17)
DEPRECATED HCO3 PLAS-SCNC: 28 MMOL/L (ref 22–29)
ERYTHROCYTE [DISTWIDTH] IN BLOOD BY AUTOMATED COUNT: 14.6 % (ref 10–15)
FIBRINOGEN PPP-MCNC: 353 MG/DL (ref 170–490)
GFR SERPL CREATININE-BSD FRML MDRD: 39 ML/MIN/1.73M2
GLUCOSE BODY FLUID SOURCE: NORMAL
GLUCOSE FLD-MCNC: 99 MG/DL
GLUCOSE SERPL-MCNC: 94 MG/DL (ref 70–99)
HCT VFR BLD AUTO: 45 % (ref 40–53)
HGB BLD-MCNC: 13.5 G/DL (ref 13.3–17.7)
INR PPP: 1.6 (ref 0.85–1.15)
LACTATE SERPL-SCNC: 1.7 MMOL/L (ref 0.7–2)
LD BODY BODY FLUID SOURCE: NORMAL
LDH FLD L TO P-CCNC: 70 U/L
LDH SERPL L TO P-CCNC: 128 U/L (ref 0–250)
LVEF ECHO: NORMAL
LYMPHOCYTES NFR FLD MANUAL: NORMAL %
MAGNESIUM SERPL-MCNC: 2.2 MG/DL (ref 1.7–2.3)
MCH RBC QN AUTO: 28.3 PG (ref 26.5–33)
MCHC RBC AUTO-ENTMCNC: 30 G/DL (ref 31.5–36.5)
MCV RBC AUTO: 94 FL (ref 78–100)
MONOS+MACROS NFR FLD MANUAL: NORMAL %
NEUTS BAND NFR FLD MANUAL: NORMAL %
NT-PROBNP SERPL-MCNC: 1962 PG/ML (ref 0–1800)
PH BODY FLUID SOURCE: NORMAL
PH FLD: 7 PH
PHOSPHATE SERPL-MCNC: 3.7 MG/DL (ref 2.5–4.5)
PLATELET # BLD AUTO: 58 10E3/UL (ref 150–450)
PLATELET # BLD AUTO: 58 10E3/UL (ref 150–450)
POTASSIUM SERPL-SCNC: 3.9 MMOL/L (ref 3.4–5.3)
PROCALCITONIN SERPL IA-MCNC: 0.16 NG/ML
PROT FLD-MCNC: 3.1 G/DL
PROT SERPL-MCNC: 5.9 G/DL (ref 6.4–8.3)
PROTEIN BODY FLUID SOURCE: NORMAL
RBC # BLD AUTO: 4.77 10E6/UL (ref 4.4–5.9)
SODIUM SERPL-SCNC: 143 MMOL/L (ref 136–145)
SODIUM SERPL-SCNC: 148 MMOL/L (ref 136–145)
SODIUM UR-SCNC: <20 MMOL/L
TROPONIN T SERPL HS-MCNC: 46 NG/L
WBC # BLD AUTO: 6.1 10E3/UL (ref 4–11)

## 2023-02-23 PROCEDURE — 92526 ORAL FUNCTION THERAPY: CPT | Mod: GN

## 2023-02-23 PROCEDURE — 250N000009 HC RX 250: Performed by: INTERNAL MEDICINE

## 2023-02-23 PROCEDURE — 84157 ASSAY OF PROTEIN OTHER: CPT | Performed by: INTERNAL MEDICINE

## 2023-02-23 PROCEDURE — 83880 ASSAY OF NATRIURETIC PEPTIDE: CPT | Performed by: INTERNAL MEDICINE

## 2023-02-23 PROCEDURE — 36415 COLL VENOUS BLD VENIPUNCTURE: CPT | Performed by: INTERNAL MEDICINE

## 2023-02-23 PROCEDURE — 82140 ASSAY OF AMMONIA: CPT | Performed by: INTERNAL MEDICINE

## 2023-02-23 PROCEDURE — 87040 BLOOD CULTURE FOR BACTERIA: CPT | Performed by: INTERNAL MEDICINE

## 2023-02-23 PROCEDURE — 74220 X-RAY XM ESOPHAGUS 1CNTRST: CPT

## 2023-02-23 PROCEDURE — 85384 FIBRINOGEN ACTIVITY: CPT | Performed by: INTERNAL MEDICINE

## 2023-02-23 PROCEDURE — 87070 CULTURE OTHR SPECIMN AEROBIC: CPT | Performed by: INTERNAL MEDICINE

## 2023-02-23 PROCEDURE — 87206 SMEAR FLUORESCENT/ACID STAI: CPT | Performed by: INTERNAL MEDICINE

## 2023-02-23 PROCEDURE — 85610 PROTHROMBIN TIME: CPT | Performed by: INTERNAL MEDICINE

## 2023-02-23 PROCEDURE — 250N000013 HC RX MED GY IP 250 OP 250 PS 637: Performed by: EMERGENCY MEDICINE

## 2023-02-23 PROCEDURE — 120N000001 HC R&B MED SURG/OB

## 2023-02-23 PROCEDURE — 258N000003 HC RX IP 258 OP 636: Performed by: INTERNAL MEDICINE

## 2023-02-23 PROCEDURE — 94640 AIRWAY INHALATION TREATMENT: CPT

## 2023-02-23 PROCEDURE — 0W993ZZ DRAINAGE OF RIGHT PLEURAL CAVITY, PERCUTANEOUS APPROACH: ICD-10-PCS | Performed by: RADIOLOGY

## 2023-02-23 PROCEDURE — 88112 CYTOPATH CELL ENHANCE TECH: CPT | Mod: 26 | Performed by: PATHOLOGY

## 2023-02-23 PROCEDURE — 255N000002 HC RX 255 OP 636: Performed by: INTERNAL MEDICINE

## 2023-02-23 PROCEDURE — 84145 PROCALCITONIN (PCT): CPT | Performed by: INTERNAL MEDICINE

## 2023-02-23 PROCEDURE — 85027 COMPLETE CBC AUTOMATED: CPT | Performed by: INTERNAL MEDICINE

## 2023-02-23 PROCEDURE — 71250 CT THORAX DX C-: CPT | Mod: MG

## 2023-02-23 PROCEDURE — 250N000013 HC RX MED GY IP 250 OP 250 PS 637: Performed by: INTERNAL MEDICINE

## 2023-02-23 PROCEDURE — 84100 ASSAY OF PHOSPHORUS: CPT | Performed by: INTERNAL MEDICINE

## 2023-02-23 PROCEDURE — 87641 MR-STAPH DNA AMP PROBE: CPT | Performed by: INTERNAL MEDICINE

## 2023-02-23 PROCEDURE — 84300 ASSAY OF URINE SODIUM: CPT | Performed by: INTERNAL MEDICINE

## 2023-02-23 PROCEDURE — 88305 TISSUE EXAM BY PATHOLOGIST: CPT | Mod: TC | Performed by: INTERNAL MEDICINE

## 2023-02-23 PROCEDURE — C9113 INJ PANTOPRAZOLE SODIUM, VIA: HCPCS | Performed by: PHYSICIAN ASSISTANT

## 2023-02-23 PROCEDURE — 83735 ASSAY OF MAGNESIUM: CPT | Performed by: INTERNAL MEDICINE

## 2023-02-23 PROCEDURE — 82042 OTHER SOURCE ALBUMIN QUAN EA: CPT | Performed by: INTERNAL MEDICINE

## 2023-02-23 PROCEDURE — 250N000011 HC RX IP 250 OP 636: Performed by: INTERNAL MEDICINE

## 2023-02-23 PROCEDURE — 32555 ASPIRATE PLEURA W/ IMAGING: CPT

## 2023-02-23 PROCEDURE — 83615 LACTATE (LD) (LDH) ENZYME: CPT | Performed by: INTERNAL MEDICINE

## 2023-02-23 PROCEDURE — 272N000710 US THORACENTESIS

## 2023-02-23 PROCEDURE — 93010 ELECTROCARDIOGRAM REPORT: CPT | Performed by: INTERNAL MEDICINE

## 2023-02-23 PROCEDURE — 84484 ASSAY OF TROPONIN QUANT: CPT | Performed by: INTERNAL MEDICINE

## 2023-02-23 PROCEDURE — 80053 COMPREHEN METABOLIC PANEL: CPT | Performed by: INTERNAL MEDICINE

## 2023-02-23 PROCEDURE — 999N000157 HC STATISTIC RCP TIME EA 10 MIN

## 2023-02-23 PROCEDURE — 93970 EXTREMITY STUDY: CPT

## 2023-02-23 PROCEDURE — 87116 MYCOBACTERIA CULTURE: CPT | Performed by: INTERNAL MEDICINE

## 2023-02-23 PROCEDURE — 84295 ASSAY OF SERUM SODIUM: CPT | Performed by: INTERNAL MEDICINE

## 2023-02-23 PROCEDURE — 99233 SBSQ HOSP IP/OBS HIGH 50: CPT | Performed by: INTERNAL MEDICINE

## 2023-02-23 PROCEDURE — 250N000011 HC RX IP 250 OP 636: Performed by: PHYSICIAN ASSISTANT

## 2023-02-23 PROCEDURE — 82550 ASSAY OF CK (CPK): CPT | Performed by: INTERNAL MEDICINE

## 2023-02-23 PROCEDURE — 89051 BODY FLUID CELL COUNT: CPT | Performed by: INTERNAL MEDICINE

## 2023-02-23 PROCEDURE — 82533 TOTAL CORTISOL: CPT | Performed by: INTERNAL MEDICINE

## 2023-02-23 PROCEDURE — 74230 X-RAY XM SWLNG FUNCJ C+: CPT

## 2023-02-23 PROCEDURE — 250N000013 HC RX MED GY IP 250 OP 250 PS 637: Performed by: PHYSICIAN ASSISTANT

## 2023-02-23 PROCEDURE — 93005 ELECTROCARDIOGRAM TRACING: CPT

## 2023-02-23 PROCEDURE — 82945 GLUCOSE OTHER FLUID: CPT | Performed by: INTERNAL MEDICINE

## 2023-02-23 PROCEDURE — 258N000001 HC RX 258: Performed by: INTERNAL MEDICINE

## 2023-02-23 PROCEDURE — 82248 BILIRUBIN DIRECT: CPT | Performed by: INTERNAL MEDICINE

## 2023-02-23 PROCEDURE — 85730 THROMBOPLASTIN TIME PARTIAL: CPT | Performed by: INTERNAL MEDICINE

## 2023-02-23 PROCEDURE — 83986 ASSAY PH BODY FLUID NOS: CPT | Performed by: INTERNAL MEDICINE

## 2023-02-23 PROCEDURE — 88112 CYTOPATH CELL ENHANCE TECH: CPT | Mod: TC | Performed by: INTERNAL MEDICINE

## 2023-02-23 PROCEDURE — 87075 CULTR BACTERIA EXCEPT BLOOD: CPT | Performed by: INTERNAL MEDICINE

## 2023-02-23 PROCEDURE — 250N000013 HC RX MED GY IP 250 OP 250 PS 637: Performed by: STUDENT IN AN ORGANIZED HEALTH CARE EDUCATION/TRAINING PROGRAM

## 2023-02-23 PROCEDURE — 93306 TTE W/DOPPLER COMPLETE: CPT | Mod: 26 | Performed by: INTERNAL MEDICINE

## 2023-02-23 PROCEDURE — 88305 TISSUE EXAM BY PATHOLOGIST: CPT | Mod: 26 | Performed by: PATHOLOGY

## 2023-02-23 PROCEDURE — 92611 MOTION FLUOROSCOPY/SWALLOW: CPT | Mod: GN

## 2023-02-23 PROCEDURE — 83605 ASSAY OF LACTIC ACID: CPT | Performed by: INTERNAL MEDICINE

## 2023-02-23 RX ORDER — AZELASTINE 1 MG/ML
1 SPRAY, METERED NASAL 2 TIMES DAILY
Status: COMPLETED | OUTPATIENT
Start: 2023-02-23 | End: 2023-02-28

## 2023-02-23 RX ORDER — CEFAZOLIN SODIUM 1 G/50ML
2000 SOLUTION INTRAVENOUS ONCE
Status: COMPLETED | OUTPATIENT
Start: 2023-02-23 | End: 2023-02-23

## 2023-02-23 RX ORDER — SALIVA STIMULANT COMB. NO.3
1 SPRAY, NON-AEROSOL (ML) MUCOUS MEMBRANE 4 TIMES DAILY PRN
Status: DISCONTINUED | OUTPATIENT
Start: 2023-02-23 | End: 2023-03-02 | Stop reason: HOSPADM

## 2023-02-23 RX ORDER — PRAVASTATIN SODIUM 20 MG
20 TABLET ORAL AT BEDTIME
Status: DISCONTINUED | OUTPATIENT
Start: 2023-02-23 | End: 2023-03-01

## 2023-02-23 RX ORDER — VANCOMYCIN HYDROCHLORIDE 1 G/200ML
1000 INJECTION, SOLUTION INTRAVENOUS EVERY 24 HOURS
Status: DISCONTINUED | OUTPATIENT
Start: 2023-02-24 | End: 2023-02-25

## 2023-02-23 RX ORDER — IPRATROPIUM BROMIDE AND ALBUTEROL SULFATE 2.5; .5 MG/3ML; MG/3ML
3 SOLUTION RESPIRATORY (INHALATION)
Status: DISCONTINUED | OUTPATIENT
Start: 2023-02-23 | End: 2023-02-25

## 2023-02-23 RX ORDER — MULTIPLE VITAMINS W/ MINERALS TAB 9MG-400MCG
1 TAB ORAL DAILY
Status: DISCONTINUED | OUTPATIENT
Start: 2023-02-23 | End: 2023-03-01

## 2023-02-23 RX ORDER — DEXTROSE MONOHYDRATE 50 MG/ML
INJECTION, SOLUTION INTRAVENOUS CONTINUOUS
Status: DISCONTINUED | OUTPATIENT
Start: 2023-02-23 | End: 2023-02-24

## 2023-02-23 RX ORDER — AMOXICILLIN 250 MG
2 CAPSULE ORAL
Status: DISCONTINUED | OUTPATIENT
Start: 2023-02-23 | End: 2023-03-01

## 2023-02-23 RX ORDER — PIPERACILLIN SODIUM, TAZOBACTAM SODIUM 3; .375 G/15ML; G/15ML
3.38 INJECTION, POWDER, LYOPHILIZED, FOR SOLUTION INTRAVENOUS EVERY 8 HOURS
Status: DISCONTINUED | OUTPATIENT
Start: 2023-02-23 | End: 2023-02-27

## 2023-02-23 RX ORDER — PIPERACILLIN SODIUM, TAZOBACTAM SODIUM 3; .375 G/15ML; G/15ML
3.38 INJECTION, POWDER, LYOPHILIZED, FOR SOLUTION INTRAVENOUS EVERY 8 HOURS
Status: DISCONTINUED | OUTPATIENT
Start: 2023-02-23 | End: 2023-02-23

## 2023-02-23 RX ORDER — PIPERACILLIN SODIUM, TAZOBACTAM SODIUM 3; .375 G/15ML; G/15ML
3.38 INJECTION, POWDER, LYOPHILIZED, FOR SOLUTION INTRAVENOUS ONCE
Status: COMPLETED | OUTPATIENT
Start: 2023-02-23 | End: 2023-02-23

## 2023-02-23 RX ORDER — FLUTICASONE PROPIONATE 50 MCG
1 SPRAY, SUSPENSION (ML) NASAL 2 TIMES DAILY
Status: DISCONTINUED | OUTPATIENT
Start: 2023-02-23 | End: 2023-03-02 | Stop reason: HOSPADM

## 2023-02-23 RX ADMIN — VANCOMYCIN HYDROCHLORIDE 2000 MG: 5 INJECTION, POWDER, LYOPHILIZED, FOR SOLUTION INTRAVENOUS at 13:19

## 2023-02-23 RX ADMIN — Medication 25 MCG: at 09:25

## 2023-02-23 RX ADMIN — CLOTRIMAZOLE: 1 CREAM TOPICAL at 12:24

## 2023-02-23 RX ADMIN — SENNOSIDES AND DOCUSATE SODIUM 2 TABLET: 50; 8.6 TABLET ORAL at 13:47

## 2023-02-23 RX ADMIN — FINASTERIDE 5 MG: 5 TABLET, FILM COATED ORAL at 20:06

## 2023-02-23 RX ADMIN — PERFLUTREN 2 ML: 6.52 INJECTION, SUSPENSION INTRAVENOUS at 14:46

## 2023-02-23 RX ADMIN — MICONAZOLE NITRATE ANTIFUNGAL POWDER: 2 POWDER TOPICAL at 20:07

## 2023-02-23 RX ADMIN — PIPERACILLIN AND TAZOBACTAM 3.38 G: 3; .375 INJECTION, POWDER, LYOPHILIZED, FOR SOLUTION INTRAVENOUS at 23:54

## 2023-02-23 RX ADMIN — PANTOPRAZOLE SODIUM 40 MG: 40 INJECTION, POWDER, FOR SOLUTION INTRAVENOUS at 09:24

## 2023-02-23 RX ADMIN — POLYETHYLENE GLYCOL 3350 17 G: 17 POWDER, FOR SOLUTION ORAL at 09:25

## 2023-02-23 RX ADMIN — DOXYCYCLINE HYCLATE 100 MG: 100 CAPSULE ORAL at 09:24

## 2023-02-23 RX ADMIN — IPRATROPIUM BROMIDE AND ALBUTEROL SULFATE 3 ML: 2.5; .5 SOLUTION RESPIRATORY (INHALATION) at 20:07

## 2023-02-23 RX ADMIN — PIPERACILLIN AND TAZOBACTAM 3.38 G: 3; .375 INJECTION, POWDER, FOR SOLUTION INTRAVENOUS at 12:19

## 2023-02-23 RX ADMIN — PRAVASTATIN SODIUM 20 MG: 20 TABLET ORAL at 20:06

## 2023-02-23 RX ADMIN — AZELASTINE HYDROCHLORIDE 1 SPRAY: 137 SPRAY, METERED NASAL at 20:07

## 2023-02-23 RX ADMIN — FLUTICASONE PROPIONATE 1 SPRAY: 50 SPRAY, METERED NASAL at 20:07

## 2023-02-23 RX ADMIN — DEXTROSE MONOHYDRATE: 50 INJECTION, SOLUTION INTRAVENOUS at 13:36

## 2023-02-23 RX ADMIN — DEXTROSE AND SODIUM CHLORIDE: 5; 450 INJECTION, SOLUTION INTRAVENOUS at 09:36

## 2023-02-23 RX ADMIN — PIPERACILLIN AND TAZOBACTAM 3.38 G: 3; .375 INJECTION, POWDER, LYOPHILIZED, FOR SOLUTION INTRAVENOUS at 17:11

## 2023-02-23 RX ADMIN — BUDESONIDE AND FORMOTEROL FUMARATE DIHYDRATE 2 PUFF: 160; 4.5 AEROSOL RESPIRATORY (INHALATION) at 09:23

## 2023-02-23 ASSESSMENT — ACTIVITIES OF DAILY LIVING (ADL)
ADLS_ACUITY_SCORE: 43

## 2023-02-23 NOTE — PLAN OF CARE
Problem: Plan of Care - These are the overarching goals to be used throughout the Goal Outcome Evaluation:    Problem: Plan of Care - These are the overarching goals to be used throughout the patient stay.    Goal: Absence of Hospital-Acquired Illness or Injury  Outcome: Progressing  Intervention: Identify and Manage Fall Risk  Recent Flowsheet Documentation  Taken 2/23/2023 1300 by María Aguiar RN  Safety Promotion/Fall Prevention:   activity supervised   bed alarm on   chair alarm on   nonskid shoes/slippers when out of bed   mobility aid in reach   safety round/check completed  Taken 2/23/2023 0830 by María Aguiar RN  Safety Promotion/Fall Prevention:   activity supervised   bed alarm on   chair alarm on   nonskid shoes/slippers when out of bed   mobility aid in reach   safety round/check completed  Intervention: Prevent Skin Injury  Recent Flowsheet Documentation  Taken 2/23/2023 1300 by María Aguiar RN  Body Position:   weight shifting   supine  Taken 2/23/2023 0830 by María Aguiar RN  Body Position: weight shifting

## 2023-02-23 NOTE — PROGRESS NOTES
GI PROGRESS NOTE  2/23/2023  Nishant Gilliam  1935  /-36    Subjective:  He was sitting upright in a chair eating eggs for breakfast, stating he overall feels better.  Wife was not present today.  His swallowing has been ok today, and he again reiterates having problems swallowing large pills.  VSS has not yet been done.  He had a small BM and he denies having abdominal pain currently.     Objective:    Patient Vitals for the past 24 hrs:   BP Temp Temp src Pulse Resp SpO2   02/23/23 0810 (!) 87/57 -- -- 73 16 94 %   02/23/23 0426 108/58 (!) 96.6  F (35.9  C) -- 64 14 95 %   02/22/23 2332 90/61 97  F (36.1  C) Oral 63 18 96 %   02/22/23 1910 112/69 (!) 96.3  F (35.7  C) Temporal 85 18 92 %   02/22/23 1521 107/64 (!) 96  F (35.6  C) Temporal 73 18 94 %   02/22/23 1333 105/65 -- -- 72 -- --   02/22/23 1136 (!) 87/52 (!) 93.3  F (34.1  C) Axillary 72 18 95 %     Body mass index is 35.42 kg/m .  Gen: NAD  Cardio: RRR  GI: Non-distended, BS positive, soft, non-tender    Laboratory  Recent Labs   Lab Test 02/23/23  0806 02/22/23  0820 02/21/23  1023 02/20/23  1619 07/08/21  0501 07/07/21  1317   WBC  --  7.1 5.8 6.9   < > 12.3*   HGB  --  13.5 14.2 13.9   < > 15.9   MCV  --  95 94 94   < > 96   PLT 58* 59* 57* 65*   < > 173   INR  --   --   --   --   --  1.04    < > = values in this interval not displayed.     Recent Labs   Lab Test 02/23/23  0806 02/22/23  0820 02/21/23  1023   * 149* 148*   POTASSIUM 3.9 4.2 5.1   CHLORIDE 108* 108* 107   CO2 28 29 29   BUN 29.1* 32.1* 33.2*   CR 1.70* 1.68* 1.65*   ANIONGAP 12 12 12   GUILLERMO 10.1 9.8 9.4   GLC 94 85 74     Recent Labs   Lab Test 02/20/23  2314 09/06/22  1346 02/25/22  1340 03/09/21  1150 12/03/18  1543   ALBUMIN  --  3.9  --  3.8 3.8   BILITOTAL  --  1.0  --  0.7 0.8   ALT  --  14 20 20 30   AST  --  27  --  20 24   ALKPHOS  --  80  --  120 141*   PROTEIN Negative  --   --  Negative  --        US Renal Complete Non-Vascular    Result Date:  2/21/2023  EXAM: US RENAL COMPLETE NON-VASCULAR LOCATION: [facility] DATE/TIME: 2/21/2023 3:19 AM INDICATION: NOREEN COMPARISON: None. TECHNIQUE: Routine Bilateral Renal and Bladder Ultrasound. FINDINGS: RIGHT KIDNEY: 11.7 x 4.6 x 6.0 cm. Mildly echogenic cortex. No hydronephrosis. LEFT KIDNEY: 9.4 x 5.6 x 4.9 cm. Mildly echogenic cortex. No hydronephrosis. BLADDER: Mildly distended. Note is made of a large right pleural effusion and a moderate amount of right upper quadrant ascites.     IMPRESSION: 1.  Mildly echogenic renal cortices which could reflect the sequela of chronic medical renal disease. No hydronephrosis. 2.  Large right pleural effusion and moderate right upper quadrant ascites.    XR Chest Port 1 View    Result Date: 2/21/2023  EXAM: XR CHEST PORT 1 VIEW LOCATION: [facility] DATE/TIME: 2/21/2023 12:54 AM INDICATION: dysphagia, frequent choking. COMPARISON: None.     IMPRESSION: Opacity at right lung base represents pleural fluid with possible atelectasis/consolidation of right lower and middle lobes. Right upper lobe and left lung are clear. Heart size likely normal.    Head CT w/o contrast    Result Date: 2/20/2023  EXAM: CT HEAD W/O CONTRAST LOCATION: [facility] DATE/TIME: 2/20/2023 5:26 PM INDICATION: Confusion, altered mental status, general weakness COMPARISON: None. TECHNIQUE: Routine CT Head without IV contrast. Multiplanar reformats. Dose reduction techniques were used. FINDINGS: INTRACRANIAL CONTENTS: No intracranial hemorrhage, extraaxial collection, or mass effect.  No CT evidence of acute infarct. Moderate presumed chronic small vessel ischemic changes. Chronic mineralization right posterior thalamus. Mild to moderate generalized volume loss. No hydrocephalus. VISUALIZED ORBITS/SINUSES/MASTOIDS: No intraorbital abnormality. No paranasal sinus mucosal disease. No middle ear or mastoid effusion. BONES/SOFT TISSUES: No  acute abnormality.     IMPRESSION: 1.  No CT evidence for acute intracranial process. 2.  Brain atrophy and presumed chronic microvascular ischemic changes as above.    EEG Awake or Drowsy Routine    Result Date: 2/22/2023  EEG report DATE Feb 22, 2023 CLINICAL HISTORY This is a 87 year old male with slowness of thought/aphasia. The EEG is done to look for underlying epilepsy/encephalopathy. INTRODUCTION This is a routine EEG performed utilizing standard 10- 20 system of electrode placement with 19 channels of recording including one channel of EKG monitor. The patient was studied in awake and drowsy state. Photic stimulation was done. EEG TIME: 31 min DESCRIPTION OF THE RECORD The EEG background consists of a generalized 6-7 Hz background rhythm that is symmetric.  The posterior dominant rhythm is not well formed.  No sharp rhythmic activity to suggest electrographic seizures was noted.  No convincing epileptiform abnormalities were noted.  No significant abnormal background change with photic stimulation. IMPRESSION/REPORT/PLAN This is an abnormal EEG during wakefulness and drowsiness due to a generalized mildly slow background suggestive of mild generalized cerebral dysfunction.  The EEG is suggestive of a mild encephalopathy.  No electrographic seizures were seen during the recording.  Further clinical correlation is needed. Please note that the absence of epileptiform abnormalities does not exclude the possibility of epilepsy in any patient.     Assessment:  1.  Dysphagia, unclear etiology.  Having VSS and now it appears also esophagram today.  Eliquis remains on hold.  Consider EGD if stricture or mass noted.  2.  Thrombocytopenia- Heme/onc consult noted with work up pending.  No mention of prior liver disease and LFTs are normal, however he does have a history of CHF.  Heme/onc wants a liver ultrasound with dopplers.    Patient Active Problem List   Diagnosis     Anxiety     Hearing Loss     Left Ventricular  Hypertrophy     Hiatal Hernia     Lumbar Disc Degeneration     Nicotine Dependence - In Remission     Squamous Cell Carcinoma Of The Skin     Basal Cell Carcinoma Of The Skin     Pure hypercholesterolemia     Essential hypertension     Coronary Artery Disease     Complete Right Bundle Branch Block     Chronic Obstructive Pulmonary Disease     Benign Polyps Of The Large Intestine     Frequency of urination     Morbid obesity with BMI of 40.0-44.9, adult (H)     Venous (peripheral) insufficiency     Bullous pemphigoid     Atrial fibrillation with rapid ventricular response (H)     Acute diastolic congestive heart failure (H)     Leukocytosis     NYHA class 3 heart failure with preserved ejection fraction (H)     Persistent atrial fibrillation (H)     Chronic kidney disease, stage 3a (H)     Dehydration     Renal insufficiency     Generalized muscle weakness        Plan:    1. Await results from imaging tests.  2.  GI following.    25 minutes of total time was spent providing patient care, including patient evaluation, reviewing documentation/test results and .                                                Shayna Stanford PA-C  Thank you for the opportunity to participate in the care of this patient.   Please feel free to call me with any questions or concerns.  Phone number [phone].              ADDENDUM:    VSS: There is a small Zenker's diverticulum. There is delayed aspiration with thin barium consistency. No other evidence of laryngeal penetration or aspiration with remaining barium consistencies.    Esophogram: There is a small Zenker's diverticulum. There is delayed aspiration with thin barium consistency. No other evidence of laryngeal penetration or aspiration with remaining barium consistencies.    No role for EGD, and we'll sign off.      Shayna Stanford PA-C  [facility]  [phone]

## 2023-02-23 NOTE — PROGRESS NOTES
Speech-Language Pathology: Video Swallow Study     02/23/23 1100   Appointment Info   Signing Clinician's Name / Credentials (SLP) JEANNE Raygoza   General Information   Onset of Illness/Injury or Date of Surgery 02/20/23   Pertinent History of Current Problem Per EMR: 87 year old male admitted on 2/20/2023. Nishant Gilliam is a 87 year old male with  HFpEF, Afib, COPD, CKD, HTN, HLD presented with generalized weakness. He has decreased po intake, fatigue, and decreased urine output.  Wife states that 'everything has slowed down for the patient- both his motion and speech, and he is more sleepy'. Denied fever, chills, nausea, vomiting, abdominal pain, diarrhea. She noted that he is having more trouble swallowing which contributes to his decreased intake. In the ED, lab is shows dehydration and NOREEN. Pt is to be admitted for further management.   General Swallowing Observations   Swallowing Evaluation Videofluoroscopic swallow study (VFSS)   VFSS Evaluation   VFSS Textures Trialed thin liquids;pureed   VFSS Eval: Thin Liquid Texture Trial   Mode of Presentation, Thin Liquid self-fed;cup   Order of Presentation 1,2,4   Preparatory Phase WFL   Oral Phase, Thin Liquid premature pharyngeal entry;residue in oral cavity   Bolus Location When Swallow Triggered posterior laryngeal surface of epiglottis   Pharyngeal Phase, Thin Liquid pharyngeal wall coating;residue in pyriform sinus;impaired pharyngoesophageal segment opening;other (see comments);residue in vallecula;impaired hyolaryngeal excursion  (Zenker's Diverticulum)   Rosenbek's Penetration Aspiration Scale: Thin Liquid Trial Results 8 - contrast passes glottis, visible subglottic residue remains, absent patient response (aspiration)  (silent aspiration was not not from jorge asp of thin, this was noted at the end of the study when pt was asked to do a dry swallow causing silent aspiration from pyriform stasis.This pyriform stasis never cleared during the study.)    Response to Aspiration absent response   Diagnostic Statement Please note that silent aspiration was not not from jorge asp of thin, this was noted at the end of the study when pt was asked to do a dry swallow causing silent aspiration from pyriform stasis.This pyriform stasis never cleared during the study.   VFSS Evaluation: Puree Solid Texture Trial   Mode of Presentation, Puree spoon   Order of Presentation 3   Preparatory Phase WFL   Oral Phase, Puree premature pharyngeal entry;residue in oral cavity   Bolus Location When Swallow Triggered valleculae   Pharyngeal Phase, Puree impaired hyolaryngel excursion;pharyngeal wall coating;residue in vallecula;residue in pyriform sinus;impaired pharyngoesophageal segment opening;other (see comments)  (Zenker's Diverticulum)   Rosenbek's Penetration Aspiration Scale: Puree Food Trial Results 2 - contrast enters airway, remains above the vocal cords, no residue remains (penetration)  (penetration from pyriform stasis)   Esophageal Phase of Swallow   Esophageal sweep performed during today s vidofluoroscopic exam  Please refer to radiologist's report for details;Other (comments)  (see esophagram report)   Swallowing Recommendations   Diet Consistency Recommendations thin liquids (level 0);easy to chew (level 7)   Supervision Level for Intake distant supervision needed   Mode of Delivery Recommendations bolus size, small   Swallowing Maneuver Recommendations alternate food and liquid intake   Recommended Feeding/Eating Techniques (Swallow Eval) maintain upright sitting position for eating   Comment, Swallowing Recommendations Pt had an episode of silent aspiration d/t weak hyolaryneal excursion creating vallecular and pyriform stasis. The pyriform stasis would occasionally retrograde into airway wth pharyngeal constriction of swallow. Oral phase appears functional with good oral control of bolus and intact tongue base retraction. Epiglottic inversion was complete. To  reduce episodes of silent aspiration, Recommend continue current diet of mech soft with thin with adding/teaching strategy small bites and sips and alternating bites with sips.Will also initiate pharyngeal ex program to try and strengthen hyolaryngeal elevation/excursion in order to reduce pharyngeal stasis. Of note, there also appeared to be a Zenker's that did accumilate barium. Please refer to esophagram report that followed this study.   General Therapy Interventions   Planned Therapy Interventions Dysphagia Treatment   Clinical Impression   SLP Diagnosis pharyngeal dysphagia   SLP Total Evaluation Time   Evaluation, videofluoroscopic eval of swallow function Minutes (19862) 30   SLP Goals   SLP Goals SLP Goal 2;SLP Goal 3   SLP: Goal 1 goal met   SLP: Goal 2 Pt will learn small bite/sip with alternating bites of food with sips of thin frequently to reduce pharyngeal stasis.   SLP: Goal 3 Pt will independently learn ph ex program to increase hyolaryngeal elevation/excursion to reduce pharyngeal stasis.   SLP Discharge Planning   SLP Plan 5x's/wk; teach small bites/sips with alternating solid liquids frequently; teach effortful swallow and CTAR   SLP Discharge Recommendation Transitional Care Facility   SLP Rationale for DC Rec below baseline strength reported, swallow close to baseline   SLP Brief overview of current status  pharyngeal muscle weakness causing silent aspiration of pharyngeal stasis, skilled ST warranted to teach sw strategies and ph ex program.   Total Session Time   Total Session Time (sum of timed and untimed services) 30

## 2023-02-23 NOTE — CONSULTS
CLINICAL NUTRITION SERVICES - ASSESSMENT NOTE     Nutrition Prescription    RECOMMENDATIONS FOR MDs/PROVIDERS TO ORDER:  100 mg thiamine daily x 7 days  Continue to monitor k, mg, phos    Malnutrition Status:    Severe in context of chronic illness    Recommendations already ordered by Registered Dietitian (RD):  Daily multivitamin with minerals  Ensure 4 ounces vanilla 2 times daily at breakfast and dinner  Magic cup at lunch    Future/Additional Recommendations:  Will monitor po intake, supplement acceptance/need, wt, labs, BM     REASON FOR ASSESSMENT  Nishant Gilliam is a/an 87 year old male assessed by the dietitian for Provider Order - Decreased intake, weight loss    Per chart, pt with  HFpEF, Afib, COPD, CKD, HTN, HLD presented with generalized weakness. He has decreased po intake, fatigue, and decreased urine output.  Wife states that 'everything has slowed down for the patient- both his motion and speech, and he is more sleepy'. Denied fever, chills, nausea, vomiting, abdominal pain, diarrhea. She noted that he is having more trouble swallowing which contributes to his decreased intake. In the ED, lab is shows dehydration and NOREEN.  Pt is [place], fatigued, mildly confused, st 2 decubitus ulcer    NUTRITION HISTORY  Spoke to pt and his wife, Daniela.  Pt follows a low salt diet at home. Pt's appetite has been poor for about a month.  He has been eating less than 50% of his usual intake.  They are unsure of weight prior to admission.  His wife just purchased Ensure this past Sunday because of his poor appetite.  Pt reports this happened in the past and he lost quite a bit of weight then - at that time he weighed over 300 lbs before his hospitalization.  He reports losing his taste and smell.  His wife says he was in a.fib and pt had a lot of fluid removed.  He said he was glad of the weight loss.    CURRENT NUTRITION ORDERS  Diet: Regular, thin liquids  (SLP recommending easy to chew)  Intake/Tolerance: Fair  "appetite, no breakfast yesterday, 75% dinner last night and 25% at lunch yesterday (some soup)  are recorded.  2/21 - 3 meals ordered -unsure of intake.    Pt reports he is eating much better in the hospital and his wife confirms this information.    LABS  Labs reviewed  Na 148 H  Cl 108 H  BUN 29.1 H   Creat 1.70 H  Alk phos 141 H  Bili, t 1.3 H  BNP 1,962 H  Procalcitonin 0.16 H    MEDICATIONS  Medications reviewed  Pantoprazole  Zosyn  Miralax  Vancomycin  Vit D3    ANTHROPOMETRICS  Height: 182.9 cm (6' .008\")  Most Recent Weight: 118.5 kg (261 lb 3.9 oz)  No recent weight for comparison  IBW: 80.9 kg  BMI: Obesity Grade II BMI 35-39.9  Weight History:   Wt Readings from Last 3 Encounters:   02/21/23 118.5 kg (261 lb 3.9 oz)   09/06/22 113.7 kg (250 lb 11.2 oz)   03/01/22 129.5 kg (285 lb 6.4 oz)       Dosing Weight: 90.3 kg adjusted    ASSESSED NUTRITION NEEDS  Estimated Energy Needs: 2255 - 2700 kcals/day (25 - 30 kcals/kg)  Justification: Increased needs and Maintenance  Estimated Protein Needs: 72-90 grams protein/day (0.8 - 1 grams of pro/kg)  Justification: Maintenance and Repletion (CKD)  Estimated Fluid Needs: 2255 mL/day (25 mL/kg), or per Provider  Justification: Maintenance with CHF    PHYSICAL FINDINGS  Per chart/observation  Pt missing some bottom front teeth  Skin: Coccyx Stage 2 PI, excoriated  3+ bilateral ROBINSON  GI: constipated, very small BM 2/22, suppository given    MALNUTRITION:  % Weight Loss:  Weight loss does not meet criteria for malnutrition   % Intake:  </= 50% for >/= 1 month (severe malnutrition)  Subcutaneous Fat Loss:  Orbital region moderate depletion  Muscle Loss:  Buccal mild depletion and Temporal region mild depletion  Fluid Retention:  Moderate     Malnutrition Diagnosis: Severe malnutrition  In Context of:  Chronic illness or disease    NUTRITION DIAGNOSIS  Malnutrition related to poor appetite as evidenced by intake of <= 50% for 1 month and moderate muscle and subcutaneous " fat loss, fluid accumulation      INTERVENTIONS  Implementation  Nutrition Education: Pt follows a low sodium diet at home   Medical food supplement therapy     Goals  Patient to consume % of nutritionally adequate meals three times per day, or the equivalent with supplements/snacks.     Monitoring/Evaluation  Progress toward goals will be monitored and evaluated per protocol.

## 2023-02-23 NOTE — PROGRESS NOTES
[facility]     Medicine Progress Note - Hospitalist Service     Date of Admission:  2/20/2023        Assessment & Plan     Nishant Gilliam is a 87 year old male admitted on 2/20/2023. Nishant Gilliam is a 87 year old male with  HFpEF, Afib, COPD, CKD, HTN, HLD presented with generalized weakness, decreased po intake, fatigue, and decreased urine output.  Wife states that 'everything has slowed down for the patient- both his motion and speech, and he is more sleepy'. Denied fever, chills, nausea, vomiting, abdominal pain, diarrhea. More trouble swallowing.  Admitted for further management.        NOREEN on CKD stage III, Hypernatremia, volume depletion/dehydration, Poor oral intake for day, 20lb recent weight loss from not eating/drinking:  Renal US chronic renal disease, no hydro. Cr slight worsening from admission.  CK normal.  -start D5W at 50ml/h (can increase rate once right pleural fluid is drained)  -UA/UC  -bladder scan q shift + PVR x 24hrs   -cmp a.m.  -hold cellcept    Generalized weakness, Slow response, speech, tremor:    - CT-head unremarkable.   -MRI brain to rule out cerebrovascular disease-patient refused  -Neurology consultation appreciated: not likely to be Parkinson. ?Encephalopathy.  -EEG suggestive of mild encephalopathy.  -CK 28, TSH normal.   - PT/OT  -Neurology s/o  -check ammonia    Dysphagia, Silent aspiration (d/t weak hyolaryneal excursion creating vallecular and pyriform stasis - per SLP), Zenker's diverticulum, Esophageal dysmotility disorder: -esophagram/VSS reviewed  -SLP following  -mechanical soft, thin liquid, swallowing strategies advised by SLP  -HOB 30 degrees  -ppi     Acute Thrombocytopenia: worse. No splenomegaly. Radiographic features of cirrhosis. Bone marrow suppression/Toxicity from CellCept in setting of renal failure. LDH normal. B12 normal. PBS normal platelet morphology. [place] cells. Polychromasia not increased. Possible  Sepsis.  -Oncology/Hematology following  -INR, PTT, Fibrinogen  -hold cellcept     HFrEF: RLL moderate pleural effusion, nt-BNP ~1900.  However, on exam very dehydrated.    -TTE  -hold metoprolol, bumex, spironolactone until hypotension resolved  -strict I/O, daily weights     PAF: EKG on admission showed a. Fib, ST abnormality, troponin 37 x2.   - Eliquis on hold due to bleeding oropharynx and low platelets  -  hold metoprolol due to hypotension  -tele    Possible Sepsis:  Hypothermic, hypotension, immunosupressed, concern for RLL aspiration pneumonia.  Renal US no hydro. Initial UA no infection. COVID negative 2/20/23. CT Chest w/o contrast showed moderate right pleural effusion with right basilar compressive atelectasis.  Procalcitonin 0.16.  -BC's x2, MRSA nasal swab, L.[place].  -right dx'c/therapeutic thoracentesis  -recheck UA/UC  -Vancomycin, Zosyn pending cultures  -hold cardura, metoprolol for today pending BP improvement  -Cortisol     Acute hypoxic respiratory failure, COPD without acute exacerbation, moderate right pleural effusion with compressive atelectasis, probable RLL aspiration PNA given silent aspiration on VSS. Due to dehydration, rising creatinine in setting of prolonged period of poor oral intake, hypotension unable to diurese.  Symptomatic with SOB, on 3L. No wheezing.  - hold symbicort as not able to generate deep breath  -start duoneb qid  -no steroids for now  -diagnostic/therapeutic thoracentesis  -empiric vanc/zosyn pending culture results  -oximetry  -bronchial hygiene  -aspiration precautions    CAD: no chest pain. Initial admission trop x 2 37. ECG abnormal.  -recheck ECG and troponin given hypotension  -hold metoprolol pending improvement in BP's  -eliquis on hold  -not on chronic ASA  -statin  -TTE    Possible cirrhosis: per imaging. LFT's with TB 1.3, DB 0.7, normal transaminases. CT Chest mild ascites. Nodular appearance of liver suggestive of cirrhosis.    BPH:  Renal US no  "hydronephrosis, + mild bladder distension  -cardura held for low BP  -on Proscar  -bladder scan/PVR q shift/void to monitor for retention     HLD  -pravachol     H/O Bullous pemphigoid: on chronic CellCept  -hold CellCept for today     Chronic BLE edema:  -venous US BLE  -compression wraps if negative for DVT     Chronic post nasal drip, nonallergic rhinitis  -trial Azelastin-Fluticasone BID     Inguinal candidiasis  - lotrimin bid     Decubitus ulcer stage 2  -Wound care following     Impaired hearing    Constipation:  -senna-s 2 qam  -dulcolax supp prn        Diet: soft, thin liq, 2g sodium  DVT Prophylaxis: DOAC on hold due to bleeding as above, no heparin due to thrombocytopenia  Mendes Catheter: Not present  Lines: None     Cardiac Monitoring: tele  Code Status: No CPR- Do NOT Intubate          Clinically Significant Risk Factors Present on Admission            # Hypernatremia: Highest Na = 148 mmol/L in last 2 days, will monitor as appropriate       # Drug Induced Coagulation Defect: home medication list includes an anticoagulant medication  # Thrombocytopenia: Lowest platelets = 57 in last 2 days, will monitor for bleeding   # Hypertension: home medication list includes antihypertensive(s)      # Obesity: Estimated body mass index is 35.42 kg/m  as calculated from the following:    Height as of this encounter: 1.829 m (6' 0.01\").    Weight as of this encounter: 118.5 kg (261 lb 3.9 oz).                  Disposition Plan        Expected Discharge Date: >2d      Destination: TCU         S:  Afebrile. Events overnight noted    O:  BP (!) 87/57 (BP Location: Right arm, Patient Position: Semi-Carreon's, Cuff Size: Adult Large)   Pulse 73   Temp (!) 96.6  F (35.9  C)   Resp 16   Ht 1.829 m (6' 0.01\")   Wt 118.5 kg (261 lb 3.9 oz)   SpO2 94%   BMI 35.42 kg/m    Physical Examination:   General appearance - alert, and in no distress  Eyes -anicteric  Mouth - mucous membranes dry. White barium  Lungs - decreased " right base-mid lung, no wheezing, non labored  Heart - irreg, irreg, nml rate,  2+ peripheral edema.  Abdomen - soft, BS+, ?bladder full  Neurological - alert, oriented x3, dysarthric like speech  Skin -no pemphigoid or bullous lesions.     Lab/imaging reviewed

## 2023-02-23 NOTE — PLAN OF CARE
Problem: Risk for Delirium  Goal: Optimal Coping  Outcome: Progressing  Goal: Improved Behavioral Control  Outcome: Progressing  Intervention: Minimize Safety Risk  Recent Flowsheet Documentation  Taken 2/22/2023 1600 by Aletha Manzano RN  Enhanced Safety Measures: bed alarm set  Goal: Improved Attention and Thought Clarity  Outcome: Progressing  Goal: Improved Sleep  Outcome: Progressing     Problem: Plan of Care - These are the overarching goals to be used throughout the patient stay.    Goal: Absence of Hospital-Acquired Illness or Injury  Intervention: Identify and Manage Fall Risk  Recent Flowsheet Documentation  Taken 2/22/2023 1600 by Aletha Manzano RN  Safety Promotion/Fall Prevention:    activity supervised    bed alarm on  Intervention: Prevent Skin Injury  Recent Flowsheet Documentation  Taken 2/22/2023 1600 by Aletha Manzano RN  Body Position: position changed independently   Goal Outcome Evaluation:       Pt is alert and oriented, uses call light appropriately.  Pt had very small BM at beginning of shift, drank 90% of Miralax but then couldn't finish the rest.  Pt has difficulties swallowing bed time medications.  Pt states that 2nd Mycophenolate was stuck in this throat despite drinking water and eating pudding.  Pt declined Doxycycline and Finasteride.  Mepilex dressing was changed this shift since previous dressing was falling off.  Pt was changed to inpatient status from observation this evening.

## 2023-02-23 NOTE — PROGRESS NOTES
"Date of Admission:  2/20/2023  Hospital Day: 4    Pain/ discomfort: denies pain or discomfort, n/v, dizziness, HA, or chest pain.  Problem List:  Dehydration [E86.0]  Renal insufficiency [N28.9]  Generalized muscle weakness [M62.81]   Assessment:  Problem: Risk for Delirium  Goal: Improved Attention and Thought Clarity  Outcome: Not Progressing  Intervention: Maximize Cognitive Function  Recent Flowsheet Documentation  Taken 2/23/2023 0000 by Rivas Rosenberg RN  Reorientation Measures: clock in view  Goal: Improved Sleep  Outcome: Not Progressing   Goal Outcome Evaluation:  -vitals stable  -RN encouraging patient with oral dietary and hydration intake, sleep hygiene, but patient having difficulty with minimal dietary intake since 2300 2/22/23 to today 2/23/2023, up much of the night. RN tried to ensure therapeutic sleep/ room environment and hygiene cares with no improvement.  -slow to respond with answering questions/promts, alert and oriented person/place/time/sitation, vss except temp (see below provider notification).  Progress:  4:53 AM: paged [facility]Y[address]  , via [employer] at [phone], \"vss except Temp 96.6. pt AOx4, [place] in [place], but seems trouble w/ following direction. please advise. thanks\". 5:16 AM: call-back received. RN discussed with provider findings from 2/22/23. Neurology consulted, EEG done; consult mentioning suspician of mild encephalopathy \"that could be causing a lot of his symptoms... (see HPI from 2/22 consult)...Defer treatment to primary team.\" Hospitalist Luisa rec is for RN to ensure morning labs are drawn and attd team will follow to further address. Continuing to monitor and assess.    /58 (BP Location: Right arm)   Pulse 64   Temp (!) 96.6  F (35.9  C)   Resp 14   Ht 1.829 m (6' 0.01\")   Wt 118.5 kg (261 lb 3.9 oz)   SpO2 95%   BMI 35.42 kg/m      24 Hour Vital Signs Summary:  Temp: (!) 96.6  F (35.9  C) Temp  Min: 93.3  F (34.1  C)  Max: 97  F " (36.1  C)  Resp: 14 Resp  Min: 14  Max: 18  SpO2: 95 % SpO2  Min: 92 %  Max: 96 %  Pulse: 64 Pulse  Min: 63  Max: 85    No data recorded  BP: 108/58 Systolic (24hrs), Av , Min:87 , Max:112   Diastolic (24hrs), Av, Min:52, Max:69        Respiratory monitoring:   Resp: 14      I/O last 3 completed shifts:  In: 1025 [P.O.:75; I.V.:950]  Out: -     Wt Readings from Last 2 Encounters:   23 118.5 kg (261 lb 3.9 oz)   22 113.7 kg (250 lb 11.2 oz)        Rivas Rosenberg RN  .................................................... 2023   4:51 AM  [facility]

## 2023-02-23 NOTE — PHARMACY-VANCOMYCIN DOSING SERVICE
Pharmacy Vancomycin Initial Note  Date of Service 2023  Patient's  1935  87 year old, male    Indication: Sepsis    Current estimated CrCl = Estimated Creatinine Clearance: 42.2 mL/min (A) (based on SCr of 1.7 mg/dL (H)).    Creatinine for last 3 days  2023:  4:19 PM Creatinine 1.72 mg/dL  2023: 10:23 AM Creatinine 1.65 mg/dL  2023:  8:20 AM Creatinine 1.68 mg/dL  2023:  8:06 AM Creatinine 1.70 mg/dL    Recent Vancomycin Level(s) for last 3 days  No results found for requested labs within last 72 hours.      Vancomycin IV Administrations (past 72 hours)      No vancomycin orders with administrations in past 72 hours.                Nephrotoxins and other renal medications (From now, onward)    Start     Dose/Rate Route Frequency Ordered Stop    23 1606  piperacillin-tazobactam (ZOSYN) 3.375 g vial to attach to  mL bag        Note to Pharmacy: Extended infusion dosing to start 6 hours after initial infusion.   See Hyperspace for full Linked Orders Report.    3.375 g  over 240 Minutes Intravenous EVERY 8 HOURS 23 1006      23 1030  piperacillin-tazobactam (ZOSYN) 3.375 g vial to attach to  mL bag        See Hyperspace for full Linked Orders Report.    3.375 g  over 30 Minutes Intravenous ONCE 23 1006            Contrast Orders - past 72 hours (72h ago, onward)    None          [employer] Prediction of Planned Initial Vancomycin Regimen  Loading dose: 2000 mg at 10:30 2023.  Regimen: 1000 mg IV every 24 hours.  Start time: 10:10 on 2023  Exposure target: AUC24 (range)400-600 mg/L.hr   AUC24,ss: 423 mg/L.hr  Probability of AUC24 > 400: 56 %  Ctrough,ss: 14.1 mg/L  Probability of Ctrough,ss > 20: 19 %  Probability of nephrotoxicity (Lodise YUKO ): 9 %        Plan:  1. Start vancomycin  2000 mg IV once followed by 1000 mg IV q24h.   2. Vancomycin monitoring method: AUC  3. Vancomycin therapeutic monitoring goal: 400-600  mg*h/L  4. Pharmacy will check vancomycin levels as appropriate in 1-3 Days.    5. Serum creatinine levels will be ordered daily for the first week of therapy and at least twice weekly for subsequent weeks.      Lilian Mendez, PharmD, BCPS 02/23/23 10:10 AM

## 2023-02-24 ENCOUNTER — APPOINTMENT (OUTPATIENT)
Dept: OCCUPATIONAL THERAPY | Facility: HOSPITAL | Age: 88
DRG: 682 | End: 2023-02-24
Attending: INTERNAL MEDICINE
Payer: MEDICARE

## 2023-02-24 ENCOUNTER — APPOINTMENT (OUTPATIENT)
Dept: SPEECH THERAPY | Facility: HOSPITAL | Age: 88
DRG: 682 | End: 2023-02-24
Payer: MEDICARE

## 2023-02-24 LAB
ALBUMIN MFR UR ELPH: 19.1 MG/DL (ref 1–14)
ALBUMIN SERPL BCG-MCNC: 3.2 G/DL (ref 3.5–5.2)
ALBUMIN UR-MCNC: 10 MG/DL
ALP SERPL-CCNC: 130 U/L (ref 40–129)
ALT SERPL W P-5'-P-CCNC: 13 U/L (ref 10–50)
ANION GAP SERPL CALCULATED.3IONS-SCNC: 10 MMOL/L (ref 7–15)
APPEARANCE UR: ABNORMAL
AST SERPL W P-5'-P-CCNC: 27 U/L (ref 10–50)
ATRIAL RATE - MUSE: 78 BPM
BASE EXCESS BLDV CALC-SCNC: 1.8 MMOL/L
BASOPHILS # BLD AUTO: 0 10E3/UL (ref 0–0.2)
BASOPHILS NFR BLD AUTO: 0 %
BILIRUB SERPL-MCNC: 1.2 MG/DL
BILIRUB UR QL STRIP: NEGATIVE
BUN SERPL-MCNC: 27.8 MG/DL (ref 8–23)
CALCIUM SERPL-MCNC: 9.4 MG/DL (ref 8.8–10.2)
CHLORIDE SERPL-SCNC: 107 MMOL/L (ref 98–107)
COLOR UR AUTO: ABNORMAL
CREAT SERPL-MCNC: 1.72 MG/DL (ref 0.67–1.17)
CREAT UR-MCNC: 102.3 MG/DL
DAT POLY: NEGATIVE
DEPRECATED HCO3 PLAS-SCNC: 27 MMOL/L (ref 22–29)
DIASTOLIC BLOOD PRESSURE - MUSE: NORMAL MMHG
EOSINOPHIL # BLD AUTO: 0 10E3/UL (ref 0–0.7)
EOSINOPHIL NFR BLD AUTO: 1 %
ERYTHROCYTE [DISTWIDTH] IN BLOOD BY AUTOMATED COUNT: 14.6 % (ref 10–15)
GFR SERPL CREATININE-BSD FRML MDRD: 38 ML/MIN/1.73M2
GLUCOSE SERPL-MCNC: 111 MG/DL (ref 70–99)
GLUCOSE UR STRIP-MCNC: NEGATIVE MG/DL
HCO3 BLDV-SCNC: 28 MMOL/L (ref 24–30)
HCT VFR BLD AUTO: 42.9 % (ref 40–53)
HGB BLD-MCNC: 13 G/DL (ref 13.3–17.7)
HGB UR QL STRIP: NEGATIVE
IMM GRANULOCYTES # BLD: 0.1 10E3/UL
IMM GRANULOCYTES NFR BLD: 1 %
INTERPRETATION ECG - MUSE: NORMAL
KETONES UR STRIP-MCNC: NEGATIVE MG/DL
LACTATE SERPL-SCNC: 2.6 MMOL/L (ref 0.7–2)
LACTATE SERPL-SCNC: 3.3 MMOL/L (ref 0.7–2)
LEUKOCYTE ESTERASE UR QL STRIP: NEGATIVE
LYMPHOCYTES # BLD AUTO: 1 10E3/UL (ref 0.8–5.3)
LYMPHOCYTES NFR BLD AUTO: 15 %
MCH RBC QN AUTO: 28.4 PG (ref 26.5–33)
MCHC RBC AUTO-ENTMCNC: 30.3 G/DL (ref 31.5–36.5)
MCV RBC AUTO: 94 FL (ref 78–100)
MONOCYTES # BLD AUTO: 1 10E3/UL (ref 0–1.3)
MONOCYTES NFR BLD AUTO: 15 %
MRSA DNA SPEC QL NAA+PROBE: NEGATIVE
NEUTROPHILS # BLD AUTO: 4.7 10E3/UL (ref 1.6–8.3)
NEUTROPHILS NFR BLD AUTO: 68 %
NITRATE UR QL: NEGATIVE
NRBC # BLD AUTO: 0.1 10E3/UL
NRBC BLD AUTO-RTO: 1 /100
OXYHGB MFR BLDV: 59.9 % (ref 70–75)
P AXIS - MUSE: NORMAL DEGREES
PCO2 BLDV: 56 MM HG (ref 35–50)
PH BLDV: 7.31 [PH] (ref 7.35–7.45)
PH UR STRIP: 5 [PH] (ref 5–7)
PLATELET # BLD AUTO: 60 10E3/UL (ref 150–450)
PO2 BLDV: 35 MM HG (ref 25–47)
POTASSIUM SERPL-SCNC: 3.7 MMOL/L (ref 3.4–5.3)
PR INTERVAL - MUSE: NORMAL MS
PROT SERPL-MCNC: 5.4 G/DL (ref 6.4–8.3)
PROT/CREAT 24H UR: 0.19 MG/MG CR (ref 0–0.2)
QRS DURATION - MUSE: 126 MS
QT - MUSE: 446 MS
QTC - MUSE: 508 MS
R AXIS - MUSE: 67 DEGREES
RBC # BLD AUTO: 4.58 10E6/UL (ref 4.4–5.9)
RBC URINE: 1 /HPF
SA TARGET DNA: NEGATIVE
SAO2 % BLDV: 60.9 % (ref 70–75)
SODIUM SERPL-SCNC: 144 MMOL/L (ref 136–145)
SP GR UR STRIP: 1.02 (ref 1–1.03)
SPECIMEN EXPIRATION DATE: NORMAL
SYSTOLIC BLOOD PRESSURE - MUSE: NORMAL MMHG
T AXIS - MUSE: -77 DEGREES
TOTAL PROTEIN SERUM FOR ELP: 5.6 G/DL (ref 6.4–8.3)
URATE CRY #/AREA URNS HPF: ABNORMAL /HPF
UROBILINOGEN UR STRIP-MCNC: <2 MG/DL
VENTRICULAR RATE- MUSE: 78 BPM
WBC # BLD AUTO: 6.8 10E3/UL (ref 4–11)
WBC URINE: 0 /HPF

## 2023-02-24 PROCEDURE — 82436 ASSAY OF URINE CHLORIDE: CPT | Performed by: INTERNAL MEDICINE

## 2023-02-24 PROCEDURE — 82805 BLOOD GASES W/O2 SATURATION: CPT | Performed by: INTERNAL MEDICINE

## 2023-02-24 PROCEDURE — 85025 COMPLETE CBC W/AUTO DIFF WBC: CPT | Performed by: INTERNAL MEDICINE

## 2023-02-24 PROCEDURE — 250N000011 HC RX IP 250 OP 636: Performed by: PHYSICIAN ASSISTANT

## 2023-02-24 PROCEDURE — 83010 ASSAY OF HAPTOGLOBIN QUANT: CPT | Performed by: PATHOLOGY

## 2023-02-24 PROCEDURE — 83521 IG LIGHT CHAINS FREE EACH: CPT | Performed by: INTERNAL MEDICINE

## 2023-02-24 PROCEDURE — 86880 COOMBS TEST DIRECT: CPT | Performed by: INTERNAL MEDICINE

## 2023-02-24 PROCEDURE — 99255 IP/OBS CONSLTJ NEW/EST HI 80: CPT | Performed by: INTERNAL MEDICINE

## 2023-02-24 PROCEDURE — 86705 HEP B CORE ANTIBODY IGM: CPT | Performed by: PATHOLOGY

## 2023-02-24 PROCEDURE — 84156 ASSAY OF PROTEIN URINE: CPT | Performed by: INTERNAL MEDICINE

## 2023-02-24 PROCEDURE — 86160 COMPLEMENT ANTIGEN: CPT | Performed by: INTERNAL MEDICINE

## 2023-02-24 PROCEDURE — 84165 PROTEIN E-PHORESIS SERUM: CPT | Mod: TC | Performed by: PATHOLOGY

## 2023-02-24 PROCEDURE — 999N000157 HC STATISTIC RCP TIME EA 10 MIN

## 2023-02-24 PROCEDURE — 83935 ASSAY OF URINE OSMOLALITY: CPT | Performed by: INTERNAL MEDICINE

## 2023-02-24 PROCEDURE — 250N000009 HC RX 250: Performed by: INTERNAL MEDICINE

## 2023-02-24 PROCEDURE — 94640 AIRWAY INHALATION TREATMENT: CPT | Mod: 76

## 2023-02-24 PROCEDURE — 84155 ASSAY OF PROTEIN SERUM: CPT | Performed by: INTERNAL MEDICINE

## 2023-02-24 PROCEDURE — 83605 ASSAY OF LACTIC ACID: CPT | Performed by: INTERNAL MEDICINE

## 2023-02-24 PROCEDURE — 92526 ORAL FUNCTION THERAPY: CPT | Mod: GN

## 2023-02-24 PROCEDURE — 82525 ASSAY OF COPPER: CPT | Performed by: INTERNAL MEDICINE

## 2023-02-24 PROCEDURE — 250N000013 HC RX MED GY IP 250 OP 250 PS 637: Performed by: PHYSICIAN ASSISTANT

## 2023-02-24 PROCEDURE — 97535 SELF CARE MNGMENT TRAINING: CPT | Mod: GO

## 2023-02-24 PROCEDURE — 94640 AIRWAY INHALATION TREATMENT: CPT

## 2023-02-24 PROCEDURE — 250N000011 HC RX IP 250 OP 636: Performed by: INTERNAL MEDICINE

## 2023-02-24 PROCEDURE — 250N000013 HC RX MED GY IP 250 OP 250 PS 637: Performed by: STUDENT IN AN ORGANIZED HEALTH CARE EDUCATION/TRAINING PROGRAM

## 2023-02-24 PROCEDURE — 84425 ASSAY OF VITAMIN B-1: CPT | Performed by: INTERNAL MEDICINE

## 2023-02-24 PROCEDURE — 80053 COMPREHEN METABOLIC PANEL: CPT | Performed by: INTERNAL MEDICINE

## 2023-02-24 PROCEDURE — 86334 IMMUNOFIX E-PHORESIS SERUM: CPT | Mod: 26

## 2023-02-24 PROCEDURE — 84165 PROTEIN E-PHORESIS SERUM: CPT | Mod: 26

## 2023-02-24 PROCEDURE — 36415 COLL VENOUS BLD VENIPUNCTURE: CPT | Performed by: INTERNAL MEDICINE

## 2023-02-24 PROCEDURE — 99233 SBSQ HOSP IP/OBS HIGH 50: CPT | Performed by: INTERNAL MEDICINE

## 2023-02-24 PROCEDURE — 86334 IMMUNOFIX E-PHORESIS SERUM: CPT | Performed by: PATHOLOGY

## 2023-02-24 PROCEDURE — 250N000013 HC RX MED GY IP 250 OP 250 PS 637: Performed by: INTERNAL MEDICINE

## 2023-02-24 PROCEDURE — 258N000003 HC RX IP 258 OP 636: Performed by: INTERNAL MEDICINE

## 2023-02-24 PROCEDURE — 81001 URINALYSIS AUTO W/SCOPE: CPT | Performed by: INTERNAL MEDICINE

## 2023-02-24 PROCEDURE — 120N000001 HC R&B MED SURG/OB

## 2023-02-24 PROCEDURE — C9113 INJ PANTOPRAZOLE SODIUM, VIA: HCPCS | Performed by: PHYSICIAN ASSISTANT

## 2023-02-24 RX ORDER — PANTOPRAZOLE SODIUM 40 MG/1
40 TABLET, DELAYED RELEASE ORAL
Status: DISCONTINUED | OUTPATIENT
Start: 2023-02-25 | End: 2023-03-02 | Stop reason: HOSPADM

## 2023-02-24 RX ORDER — METOPROLOL TARTRATE 25 MG/1
25 TABLET, FILM COATED ORAL 2 TIMES DAILY
Status: DISCONTINUED | OUTPATIENT
Start: 2023-02-24 | End: 2023-02-24

## 2023-02-24 RX ORDER — THIAMINE HYDROCHLORIDE 100 MG/ML
100 INJECTION, SOLUTION INTRAMUSCULAR; INTRAVENOUS DAILY
Status: DISCONTINUED | OUTPATIENT
Start: 2023-02-24 | End: 2023-03-01

## 2023-02-24 RX ORDER — BUMETANIDE 2 MG/1
2 TABLET ORAL DAILY
Status: DISCONTINUED | OUTPATIENT
Start: 2023-02-24 | End: 2023-02-25

## 2023-02-24 RX ADMIN — Medication 1 TABLET: at 08:38

## 2023-02-24 RX ADMIN — IPRATROPIUM BROMIDE AND ALBUTEROL SULFATE 3 ML: 2.5; .5 SOLUTION RESPIRATORY (INHALATION) at 11:41

## 2023-02-24 RX ADMIN — POLYETHYLENE GLYCOL 3350 17 G: 17 POWDER, FOR SOLUTION ORAL at 08:37

## 2023-02-24 RX ADMIN — PIPERACILLIN AND TAZOBACTAM 3.38 G: 3; .375 INJECTION, POWDER, LYOPHILIZED, FOR SOLUTION INTRAVENOUS at 08:39

## 2023-02-24 RX ADMIN — ACETAMINOPHEN 325 MG: 325 TABLET ORAL at 08:37

## 2023-02-24 RX ADMIN — Medication 1 MG: at 23:13

## 2023-02-24 RX ADMIN — IPRATROPIUM BROMIDE AND ALBUTEROL SULFATE 3 ML: 2.5; .5 SOLUTION RESPIRATORY (INHALATION) at 16:07

## 2023-02-24 RX ADMIN — PRAVASTATIN SODIUM 20 MG: 20 TABLET ORAL at 20:21

## 2023-02-24 RX ADMIN — SENNOSIDES AND DOCUSATE SODIUM 2 TABLET: 50; 8.6 TABLET ORAL at 08:39

## 2023-02-24 RX ADMIN — APIXABAN 2.5 MG: 2.5 TABLET, FILM COATED ORAL at 20:20

## 2023-02-24 RX ADMIN — DEXTROSE MONOHYDRATE: 50 INJECTION, SOLUTION INTRAVENOUS at 08:05

## 2023-02-24 RX ADMIN — ACETAMINOPHEN 650 MG: 325 TABLET ORAL at 23:13

## 2023-02-24 RX ADMIN — PIPERACILLIN AND TAZOBACTAM 3.38 G: 3; .375 INJECTION, POWDER, LYOPHILIZED, FOR SOLUTION INTRAVENOUS at 16:51

## 2023-02-24 RX ADMIN — BUMETANIDE 2 MG: 2 TABLET ORAL at 13:00

## 2023-02-24 RX ADMIN — IPRATROPIUM BROMIDE AND ALBUTEROL SULFATE 3 ML: 2.5; .5 SOLUTION RESPIRATORY (INHALATION) at 07:16

## 2023-02-24 RX ADMIN — FLUTICASONE PROPIONATE 1 SPRAY: 50 SPRAY, METERED NASAL at 08:40

## 2023-02-24 RX ADMIN — THIAMINE HYDROCHLORIDE 100 MG: 100 INJECTION, SOLUTION INTRAMUSCULAR; INTRAVENOUS at 20:27

## 2023-02-24 RX ADMIN — MICONAZOLE NITRATE ANTIFUNGAL POWDER: 2 POWDER TOPICAL at 20:24

## 2023-02-24 RX ADMIN — METOPROLOL TARTRATE 12.5 MG: 25 TABLET, FILM COATED ORAL at 20:19

## 2023-02-24 RX ADMIN — AZELASTINE HYDROCHLORIDE 1 SPRAY: 137 SPRAY, METERED NASAL at 08:40

## 2023-02-24 RX ADMIN — VANCOMYCIN HYDROCHLORIDE 1000 MG: 1 INJECTION, SOLUTION INTRAVENOUS at 11:26

## 2023-02-24 RX ADMIN — FINASTERIDE 5 MG: 5 TABLET, FILM COATED ORAL at 21:24

## 2023-02-24 RX ADMIN — AZELASTINE HYDROCHLORIDE 1 SPRAY: 137 SPRAY, METERED NASAL at 20:23

## 2023-02-24 RX ADMIN — Medication 25 MCG: at 08:39

## 2023-02-24 RX ADMIN — MICONAZOLE NITRATE ANTIFUNGAL POWDER: 2 POWDER TOPICAL at 08:40

## 2023-02-24 RX ADMIN — PANTOPRAZOLE SODIUM 40 MG: 40 INJECTION, POWDER, FOR SOLUTION INTRAVENOUS at 08:37

## 2023-02-24 RX ADMIN — IPRATROPIUM BROMIDE AND ALBUTEROL SULFATE 3 ML: 2.5; .5 SOLUTION RESPIRATORY (INHALATION) at 19:31

## 2023-02-24 RX ADMIN — FLUTICASONE PROPIONATE 1 SPRAY: 50 SPRAY, METERED NASAL at 20:23

## 2023-02-24 ASSESSMENT — ACTIVITIES OF DAILY LIVING (ADL)
ADLS_ACUITY_SCORE: 43

## 2023-02-24 NOTE — PROGRESS NOTES
"Remains on O2 1.5 lpm, SpO2 93 %, BS clear diminished bases. Duoneb/mask tx given. Tolerated well. BS unchanged after.    /64 (BP Location: Left arm)   Pulse 102   Temp 98  F (36.7  C) (Oral)   Resp 18   Ht 1.829 m (6' 0.01\")   Wt 120.7 kg (266 lb)   SpO2 93%   BMI 36.07 kg/m      RT to monitor  "

## 2023-02-24 NOTE — PLAN OF CARE
Goal Outcome Evaluation:      Problem: Plan of Care - These are the overarching goals to be used throughout the patient stay.    Goal: Plan of Care Review  Outcome: Progressing  Goal: Patient-Specific Goal (Individualize  Outcome: Progressing  Goal: Absence of Hospital-Acquired Illness or Injury  Outcome: Progressing  Intervention: Identify and Manage Fall Risk  Recent Flowsheet Documentation  Taken 2/23/2023 2100 by Geni Powers, SENDY  Safety Promotion/Fall Prevention:   activity supervised   assistive device/personal items within reach   bed alarm on  Taken 2/23/2023 1656 by Geni Powers RN  Safety Promotion/Fall Prevention:   activity supervised   assistive device/personal items within reach   bed alarm on  Goal: Optimal Comfort and Wellbeing  Outcome: Progressing  Goal: Readiness for Transition of Care  Outcome: Progressing     Problem: Risk for Delirium  Goal: Optimal Coping  Outcome: Progressing  Goal: Improved Behavioral Control  Outcome: Progressing  Intervention: Minimize Safety Risk  Recent Flowsheet Documentation  Taken 2/23/2023 2100 by Geni Powers, RN  Enhanced Safety Measures: bed alarm set  Taken 2/23/2023 1656 by Geni Powers RN  Enhanced Safety Measures: bed alarm set  Goal: Improved Attention and Thought Clarity  Outcome: Progressing  Goal: Improved Sleep  Outcome: Progressing

## 2023-02-24 NOTE — PLAN OF CARE
Problem: Heart Failure  Goal: Fluid and Electrolyte Balance  Outcome: Progressing   Goal Outcome Evaluation:  Patient complaining of dyspnea with activity and back pain.  Tylenol given for pain relief.  Maintaining sats on 1.5 L NC.  Continues in A-fib on tele.  Patient voided 325cc, PVR of 5cc.  Up in chair for meals.  Assist of 2 for transfers with walker and gait belt.

## 2023-02-24 NOTE — PROGRESS NOTES
[facility]     Medicine Progress Note - Hospitalist Service     Date of Admission:  2/20/2023        Assessment & Plan     Nishant Gilliam is a 87 year old male admitted on 2/20/2023. Nishant Gilliam is a 87 year old male with  HFpEF, Afib, COPD, CKD, HTN, HLD presented with generalized weakness, decreased po intake, fatigue, and decreased urine output.  Wife states that 'everything has slowed down for the patient- both his motion and speech, and he is more sleepy'. Denied fever, chills, nausea, vomiting, abdominal pain, diarrhea. More trouble swallowing.  Admitted for further management.        NOREEN on probable CKD stage III, Hypernatremia (resolved), initial dehydration on admission, Poor oral intake PTA, 20lb recent weight loss from not eating/drinking per patient/spouse,  Renal US showed chronic renal disease, no hydro but mild bladder distension. Cr slight worsening from admission and despite IVF not improving. I/O's not performed nor daily weights nor bladder scan/PVR q shift as ordered. CK normal.    -await UA/UC. If any hematuria/pyuria present will need to obtain BK virus PCR given Mycophenolate.  -bladder scan q shift + PVR's each void  -cmp a.m.  -hold cellcept  -resume diuresis per cardiology as given right heart failure ? If a component of CRS.  -check Uprot/Cr    Generalized weakness, Slow response, speech, tremor:  Seems to be persistent  - CT-head unremarkable for acute process  -MRI brain to rule out cerebrovascular disease refused by patient  -Neurology consultation appreciated: not likely to be Parkinson. ?Encephalopathy.  -EEG suggestive of mild encephalopathy.  -CK 28, TSH & ammonia normal.   - PT/OT  -Neurology s/o  -if persists would recommend rediscussing MRI brain given chronic Mycophenolate and certainly possible risk for PML.    Dysphagia, Silent aspiration (d/t weak hyolaryneal excursion creating vallecular and pyriform stasis - per SLP), Zenker's diverticulum,  Esophageal dysmotility disorder:   -esophagram/VSS reviewed  -SLP following  -mechanical soft, thin liquid, swallowing strategies advised by SLP  -HOB 30 degrees  -ppi     Acute Thrombocytopenia: better. No splenomegaly. + Radiographic features of cirrhosis. Bone marrow suppression/Toxicity from CellCept in setting of renal failure. LDH normal. B12 normal. PBS normal platelet morphology. +Cristina cells. Polychromasia not increased.   -Oncology/Hematology following  -hold cellcept  -cbc a.m.     HFpEF with ADHF predominant RHF, severe TR: RLL moderate pleural effusion, nt-BNP ~1900.  Hypervolemic on exam consistent with right heart failure. LVEF 60-65%, right ventricle is severely dilated with mildly decreased right  ventricular systolic function. Worse vs TTE in 2021.  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 Fib, ST abnormality, troponin 37 x2.   - Eliquis on hold due to bleeding oropharynx and low platelets  - metoprolol resumed today  -resume eliquis renally dosed 2.5mg BID    Possible Sepsis:  Hypothermic, hypotension, immunosupressed, concern for RLL aspiration pneumonia.  Renal US no hydro. Initial UA no infection. COVID negative 2/20/23. CT Chest w/o contrast showed moderate right pleural effusion with right basilar compressive atelectasis.  Procalcitonin 0.16.  -BC's x2 NGTD, MRSA nasal swab neg, pleural fluid cultures pending  -Vancomycin, Zosyn pending cultures     Acute hypoxic respiratory failure, COPD without acute exacerbation, moderate right pleural effusion with compressive atelectasis, probable RLL aspiration PNA given silent aspiration on VSS. S/P right thoracentesis 2/23 with 1.5L fluid removed.  Meets 1/3 of light's coiteria for exudative effusion.  Improved hypoxia.  - hold symbicort  -duoneb qid  -empiric vanc/zosyn pending  "culture results  -oximetry  -bronchial hygiene  -aspiration precautions  -await pleural fluid labs/cytology    CAD: no chest pain. Initial admission trop x 2 37.   -eliquis, metoprolol, statin    Possible cirrhosis: per imaging. LFT's with TB 1.3, DB 0.7 (normal today), normal transaminases. CT Chest showed mild ascites. Nodular appearance of liver suggestive of cirrhosis. No h/o EtOH use. Suspect related to right heart failure.    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  -hold CellCept      Chronic BLE edema:  -venous US BLE negative for dvt  -compression wraps     Chronic post nasal drip, nonallergic rhinitis  -trial Azelastin-Fluticasone BID     Inguinal candidiasis  - lotrimin bid     Decubitus ulcer stage 2  -Wound care following     Impaired hearing    Constipation:  -senna-s 2 qam  -dulcolax supp prn        Diet: soft, thin liq, 2g sodium  DVT Prophylaxis: DOAC   Mendes Catheter: Not present  Lines: None     Cardiac Monitoring: tele  Code Status: No CPR- Do NOT Intubate          Clinically Significant Risk Factors Present on Admission            # Hypernatremia: Highest Na = 148 mmol/L in last 2 days, will monitor as appropriate       # Drug Induced Coagulation Defect: home medication list includes an anticoagulant medication  # Thrombocytopenia: Lowest platelets = 57 in last 2 days, will monitor for bleeding   # Hypertension: home medication list includes antihypertensive(s)      # Obesity: Estimated body mass index is 35.42 kg/m  as calculated from the following:    Height as of this encounter: 1.829 m (6' 0.01\").    Weight as of this encounter: 118.5 kg (261 lb 3.9 oz).                  Disposition Plan        Expected Discharge Date: >2d      Destination: TCU         S:  Afebrile. Events overnight noted    O:  BP 96/63 (BP " "Location: Left arm)   Pulse 97   Temp 98  F (36.7  C) (Oral)   Resp 16   Ht 1.829 m (6' 0.01\")   Wt 120.7 kg (266 lb)   SpO2 96%   BMI 36.07 kg/m    Physical Examination:   General appearance - alert, and in no distress  Eyes -anicteric  Lungs - decreased right base-mid lung, no wheezing, non labored  Heart - irreg, irreg, nml rate,  2+ peripheral edema.  Abdomen - soft, BS+  Neurological - alert, oriented x3, dysarthric like speech  Skin -no pemphigoid or bullous lesions.     Lab/imaging reviewed    "

## 2023-02-24 NOTE — PROGRESS NOTES
Lymphedema:    Patient has short-stretch bandages, patient wears at home. Please remove wraps to assess skin on 2/26 and place in tubular compression. Instructions in room on shelf and tubigrip on shelf. See patient care order for signs/symptoms of intolerance.  GUILLAUME Johnson, OTR/L, CLT,  2/24/2023, 9:57 AM

## 2023-02-24 NOTE — PROGRESS NOTES
Pt back from Thoracentesis at 1700--also had bilateral LE U/S done to rule out DVT.     Nares swabbed for MRSA. Urine spec sent. Pt bladder scanned at 2000 for 330 ml after voiding 125 ml. Orders to straight cath for >350. Pt states he does not want to be straight cath'd and he will void again later.     LE edema present, wife states worse than usual. New order for lymphedema wraps.     Pt continues on O2 at 3 liters. States breathing improved after thoracentesis. Pt able to sit upright for dinner, tolerated well. VSS.

## 2023-02-24 NOTE — PROGRESS NOTES
[facility] on [facility] accepted patient for TCU. They will likely have a bed next week. Wife updated. She will transfer if able. PAS done.

## 2023-02-24 NOTE — PROGRESS NOTES
02/24/23 0730   Appointment Info   Signing Clinician's Name / Credentials (OT) Lily Kay, OTR/L, CLT   Quick Adds   Quick Adds Edema;Certification   General Information   Onset of Illness/Injury or Date of Surgery 02/20/23   Referring Physician Roosevelt Roberts,    Patient/Family Therapy Goal Statement (OT) none stated   Additional Occupational Profile Info/Pertinent History of Current Problem Nishant Gilliam is a 87 year old male admitted on 2/20/2023. Nishant Gilliam is a 87 year old male with  HFpEF, Afib, COPD, CKD, HTN, HLD presented with generalized weakness, decreased po intake, fatigue, and decreased urine output.  Wife states that 'everything has slowed down for the patient- both his motion and speech, and he is more sleepy'. Denied fever, chills, nausea, vomiting, abdominal pain, diarrhea. More trouble swallowing.  Admitted for further management.   Existing Precautions/Restrictions fall   Cognitive Status Examination   Orientation Status orientation to person, place and time   Edema General Information   Onset of Edema   (Patient reports he has had edema for several years, spouse wraps legs every other day.)   Affected Body Part(s) Left LE;Right LE   Edema Etiology Chronic Venous Insfficiency   Edema Examination/Assessment   Skin Condition Pitting;Dryness;Hemosiderin deposits   Scar No   Ulcerations No   Stemmer Sign Positive   Skin Integrity inatct, dry, discolored   Pitting Assessment 3+ bilaterally   Clinical Impression   Criteria for Skilled Therapeutic Interventions Met (OT) Yes, treatment indicated   Edema: Patient Presentation Edema   Edema: Planned Interventions Gradient compression bandaging;Edema exercises;Education;Precautions to prevent infection/exacerbation   Clinical Decision Making Complexity (OT) low complexity   Risk & Benefits of therapy have been explained evaluation/treatment results reviewed;care plan/treatment goals reviewed   OT Total Evaluation Time   OT Lily  Moderate Complexity Minutes (24569)   (10 eval already billed by OT)   Therapy Certification   Medical Diagnosis generalized muscle weakness   Start of Care Date 02/24/23   Certification date from 02/24/23   Certification date to 03/03/23   OT Goals   Therapy Frequency (OT) 4 times/wk   OT Predicted Duration/Target Date for Goal Attainment 03/03/23   OT Goals Edema   OT: Edema education to increase ability to manage edema after discharge from the hospital Patient;Verbalize;garrnet/bandage care;signs/symptoms of intolerance   OT: Management of edema bandages Patient;Verbalize   OT: Functional edema exercise program to reduce limb volume, increase activity tolerance and improve independence with ADL Patient;Demonstrates;[place]   Self-Care/Home Management   Self-Care/Home Mgmt/ADL, Compensatory, Meal Prep Minutes (99129) 30   Symptoms Noted During/After Treatment (Meal Preparation/Planning Training) none   Treatment Detail/Skilled Intervention washed/dried/applied lotion prior to wrapping with 2 layer SSB, herringbone medium compression 50% overlap.   OT Discharge Planning   OT Plan Lymph: re-wrap monday   OT Discharge Recommendation (DC Rec)   (per OT-lymphedema did not assess ADL/mobility)   OT Rationale for DC Rec   (patient likely able to continue wrapping @ home)   OT Brief overview of current status 3+ pitting edema, dry, discolored   Total Session Time   Timed Code Treatment Minutes 30   Total Session Time (sum of timed and untimed services) 30      [facility]  OUTPATIENT OCCUPATIONAL THERAPY  EVALUATION  PLAN OF TREATMENT FOR OUTPATIENT REHABILITATION  (COMPLETE FOR INITIAL CLAIMS ONLY)  Patient's Last Name, First Name, M.I.  YOB: 1935  Nishant Tsang                          Provider's Name  [facility] Medical Record No.  [id number]                             Onset Date:  02/20/23   Start of Care Date:  02/24/23   Type:      ___PT   _X_OT   ___SLP Medical Diagnosis:  generalized muscle weakness                    OT Diagnosis:  decreassed ADLs Visits from SOC:  1     See note for plan of treatment, functional goals and certification details    I CERTIFY THE NEED FOR THESE SERVICES FURNISHED UNDER        THIS PLAN OF TREATMENT AND WHILE UNDER MY CARE     (Physician co-signature of this document indicates review and certification of the therapy plan).

## 2023-02-24 NOTE — PROGRESS NOTES
"BS crackles LLL, O2 1.5 lpm/NC, SpO2 95 %. Duoneb tx given/mask. Tolerated well, BS after unchanged    BP 96/63 (BP Location: Left arm)   Pulse 94   Temp 98  F (36.7  C) (Oral)   Resp 16   Ht 1.829 m (6' 0.01\")   Wt 120.7 kg (266 lb)   SpO2 95%   BMI 36.07 kg/m      RT to monitor  "

## 2023-02-24 NOTE — PLAN OF CARE
Problem: Plan of Care - These are the overarching goals to be used throughout the patient stay.    Goal: Absence of Hospital-Acquired Illness or Injury  Intervention: Identify and Manage Fall Risk  Recent Flowsheet Documentation  Taken 2/23/2023 2357 by Rene Li, RN  Safety Promotion/Fall Prevention:    activity supervised    assistive device/personal items within reach    bed alarm on     Problem: Risk for Delirium  Goal: Improved Behavioral Control  Intervention: Minimize Safety Risk  Recent Flowsheet Documentation  Taken 2/23/2023 2357 by Rene Li RN  Enhanced Safety Measures: bed alarm set   Goal Outcome Evaluation:     Patient denies pain or discomfort in this shift.Thoracentesis site remain intact. No bleeding or abnormality noted. Vitals stable. Patient denies SOB/respiratory distress. Reported occasional cough.Lung sound clear. Continues on IV abx therapy. No adverse reaction noted at this time. Afebrile. Voided x1. Bladder scanned for 120 ml. Patient denies bladder discomfort. Remains on oxygen at 3L/NC. No respiratory distress.

## 2023-02-24 NOTE — PROGRESS NOTES
"O2 1.5 lpm/NC, SpO2 96 %. BS clear bilat, Duoneb/mask tx given, tolerated well. BS unchanged after    /56 (BP Location: Left arm, Patient Position: Semi-Carreon's, Cuff Size: Adult Large)   Pulse 78   Temp 98  F (36.7  C) (Oral)   Resp 18   Ht 1.829 m (6' 0.01\")   Wt 118.5 kg (261 lb 3.9 oz)   SpO2 96%   BMI 35.42 kg/m      RT to monitor      "

## 2023-02-24 NOTE — CONSULTS
"HEART CARE NOTE        Thank you, Dr. Roberts, for asking the [facility] team to see Nishant Gilliam to evaluate RV dysfunction.      Assessment/Recommendations     1. HFpEF/RV dysfunction  Assessment / Plan    Hypervolemic on physical exam - will resume home dose of oral bumex and continue to monitor    GDMT as detailed below; mainstay of treatment for HFpEF includes diuretics and adequate BP control (class I) and SGLT2-I (class 2a); additional medical therapy (ARNI, MRA, ARB) demonstrated less robust evidence for indication but may be considered per guideline recommendations (2b); no indication for BBlockers     Current Pharmacotherapy AHA Guideline-Directed Medical Therapy   Losartan - on hold given NOREEN and borderline BP ARNI/ARB   Spironolactone not started  MRA   SGLT2 inhibitor not started SGLT2-I    Bumetanide 2 mg daily Loop diuretic      2. Valvular heart disease  Assessment / Plan    Severe TR; likely functional in the setting of a dilated RV with reduced function - will refer to valve clinic if patient is amenable    Diuresis as above    3. Atrial fibrillation  Assessment / Plan    Paroxysmal - continue apixaban; resume low dose metoprolol    4. CKD  Assessment / Plan    Continue to monitor renal function closely while diuresing    5. CAD  Assessment / Plan    Denies chest pain or anginal equivalents    Continue metoprolol (reduced to 12.5 mg BID), statin     Clinically Significant Risk Factors Present on Admission          # Severe Malnutrition: based on nutrition assessment   # Obesity: Estimated body mass index is 35.42 kg/m  as calculated from the following:    Height as of this encounter: 1.829 m (6' 0.01\").    Weight as of this encounter: 118.5 kg (261 lb 3.9 oz).    Cardiovascular: Cardiac Arrhythmia: Atrial fibrillation: Unspecified    Fluid & Electrolyte Disorders: Fluid overload, unspecified, Other fluid overload and Other disorders of electrolyte and fluid balance, not elsewhere " "classified    Nephrology: Acute kidney failure, unspecified  CKD POA List: Stage 3b (GFR 30-44)    Limitations of activities/Fatigue (optional): Chronic Fatigue and Other Debilities: Age-related physical debility  Limitation of activities due to disability  Other reduced mobility    History of Present Illness/Subjective    Mr. Nishant Gilliam is a 87 year old male with a PMHx significant for (per Dr. Pham's note) HFpEF, Afib, COPD, CKD, HTN, HLD presented with generalized weakness with concern for dehydration. HF service now consulted regarding RV dysfunction/CHF management     Today, Mr. TERRI Gilliam denies any acute cardiac events or complaints; He denies orthopnea, PND, edema; Management plan as detailed above - plan discussed with patient and wife at bedside    ECG: Personally reviewed. atrial fibrillation, rate controlled, RBBB.    ECHO (personnaly Reviewed):   The left ventricle is normal in size with normal left ventricular wall  thickness.  Left ventricular function is normal.The ejection fraction is 60-65%.  The right ventricle is severely dilated with mildly decreased right  ventricular systolic function  There is severe (4+) tricuspid regurgitation.  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  Compared to the prior study of 7/8/21, the right ventricle is now severely  dilated and there is now severe tricuspid regurgitation.     Consider outpatient referral to the [facility] valve clinic to  consider treatment options for the tricuspid regurgitation.        Physical Examination Review of Systems   /65 (BP Location: Left arm)   Pulse 80   Temp 98  F (36.7  C) (Oral)   Resp 18   Ht 1.829 m (6' 0.01\")   Wt 118.5 kg (261 lb 3.9 oz)   SpO2 98%   BMI 35.42 kg/m    Body mass index is 35.42 kg/m .  Wt Readings from Last 3 Encounters:   02/21/23 118.5 kg (261 lb 3.9 oz)   09/06/22 113.7 kg (250 lb 11.2 oz)   03/01/22 129.5 kg (285 lb 6.4 oz)     General Appearance:   no distress, normal body " habitus   ENT/Mouth: membranes moist, no oral lesions or bleeding gums.      EYES:  no scleral icterus, normal conjunctivae   Neck: no carotid bruits or thyromegaly   Chest/Lungs:   lungs are clear to auscultation, no rales or wheezing, equal chest wall expansion    Cardiovascular:   Irregular. Normal first and second heart sounds with no murmurs, rubs, or gallops; the carotid, radial and posterior tibial pulses are intact, + JVD and LE edema bilaterally    Abdomen:  no organomegaly, masses, bruits, or tenderness; bowel sounds are present   Extremities: no cyanosis or clubbing   Skin: no xanthelasma, warm.    Neurologic: alert and calm     Psychiatric: alert and calm     A complete 10 systems ROS was reviewed  And is negative except what is listed in the HPI.          Medical History  Surgical History Family History Social History   Past Medical History:   Diagnosis Date     COPD (chronic obstructive pulmonary disease) (H)      Coronary artery disease      Hypertension      Right bundle branch block     complete    Past Surgical History:   Procedure Laterality Date     HC KNEE SCOPE, DIAGNOSTIC      Description: Arthroscopy Knee Left;  Recorded: 06/27/2009;  Comments: details unk.     HERNIA REPAIR       [facility] CORONARY STENT PERCUT, INITIAL VESSEL      Description: Cath Stent Placement;  Recorded: 02/24/2014;  Comments: stenting to the LAD in 1999. 2nd done years later.    no family history of premature coronary artery disease Social History     Socioeconomic History     Marital status:      Spouse name: Not on file     Number of children: Not on file     Years of education: Not on file     Highest education level: Not on file   Occupational History     Not on file   Tobacco Use     Smoking status: Former     Years: 30.00     Types: Cigarettes     Smokeless tobacco: Never   Substance and Sexual Activity     Alcohol use: No     Drug use: No     Sexual activity: Not on file   Other Topics Concern     Not on file    Social History Narrative    Lives with his wife.     Social Determinants of Health     Financial Resource Strain: Not on file   Food Insecurity: Not on file   Transportation Needs: Not on file   Physical Activity: Not on file   Stress: Not on file   Social Connections: Not on file   Intimate Partner Violence: Not on file   Housing Stability: Not on file           Lab Results    Chemistry/lipid CBC Cardiac Enzymes/BNP/TSH/INR   Lab Results   Component Value Date    CHOL 128 02/25/2022    HDL 48 02/25/2022    TRIG 121 02/25/2022    BUN 27.8 (H) 02/24/2023     02/24/2023    CO2 27 02/24/2023    Lab Results   Component Value Date    WBC 6.8 02/24/2023    HGB 13.0 (L) 02/24/2023    HCT 42.9 02/24/2023    MCV 94 02/24/2023    PLT 60 (L) 02/24/2023    Lab Results   Component Value Date    TROPONINI 0.03 07/07/2021     (H) 07/07/2021    TSH 2.42 02/21/2023    INR 1.60 (H) 02/23/2023     Lab Results   Component Value Date    TROPONINI 0.03 07/07/2021          Weight:    Wt Readings from Last 3 Encounters:   02/21/23 118.5 kg (261 lb 3.9 oz)   09/06/22 113.7 kg (250 lb 11.2 oz)   03/01/22 129.5 kg (285 lb 6.4 oz)       Allergies  Allergies   Allergen Reactions     Furosemide Rash     pemphigoid     Cat Hair Std Allergenic Ext [Cat Hair Extract] Unknown     Sulfa Drugs Unknown         Surgical History  Past Surgical History:   Procedure Laterality Date     HC KNEE SCOPE, DIAGNOSTIC      Description: Arthroscopy Knee Left;  Recorded: 06/27/2009;  Comments: details unk.     HERNIA REPAIR       [facility] CORONARY STENT PERCUT, INITIAL VESSEL      Description: Cath Stent Placement;  Recorded: 02/24/2014;  Comments: stenting to the LAD in 1999. 2nd done years later.       Social History  Tobacco:   History   Smoking Status     Former     Years: 30.00     Types: Cigarettes   Smokeless Tobacco     Never    Alcohol:   Social History    Substance and Sexual Activity      Alcohol use: No   Illicit Drugs:   History   Drug Use  No       Family History  No family history on file.       Edd Fontanez MD on 2/24/2023      cc: Aditya Mehta,

## 2023-02-25 ENCOUNTER — APPOINTMENT (OUTPATIENT)
Dept: ULTRASOUND IMAGING | Facility: HOSPITAL | Age: 88
DRG: 682 | End: 2023-02-25
Attending: INTERNAL MEDICINE
Payer: MEDICARE

## 2023-02-25 LAB
ALBUMIN SERPL BCG-MCNC: 3.3 G/DL (ref 3.5–5.2)
ALP SERPL-CCNC: 139 U/L (ref 40–129)
ALT SERPL W P-5'-P-CCNC: 15 U/L (ref 10–50)
ANION GAP SERPL CALCULATED.3IONS-SCNC: 10 MMOL/L (ref 7–15)
AST SERPL W P-5'-P-CCNC: 32 U/L (ref 10–50)
BASOPHILS # BLD AUTO: 0 10E3/UL (ref 0–0.2)
BASOPHILS NFR BLD AUTO: 0 %
BILIRUB SERPL-MCNC: 1.6 MG/DL
BUN SERPL-MCNC: 25.5 MG/DL (ref 8–23)
CALCIUM SERPL-MCNC: 9.5 MG/DL (ref 8.8–10.2)
CHLORIDE SERPL-SCNC: 107 MMOL/L (ref 98–107)
CHLORIDE UR-SCNC: <20 MMOL/L
CREAT SERPL-MCNC: 1.79 MG/DL (ref 0.67–1.17)
CYSTATIN C (ROCHE): 2.8 MG/L (ref 0.6–1)
DEPRECATED HCO3 PLAS-SCNC: 30 MMOL/L (ref 22–29)
EOSINOPHIL # BLD AUTO: 0.1 10E3/UL (ref 0–0.7)
EOSINOPHIL NFR BLD AUTO: 1 %
ERYTHROCYTE [DISTWIDTH] IN BLOOD BY AUTOMATED COUNT: 14.6 % (ref 10–15)
GFR SERPL CREATININE-BSD FRML MDRD: 18 ML/MIN/1.73M2
GFR SERPL CREATININE-BSD FRML MDRD: 37 ML/MIN/1.73M2
GLUCOSE SERPL-MCNC: 65 MG/DL (ref 70–99)
HCT VFR BLD AUTO: 42.9 % (ref 40–53)
HGB BLD-MCNC: 12.8 G/DL (ref 13.3–17.7)
IMM GRANULOCYTES # BLD: 0.1 10E3/UL
IMM GRANULOCYTES NFR BLD: 2 %
LYMPHOCYTES # BLD AUTO: 1 10E3/UL (ref 0.8–5.3)
LYMPHOCYTES NFR BLD AUTO: 20 %
MAGNESIUM SERPL-MCNC: 2.2 MG/DL (ref 1.7–2.3)
MCH RBC QN AUTO: 28.2 PG (ref 26.5–33)
MCHC RBC AUTO-ENTMCNC: 29.8 G/DL (ref 31.5–36.5)
MCV RBC AUTO: 95 FL (ref 78–100)
MONOCYTES # BLD AUTO: 0.9 10E3/UL (ref 0–1.3)
MONOCYTES NFR BLD AUTO: 17 %
NEUTROPHILS # BLD AUTO: 3.2 10E3/UL (ref 1.6–8.3)
NEUTROPHILS NFR BLD AUTO: 60 %
NRBC # BLD AUTO: 0.1 10E3/UL
NRBC BLD AUTO-RTO: 1 /100
OSMOLALITY SERPL: 306 MMOL/KG (ref 280–301)
OSMOLALITY UR: 486 MMOL/KG (ref 100–1200)
PLATELET # BLD AUTO: 63 10E3/UL (ref 150–450)
POTASSIUM SERPL-SCNC: 3.7 MMOL/L (ref 3.4–5.3)
PROT SERPL-MCNC: 5.9 G/DL (ref 6.4–8.3)
RBC # BLD AUTO: 4.54 10E6/UL (ref 4.4–5.9)
SODIUM SERPL-SCNC: 147 MMOL/L (ref 136–145)
WBC # BLD AUTO: 5.2 10E3/UL (ref 4–11)

## 2023-02-25 PROCEDURE — 250N000013 HC RX MED GY IP 250 OP 250 PS 637: Performed by: INTERNAL MEDICINE

## 2023-02-25 PROCEDURE — 250N000011 HC RX IP 250 OP 636: Performed by: INTERNAL MEDICINE

## 2023-02-25 PROCEDURE — 999N000157 HC STATISTIC RCP TIME EA 10 MIN

## 2023-02-25 PROCEDURE — 84155 ASSAY OF PROTEIN SERUM: CPT | Performed by: INTERNAL MEDICINE

## 2023-02-25 PROCEDURE — 99233 SBSQ HOSP IP/OBS HIGH 50: CPT | Performed by: INTERNAL MEDICINE

## 2023-02-25 PROCEDURE — 250N000013 HC RX MED GY IP 250 OP 250 PS 637: Performed by: STUDENT IN AN ORGANIZED HEALTH CARE EDUCATION/TRAINING PROGRAM

## 2023-02-25 PROCEDURE — 83735 ASSAY OF MAGNESIUM: CPT | Performed by: INTERNAL MEDICINE

## 2023-02-25 PROCEDURE — 82610 CYSTATIN C: CPT | Performed by: INTERNAL MEDICINE

## 2023-02-25 PROCEDURE — 99254 IP/OBS CNSLTJ NEW/EST MOD 60: CPT | Performed by: INTERNAL MEDICINE

## 2023-02-25 PROCEDURE — 83930 ASSAY OF BLOOD OSMOLALITY: CPT | Performed by: INTERNAL MEDICINE

## 2023-02-25 PROCEDURE — 94640 AIRWAY INHALATION TREATMENT: CPT

## 2023-02-25 PROCEDURE — 85004 AUTOMATED DIFF WBC COUNT: CPT | Performed by: INTERNAL MEDICINE

## 2023-02-25 PROCEDURE — 250N000013 HC RX MED GY IP 250 OP 250 PS 637: Performed by: PHYSICIAN ASSISTANT

## 2023-02-25 PROCEDURE — 36415 COLL VENOUS BLD VENIPUNCTURE: CPT | Performed by: INTERNAL MEDICINE

## 2023-02-25 PROCEDURE — 250N000009 HC RX 250: Performed by: INTERNAL MEDICINE

## 2023-02-25 PROCEDURE — 86038 ANTINUCLEAR ANTIBODIES: CPT | Performed by: INTERNAL MEDICINE

## 2023-02-25 PROCEDURE — 93975 VASCULAR STUDY: CPT

## 2023-02-25 PROCEDURE — 120N000001 HC R&B MED SURG/OB

## 2023-02-25 PROCEDURE — 86162 COMPLEMENT TOTAL (CH50): CPT | Performed by: INTERNAL MEDICINE

## 2023-02-25 PROCEDURE — 80053 COMPREHEN METABOLIC PANEL: CPT | Performed by: INTERNAL MEDICINE

## 2023-02-25 RX ORDER — BUMETANIDE 0.25 MG/ML
2 INJECTION INTRAMUSCULAR; INTRAVENOUS
Status: DISCONTINUED | OUTPATIENT
Start: 2023-02-25 | End: 2023-02-28

## 2023-02-25 RX ORDER — IPRATROPIUM BROMIDE AND ALBUTEROL SULFATE 2.5; .5 MG/3ML; MG/3ML
3 SOLUTION RESPIRATORY (INHALATION) EVERY 4 HOURS PRN
Status: DISCONTINUED | OUTPATIENT
Start: 2023-02-25 | End: 2023-03-02

## 2023-02-25 RX ADMIN — METOPROLOL TARTRATE 12.5 MG: 25 TABLET, FILM COATED ORAL at 09:34

## 2023-02-25 RX ADMIN — FLUTICASONE PROPIONATE 1 SPRAY: 50 SPRAY, METERED NASAL at 10:03

## 2023-02-25 RX ADMIN — PIPERACILLIN AND TAZOBACTAM 3.38 G: 3; .375 INJECTION, POWDER, LYOPHILIZED, FOR SOLUTION INTRAVENOUS at 17:55

## 2023-02-25 RX ADMIN — BUMETANIDE 2 MG: 0.25 INJECTION INTRAMUSCULAR; INTRAVENOUS at 13:51

## 2023-02-25 RX ADMIN — IPRATROPIUM BROMIDE AND ALBUTEROL SULFATE 3 ML: 2.5; .5 SOLUTION RESPIRATORY (INHALATION) at 08:30

## 2023-02-25 RX ADMIN — AZELASTINE HYDROCHLORIDE 1 SPRAY: 137 SPRAY, METERED NASAL at 10:03

## 2023-02-25 RX ADMIN — SENNOSIDES AND DOCUSATE SODIUM 2 TABLET: 50; 8.6 TABLET ORAL at 09:34

## 2023-02-25 RX ADMIN — POLYETHYLENE GLYCOL 3350 17 G: 17 POWDER, FOR SOLUTION ORAL at 09:49

## 2023-02-25 RX ADMIN — FLUTICASONE PROPIONATE 1 SPRAY: 50 SPRAY, METERED NASAL at 20:42

## 2023-02-25 RX ADMIN — PIPERACILLIN AND TAZOBACTAM 3.38 G: 3; .375 INJECTION, POWDER, LYOPHILIZED, FOR SOLUTION INTRAVENOUS at 09:15

## 2023-02-25 RX ADMIN — PIPERACILLIN AND TAZOBACTAM 3.38 G: 3; .375 INJECTION, POWDER, LYOPHILIZED, FOR SOLUTION INTRAVENOUS at 00:36

## 2023-02-25 RX ADMIN — BUMETANIDE 2 MG: 2 TABLET ORAL at 09:35

## 2023-02-25 RX ADMIN — APIXABAN 2.5 MG: 2.5 TABLET, FILM COATED ORAL at 20:41

## 2023-02-25 RX ADMIN — MICONAZOLE NITRATE ANTIFUNGAL POWDER: 2 POWDER TOPICAL at 20:42

## 2023-02-25 RX ADMIN — Medication 1 TABLET: at 09:34

## 2023-02-25 RX ADMIN — PRAVASTATIN SODIUM 20 MG: 20 TABLET ORAL at 20:41

## 2023-02-25 RX ADMIN — MICONAZOLE NITRATE ANTIFUNGAL POWDER: 2 POWDER TOPICAL at 10:04

## 2023-02-25 RX ADMIN — AZELASTINE HYDROCHLORIDE 1 SPRAY: 137 SPRAY, METERED NASAL at 20:42

## 2023-02-25 RX ADMIN — APIXABAN 2.5 MG: 2.5 TABLET, FILM COATED ORAL at 09:35

## 2023-02-25 RX ADMIN — Medication 25 MCG: at 09:33

## 2023-02-25 RX ADMIN — FINASTERIDE 5 MG: 5 TABLET, FILM COATED ORAL at 21:01

## 2023-02-25 RX ADMIN — THIAMINE HYDROCHLORIDE 100 MG: 100 INJECTION, SOLUTION INTRAMUSCULAR; INTRAVENOUS at 20:42

## 2023-02-25 RX ADMIN — PANTOPRAZOLE SODIUM 40 MG: 40 TABLET, DELAYED RELEASE ORAL at 09:31

## 2023-02-25 RX ADMIN — BUMETANIDE 2 MG: 0.25 INJECTION INTRAMUSCULAR; INTRAVENOUS at 17:56

## 2023-02-25 RX ADMIN — METOPROLOL TARTRATE 12.5 MG: 25 TABLET, FILM COATED ORAL at 20:48

## 2023-02-25 ASSESSMENT — ACTIVITIES OF DAILY LIVING (ADL)
ADLS_ACUITY_SCORE: 43

## 2023-02-25 NOTE — PROGRESS NOTES
Patient on 2L NC with an SpO2 in the mid 90's. BS are vesicular to ausculation and patient received scheduled nebs. RT will continue to monitor.     Kaitlin Valdivia RRT

## 2023-02-25 NOTE — PROGRESS NOTES
"[facility] HEART [facility]   [address]  [place] [address]   [email or web address]   OFFICE: [phone]     CARDIOLOGY FOLLOW-UP NOTE      Impression and Plan     Impression:   1. Acute heart failure with preserved LVEF, likely related to tricuspid regurgitation - remains decompensated. Not responding to home dose of PO bumex.  2. Severe tricuspid regurgitation, likely functional in the setting of dilated RV and reduced RV function. Treating with diuresis. Refer to valve clinic if pt is amenable.  3. Paroxysmal atrial fibrillation. On eliquis for stroke prevention and low-dose metoprolol for rate control.  4. CKD  5. Coronary artery disease - stable without angina.    Plan:    Change bumetanide to 2 mg IV BID    Monitor renal function, strict I/O    Will follow      Subjective     More alert today. Breathing modestly improved.     Cardiac Diagnostics   Telemetry (personally reviewed): atrial fibrillation, controlled rate.    Echocardiogram (results reviewed):   TTE 2/23/23  The left ventricle is normal in size with normal left ventricular wall  thickness.  Left ventricular function is normal.The ejection fraction is 60-65%.  The right ventricle is severely dilated with mildly decreased right  ventricular systolic function  There is severe (4+) tricuspid regurgitation.  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  Compared to the prior study of 7/8/21, the right ventricle is now severely  dilated and there is now severe tricuspid regurgitation.     Consider outpatient referral to the [facility] valve clinic to  consider treatment options for the tricuspid regurgitation.      Physical Examination       BP 99/64 (BP Location: Right arm)   Pulse 82   Temp 97.4  F (36.3  C) (Oral)   Resp 18   Ht 1.829 m (6' 0.01\")   Wt 120.7 kg (266 lb)   SpO2 96%   BMI 36.07 kg/m        [unfilled]        Intake/Output Summary (Last 24 hours) at 2/25/2023 9534  Last data filed at " 2/25/2023 0544  Gross per 24 hour   Intake 360 ml   Output 950 ml   Net -590 ml       General: frail elderly  Man, no acute distress.  HENT: external ears normal. Nares patent. Mucous membranes moist.  Eyes: perrla, extraocular muscles intact. No scleral icterus.   Lungs: coarse breath sounds  COR: irregular rhythm. Normal rate.  Extrem: 3+ BLE edema         Imaging      No new imaging in past 24 hours.    Lab Results   Lab Results   Component Value Date    CHOL 128 02/25/2022    HDL 48 02/25/2022    TRIG 121 02/25/2022    BUN 25.5 (H) 02/25/2023     (H) 02/25/2023    CO2 30 (H) 02/25/2023       Lab Results   Component Value Date    WBC 5.2 02/25/2023    HGB 12.8 (L) 02/25/2023    HCT 42.9 02/25/2023    MCV 95 02/25/2023    PLT 63 (L) 02/25/2023       Lab Results   Component Value Date    TROPONINI 0.03 07/07/2021     (H) 07/07/2021    TSH 2.42 02/21/2023    INR 1.60 (H) 02/23/2023               Current Inpatient Scheduled Medications   Scheduled Meds:    apixaban ANTICOAGULANT  2.5 mg Oral BID     azelastine  1 spray Both Nostrils BID     [Held by provider] budesonide-formoterol  2 puff Inhalation 2 times daily     bumetanide  2 mg Oral Daily     [Held by provider] doxazosin  4 mg Oral At Bedtime     finasteride  5 mg Oral At Bedtime     fluticasone  1 spray Both Nostrils BID     metoprolol tartrate  12.5 mg Oral BID     miconazole   Topical BID     multivitamin w/minerals  1 tablet Oral Daily     [Held by provider] mycophenolate  1,000 mg Oral BID     pantoprazole  40 mg Oral QAM AC     piperacillin-tazobactam  3.375 g Intravenous Q8H     polyethylene glycol  17 g Oral Daily     pravastatin  20 mg Oral At Bedtime     senna-docusate  2 tablet Oral QAM AC     thiamine  100 mg Intravenous Daily     Vitamin D3  25 mcg Oral Daily     Continuous Infusions:         Medications Prior to Admission   Prior to Admission medications    Medication Sig Start Date End Date Taking? Authorizing Provider   apixaban  ANTICOAGULANT (ELIQUIS) 5 MG tablet Take 1 tablet (5 mg) by mouth 2 times daily 7/18/22  Yes Aditya Mehta MD   budesonide-formoterol (SYMBICORT) 160-4.5 MCG/ACT Inhaler [BUDESONIDE-FORMOTEROL (SYMBICORT) 160-4.5 MCG/ACTUATION INHALER] USE 2 INHALATIONS TWICE A DAY 12/14/21  Yes Aditya Mehta MD   bumetanide (BUMEX) 1 MG tablet Take 2 tablets (2 mg) by mouth daily 3/23/22  Yes Smith Ellis MD   cholecalciferol, vitamin D3, (VITAMIN D3) 25 mcg (1,000 unit) capsule Take 1 capsule by mouth daily 5/13/21  Yes Provider, Historical   doxazosin (CARDURA) 4 MG tablet Take 1 tablet (4 mg) by mouth At Bedtime 10/18/22  Yes Aditya Mehta MD   doxycycline hyclate (VIBRAMYCIN) 100 MG capsule Take 100 mg by mouth 2 times daily   Yes Unknown, Entered By History   finasteride (PROSCAR) 5 MG tablet Take 1 tablet (5 mg) by mouth daily  Patient taking differently: Take 5 mg by mouth At Bedtime 4/14/22  Yes Aditya Mehta MD   ipratropium (ATROVENT) 0.03 % nasal spray Spray 2 sprays into both nostrils 3 times daily As needed for congestion  Patient taking differently: Spray 2 sprays into both nostrils 3 times daily as needed As needed for congestion 9/6/22  Yes Aditya Mehta MD   LORazepam (ATIVAN) 0.5 MG tablet TAKE 1/2 TO 1 TABLET BY MOUTH THREE TIMES DAILY AS NEEDED FOR ANXIETY 10/6/22  Yes Aditya Mehta MD   metoprolol tartrate (LOPRESSOR) 100 MG tablet Take 1 tablet (100 mg) by mouth 2 times daily 10/6/22  Yes Aditya Mehta MD   niacinamide 500 MG tablet Take 1,000 mg by mouth daily   Yes Unknown, Entered By History   pravastatin (PRAVACHOL) 20 MG tablet Take 1 tablet (20 mg) by mouth every evening 4/14/22  Yes Aditya Mehta MD   sodium chloride (OCEAN) 0.65 % nasal spray Spray 1 spray into both nostrils daily as needed for congestion   Yes Unknown, Entered By History   spironolactone (ALDACTONE) 25 MG tablet Take 1 tablet (25 mg) by mouth daily 10/6/22  Yes Aditya Mehta MD   mycophenolate (GENERIC  EQUIVALENT) 500 MG tablet Take 1,000 mg by mouth 2 times daily 9/2/21   Reported, Patient   losartan (COZAAR) 100 MG tablet [LOSARTAN (COZAAR) 100 MG TABLET] Take 1 tablet (100 mg total) by mouth daily. 3/31/21 7/12/21  Aditya Mehta MD

## 2023-02-25 NOTE — PLAN OF CARE
Problem: Heart Failure  Goal: Improved Oral Intake  Outcome: Progressing     Problem: Heart Failure  Goal: Effective Breathing Pattern During Sleep  Outcome: Progressing   Goal Outcome Evaluation:       Pt is alert oriented x4. VSS. No c/o significant pain this marlo. Pt remains on O2 2L NC with spo2 in the mid 90's.  No resp distress observed this marlo. Assist of 2 to dangle in bed and voiding adequately. Pt declined dinner this shift, but taking fluids fair. Bilat L/E ace wrapped remains intact. Toes remains warm and pink. Will cont to monitor general condition.

## 2023-02-25 NOTE — CONSULTS
NEPHROLOGY CONSULTATION      ASSESSMENT/PLAN:  87 year old male with a past medical history of heart failure with preserved ejection fraction history of COPD CKD history of atrial fibrillation hypertension chronic lymphedema presented with increasing generalized weakness poor oral intake worsening fatigue and decreased urine output.  Admission labs noted for mildly elevated creatinine, nephrology consulted for NOREEN on CKD    NOREEN nonoliguric  Baseline likely CKD 3, baseline creatinine is around 1.2-1.3 with GFR close to 60 based on creatinine clearance  Check Cystatin C GFR  Urine sodium less than 20 urine protein 0.19 g/g, UA with uric acid crystals and hyaline casts otherwise bland  Unremarkable renal imaging except for noted pleural effusion and ascites  Has had paraprotein work-up in 2021 that was negative  Complements normal in 2021  Creatinine elevation appears to be consistent with cardiorenal syndrome likely acute on chronic, less likely to be of primary renal disease with no proteinuria at this time, we will repeat a paraprotein work-up as well as complements and BLACK  Initially Bumex was held now resumed since yesterday with increased to IV Bumex 2 mg twice a day 2/25/2023  Received some IV fluids up till yesterday now discontinued  --> Discussed with his wife creatinine has had slowly uptrending in the past 2 years and this might be his progressive disease versus this creatinine is secondary to cardiorenal syndrome like picture with hypervolemia and congestion with possible improvement with diuresis  --> Agree with resuming Bumex at 2 mg twice a day and following response  May need to add on thiazide for some additional natriuresis  --> Follow on renal panel    Electrolytes  Mild hypernatremia  Follow-up with ongoing diuresis may need thiazide for additional natriuresis    Hypervolemia  Low normal blood pressures  Agree with resuming Bumex IV twice a day may need to have additional  thiazides  Following response  On metoprolol for atrial fibrillation    Hemoglobin 13+  Acute onset thrombocytopenia slowly improving  Heme-onc following currently CellCept on hold    Heart failure exacerbation with heart failure with preserved ejection fraction  History of severe TR  History of CAD  Moderate pleural effusion on right lower lobe s/p thoracocentesis  Possibly cor pulmonale with worsened right heart functions  Mild to moderate aortic stenosis on TTE  History of atrial fibrillation Eliquis resumed 2.5 mg twice daily was initially on hold secondary to bleeding and thrombocytopenia  Ongoing dyspnea and worsening lower extremity edema  Also received IV fluids earlier on admission  Started on IV Bumex 2 mg twice a day 2/25/2022  Continued on metoprolol  Cardiology following    Initial concerns for RLL aspirated pneumonia  Remains on Zosyn    Acute on chronic respiratory failure  History of COPD  Likely secondary to heart failure exacerbation as well  S/p thoracocentesis right pleural effusion  DuoNebs nasal cannula oxygen    History of BPH  No evidence of retention  Currently off Cardura continued on Proscar    Chronic lymphedema  Local management with compression wraps    History of bullous pemphigoid on mycophenolate PTA  Also on doxycycline for suppression has been on hold  Report significant sulfa allergy has been tolerating Bumex with immunosuppression with MMF    Significant deconditioning and weakness  Stage II decubitus ulcer  Frequent changing positions avoiding pressure points    Thank you for the consultation  We will follow  Cristopher Lang MD  [facility]  [phone]    CC: NOREEN CKD 3 with progression hypervolemia    REASON FOR CONSULTATION: We are asked to see pt by Dr. Roberts    HISTORY OF PRESENT ILLNESS:87 year old male    87 year old male with a past medical history of heart failure with preserved ejection fraction history of COPD CKD history of atrial fibrillation  hypertension chronic lymphedema presented with increasing generalized weakness poor oral intake worsening fatigue and decreased urine output.  Admission labs noted for mildly elevated creatinine, nephrology consulted for NOREEN on CKD  History mostly obtained from wife with review admission and information provided by   He appears alert and oriented although very sluggish in his responses sometimes needing reorientation.  Per his wife the patient started to notice increasing lower extremity edema in 2020 and was diagnosed at that time with lymphedema and heart failure has been on treatment with diuretics since then.  Edema has never completely disappeared and has had worsening over the past few years.  They have not seen a kidney doctor prior to this although paraprotein work-up complements were checked in 2021 they were negative  Patient's creatinine prior to 2020 was less than 1 was noted to be more around 1.1-1.3 in the last few years up till September 2022.  4 months later on current admission labs the patient is noted to have elevated creatinine to 1.7 and throughout the course of admission has remained around 1.7.  No other major events.  He is compliant with his medications per wife.   Multiple additional issues including worsening fatigue some confusion generalized weakness with inability to take care of ADLs are also reported.  Worsening response to oral diuretics poor oral intake and declining appetite as well.  REVIEW OF SYSTEMS:  ROS was completely reviewed and otherwise negative and non-contributory    Past Medical History:   Diagnosis Date     COPD (chronic obstructive pulmonary disease) (H)      Coronary artery disease      Hypertension      Right bundle branch block     complete       Social History     Socioeconomic History     Marital status:      Spouse name: Not on file     Number of children: Not on file     Years of education: Not on file     Highest education level: Not on file  "  Occupational History     Not on file   Tobacco Use     Smoking status: Former     Years: 30.00     Types: Cigarettes     Smokeless tobacco: Never   Substance and Sexual Activity     Alcohol use: No     Drug use: No     Sexual activity: Not on file   Other Topics Concern     Not on file   Social History Narrative    Lives with his wife.     Social Determinants of Health     Financial Resource Strain: Not on file   Food Insecurity: Not on file   Transportation Needs: Not on file   Physical Activity: Not on file   Stress: Not on file   Social Connections: Not on file   Intimate Partner Violence: Not on file   Housing Stability: Not on file       No family history on file.    Allergies   Allergen Reactions     Furosemide Rash     pemphigoid     Cat Hair Std Allergenic Ext [Cat Hair Extract] Unknown     Sulfa Drugs Unknown       MEDICATIONS:    apixaban ANTICOAGULANT  2.5 mg Oral BID     azelastine  1 spray Both Nostrils BID     [Held by provider] budesonide-formoterol  2 puff Inhalation 2 times daily     bumetanide  2 mg Intravenous BID     [Held by provider] doxazosin  4 mg Oral At Bedtime     finasteride  5 mg Oral At Bedtime     fluticasone  1 spray Both Nostrils BID     metoprolol tartrate  12.5 mg Oral BID     miconazole   Topical BID     multivitamin w/minerals  1 tablet Oral Daily     [Held by provider] mycophenolate  1,000 mg Oral BID     pantoprazole  40 mg Oral QAM AC     piperacillin-tazobactam  3.375 g Intravenous Q8H     polyethylene glycol  17 g Oral Daily     pravastatin  20 mg Oral At Bedtime     senna-docusate  2 tablet Oral QAM AC     thiamine  100 mg Intravenous Daily     Vitamin D3  25 mcg Oral Daily         PHYSICAL EXAM    /60 (BP Location: Left arm)   Pulse 77   Temp 97.4  F (36.3  C) (Oral)   Resp 20   Ht 1.829 m (6' 0.01\")   Wt 120.7 kg (266 lb)   SpO2 94%   BMI 36.07 kg/m        Intake/Output Summary (Last 24 hours) at 2/25/2023 1717  Last data filed at 2/25/2023 1300  Gross per " 24 hour   Intake 360 ml   Output 975 ml   Net -615 ml       Alert, awake and NAD  HEENT NC/AT; perrla; OP clear without lesions; mmm  Neck supple without LAD, TM  CV; RRR without rub or murmur  Lung: crepts in bases, on NC O2  Ab: soft and NT; +distended; normal bs  Ext: +++edema and well perfused  Skin; no rash  Neuro; grossly intact    LABORATORIES    Recent Labs   Lab 02/25/23  0620 02/24/23  0629 02/23/23  0806   WBC 5.2 6.8 6.1   HGB 12.8* 13.0* 13.5   HCT 42.9 42.9 45.0   PLT 63* 60* 58*  58*     Recent Labs   Lab 02/25/23  0620 02/24/23  0629 02/23/23  2143 02/23/23  0806   * 144 143 148*   CO2 30* 27  --  28   BUN 25.5* 27.8*  --  29.1*   ALKPHOS 139* 130*  --  141*   ALT 15 13  --  17   AST 32 27  --  29     Recent Labs   Lab 02/23/23  1339   INR 1.60*   PTT 44*     Invalid input(s): FERRITIN  No results for input(s): IRON in the last 168 hours.    Invalid input(s): TIBC    I reviewed all labs and imaging    Thank you for the consultation we will follow    Cristopher Lang MD  [facility]  [phone]

## 2023-02-25 NOTE — PROGRESS NOTES
Pt seen for scheduled nebulizer this am. BS diminished but clear. No complaints of SOB. Pt on 1 L NC. No nebulizers at home. Will change to PRN and re evaluate if condition worsens.    Sergei Lao, RT on 2/25/2023 at 10:51 AM

## 2023-02-25 NOTE — PROGRESS NOTES
[facility]     Medicine Progress Note - Hospitalist Service     Date of Admission:  2/20/2023        Assessment & Plan     Nishant Gilliam is a 87 year old male admitted on 2/20/2023. Nishant Gilliam is a 87 year old male with  HFpEF, Afib, COPD, CKD, HTN, HLD presented with generalized weakness, decreased po intake, fatigue, and decreased urine output.  Wife states that 'everything has slowed down for the patient- both his motion and speech, and he is more sleepy'. Denied fever, chills, nausea, vomiting, abdominal pain, diarrhea. More trouble swallowing.  Admitted for further management.        NOREEN on probable CKD stage III:  Suspect CRS due to decompensated heart failure.  Cr remains elevated. Erika < 20.  Renal US showed chronic renal disease, no hydro but mild bladder distension. Cr slight worsening from admission. CK normal.  UA bland. Uprot 19.1.   -Nephrology consult    Generalized weakness, Slow response, speech, tremor:  Seems to be persistent  - CT-head unremarkable for acute process  -MRI brain to rule out cerebrovascular disease refused by patient  -Neurology consultation appreciated: not likely to be Parkinson. ?Encephalopathy.  -EEG suggestive of mild encephalopathy.  -CK 28, TSH & ammonia normal.   - PT/OT  -Neurology s/o  -if persists would recommend rediscussing MRI brain    Dysphagia, Silent aspiration (d/t weak hyolaryneal excursion creating vallecular and pyriform stasis - per SLP), Zenker's diverticulum, Esophageal dysmotility disorder:   -esophagram/VSS reviewed  -SLP following  -mechanical soft, thin liquid, swallowing strategies advised by SLP  -HOB 30 degrees  -ppi     Acute Thrombocytopenia: slowly improving. No splenomegaly. + Radiographic features of cirrhosis. Bone marrow suppression/Toxicity from CellCept in setting of renal failure. LDH normal. B12 normal. PBS normal platelet morphology. numerous katie cells. Polychromasia not increased.   -Oncology/Hematology  following  -hold cellcept  -hemolysis labs neg  -cbc a.m.     HFpEF with ADHF predominant RHF, severe Tricuspid regurgitation: RLL moderate pleural effusion, nt-BNP ~1900.  Hypervolemic on exam consistent with right heart failure. LVEF 60-65%, right ventricle is severely dilated with mildly decreased right  ventricular systolic function. Worse vs TTE in 2021.  -remains significantly volume overloaded and suspect this is driving his renal dysfunction as well.  -IV Bumex from PO started today  -metoprolol 12.5mg BID  -Cardiology following  -strict I/O, daily weights    Mild to moderate valvular aortic stenosis: TTE 2/23/223     PAF: EKG on admission showed a. Fib, ST abnormality, troponin 37 x2.   - Eliquis on hold due to bleeding oropharynx and low platelets  - metoprolol resumed today  -resume eliquis renally dosed 2.5mg BID    Possible Sepsis:  Hypothermic, hypotension, immunosupressed, concern for RLL aspiration pneumonia.  Renal US no hydro. Initial UA no infection. COVID negative 2/20/23. CT Chest w/o contrast showed moderate right pleural effusion with right basilar compressive atelectasis.  Procalcitonin 0.16.  -BC's x2 NGTD, MRSA nasal swab neg, pleural fluid cultures negative.  -stop vancoycin. Cont zosyn     Acute hypoxic respiratory failure, COPD without acute exacerbation, Severe tricuspid regurgitation with HFpEF with ADHF predominant right heart failure, moderate right pleural effusion with compressive atelectasis, probable RLL aspiration PNA given silent aspiration on VSS. S/P right thoracentesis 2/23 with 1.5L fluid removed.  Meets 1/3 of light's coiteria for exudative effusion.  Improved hypoxia.  - hold symbicort  -duoneb qid  -zosyn  -oximetry  -bronchial hygiene  -aspiration precautions  -diuresis    CAD: no chest pain. Initial admission trop x 2 37.   -eliquis, metoprolol, statin    Possible cirrhosis: per imaging. LFT's with TB 1.3, DB 0.7 (normal today), normal transaminases. CT Chest showed  "mild ascites. Nodular appearance of liver suggestive of cirrhosis. No h/o EtOH use. Suspect related to right heart failure.  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  -hold CellCept      Chronic BLE edema:  -venous US BLE negative for dvt  -compression wraps     Chronic post nasal drip, nonallergic rhinitis  -trial Azelastin-Fluticasone BID     Inguinal candidiasis  - lotrimin bid     Decubitus ulcer stage 2  -Wound care following     Impaired hearing    Constipation:  -senna-s 2 qam  -dulcolax supp prn    Addendum:  -Severe malnutrition in context of Chronic illness or disease          Diet: soft, thin liq, 2g sodium  DVT Prophylaxis: DOAC   Mendes Catheter: Not present  Lines: None     Cardiac Monitoring: tele  Code Status: No CPR- Do NOT Intubate          Clinically Significant Risk Factors Present on Admission            # Hypernatremia: Highest Na = 148 mmol/L in last 2 days, will monitor as appropriate       # Drug Induced Coagulation Defect: home medication list includes an anticoagulant medication  # Thrombocytopenia: Lowest platelets = 57 in last 2 days, will monitor for bleeding   # Hypertension: home medication list includes antihypertensive(s)      # Obesity: Estimated body mass index is 35.42 kg/m  as calculated from the following:    Height as of this encounter: 1.829 m (6' 0.01\").    Weight as of this encounter: 118.5 kg (261 lb 3.9 oz).                  Disposition Plan        Expected Discharge Date: >2d      Destination: TCU         S:  Afebrile. Events overnight noted    O:  /60 (BP Location: Left arm)   Pulse 77   Temp 97.4  F (36.3  C) (Oral)   Resp 20   Ht 1.829 m (6' 0.01\")   Wt 120.7 kg (266 lb)   SpO2 94%   BMI 36.07 kg/m    Physical Examination:   General appearance " - alert, and in no distress  Lungs - decreased right base-mid lung, no wheezing, non labored  Heart - irreg, irreg, nml rate,  2+ peripheral edema.  Abdomen - soft, BS+  Neurological - alert, oriented x2, dysarthric like speech  Skin -no pemphigoid or bullous lesions.     Lab/imaging reviewed

## 2023-02-25 NOTE — PLAN OF CARE
"  Problem: Plan of Care - These are the overarching goals to be used throughout the patient stay.    Goal: Patient-Specific Goal (Individualized)  Description: You can add care plan individualizations to a care plan. Examples of Individualization might be:  \"Parent requests to be called daily at 9am for status\", \"I have a hard time hearing out of my right ear\", or \"Do not touch me to wake me up as it startles me\".  Outcome: Progressing     Problem: Plan of Care - These are the overarching goals to be used throughout the patient stay.    Goal: Plan of Care Review  Description: The Plan of Care Review/Shift note should be completed every shift.  The Outcome Evaluation is a brief statement about your assessment that the patient is improving, declining, or no change.  This information will be displayed automatically on your shift note.  Outcome: Progressing   Goal Outcome Evaluation:       Pt is on 1 L O2 nasal cannula. Pt denies pain. Pt reports that it is hard for him to swallow pills and would like the ones that can be crushed up in vanilla or chocolate pudding. An abdominal ultrasound was ordered to be taken at 1500 today. Encouraged pt to change positions in bed every 2 hours because of his pressure ulcer on his coccyx.       Delaney Segura RN            "

## 2023-02-26 LAB
ALBUMIN SERPL BCG-MCNC: 3.3 G/DL (ref 3.5–5.2)
ALP SERPL-CCNC: 134 U/L (ref 40–129)
ALT SERPL W P-5'-P-CCNC: 18 U/L (ref 10–50)
ANION GAP SERPL CALCULATED.3IONS-SCNC: 11 MMOL/L (ref 7–15)
AST SERPL W P-5'-P-CCNC: 38 U/L (ref 10–50)
BASE EXCESS BLDV CALC-SCNC: 4.5 MMOL/L
BASOPHILS # BLD AUTO: 0 10E3/UL (ref 0–0.2)
BASOPHILS NFR BLD AUTO: 0 %
BILIRUB DIRECT SERPL-MCNC: 0.89 MG/DL (ref 0–0.3)
BILIRUB SERPL-MCNC: 1.8 MG/DL
BUN SERPL-MCNC: 24.1 MG/DL (ref 8–23)
CALCIUM SERPL-MCNC: 9.5 MG/DL (ref 8.8–10.2)
CHLORIDE SERPL-SCNC: 105 MMOL/L (ref 98–107)
COPPER SERPL-MCNC: 119.6 UG/DL
CORTIS SERPL-MCNC: 11.5 UG/DL
CREAT SERPL-MCNC: 1.71 MG/DL (ref 0.67–1.17)
DEPRECATED HCO3 PLAS-SCNC: 30 MMOL/L (ref 22–29)
EOSINOPHIL # BLD AUTO: 0.1 10E3/UL (ref 0–0.7)
EOSINOPHIL NFR BLD AUTO: 1 %
ERYTHROCYTE [DISTWIDTH] IN BLOOD BY AUTOMATED COUNT: 14.7 % (ref 10–15)
GFR SERPL CREATININE-BSD FRML MDRD: 39 ML/MIN/1.73M2
GLUCOSE BLDC GLUCOMTR-MCNC: 65 MG/DL (ref 70–99)
GLUCOSE BLDC GLUCOMTR-MCNC: 76 MG/DL (ref 70–99)
GLUCOSE BLDC GLUCOMTR-MCNC: 79 MG/DL (ref 70–99)
GLUCOSE BLDC GLUCOMTR-MCNC: 87 MG/DL (ref 70–99)
GLUCOSE BLDC GLUCOMTR-MCNC: 90 MG/DL (ref 70–99)
GLUCOSE SERPL-MCNC: 67 MG/DL (ref 70–99)
HCO3 BLDV-SCNC: 30 MMOL/L (ref 24–30)
HCT VFR BLD AUTO: 43.6 % (ref 40–53)
HGB BLD-MCNC: 13.2 G/DL (ref 13.3–17.7)
HOLD SPECIMEN: NORMAL
IMM GRANULOCYTES # BLD: 0.1 10E3/UL
IMM GRANULOCYTES NFR BLD: 1 %
LACTATE SERPL-SCNC: 1.3 MMOL/L (ref 0.7–2)
LYMPHOCYTES # BLD AUTO: 1 10E3/UL (ref 0.8–5.3)
LYMPHOCYTES NFR BLD AUTO: 20 %
MAGNESIUM SERPL-MCNC: 2.2 MG/DL (ref 1.7–2.3)
MCH RBC QN AUTO: 28.4 PG (ref 26.5–33)
MCHC RBC AUTO-ENTMCNC: 30.3 G/DL (ref 31.5–36.5)
MCV RBC AUTO: 94 FL (ref 78–100)
MONOCYTES # BLD AUTO: 0.9 10E3/UL (ref 0–1.3)
MONOCYTES NFR BLD AUTO: 17 %
NEUTROPHILS # BLD AUTO: 3.2 10E3/UL (ref 1.6–8.3)
NEUTROPHILS NFR BLD AUTO: 61 %
NRBC # BLD AUTO: 0 10E3/UL
NRBC BLD AUTO-RTO: 1 /100
OXYHGB MFR BLDV: 86 % (ref 70–75)
PCO2 BLDV: 51 MM HG (ref 35–50)
PH BLDV: 7.38 [PH] (ref 7.35–7.45)
PLATELET # BLD AUTO: 61 10E3/UL (ref 150–450)
PO2 BLDV: 53 MM HG (ref 25–47)
POTASSIUM SERPL-SCNC: 3.5 MMOL/L (ref 3.4–5.3)
PROCALCITONIN SERPL IA-MCNC: 0.14 NG/ML
PROT SERPL-MCNC: 5.8 G/DL (ref 6.4–8.3)
RBC # BLD AUTO: 4.64 10E6/UL (ref 4.4–5.9)
SAO2 % BLDV: 87.2 % (ref 70–75)
SODIUM SERPL-SCNC: 146 MMOL/L (ref 136–145)
TOTAL PROTEIN SERUM FOR ELP: 5.7 G/DL (ref 6.4–8.3)
WBC # BLD AUTO: 5.2 10E3/UL (ref 4–11)

## 2023-02-26 PROCEDURE — 250N000013 HC RX MED GY IP 250 OP 250 PS 637: Performed by: INTERNAL MEDICINE

## 2023-02-26 PROCEDURE — 82533 TOTAL CORTISOL: CPT | Performed by: INTERNAL MEDICINE

## 2023-02-26 PROCEDURE — 85025 COMPLETE CBC W/AUTO DIFF WBC: CPT | Performed by: INTERNAL MEDICINE

## 2023-02-26 PROCEDURE — 99233 SBSQ HOSP IP/OBS HIGH 50: CPT | Performed by: INTERNAL MEDICINE

## 2023-02-26 PROCEDURE — 36415 COLL VENOUS BLD VENIPUNCTURE: CPT | Performed by: INTERNAL MEDICINE

## 2023-02-26 PROCEDURE — 120N000001 HC R&B MED SURG/OB

## 2023-02-26 PROCEDURE — 84145 PROCALCITONIN (PCT): CPT | Performed by: INTERNAL MEDICINE

## 2023-02-26 PROCEDURE — 82248 BILIRUBIN DIRECT: CPT | Performed by: INTERNAL MEDICINE

## 2023-02-26 PROCEDURE — 250N000009 HC RX 250: Performed by: INTERNAL MEDICINE

## 2023-02-26 PROCEDURE — 83605 ASSAY OF LACTIC ACID: CPT | Performed by: INTERNAL MEDICINE

## 2023-02-26 PROCEDURE — 80053 COMPREHEN METABOLIC PANEL: CPT | Performed by: INTERNAL MEDICINE

## 2023-02-26 PROCEDURE — 250N000013 HC RX MED GY IP 250 OP 250 PS 637: Performed by: STUDENT IN AN ORGANIZED HEALTH CARE EDUCATION/TRAINING PROGRAM

## 2023-02-26 PROCEDURE — 83735 ASSAY OF MAGNESIUM: CPT | Performed by: INTERNAL MEDICINE

## 2023-02-26 PROCEDURE — 82805 BLOOD GASES W/O2 SATURATION: CPT | Performed by: INTERNAL MEDICINE

## 2023-02-26 PROCEDURE — 99232 SBSQ HOSP IP/OBS MODERATE 35: CPT | Performed by: INTERNAL MEDICINE

## 2023-02-26 PROCEDURE — 250N000011 HC RX IP 250 OP 636: Performed by: INTERNAL MEDICINE

## 2023-02-26 RX ORDER — POLYETHYLENE GLYCOL 3350 17 G/17G
17 POWDER, FOR SOLUTION ORAL DAILY PRN
Status: DISCONTINUED | OUTPATIENT
Start: 2023-02-26 | End: 2023-03-02 | Stop reason: HOSPADM

## 2023-02-26 RX ORDER — MIDODRINE HYDROCHLORIDE 2.5 MG/1
2.5 TABLET ORAL
Status: DISCONTINUED | OUTPATIENT
Start: 2023-02-26 | End: 2023-02-27

## 2023-02-26 RX ORDER — ACETAMINOPHEN 325 MG/1
975 TABLET ORAL 3 TIMES DAILY
Status: DISCONTINUED | OUTPATIENT
Start: 2023-02-26 | End: 2023-03-01

## 2023-02-26 RX ORDER — LIDOCAINE 50 MG/G
OINTMENT TOPICAL 4 TIMES DAILY
Status: DISCONTINUED | OUTPATIENT
Start: 2023-02-26 | End: 2023-03-02 | Stop reason: HOSPADM

## 2023-02-26 RX ORDER — NICOTINE POLACRILEX 4 MG
15-30 LOZENGE BUCCAL
Status: DISCONTINUED | OUTPATIENT
Start: 2023-02-26 | End: 2023-03-02 | Stop reason: HOSPADM

## 2023-02-26 RX ORDER — POTASSIUM CHLORIDE 1.5 G/1.58G
40 POWDER, FOR SOLUTION ORAL 3 TIMES DAILY
Status: COMPLETED | OUTPATIENT
Start: 2023-02-26 | End: 2023-02-26

## 2023-02-26 RX ORDER — DEXTROSE MONOHYDRATE 25 G/50ML
25-50 INJECTION, SOLUTION INTRAVENOUS
Status: DISCONTINUED | OUTPATIENT
Start: 2023-02-26 | End: 2023-03-02 | Stop reason: HOSPADM

## 2023-02-26 RX ADMIN — PIPERACILLIN AND TAZOBACTAM 3.38 G: 3; .375 INJECTION, POWDER, LYOPHILIZED, FOR SOLUTION INTRAVENOUS at 09:31

## 2023-02-26 RX ADMIN — LIDOCAINE: 50 OINTMENT TOPICAL at 17:25

## 2023-02-26 RX ADMIN — AZELASTINE HYDROCHLORIDE 1 SPRAY: 137 SPRAY, METERED NASAL at 21:34

## 2023-02-26 RX ADMIN — MICONAZOLE NITRATE ANTIFUNGAL POWDER: 2 POWDER TOPICAL at 21:34

## 2023-02-26 RX ADMIN — FLUTICASONE PROPIONATE 1 SPRAY: 50 SPRAY, METERED NASAL at 21:34

## 2023-02-26 RX ADMIN — Medication 25 MCG: at 10:02

## 2023-02-26 RX ADMIN — ACETAMINOPHEN 975 MG: 325 TABLET ORAL at 13:46

## 2023-02-26 RX ADMIN — BUDESONIDE AND FORMOTEROL FUMARATE DIHYDRATE 2 PUFF: 160; 4.5 AEROSOL RESPIRATORY (INHALATION) at 21:34

## 2023-02-26 RX ADMIN — LIDOCAINE: 50 OINTMENT TOPICAL at 21:34

## 2023-02-26 RX ADMIN — PANTOPRAZOLE SODIUM 40 MG: 40 TABLET, DELAYED RELEASE ORAL at 07:10

## 2023-02-26 RX ADMIN — BUMETANIDE 2 MG: 0.25 INJECTION INTRAMUSCULAR; INTRAVENOUS at 09:47

## 2023-02-26 RX ADMIN — METOPROLOL TARTRATE 12.5 MG: 25 TABLET, FILM COATED ORAL at 09:46

## 2023-02-26 RX ADMIN — POTASSIUM CHLORIDE 40 MEQ: 1.5 POWDER, FOR SOLUTION ORAL at 13:53

## 2023-02-26 RX ADMIN — FLUTICASONE PROPIONATE 1 SPRAY: 50 SPRAY, METERED NASAL at 09:30

## 2023-02-26 RX ADMIN — AZELASTINE HYDROCHLORIDE 1 SPRAY: 137 SPRAY, METERED NASAL at 09:30

## 2023-02-26 RX ADMIN — PIPERACILLIN AND TAZOBACTAM 3.38 G: 3; .375 INJECTION, POWDER, LYOPHILIZED, FOR SOLUTION INTRAVENOUS at 01:25

## 2023-02-26 RX ADMIN — Medication 1 TABLET: at 10:00

## 2023-02-26 RX ADMIN — BUMETANIDE 2 MG: 0.25 INJECTION INTRAMUSCULAR; INTRAVENOUS at 17:25

## 2023-02-26 RX ADMIN — SENNOSIDES AND DOCUSATE SODIUM 1 TABLET: 50; 8.6 TABLET ORAL at 07:10

## 2023-02-26 RX ADMIN — MICONAZOLE NITRATE ANTIFUNGAL POWDER: 2 POWDER TOPICAL at 09:31

## 2023-02-26 RX ADMIN — THIAMINE HYDROCHLORIDE 100 MG: 100 INJECTION, SOLUTION INTRAMUSCULAR; INTRAVENOUS at 21:32

## 2023-02-26 RX ADMIN — POTASSIUM CHLORIDE 40 MEQ: 1.5 POWDER, FOR SOLUTION ORAL at 10:33

## 2023-02-26 RX ADMIN — APIXABAN 2.5 MG: 2.5 TABLET, FILM COATED ORAL at 09:44

## 2023-02-26 RX ADMIN — PIPERACILLIN AND TAZOBACTAM 3.38 G: 3; .375 INJECTION, POWDER, LYOPHILIZED, FOR SOLUTION INTRAVENOUS at 17:24

## 2023-02-26 ASSESSMENT — ACTIVITIES OF DAILY LIVING (ADL)
ADLS_ACUITY_SCORE: 43

## 2023-02-26 NOTE — PROGRESS NOTES
[facility]   [address]  [place] [address]   [email or web address]   OFFICE: [phone]     CARDIOLOGY FOLLOW-UP NOTE      Impression and Plan     Impression:   1. Acute right heart failure with preserved LVEF, likely related to tricuspid regurgitation and right ventricular dilation and dysfunction - remains decompensated. Not responding well to IV bumetanide. He also has a strong allergy to high doses of loop diuretics which is mostly controlled with mycophenolate. His lack of clinical improvement is likely related to inability to effectively move fluid from right side to left sided circulation and adequately perfuse his kidneys. I discussed with the patient and his wife the challenges this provides and that there are limited treatment options and no cure. Dr. Roberts and Dr. Lang had already discussed these issues with Mr. TERRI Gilliam and his wife and I reinforced these concepts. Currently Pérez and his wife are strongly considering palliative care.  2. Severe tricuspid regurgitation, likely functional in the setting of dilated RV and reduced RV function. Treating with diuresis. Refer to valve clinic if pt is amenable.  3. Paroxysmal atrial fibrillation. On eliquis for stroke prevention and low-dose metoprolol for rate control.  4. CKD  5. Coronary artery disease - stable without angina.    Plan:    Continue bumetanide for now    Palliative consulted for tomorrow    Overall prognosis is fairly poor.      Subjective     Alert.no improvement in LE edema. Breathing is stable - not worse, but not better.    Cardiac Diagnostics   Telemetry (personally reviewed): atrial fibrillation, controlled rate.    Echocardiogram (results reviewed):   TTE 2/23/23  The left ventricle is normal in size with normal left ventricular wall  thickness.  Left ventricular function is normal.The ejection fraction is 60-65%.  The right ventricle is severely dilated with mildly decreased  "right  ventricular systolic function  There is severe (4+) tricuspid regurgitation.  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  Compared to the prior study of 7/8/21, the right ventricle is now severely  dilated and there is now severe tricuspid regurgitation.     Consider outpatient referral to the [facility] valve clinic to  consider treatment options for the tricuspid regurgitation.      Physical Examination       BP 92/53 (BP Location: Left arm, Patient Position: Sitting, Cuff Size: Adult Regular)   Pulse 81   Temp (!) 95.9  F (35.5  C) (Temporal)   Resp 20   Ht 1.829 m (6' 0.01\")   Wt 120.7 kg (266 lb)   SpO2 94%   BMI 36.07 kg/m            Intake/Output Summary (Last 24 hours) at 2/25/2023 1134  Last data filed at 2/25/2023 0544  Gross per 24 hour   Intake 360 ml   Output 950 ml   Net -590 ml       General: frail elderly  Man, no acute distress.  HENT: external ears normal. Nares patent. Mucous membranes moist.  Eyes: perrla, extraocular muscles intact. No scleral icterus.   Lungs: coarse breath sounds  COR: irregular rhythm. Normal rate.  Extrem: 3+ BLE edema         Imaging      No new imaging in past 24 hours.    Lab Results   Lab Results   Component Value Date    CHOL 128 02/25/2022    HDL 48 02/25/2022    TRIG 121 02/25/2022    BUN 24.1 (H) 02/26/2023     (H) 02/26/2023    CO2 30 (H) 02/26/2023       Lab Results   Component Value Date    WBC 5.2 02/26/2023    HGB 13.2 (L) 02/26/2023    HCT 43.6 02/26/2023    MCV 94 02/26/2023    PLT 61 (L) 02/26/2023       Lab Results   Component Value Date    TROPONINI 0.03 07/07/2021     (H) 07/07/2021    TSH 2.42 02/21/2023    INR 1.60 (H) 02/23/2023               Current Inpatient Scheduled Medications   Scheduled Meds:    apixaban ANTICOAGULANT  2.5 mg Oral BID     azelastine  1 spray Both Nostrils BID     budesonide-formoterol  2 puff Inhalation 2 times daily     bumetanide  2 mg Intravenous BID     [Held by provider] doxazosin  4 mg " Oral At Bedtime     finasteride  5 mg Oral At Bedtime     fluticasone  1 spray Both Nostrils BID     metoprolol tartrate  12.5 mg Oral BID     miconazole   Topical BID     multivitamin w/minerals  1 tablet Oral Daily     [Held by provider] mycophenolate  1,000 mg Oral BID     pantoprazole  40 mg Oral QAM AC     piperacillin-tazobactam  3.375 g Intravenous Q8H     potassium chloride  40 mEq Oral TID     pravastatin  20 mg Oral At Bedtime     senna-docusate  2 tablet Oral QAM AC     thiamine  100 mg Intravenous Daily     Vitamin D3  25 mcg Oral Daily     Continuous Infusions:         Medications Prior to Admission   Prior to Admission medications    Medication Sig Start Date End Date Taking? Authorizing Provider   apixaban ANTICOAGULANT (ELIQUIS) 5 MG tablet Take 1 tablet (5 mg) by mouth 2 times daily 7/18/22  Yes Aditya Mehta MD   budesonide-formoterol (SYMBICORT) 160-4.5 MCG/ACT Inhaler [BUDESONIDE-FORMOTEROL (SYMBICORT) 160-4.5 MCG/ACTUATION INHALER] USE 2 INHALATIONS TWICE A DAY 12/14/21  Yes Aditya Mehta MD   bumetanide (BUMEX) 1 MG tablet Take 2 tablets (2 mg) by mouth daily 3/23/22  Yes Smith Ellis MD   cholecalciferol, vitamin D3, (VITAMIN D3) 25 mcg (1,000 unit) capsule Take 1 capsule by mouth daily 5/13/21  Yes Provider, Historical   doxazosin (CARDURA) 4 MG tablet Take 1 tablet (4 mg) by mouth At Bedtime 10/18/22  Yes Aditya Mehta MD   doxycycline hyclate (VIBRAMYCIN) 100 MG capsule Take 100 mg by mouth 2 times daily   Yes Unknown, Entered By History   finasteride (PROSCAR) 5 MG tablet Take 1 tablet (5 mg) by mouth daily  Patient taking differently: Take 5 mg by mouth At Bedtime 4/14/22  Yes Aditya Mehta MD   ipratropium (ATROVENT) 0.03 % nasal spray Spray 2 sprays into both nostrils 3 times daily As needed for congestion  Patient taking differently: Spray 2 sprays into both nostrils 3 times daily as needed As needed for congestion 9/6/22  Yes Aditya Mehta MD   LORazepam (ATIVAN)  0.5 MG tablet TAKE 1/2 TO 1 TABLET BY MOUTH THREE TIMES DAILY AS NEEDED FOR ANXIETY 10/6/22  Yes Aditya Mehta MD   metoprolol tartrate (LOPRESSOR) 100 MG tablet Take 1 tablet (100 mg) by mouth 2 times daily 10/6/22  Yes Aditya Mehta MD   niacinamide 500 MG tablet Take 1,000 mg by mouth daily   Yes Unknown, Entered By History   pravastatin (PRAVACHOL) 20 MG tablet Take 1 tablet (20 mg) by mouth every evening 4/14/22  Yes Aditya Mehta MD   sodium chloride (OCEAN) 0.65 % nasal spray Spray 1 spray into both nostrils daily as needed for congestion   Yes Unknown, Entered By History   spironolactone (ALDACTONE) 25 MG tablet Take 1 tablet (25 mg) by mouth daily 10/6/22  Yes Aditya Mehta MD   mycophenolate (GENERIC EQUIVALENT) 500 MG tablet Take 1,000 mg by mouth 2 times daily 9/2/21   Reported, Patient   losartan (COZAAR) 100 MG tablet [LOSARTAN (COZAAR) 100 MG TABLET] Take 1 tablet (100 mg total) by mouth daily. 3/31/21 7/12/21  Aditya Mehta MD

## 2023-02-26 NOTE — PLAN OF CARE
Problem: Heart Failure  Goal: Stable Heart Rate and Rhythm  Outcome: Progressing     Problem: Heart Failure  Goal: Effective Oxygenation and Ventilation  Outcome: Progressing  Intervention: Promote Airway Secretion Clearance  Recent Flowsheet Documentation  Taken 2/26/2023 0450 by Poppy Callahan RN  Activity Management: activity adjusted per tolerance  Taken 2/26/2023 0045 by Poppy Callahan RN  Activity Management: activity adjusted per tolerance     Problem: Hypertension Comorbidity  Goal: Blood Pressure in Desired Range  Outcome: Progressing  Intervention: Maintain Blood Pressure Management  Recent Flowsheet Documentation  Taken 2/26/2023 0450 by Poppy Callahan RN  Medication Review/Management: medications reviewed  Taken 2/26/2023 0045 by Poppy Callahan RN  Medication Review/Management: medications reviewed    Pt's vital signs were stable on 1.5 L of O2. He has denied pain but stated that he does have some abdominal discomfort. He has been up to the bathroom and has had a loose BM. He stated that he doesn't want a stool softeners this am. He has lymphedema wraps in place to his BLE and he is tolerating them. He is an A2 with a walker and gait belt. He is able to make needs known. Call light is within reach.

## 2023-02-26 NOTE — PLAN OF CARE
Problem: Plan of Care - These are the overarching goals to be used throughout the patient stay.    Goal: Optimal Comfort and Wellbeing  Outcome: Progressing     Problem: COPD (Chronic Obstructive Pulmonary Disease) Comorbidity  Goal: Maintenance of COPD Symptom Control  Outcome: Progressing   Goal Outcome Evaluation:       Pt is alert oriented x4. VSS. Pt appears to be in good spirits this marlo.  Denies pain. Wife visited this marlo around dinner and assisted pt with meals. abdominal US done.  Pt is more active and reports feeling better today. Ambulated to the bathroom with assist of 2 to void and also had a loose bowel movement at HS, and tolerated it activity very well. Remains on O2 1.5L via NC with Spo2 in the mid 90's. Pt ate well at supper. Taking po med crushed in pudding very  Well.

## 2023-02-26 NOTE — PROGRESS NOTES
Care Management Follow Up    Length of Stay (days): 6    Expected Discharge Date: 02/27/2023     Concerns to be Addressed: discharge planning     Patient plan of care discussed at interdisciplinary rounds: Yes    Anticipated Discharge Disposition:  TCU- accepted at [facility] when medically ready     Anticipated Discharge Services:    Anticipated Discharge DME:      Patient/family educated on Medicare website which has current facility and service quality ratings: yes   Education Provided on the Discharge Plan:  yes  Patient/Family in Agreement with the Plan:  yes    Referrals Placed by CM/DIRK:    Private pay costs discussed: Not applicable    Additional Information:  SW completed chart review. Pt accepted at [facility] TCU when medically ready. SW placed call to admissions and left voicemail with request to return call.       LANEY Lopez

## 2023-02-26 NOTE — PROGRESS NOTES
Renal progress note  CC:CKD , hypervolemia  Assessment and Plan:  87 year old male with a past medical history of heart failure with preserved ejection fraction history of COPD CKD history of atrial fibrillation hypertension chronic lymphedema presented with increasing generalized weakness poor oral intake worsening fatigue and decreased urine output.  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 chronic, less likely to be of primary renal disease with no proteinuria at this time, we will repeat a paraprotein work-up as well as complements and BLACK  Initially Bumex was held now resumed since 2/24/2023 with increased to IV Bumex 2 mg twice a day 2/25/2023  Received some IV fluids now discontinued  --> Discussed with his wife creatinine has had slowly uptrending in the past 2 years and this might be his progressive disease versus this creatinine is secondary to cardiorenal syndrome like picture with hypervolemia and congestion with possible improvement with diuresis  -->  Extensive discussion between cardiology primary team and then reviewed those plans with wife and  as well at this time his options are very limited.  We can aggressively UF with ultrafiltration and consider options like tricuspid clipping to reduce TR although that would still remain a problem with intolerance to diuretics.  Per wife the patient is not considering any aggressive cares and  does not have the capacity to follow with rehab and has been declining over the past few months.  Based on patient and his wife's decisions would pursue palliative care as this was passed on to the hospitalist team and consult to be placed for tomorrow  --> Follow on renal panel daily with ongoing diuresis     Electrolytes  Mild hypernatremia  Follow-up with ongoing diuresis may need thiazide for additional natriuresis     Hypervolemia  Low normal blood pressures  Agree with resuming Bumex IV twice a day   Following response  On metoprolol for atrial fibrillation     Hemoglobin 13+  Acute onset thrombocytopenia slowly improving  Heme-onc following currently CellCept on hold     Heart failure exacerbation with heart failure with preserved ejection fraction  History of severe TR  History of CAD  Moderate pleural effusion on right lower lobe s/p thoracocentesis  Possibly cor pulmonale with worsened right heart functions  Mild to moderate aortic stenosis on TTE  History of atrial fibrillation Eliquis resumed 2.5 mg twice daily was initially on hold secondary to bleeding and thrombocytopenia  Ongoing dyspnea and worsening lower extremity edema  Also received IV fluids earlier on admission  Started on IV Bumex 2 mg twice a day 2/25/2022  Continued on metoprolol  Cardiology following     Initial concerns for RLL aspirated pneumonia  Remains on Zosyn     Acute on chronic respiratory failure  History of COPD  Likely secondary to heart failure exacerbation as well  S/p thoracocentesis right pleural effusion  DuoNebs nasal cannula oxygen     History of BPH  No evidence of retention  Currently off Cardura continued on Proscar     Chronic lymphedema  Local management with compression wraps     History of bullous pemphigoid on mycophenolate PTA  Also on doxycycline for suppression has been on hold  Report significant sulfa allergy has been tolerating Bumex with immunosuppression with MMF     Significant deconditioning and  weakness  Stage II decubitus ulcer  Frequent changing positions avoiding pressure points    Thank you for the consultation we will follow  Cristopher Lang MD  [facility]  [phone]      Subjective  Patient still with significant amount of hypervolemia has had some response and increasing urine output with IV Bumex although still remains with increasing weight and positive fluid balance.  Labs from yesterday still pending overall course and prognosis were reviewed with wife at length and at this time the plan is to pursue palliative care with consult placed for tomorrow  Case was discussed at length with cardiology as well as primary team    Objective    Vital signs in last 24 hours  Temp:  [95.9  F (35.5  C)-98.2  F (36.8  C)] 95.9  F (35.5  C)  Pulse:  [68-82] 68  Resp:  [18-20] 20  BP: ()/(60-75) 92/61  SpO2:  [93 %-97 %] 94 %  Weight:   [unfilled]    Intake/Output last 3 shifts  I/O last 3 completed shifts:  In: 240 [P.O.:240]  Out: 700 [Urine:700]  Intake/Output this shift:  No intake/output data recorded.    Physical Exam  Alert/, awake, NAD  CV: RRR without murmur or rub  Lung: clear and equal; no extra sounds  Ab: soft and NT; mild distended; normal bs  Ext: +++ edema and well perfused  Skin; no rash    Pertinent Labs   Lab Results   Component Value Date    WBC 5.2 02/26/2023    HGB 13.2 (L) 02/26/2023    HCT 43.6 02/26/2023    MCV 94 02/26/2023    PLT 61 (L) 02/26/2023     Lab Results   Component Value Date    BUN 24.1 (H) 02/26/2023     (H) 02/26/2023    CO2 30 (H) 02/26/2023       Lab Results   Component Value Date    ALBUMIN 3.3 (L) 02/26/2023     Lab Results   Component Value Date    PHOS 3.7 02/23/2023     I reviewed all lab results  Cristopher Lang MD

## 2023-02-26 NOTE — PLAN OF CARE
Problem: Plan of Care - These are the overarching goals to be used throughout the patient stay.    Goal: Absence of Hospital-Acquired Illness or Injury  Intervention: Prevent and Manage VTE (Venous Thromboembolism) Risk  Recent Flowsheet Documentation  Taken 2/26/2023 1430 by Delaney Segura RN  VTE Prevention/Management: (lymphodema wrap) --  Taken 2/26/2023 1000 by Delaney Segura RN  VTE Prevention/Management: (lymphodema wrap) --     Problem: Heart Failure  Goal: Fluid and Electrolyte Balance  Outcome: Progressing   Goal Outcome Evaluation:       Pt reports no pain today. Pt declines the Miralax due to loose bowel movements this morning. Pt was prescribed potassium supplement today. Bladder scan is ordered to scan pt after each void. Lymphoedema dressing was removed and replaced with tubi . Pt has a hard time swallowing and wants meds crushed in pudding. Temp was 95.9 F and applied taz hugger.       Delaney Segura RN

## 2023-02-26 NOTE — PROGRESS NOTES
[facility]     Medicine Progress Note - Hospitalist Service     Date of Admission:  2/20/2023        Assessment & Plan     Nishant Gilliam is a 87 year old male admitted on 2/20/2023. Nishant Gilliam is a 87 year old male with  HFpEF, Afib, COPD, CKD, HTN, HLD presented with generalized weakness, decreased po intake, fatigue, and decreased urine output.  Wife states that 'everything has slowed down for the patient- both his motion and speech, and he is more sleepy'. Denied fever, chills, nausea, vomiting, abdominal pain, diarrhea. More trouble swallowing.  Admitted for further management.    HFpEF with ADHF (end-stage) predominant RHF with severe Tricuspid regurgitation: RLL moderate pleural effusion, nt-BNP ~1900.  Hypervolemic on exam consistent with right heart failure. LVEF 60-65%, right ventricle is severely dilated with mildly decreased right ventricular systolic function. Worse vs TTE in 2021.  -remains significantly decompensated. Not responding well to IV bumetanide. He has a strong allergy to high doses of loop diuretics resulting in Bullous Pemphigoid which is mostly controlled with mycophenolate. Cardiology, Nephrology and myself d/w patient/spouse the limited treatment options and no cure. They are both in agreement with having palliative care discussion on goals of care as certainly would be Hospice appropriate.    -IV Bumex   -hold metoprolol 12.5mg BID for today  -Cardiology following  -strict I/O, daily weights  -can try some midodrin today to see if this helps some with BP and allow IV diuresis     NOREEN on probable CKD stage III:  Due to CRS due to decompensated heart failure.  Cr remains elevated. Erika < 20.  Renal US showed chronic renal disease, no hydro but mild bladder distension. Cr slight worsening from admission. CK normal.  UA bland. Uprot 19.1.   -additional labs pending  -Nephrology following    Generalized weakness, Slow response, speech, tremor:  Seems to be  persistent  - CT-head unremarkable for acute process  -MRI brain to rule out cerebrovascular disease refused by patient  -Neurology consultation appreciated: not likely to be Parkinson. ?Encephalopathy.  -EEG suggestive of mild encephalopathy.  -CK 28, TSH & ammonia normal.   - PT/OT  -Neurology s/o  -if persists would recommend rediscussing MRI brain    Dysphagia, Silent aspiration (d/t weak hyolaryneal excursion creating vallecular and pyriform stasis - per SLP), Zenker's diverticulum, Esophageal dysmotility disorder:   -esophagram/VSS reviewed  -SLP following  -mechanical soft, thin liquid, swallowing strategies advised by SLP  -HOB 30 degrees  -ppi     Acute Thrombocytopenia: slowly improving. No splenomegaly. + Radiographic features of cirrhosis. Bone marrow suppression/Toxicity from CellCept in setting of renal failure. LDH normal. B12 normal. PBS normal platelet morphology. numerous katie cells. Polychromasia not increased.   -Oncology/Hematology following  -hold cellcept  -hemolysis labs neg    Mild to moderate valvular aortic stenosis: TTE 2/23/223     PAF: EKG on admission showed a. Fib, ST abnormality, troponin 37 x2.   - metoprolol   -eliquis 2.5mg BID    Possible Sepsis:  Hypothermic, hypotension, immunosupressed, concern for RLL aspiration pneumonia.  Renal US no hydro. Initial UA no infection. COVID negative 2/20/23. CT Chest w/o contrast showed moderate right pleural effusion with right basilar compressive atelectasis.  Procalcitonin 0.16.  However, I think his hypothermia, hypotension, etc are all due to decompensated heart failure and cardiorenal syndrome resulting in tissue hypoperfusion/shock  -BC's x2 NGTD, MRSA nasal swab neg, pleural fluid cultures negative.  -zosyn     Acute hypoxic respiratory failure, COPD without acute exacerbation, Severe tricuspid regurgitation with HFpEF with ADHF predominant right heart failure, moderate right pleural effusion with compressive atelectasis, probable RLL  aspiration PNA given silent aspiration on VSS. S/P right thoracentesis 2/23 with 1.5L fluid removed.  Meets 1/3 of light's coiteria for exudative effusion.  Improved hypoxia.  - symbicort  -duoneb qid   -zosyn  -oximetry  -bronchial hygiene  -aspiration precautions  -diuresis     CAD: no chest pain. Initial admission trop x 2 37.   -eliquis, metoprolol, statin    Possible cirrhosis: per imaging. LFT's with TB 1.3, DB 0.7 (normal today), normal transaminases. CT Chest showed mild ascites. Nodular appearance of liver suggestive of cirrhosis. No h/o EtOH use. Suspect related to right heart failure. 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  -hold CellCept      Chronic BLE edema:  -venous US BLE negative for dvt  -compression wraps     Chronic post nasal drip, nonallergic rhinitis  -trial Azelastin-Fluticasone BID     Inguinal candidiasis  - lotrimin bid     Decubitus ulcer stage 2  -Wound care following     Impaired hearing    Constipation:  -senna-s 2 qam  -dulcolax supp prn    Severe malnutrition in context of Chronic illness or disease          Diet: soft, thin liq, 2g sodium  DVT Prophylaxis: DOAC   Mendes Catheter: Not present  Lines: None     Cardiac Monitoring: tele  Code Status: No CPR- Do NOT Intubate          Clinically Significant Risk Factors Present on Admission            # Hypernatremia: Highest Na = 148 mmol/L in last 2 days, will monitor as appropriate       # Drug Induced Coagulation Defect: home medication list includes an anticoagulant medication  # Thrombocytopenia: Lowest platelets = 57 in last 2 days, will monitor for bleeding   # Hypertension: home medication list includes antihypertensive(s)      # Obesity: Estimated body mass index is 35.42 kg/m  as  "calculated from the following:    Height as of this encounter: 1.829 m (6' 0.01\").    Weight as of this encounter: 118.5 kg (261 lb 3.9 oz).                  Disposition Plan        Expected Discharge Date: >2d      Destination: TCU         S:  Afebrile. Events overnight noted    O:  BP (!) 73/40 (BP Location: Left arm)   Pulse 72   Temp (!) 96.4  F (35.8  C) (Temporal)   Resp 18   Ht 1.829 m (6' 0.01\")   Wt 120.7 kg (266 lb)   SpO2 93%   BMI 36.07 kg/m    Physical Examination:   General appearance -nad  Lungs - decreased right base-mid lung, no wheezing, non labored  Heart - irreg, irreg, nml rate,  anasarca  Abdomen - soft, BS+, distended, ascites  Neurological - alert, oriented x2, dysarthric like speech, lethargic  Skin -no pemphigoid or bullous lesions.     Lab/imaging reviewed    "

## 2023-02-27 ENCOUNTER — APPOINTMENT (OUTPATIENT)
Dept: OCCUPATIONAL THERAPY | Facility: HOSPITAL | Age: 88
DRG: 682 | End: 2023-02-27
Payer: MEDICARE

## 2023-02-27 ENCOUNTER — APPOINTMENT (OUTPATIENT)
Dept: SPEECH THERAPY | Facility: HOSPITAL | Age: 88
DRG: 682 | End: 2023-02-27
Payer: MEDICARE

## 2023-02-27 ENCOUNTER — TELEPHONE (OUTPATIENT)
Dept: CARDIOLOGY | Facility: CLINIC | Age: 88
End: 2023-02-27
Payer: MEDICARE

## 2023-02-27 DIAGNOSIS — I50.9 ACUTE DECOMPENSATED HEART FAILURE (H): Primary | ICD-10-CM

## 2023-02-27 LAB
ALBUMIN SERPL BCG-MCNC: 3.5 G/DL (ref 3.5–5.2)
ALBUMIN SERPL ELPH-MCNC: 3.3 G/DL (ref 3.7–5.1)
ALP SERPL-CCNC: 141 U/L (ref 40–129)
ALPHA1 GLOB SERPL ELPH-MCNC: 0.4 G/DL (ref 0.2–0.4)
ALPHA2 GLOB SERPL ELPH-MCNC: 0.6 G/DL (ref 0.5–0.9)
ALT SERPL W P-5'-P-CCNC: 17 U/L (ref 10–50)
ANA SER QL IF: NEGATIVE
ANION GAP SERPL CALCULATED.3IONS-SCNC: 13 MMOL/L (ref 7–15)
AST SERPL W P-5'-P-CCNC: 40 U/L (ref 10–50)
B-GLOBULIN SERPL ELPH-MCNC: 0.6 G/DL (ref 0.6–1)
BILIRUB SERPL-MCNC: 1.6 MG/DL
BUN SERPL-MCNC: 23 MG/DL (ref 8–23)
C3 SERPL-MCNC: 103 MG/DL (ref 81–157)
C4 SERPL-MCNC: 25 MG/DL (ref 13–39)
CALCIUM SERPL-MCNC: 9.6 MG/DL (ref 8.8–10.2)
CHLORIDE SERPL-SCNC: 104 MMOL/L (ref 98–107)
CREAT SERPL-MCNC: 1.86 MG/DL (ref 0.67–1.17)
DEPRECATED HCO3 PLAS-SCNC: 30 MMOL/L (ref 22–29)
ERYTHROCYTE [DISTWIDTH] IN BLOOD BY AUTOMATED COUNT: 14.6 % (ref 10–15)
GAMMA GLOB SERPL ELPH-MCNC: 0.7 G/DL (ref 0.7–1.6)
GFR SERPL CREATININE-BSD FRML MDRD: 35 ML/MIN/1.73M2
GLUCOSE BLDC GLUCOMTR-MCNC: 78 MG/DL (ref 70–99)
GLUCOSE BLDC GLUCOMTR-MCNC: 93 MG/DL (ref 70–99)
GLUCOSE BLDC GLUCOMTR-MCNC: 96 MG/DL (ref 70–99)
GLUCOSE BLDC GLUCOMTR-MCNC: 96 MG/DL (ref 70–99)
GLUCOSE SERPL-MCNC: 77 MG/DL (ref 70–99)
HAPTOGLOB SERPL-MCNC: 121 MG/DL (ref 32–197)
HAV IGM SERPL QL IA: NONREACTIVE
HBV CORE IGM SERPL QL IA: NONREACTIVE
HBV SURFACE AG SERPL QL IA: NONREACTIVE
HCT VFR BLD AUTO: 44.5 % (ref 40–53)
HCV AB SERPL QL IA: NONREACTIVE
HGB BLD-MCNC: 13.4 G/DL (ref 13.3–17.7)
KAPPA LC FREE SER-MCNC: 7.06 MG/DL (ref 0.33–1.94)
KAPPA LC FREE/LAMBDA FREE SER NEPH: 2.36 {RATIO} (ref 0.26–1.65)
LACTATE SERPL-SCNC: 1.3 MMOL/L (ref 0.7–2)
LACTATE SERPL-SCNC: 1.6 MMOL/L (ref 0.7–2)
LAMBDA LC FREE SERPL-MCNC: 2.99 MG/DL (ref 0.57–2.63)
M PROTEIN SERPL ELPH-MCNC: 0 G/DL
MCH RBC QN AUTO: 28.3 PG (ref 26.5–33)
MCHC RBC AUTO-ENTMCNC: 30.1 G/DL (ref 31.5–36.5)
MCV RBC AUTO: 94 FL (ref 78–100)
PATH REPORT.COMMENTS IMP SPEC: NORMAL
PATH REPORT.FINAL DX SPEC: NORMAL
PATH REPORT.GROSS SPEC: NORMAL
PATH REPORT.MICROSCOPIC SPEC OTHER STN: NORMAL
PATH REPORT.RELEVANT HX SPEC: NORMAL
PLATELET # BLD AUTO: 75 10E3/UL (ref 150–450)
POTASSIUM SERPL-SCNC: 3.6 MMOL/L (ref 3.4–5.3)
PROT PATTERN SERPL ELPH-IMP: ABNORMAL
PROT PATTERN SERPL IFE-IMP: NORMAL
PROT SERPL-MCNC: 6.1 G/DL (ref 6.4–8.3)
RBC # BLD AUTO: 4.74 10E6/UL (ref 4.4–5.9)
SODIUM SERPL-SCNC: 147 MMOL/L (ref 136–145)
WBC # BLD AUTO: 6.1 10E3/UL (ref 4–11)

## 2023-02-27 PROCEDURE — 99233 SBSQ HOSP IP/OBS HIGH 50: CPT | Performed by: INTERNAL MEDICINE

## 2023-02-27 PROCEDURE — 36415 COLL VENOUS BLD VENIPUNCTURE: CPT | Performed by: INTERNAL MEDICINE

## 2023-02-27 PROCEDURE — 80053 COMPREHEN METABOLIC PANEL: CPT | Performed by: INTERNAL MEDICINE

## 2023-02-27 PROCEDURE — 83605 ASSAY OF LACTIC ACID: CPT | Performed by: INTERNAL MEDICINE

## 2023-02-27 PROCEDURE — 250N000013 HC RX MED GY IP 250 OP 250 PS 637: Performed by: STUDENT IN AN ORGANIZED HEALTH CARE EDUCATION/TRAINING PROGRAM

## 2023-02-27 PROCEDURE — 97535 SELF CARE MNGMENT TRAINING: CPT | Mod: GO

## 2023-02-27 PROCEDURE — 250N000011 HC RX IP 250 OP 636: Performed by: INTERNAL MEDICINE

## 2023-02-27 PROCEDURE — 250N000013 HC RX MED GY IP 250 OP 250 PS 637: Performed by: INTERNAL MEDICINE

## 2023-02-27 PROCEDURE — 85027 COMPLETE CBC AUTOMATED: CPT | Performed by: INTERNAL MEDICINE

## 2023-02-27 PROCEDURE — 92526 ORAL FUNCTION THERAPY: CPT | Mod: GN

## 2023-02-27 PROCEDURE — 99232 SBSQ HOSP IP/OBS MODERATE 35: CPT | Performed by: NURSE PRACTITIONER

## 2023-02-27 PROCEDURE — 120N000001 HC R&B MED SURG/OB

## 2023-02-27 RX ADMIN — AZELASTINE HYDROCHLORIDE 1 SPRAY: 137 SPRAY, METERED NASAL at 09:04

## 2023-02-27 RX ADMIN — AMOXICILLIN AND CLAVULANATE POTASSIUM 1 TABLET: 875; 125 TABLET, FILM COATED ORAL at 19:28

## 2023-02-27 RX ADMIN — APIXABAN 2.5 MG: 2.5 TABLET, FILM COATED ORAL at 08:56

## 2023-02-27 RX ADMIN — THIAMINE HYDROCHLORIDE 100 MG: 100 INJECTION, SOLUTION INTRAMUSCULAR; INTRAVENOUS at 19:36

## 2023-02-27 RX ADMIN — BUMETANIDE 2 MG: 0.25 INJECTION INTRAMUSCULAR; INTRAVENOUS at 19:46

## 2023-02-27 RX ADMIN — LIDOCAINE: 50 OINTMENT TOPICAL at 14:32

## 2023-02-27 RX ADMIN — BUDESONIDE AND FORMOTEROL FUMARATE DIHYDRATE 2 PUFF: 160; 4.5 AEROSOL RESPIRATORY (INHALATION) at 09:05

## 2023-02-27 RX ADMIN — PIPERACILLIN AND TAZOBACTAM 3.38 G: 3; .375 INJECTION, POWDER, LYOPHILIZED, FOR SOLUTION INTRAVENOUS at 08:48

## 2023-02-27 RX ADMIN — BUDESONIDE AND FORMOTEROL FUMARATE DIHYDRATE 2 PUFF: 160; 4.5 AEROSOL RESPIRATORY (INHALATION) at 19:37

## 2023-02-27 RX ADMIN — MICONAZOLE NITRATE ANTIFUNGAL POWDER: 2 POWDER TOPICAL at 20:44

## 2023-02-27 RX ADMIN — PRAVASTATIN SODIUM 20 MG: 20 TABLET ORAL at 21:11

## 2023-02-27 RX ADMIN — MICONAZOLE NITRATE ANTIFUNGAL POWDER: 2 POWDER TOPICAL at 09:12

## 2023-02-27 RX ADMIN — FLUTICASONE PROPIONATE 1 SPRAY: 50 SPRAY, METERED NASAL at 20:43

## 2023-02-27 RX ADMIN — APIXABAN 2.5 MG: 2.5 TABLET, FILM COATED ORAL at 19:28

## 2023-02-27 RX ADMIN — FLUTICASONE PROPIONATE 1 SPRAY: 50 SPRAY, METERED NASAL at 09:04

## 2023-02-27 RX ADMIN — LIDOCAINE: 50 OINTMENT TOPICAL at 09:12

## 2023-02-27 RX ADMIN — LIDOCAINE: 50 OINTMENT TOPICAL at 20:44

## 2023-02-27 RX ADMIN — ACETAMINOPHEN 975 MG: 325 TABLET ORAL at 20:04

## 2023-02-27 RX ADMIN — FINASTERIDE 5 MG: 5 TABLET, FILM COATED ORAL at 21:11

## 2023-02-27 RX ADMIN — AZELASTINE HYDROCHLORIDE 1 SPRAY: 137 SPRAY, METERED NASAL at 20:43

## 2023-02-27 RX ADMIN — PIPERACILLIN AND TAZOBACTAM 3.38 G: 3; .375 INJECTION, POWDER, LYOPHILIZED, FOR SOLUTION INTRAVENOUS at 00:45

## 2023-02-27 RX ADMIN — LIDOCAINE: 50 OINTMENT TOPICAL at 16:48

## 2023-02-27 ASSESSMENT — ACTIVITIES OF DAILY LIVING (ADL)
ADLS_ACUITY_SCORE: 49
ADLS_ACUITY_SCORE: 49
ADLS_ACUITY_SCORE: 45
ADLS_ACUITY_SCORE: 49
ADLS_ACUITY_SCORE: 45

## 2023-02-27 NOTE — PROGRESS NOTES
HEART CARE NOTE          Assessment/Recommendations     1. HFpEF/RV dysfunction  Assessment / Plan    Diuresing well on current regimen - no changes to regimen at this time    GDMT as detailed below; mainstay of treatment for HFpEF includes diuretics and adequate BP control (class I) and SGLT2-I (class 2a); additional medical therapy (ARNI, MRA, ARB) demonstrated less robust evidence for indication but may be considered per guideline recommendations (2b); no indication for BBlockers      Current Pharmacotherapy AHA Guideline-Directed Medical Therapy   Losartan - on hold given NOREEN and borderline BP ARNI/ARB   Spironolactone not started  MRA   SGLT2 inhibitor not started SGLT2-I    Bumetanide IV Loop diuretic       2. Valvular heart disease  Assessment / Plan    Severe TR; likely functional in the setting of a dilated RV with reduced function - will refer to valve clinic if patient is amenable    Diuresis as above     3. Atrial fibrillation  Assessment / Plan    Paroxysmal - continue apixaban     4. CKD  Assessment / Plan    Continue to monitor renal function closely while diuresing     5. CAD  Assessment / Plan    Denies chest pain or anginal equivalents    Continue metoprolol (reduced to 12.5 mg BID), statin     History of Present Illness/Subjective      Mr. Nishant Gilliam is a 87 year old male with a PMHx significant for (per Dr. Pham's note) HFpEF, Afib, COPD, CKD, HTN, HLD presented with generalized weakness with concern for dehydration. HF service now consulted regarding RV dysfunction/CHF management      Today, Mr. TERRI Gilliam denies any acute cardiac events or complaints; Management plan as detailed above     ECG: Personally reviewed. atrial fibrillation, rate controlled, RBBB.     ECHO (personnaly Reviewed):   The left ventricle is normal in size with normal left ventricular wall  thickness.  Left ventricular function is normal.The ejection fraction is 60-65%.  The right ventricle is severely dilated with  "mildly decreased right  ventricular systolic function  There is severe (4+) tricuspid regurgitation.  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  Compared to the prior study of 7/8/21, the right ventricle is now severely  dilated and there is now severe tricuspid regurgitation.     Consider outpatient referral to the [facility] valve clinic to  consider treatment options for the tricuspid regurgitation.          Physical Examination Review of Systems   BP 92/59 (BP Location: Right arm, Patient Position: Semi-Carreon's, Cuff Size: Adult Regular)   Pulse 91   Temp 97.1  F (36.2  C) (Tympanic)   Resp 18   Ht 1.829 m (6' 0.01\")   Wt 120.7 kg (266 lb)   SpO2 92%   BMI 36.07 kg/m    Body mass index is 36.07 kg/m .  Wt Readings from Last 3 Encounters:   02/24/23 120.7 kg (266 lb)   09/06/22 113.7 kg (250 lb 11.2 oz)   03/01/22 129.5 kg (285 lb 6.4 oz)     General Appearance:   no distress, normal body habitus   ENT/Mouth: membranes moist, no oral lesions or bleeding gums.      EYES:  no scleral icterus, normal conjunctivae   Neck: no carotid bruits or thyromegaly   Chest/Lungs:   lungs are clear to auscultation, no rales or wheezing, equal chest wall expansion    Cardiovascular:   Irregular. Normal first and second heart sounds with no murmurs, rubs, or gallops; the carotid, radial and posterior tibial pulses are intact, + JVD and LE edema bilaterally    Abdomen:  no organomegaly, masses, bruits, or tenderness; bowel sounds are present   Extremities: no cyanosis or clubbing   Skin: no xanthelasma, warm.    Neurologic: alert and calm     Psychiatric: alert and calm     A complete 10 systems ROS was reviewed  And is negative except what is listed in the HPI.          Medical History  Surgical History Family History Social History   Past Medical History:   Diagnosis Date     COPD (chronic obstructive pulmonary disease) (H)      Coronary artery disease      Hypertension      Right bundle branch block     complete "    Past Surgical History:   Procedure Laterality Date     HC KNEE SCOPE, DIAGNOSTIC      Description: Arthroscopy Knee Left;  Recorded: 06/27/2009;  Comments: details unk.     HERNIA REPAIR       ZZHC CORONARY STENT PERCUT, INITIAL VESSEL      Description: Cath Stent Placement;  Recorded: 02/24/2014;  Comments: stenting to the LAD in 1999. 2nd done years later.    no family history of premature coronary artery disease Social History     Socioeconomic History     Marital status:      Spouse name: Not on file     Number of children: Not on file     Years of education: Not on file     Highest education level: Not on file   Occupational History     Not on file   Tobacco Use     Smoking status: Former     Years: 30.00     Types: Cigarettes     Smokeless tobacco: Never   Substance and Sexual Activity     Alcohol use: No     Drug use: No     Sexual activity: Not on file   Other Topics Concern     Not on file   Social History Narrative    Lives with his wife.     Social Determinants of Health     Financial Resource Strain: Not on file   Food Insecurity: Not on file   Transportation Needs: Not on file   Physical Activity: Not on file   Stress: Not on file   Social Connections: Not on file   Intimate Partner Violence: Not on file   Housing Stability: Not on file           Lab Results    Chemistry/lipid CBC Cardiac Enzymes/BNP/TSH/INR   Lab Results   Component Value Date    CHOL 128 02/25/2022    HDL 48 02/25/2022    TRIG 121 02/25/2022    BUN 23.0 02/27/2023     (H) 02/27/2023    CO2 30 (H) 02/27/2023    Lab Results   Component Value Date    WBC 5.2 02/26/2023    HGB 13.2 (L) 02/26/2023    HCT 43.6 02/26/2023    MCV 94 02/26/2023    PLT 61 (L) 02/26/2023    Lab Results   Component Value Date    TROPONINI 0.03 07/07/2021     (H) 07/07/2021    TSH 2.42 02/21/2023    INR 1.60 (H) 02/23/2023     Lab Results   Component Value Date    TROPONINI 0.03 07/07/2021          Weight:    Wt Readings from Last 3  Encounters:   02/24/23 120.7 kg (266 lb)   09/06/22 113.7 kg (250 lb 11.2 oz)   03/01/22 129.5 kg (285 lb 6.4 oz)       Allergies  Allergies   Allergen Reactions     Furosemide Rash     pemphigoid     Cat Hair Std Allergenic Ext [Cat Hair Extract] Unknown     Sulfa Drugs Unknown         Surgical History  Past Surgical History:   Procedure Laterality Date     HC KNEE SCOPE, DIAGNOSTIC      Description: Arthroscopy Knee Left;  Recorded: 06/27/2009;  Comments: details unk.     HERNIA REPAIR       ZZHC CORONARY STENT PERCUT, INITIAL VESSEL      Description: Cath Stent Placement;  Recorded: 02/24/2014;  Comments: stenting to the LAD in 1999. 2nd done years later.       Social History  Tobacco:   History   Smoking Status     Former     Years: 30.00     Types: Cigarettes   Smokeless Tobacco     Never    Alcohol:   Social History    Substance and Sexual Activity      Alcohol use: No   Illicit Drugs:   History   Drug Use No       Family History  No family history on file.       Edd Fontanez MD on 2/27/2023      cc: Aditya Mehta

## 2023-02-27 NOTE — PROGRESS NOTES
[facility]     Medicine Progress Note - Hospitalist Service     Date of Admission:  2/20/2023        Assessment & Plan     Nishant Gilliam is a 87 year old male admitted on 2/20/2023. Nishant Gilliam is a 87 year old male with  HFpEF, Afib, COPD, CKD, HTN, HLD presented with generalized weakness, decreased po intake, fatigue, and decreased urine output.  Wife states that 'everything has slowed down for the patient- both his motion and speech, and he is more sleepy'. Denied fever, chills, nausea, vomiting, abdominal pain, diarrhea. More trouble swallowing.  Admitted for further management.    HFpEF with ADHF (end-stage) predominant RHF with severe Tricuspid regurgitation: RLL moderate pleural effusion, nt-BNP ~1900.  Hypervolemic on exam consistent with right heart failure. LVEF 60-65%, right ventricle is severely dilated with mildly decreased right ventricular systolic function. Worse vs TTE in 2021.  -remains significantly decompensated. Not responding well to IV bumetanide. He has a strong allergy to high doses of loop diuretics resulting in Bullous Pemphigoid which is mostly controlled with mycophenolate. Cardiology, Nephrology and myself d/w patient/spouse the limited treatment options and no cure. They are both in agreement with having palliative care discussion on goals of care as certainly would be Hospice appropriate.  Unfortunately, spouse not here today as she came down with GI illness.  Palliative care will reach out to coordinate a time hopefully tomorrow for conference.  -IV Bumex   -hold metoprolol 12.5mg BID for today  -Cardiology following  -strict I/O, daily weights  -trial midodrin continue today to see if this helps some with BP and allow IV diuresis     NOREEN on probable CKD stage III:  Due to CRS due to decompensated heart failure.  Cr remains elevated. Erika < 20.  Renal US showed chronic renal disease, no hydro but mild bladder distension. Cr slight worsening from  admission. CK normal.  UA bland. Uprot 19.1.   -additional labs pending  -Nephrology following  -no dialysis    Generalized weakness, Slow response, speech, tremor:  Stable. Very [place].  - CT-head unremarkable for acute process  -MRI brain to rule out cerebrovascular disease refused by patient  -Neurology consultation appreciated: not likely to be Parkinson. ?Encephalopathy.  -EEG suggestive of mild encephalopathy.  -CK 28, TSH & ammonia normal.   -Neurology s/o    Dysphagia, Silent aspiration (d/t weak hyolaryneal excursion creating vallecular and pyriform stasis - per SLP), Zenker's diverticulum, Esophageal dysmotility disorder:   -esophagram/VSS reviewed  -SLP following  -mechanical soft, thin liquid, swallowing strategies advised by SLP  -HOB 30 degrees  -ppi     Acute Thrombocytopenia: slowly improving/stable. No splenomegaly. + Radiographic features of cirrhosis. Bone marrow suppression/Toxicity from CellCept in setting of renal failure. LDH normal. B12 normal. PBS normal platelet morphology. numerous katie cells. Polychromasia not increased.   -Oncology/Hematology following  -hold cellcept  -hemolysis labs neg  -hold on additional w/u given goals of care    Mild to moderate valvular aortic stenosis: TTE 2/23/223     PAF: EKG on admission showed a. Fib, ST abnormality, troponin 37 x2.   - metoprolol hold due to #1  -eliquis 2.5mg BID    Possible Sepsis:  Hypothermic, hypotension, immunosupressed, concern for RLL aspiration pneumonia.  Renal US no hydro. Initial UA no infection. COVID negative 2/20/23. CT Chest w/o contrast showed moderate right pleural effusion with right basilar compressive atelectasis.  Procalcitonin 0.16.  However, I think his hypothermia, hypotension, etc are all due to decompensated heart failure and cardiorenal syndrome resulting in tissue hypoperfusion/shock  -BC's x2 NGTD, MRSA nasal swab neg, pleural fluid cultures negative.  -zosyn -> augmentin x 3 days from today     Acute hypoxic  respiratory failure, COPD without acute exacerbation, Severe tricuspid regurgitation with HFpEF with ADHF predominant right heart failure, moderate right pleural effusion with compressive atelectasis, probable RLL aspiration PNA given silent aspiration on VSS. S/P right thoracentesis 2/23 with 1.5L fluid removed.  Meets 1/3 of light's coiteria for exudative effusion.  Improved hypoxia.  - symbicort  -duoneb qid prn  -augmentin x 3d  -oximetry  -bronchial hygiene  -aspiration precautions  -diuresis     CAD: no chest pain. Initial admission trop x 2 37.   -eliquis, metoprolol on hold, statin    Possible cirrhosis: per imaging. LFT's with mild TB/DB elevation, normal transaminases. CT Chest showed mild ascites. Nodular appearance of liver suggestive of cirrhosis. No h/o EtOH use. Suspect related to right heart failure. RUQ US with doppler w/o arterio-veno occlusive disease.    BPH:  Renal US no hydronephrosis, + mild bladder distension  -cardura hold  -on Proscar  -continue to bladder scan/PVR q shift/void to monitor for retention     HLD  -pravachol     H/O Bullous pemphigoid: on chronic CellCept. In remission  -hold CellCept due to concern for possible pneumonia and thrombocytopenia in setting of renal failure.      Chronic BLE edema:  -venous US BLE negative for dvt  -compression wraps  -diuresis     Chronic post nasal drip, nonallergic rhinitis  -trial Azelastin-Fluticasone BID     Inguinal candidiasis  - lotrimin bid     Decubitus ulcer stage 2  -Wound care following     Impaired hearing    Constipation:  -senna-s 2 qam  -dulcolax supp prn    Severe malnutrition in context of Chronic illness or disease          Diet: soft, thin liq, 2g sodium  DVT Prophylaxis: DOAC   Mendes Catheter: Not present  Lines: None     Cardiac Monitoring: tele  Code Status: No CPR- Do NOT Intubate          Clinically Significant Risk Factors Present on Admission            # Hypernatremia: Highest Na = 148 mmol/L in last 2 days, will  "monitor as appropriate       # Drug Induced Coagulation Defect: home medication list includes an anticoagulant medication  # Thrombocytopenia: Lowest platelets = 57 in last 2 days, will monitor for bleeding   # Hypertension: home medication list includes antihypertensive(s)      # Obesity: Estimated body mass index is 35.42 kg/m  as calculated from the following:    Height as of this encounter: 1.829 m (6' 0.01\").    Weight as of this encounter: 118.5 kg (261 lb 3.9 oz).                  Disposition Plan        Expected Discharge Date: pending goals of care discussion, if hospice desired then ? Home vs facility with hospice     Destination: pending goals of care discussion and if hospice chosen         S:  Afebrile. Events overnight noted    O:  /57   Pulse 81   Temp 97.1  F (36.2  C) (Temporal)   Resp 20   Ht 1.829 m (6' 0.01\")   Wt 120.7 kg (266 lb)   SpO2 91%   BMI 36.07 kg/m    Physical Examination:   General appearance -nad  Lungs - decreased right base-mid lung, no wheezing, non labored  Heart - irreg, irreg, nml rate,  Anasarca, edema worse  Abdomen - soft, BS+, distended, ascites   Neurological - alert, oriented x2, [place], answers questions appropriately  Skin -no pemphigoid or bullous lesions.     Lab/imaging reviewed    "

## 2023-02-27 NOTE — PROGRESS NOTES
SW spoke to admissions, pt accepted today to [facility] if medically ready to discharge. Pt is not medically ready to discharge per MD with updated that pt goals will be changing.      LANEY Lopez

## 2023-02-27 NOTE — PLAN OF CARE
Problem: Plan of Care - These are the overarching goals to be used throughout the patient stay.    Goal: Optimal Comfort and Wellbeing  2/27/2023 0540 by Eneida Miranda RN  Outcome: Progressing  2/27/2023 0331 by Eneida Miranda RN  Outcome: Progressing     Problem: Risk for Delirium  Goal: Improved Sleep  2/27/2023 0540 by Eneida Miranda RN  Outcome: Progressing  2/27/2023 0331 by Eneida Miranda RN  Outcome: Progressing   Goal Outcome Evaluation:       Pt slept well this shift. Midnight VS triggered the sepsis protocol. Lactic acid was drawn and came back at 1.6. Pt is able to call for needs. VSS. Denies pain. Pt is using urinal at bedside and voiding adequately without problem. No BM this shift. No respiratory distress on room air. Will cont with plan of care.

## 2023-02-27 NOTE — PROGRESS NOTES
Renal progress note  CC:CKD , hypervolemia  Assessment and Plan:  87 year old male with a past medical history of heart failure with preserved ejection fraction history of COPD CKD history of atrial fibrillation hypertension chronic lymphedema presented with increasing generalized weakness poor oral intake worsening fatigue and decreased urine output.  Admission labs noted for mildly elevated creatinine, nephrology consulted for NOREEN on CKD     NOREEN nonoliguric  Baseline likely CKD 3, baseline creatinine is around 1.2-1.3 with GFR close to 60 based on creatinine clearance  Cystatin C 2.8 GFR 18  Urine sodium less than 20 urine protein 0.19 g/g, UA with uric acid crystals and hyaline casts otherwise bland  Unremarkable renal imaging except for noted pleural effusion and ascites  Has had paraprotein work-up in 2021 that was negative  Repeat work up this admission:  BLCAK negative.  C3 and C4 normal.  CH50 pending.  Immunofixation pending, but FLC ratio elevated.     Creatinine elevation appears to be consistent with cardiorenal syndrome likely acute on chronic, less likely to be of primary renal disease with no proteinuria at this time.  Initially Bumex was held now resumed since 2/24/2023 with increased to IV Bumex 2 mg twice a day 2/25/2023  Cr today fairly stable at 1.8  Elevated Na and CO2, electrolytes otherwise ok.  Nonoliguric.  Seen by palliative care today.  He is not interested in dialysis if it should come to that.  --> Follow on renal panel daily with ongoing diuresis     Electrolytes  Mild hypernatremia  Follow-up with ongoing diuresis may need thiazide for additional natriuresis     Hypervolemia  Low normal blood pressures  Agree with resuming Bumex IV twice a day   Following response  On metoprolol for atrial fibrillation     Hemoglobin 13+  Acute onset thrombocytopenia slowly improving  Heme-onc following currently CellCept on hold     Heart failure exacerbation with heart failure with preserved  ejection fraction  History of severe TR  History of CAD  Moderate pleural effusion on right lower lobe s/p thoracocentesis  Possibly cor pulmonale with worsened right heart functions  Mild to moderate aortic stenosis on TTE  History of atrial fibrillation Eliquis resumed 2.5 mg twice daily was initially on hold secondary to bleeding and thrombocytopenia  Ongoing dyspnea and worsening lower extremity edema   Started on IV Bumex 2 mg twice a day 2/25/2022  Continued on metoprolol  Cardiology following     Initial concerns for RLL aspirated pneumonia  Remains on Zosyn     Acute on chronic respiratory failure  History of COPD  Likely secondary to heart failure exacerbation as well  S/p thoracocentesis right pleural effusion  DuoNebs nasal cannula oxygen     History of BPH  No evidence of retention  Currently off Cardura continued on Proscar     Chronic lymphedema  Local management with compression wraps     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  Today's labs reviewed.    Pérez reports breathing fairly comfortable.  Not much improvement in LE edema.  Appetite is not good.  Denies nausea.   No urinary complaints.  Some orthostatic dizziness with lower BP      Objective    Vital signs in last 24 hours  Temp:  [96.4  F (35.8  C)-97.9  F (36.6  C)] 97.1  F (36.2  C)  Pulse:  [72-93] 81  Resp:  [18-20] 20  BP: ()/(40-82) 102/57  SpO2:  [91 %-96 %] 91 %  Weight:       Intake/Output last 3 shifts  I/O last 3 completed shifts:  In: 480 [P.O.:480]  Out: 1170 [Urine:1170]  Intake/Output this shift:  No intake/output data  recorded.    Physical Exam  Alert/, awake, NAD, sitting up in chair  CV: RRR   Lung: diminished right base, normal effort, room air  Ab: soft  Ext: +++ LE edema, legs wrapped  Skin; no rash      Pertinent Labs   Lab Results   Component Value Date    WBC 6.1 02/27/2023    HGB 13.4 02/27/2023    HCT 44.5 02/27/2023    MCV 94 02/27/2023    PLT 75 (L) 02/27/2023     Lab Results   Component Value Date    BUN 23.0 02/27/2023     (H) 02/27/2023    CO2 30 (H) 02/27/2023       Lab Results   Component Value Date    ALBUMIN 3.5 02/27/2023     Lab Results   Component Value Date    PHOS 3.7 02/23/2023     I reviewed all lab results

## 2023-02-27 NOTE — PLAN OF CARE
Problem: Plan of Care - These are the overarching goals to be used throughout the patient stay.    Goal: Optimal Comfort and Wellbeing  Outcome: Progressing     Problem: Heart Failure  Goal: Optimal Coping  Outcome: Progressing  Goal: Optimal Cardiac Output  Outcome: Progressing  Goal: Stable Heart Rate and Rhythm  Outcome: Progressing  Goal: Optimal Functional Ability  Outcome: Progressing  Intervention: Optimize Functional Ability  Recent Flowsheet Documentation  Taken 2/27/2023 0030 by Eneida Miranda RN  Activity Management: activity adjusted per tolerance  Taken 2/26/2023 2030 by Eneida Miranda RN  Activity Management: activity adjusted per tolerance  Taken 2/26/2023 1630 by Eneida Miranda RN  Activity Management: activity adjusted per tolerance  Goal: Fluid and Electrolyte Balance  Outcome: Progressing  Goal: Improved Oral Intake  Outcome: Progressing  Goal: Effective Oxygenation and Ventilation  Outcome: Progressing  Intervention: Promote Airway Secretion Clearance  Recent Flowsheet Documentation  Taken 2/27/2023 0030 by Eneida Miranda RN  Cough And Deep Breathing: done independently per patient  Activity Management: activity adjusted per tolerance  Taken 2/26/2023 2030 by Eneida Miranda RN  Cough And Deep Breathing: done independently per patient  Activity Management: activity adjusted per tolerance  Taken 2/26/2023 1630 by Eneida Miranda RN  Cough And Deep Breathing: done independently per patient  Activity Management: activity adjusted per tolerance  Goal: Effective Breathing Pattern During Sleep  Outcome: Progressing  Intervention: Monitor and Manage Obstructive Sleep Apnea  Recent Flowsheet Documentation  Taken 2/27/2023 0030 by Eneida Miranda RN  Medication Review/Management: medications reviewed  Taken 2/26/2023 2030 by Eneida Miranda RN  Medication Review/Management: medications reviewed  Taken 2/26/2023 1630 by Eneida Miranda  "RN  Medication Review/Management: medications reviewed   Goal Outcome Evaluation:       Pt seemed withdrawn and sad earlier this shift. B/P at 1530 was 73/40. MD notified and midodrine 2.5mg po TID ordered but pt refused meds, and also all po meds at HS. Pt told writer he \"spoke with my doctors and there's nothing more they can do for me;  I'm going to palliative/ hospice so none of these pills are necessary any more. Pt however,  took  IV, nasal, and inhalers at HS. Pt refused dinner until his wife came in and encouraged him to eat which he did. Assist of 2 to the bathroom and voiding adequately. VSS at HS. Remains on room air with Spo2 in the low to mid 90's. No resp distress observed this shift. Pt is currently in bed resting. Plan to cluster pt's care at [facility] to promote sleep.                  "

## 2023-02-27 NOTE — PLAN OF CARE
Goal Outcome Evaluation:         Problem: Heart Failure  Goal: Optimal Cardiac Output  Outcome: Not Progressing  Goal: Fluid and Electrolyte Balance  Outcome: Not Progressing     Problem: Plan of Care - These are the overarching goals to be used throughout the patient stay.    Goal: Plan of Care Review  Description: The Plan of Care Review/Shift note should be completed every shift.  The Outcome Evaluation is a brief statement about your assessment that the patient is improving, declining, or no change.  This information will be displayed automatically on your shift note.  Outcome: Progressing     Pt still waiting for palliative consult.  Cardiology is continuing with treatment of bumex but pt refused it this morning.  Pt refusing vitamins and most pills, he is focused more on comfort now.  Wife was sick at home so not able to come be with the pt today but has been calling him.  Pt has back pain which he says the lidocaine is helping.  Blood sugars 78 and 93, still eating about 50% of meals.  On a 1500 cc Fluid restriction.  Voiding, had a 323 ml PVR.  Applying primofit when he gets back to bed this afternoon.  Pt has been up in the chair for a few hours, more comfortable in the chair.  New lymphedema wraps were applied this morning.  Sacral mepilex in place, I did not change it, tiny open area noted with redness around it.  Encouraged pt to turn to prevent further breakdown.  Pt has to sit fully upright for any PO or pills, gets a lot of phlegm when he drinks and will spit it up.  Abdominal fold redness healing with miconazole powder.  Shauna Ly, RN

## 2023-02-27 NOTE — CONSULTS
[facility]  Palliative Care Consultation Note    Patient: Nishant Gilliam  Date of Admission:  2/20/2023    Requesting Clinician / Team: Dr. Roberts  Reason for consult: Goals of care    Impression & Recommendations:  GOALS OF CARE: Goals are life prolonging with limits.  DNR/DNI, no dialysis.  Continuing with current cares and treatments at this time.   -He has a good understanding of his medical conditions, and priority is to be able to spend his remaining time with his wife.   -Discussed hospice as a possible option, if he choose to focus on comfort and quality of life and forgo future hospitalizations or aggressive medical interventions.  They are interested in meeting with hospice for informational consultation. Discussed with care management and care management referral placed for assistance with coordinating hospice informational consult  -Will plan to meet with patient and  tomorrow at 1 pm to follow up.    ADVANCE CARE PLANNING:   Advance directive: no  POLST: no  Surrogate decision maker: Wife Daniela  Code status: Confirmed DNR/DNI    SYMPTOM MANAGEMENT: Palliative care is not actively managing symptoms at this time.  Will continue to monitor.     These recommendations have been discussed with Dr. Estrada.      Thank you for the opportunity to participate in the care of this patient and family. Our team: will continue to follow.     During regular M-F work hours -- if you are not sure who specifically to contact -- please contact us by calling us directly at the Palliative Care Main Line [phone]    After regular work hours and on weekends/holidays, you can leave a message at [phone]      Summary/HPI:  Nishant Gilliam is a 87 year old male with PMH of  HFpEF, Afib, COPD, CKD, HTN, HLD who presented to the ED on 2/20/23 with generalized weakness.  Admitted to the hospital with dehydration, NOREEN on CKD, generalized weakness, elevated troponin, HFpEF, paroxysmal a-fib,  "thrombocytopenia, bullous pemphigoid, lymphedema.    Cardiology, neurology, GI, heme/onc, and nephrology have also been consulted this admission.      History gathered today from: patient, family/loved ones, medical chart, medical team members     Per wife via phone call: Patient has been generally weaker over the past few months, more fatigued, has not felt like doing exercises.  Sleeps most of the day. Lost 35lbs last spring and then appetite slowly came back.  Very poor appetite again in December of this year, complaining of poor taste.    [place] a house in [place] [place].  Wife Daniela is caregiver.  He developed bullous pemphigoid possibly from furosemide, which resulted in blisters all over hands/feet, arms and legs. She was doing his dressing changes BID.  In the past week or so he wasn't really walking in house.  Very slow, sitting down more during short walks. Has a fear of falling-fell 2 years ago into bathtub; was in tub bleeding for 3 hours until wife returned home.      Today, the patient was seen for:  Goals of care    Palliative Care Summary:   Spoke with wife Daniela separately via phone.  Her understanding is that medically, his organs are failing because his body can't get rid of fluid.  He is not responding to diuretics, due to kidney failure and concerns about pemphigoid.  Dialysis would only be a temporary measure. Heart disease is \"breaking down\" his other organ systems. She thinks Pérez understands his medical conditions.  Recently He stated \"I've lived a good life\" \"I'm ready to die\".  Has no health care directive, but has started a couple of documents.  He is DNR/DNI, no extreme measures, no feeding tube or dialysis.        Met with patient, Dr. Estrada and spouse in room today.  I introduced our role as an extra layer of support and how we help patients and families dealing with serious, potentially life-limiting illnesses. I explained the composition of the palliative care " "team.  Palliative care helps patients and families navigate their care while focusing on the whole person; providing emotional, social and spiritual support  Palliative care often assists with symptom management, information sharing about what to expect from the illness, available treatment options and what effect those options may have on the disease course, and provide effective communication and caring support.    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  Discussed typical disease trajectory, including high risk of future frequent hospital admissions.    Patient and wife requesting information re: life expectancy and prognosis.  We reviewed that with his heart failure, cirrhosis, and kidney disease, he likely has life expectancy of 6 months or less.  Also reviewed that prognostication is not an exact science, and his time could be very short (days) versus longer (months), depending on how things progress and if further complications arise.  Patient verbalized that he wishes he could have a \"30 second heart attack\" and pass away quickly.    Discussed hospice versus home care versus TCU as potential discharge options, depending on his overall care goals.  Discussed hospice philosophy and services in detail.  Patient verbalizes he wants to spend time with his wife, and has lived a good life of 86 years.  They would like to meet with hospice and then have time to discuss options before making further decisions.       Prognosis, Goals, & Planning:      Functional Status just prior to hospitalization: 3 (Capable of only limited self-care; needs help with ADLs; in bed/chair >50% of waking hours)    \"Stands and shuffle into bathroom with walker.\"      Prognosis, Goals, and/or Advance Care Planning were addressed today: Yes      Patient's decision making preferences: shared with support from loved ones          Patient has decision-making capacity " today for complex decisions: Yes            I have concerns about the patient/family's health literacy today: No           Patient has a completed Health Care Directive: No.       Code status: No CPR / No Intubation    Coping, Meaning, & Spirituality:   Mood, coping, and/or meaning in the context of serious illness were addressed today: Yes    Social:     Living situation: lives with wife    [place] family / caregivers: .  2 children that are estranged    Occupational history: Worked as an academic, doctorate in english literature, market research, used book business.     Key Palliative Symptom Data:  # Pain severity the last 12 hours: moderate  # Dyspnea severity the last 12 hours: none  # Nausea severity the last 12 hours: none  # Anxiety severity the last 12 hours: none    ROS:  Comprehensive ROS is reviewed and is negative except as here & per HPI: mild-moderate low back pain that is chronic.  Worse now that spending more time in hospital bed.  Tylenol and topical lidocaine helpful     Past Medical History:  Past Medical History:   Diagnosis Date     COPD (chronic obstructive pulmonary disease) (H)      Coronary artery disease      Hypertension      Right bundle branch block     complete        Past Surgical History:  Past Surgical History:   Procedure Laterality Date     HC KNEE SCOPE, DIAGNOSTIC      Description: Arthroscopy Knee Left;  Recorded: 06/27/2009;  Comments: details unk.     HERNIA REPAIR       [facility]HC CORONARY STENT PERCUT, INITIAL VESSEL      Description: Cath Stent Placement;  Recorded: 02/24/2014;  Comments: stenting to the LAD in 1999. 2nd done years later.         Family History:  No family history on file.      Allergies:  Allergies   Allergen Reactions     Furosemide Rash     pemphigoid     Cat Hair Std Allergenic Ext [Cat Hair Extract] Unknown     Sulfa Drugs Unknown        Medications:  I have reviewed this patient's medication profile and medications from this hospitalization.  "    PERTINENT PHYSICAL EXAMINATION:  Vital Signs: Blood pressure 91/57, pulse 86, temperature 97.5  F (36.4  C), temperature source Oral, resp. rate 18, height 1.829 m (6' 0.01\"), weight 120.7 kg (266 lb), SpO2 92 %.   GENERAL: Alert, sitting up in chair, NAD, hard of hearing    HEENT: Normocephalic, moist mucous membranes  LUNGS: non-labored   ABDOMINAL: slightly distended, non tender  EXTREMITIES: Bilateral lower extremities wrapped in ace bandages  NEUROLOGIC: Alert and oriented X 4    Data reviewed:    Results for orders placed or performed during the hospital encounter of 02/20/23   Head CT w/o contrast    Impression    IMPRESSION:  1.  No CT evidence for acute intracranial process.  2.  Brain atrophy and presumed chronic microvascular ischemic changes as above.   XR Chest Port 1 View    Impression    IMPRESSION: Opacity at right lung base represents pleural fluid with possible atelectasis/consolidation of right lower and middle lobes. Right upper lobe and left lung are clear. Heart size likely normal.   US Renal Complete Non-Vascular    Impression    IMPRESSION:  1.  Mildly echogenic renal cortices which could reflect the sequela of chronic medical renal disease. No hydronephrosis.    2.  Large right pleural effusion and moderate right upper quadrant ascites.   CT Chest w/o Contrast    Impression    IMPRESSION:   1.  Moderate right pleural effusion.  2.  Hepatic morphology compatible with cirrhosis.  3.  The left lobe liver has 3 probable cysts and an additional 2.6 cm indeterminate hypodense lesion. Hepatic ultrasound recommended to evaluate the indeterminate lesion.     US Thoracentesis    Impression    IMPRESSION:  Status post right ultrasound-guided thoracentesis.    Reference CPT Code: 69984   US Lower Extremity Venous Duplex Bilateral    Impression    IMPRESSION:  1.  No deep venous thrombosis in the bilateral lower extremities.   US Abdomen Limited w Doppler Complete    Impression    IMPRESSION:  1.  " Scattered benign cysts in the liver.    2.  Moderate sludge within the gallbladder with slight gallbladder wall thickening at 3.5 mm. No pericholecystic fluid or positive Bah sign.    3.  Normal abdominal liver duplex.    4.  The remainder of the visualized abdomen is normal.       Echocardiogram Complete   Result Value Ref Range    LVEF  60-65%        Recent Labs   Lab 02/28/23  1201 02/28/23  0816 02/28/23  0632 02/27/23  1228 02/27/23  0946 02/27/23  0752 02/27/23  0309 02/26/23  0900 02/26/23  0627 02/23/23  2143 02/23/23  1339   WBC  --   --  5.4  --  6.1  --   --   --  5.2   < >  --    HGB  --   --  13.4  --  13.4  --   --   --  13.2*   < >  --    MCV  --   --  93  --  94  --   --   --  94   < >  --    PLT  --   --  70*  --  75*  --   --   --  61*   < >  --    INR  --   --   --   --   --   --   --   --   --   --  1.60*   NA  --   --  148*  --   --   --  147*  --  146*   < >  --    POTASSIUM  --   --  3.1*  --   --   --  3.6  --  3.5   < >  --    CHLORIDE  --   --  105  --   --   --  104  --  105   < >  --    CO2  --   --  32*  --   --   --  30*  --  30*   < >  --    BUN  --   --  19.5  --   --   --  23.0  --  24.1*   < >  --    CR  --   --  1.73*  --   --   --  1.86*  --  1.71*   < >  --    ANIONGAP  --   --  11  --   --   --  13  --  11   < >  --    GUILLERMO  --   --  9.3  --   --   --  9.6  --  9.5   < >  --    GLC 77 70 72   < >  --    < > 77   < > 67*   < >  --    ALBUMIN  --   --  3.3*  --   --   --  3.5  --  3.3*   < >  --    PROTTOTAL  --   --  6.1*  --   --   --  6.1*  --  5.8*   < >  --    BILITOTAL  --   --  1.5*  --   --   --  1.6*  --  1.8*   < >  --    ALKPHOS  --   --  127  --   --   --  141*  --  134*   < >  --    ALT  --   --  18  --   --   --  17  --  18   < >  --    AST  --   --  37  --   --   --  40  --  38   < >  --     < > = values in this interval not displayed.      Lab Results   Component Value Date    WBC 5.4 02/28/2023    HGB 13.4 02/28/2023    HCT 44.0 02/28/2023    PLT 70 (L)  02/28/2023     (H) 02/28/2023    POTASSIUM 3.1 (L) 02/28/2023    CHLORIDE 105 02/28/2023    CO2 32 (H) 02/28/2023    BUN 19.5 02/28/2023    CR 1.73 (H) 02/28/2023    GLC 77 02/28/2023    SED 3 05/04/2021    NTBNPI 1,962 (H) 02/23/2023    AST 37 02/28/2023    ALT 18 02/28/2023    ALKPHOS 127 02/28/2023    BILITOTAL 1.5 (H) 02/28/2023    FRANCESCO 32 02/23/2023    INR 1.60 (H) 02/23/2023      Lab Results   Component Value Date    ALBUMIN 3.5 02/27/2023    ALBUMIN 3.8 03/09/2021          ====================================================  TT: I have personally spent a total of 120 minutes on the day of the encounter on the unit in review of medical record, consultation with the medical providers and assessment of patient today, with time spent in counseling, coordination of care, and discussion with patient and family re: diagnostic results, prognosis, symptom management, risks and benefits of management options, and development of plan of care as noted above.  ====================================================    KATERIN Pillai, GCNS-BC  Clinical Nurse Specialist  [facility]  [phone]

## 2023-02-28 ENCOUNTER — APPOINTMENT (OUTPATIENT)
Dept: PHYSICAL THERAPY | Facility: HOSPITAL | Age: 88
DRG: 682 | End: 2023-02-28
Payer: MEDICARE

## 2023-02-28 ENCOUNTER — APPOINTMENT (OUTPATIENT)
Dept: SPEECH THERAPY | Facility: HOSPITAL | Age: 88
DRG: 682 | End: 2023-02-28
Payer: MEDICARE

## 2023-02-28 ENCOUNTER — APPOINTMENT (OUTPATIENT)
Dept: OCCUPATIONAL THERAPY | Facility: HOSPITAL | Age: 88
DRG: 682 | End: 2023-02-28
Payer: MEDICARE

## 2023-02-28 LAB
ALBUMIN SERPL BCG-MCNC: 3.3 G/DL (ref 3.5–5.2)
ALP SERPL-CCNC: 127 U/L (ref 40–129)
ALT SERPL W P-5'-P-CCNC: 18 U/L (ref 10–50)
ANION GAP SERPL CALCULATED.3IONS-SCNC: 11 MMOL/L (ref 7–15)
AST SERPL W P-5'-P-CCNC: 37 U/L (ref 10–50)
BACTERIA BLD CULT: NO GROWTH
BACTERIA PLR CULT: NO GROWTH
BILIRUB SERPL-MCNC: 1.5 MG/DL
BUN SERPL-MCNC: 19.5 MG/DL (ref 8–23)
CALCIUM SERPL-MCNC: 9.3 MG/DL (ref 8.8–10.2)
CHLORIDE SERPL-SCNC: 105 MMOL/L (ref 98–107)
CREAT SERPL-MCNC: 1.73 MG/DL (ref 0.67–1.17)
DEPRECATED HCO3 PLAS-SCNC: 32 MMOL/L (ref 22–29)
ERYTHROCYTE [DISTWIDTH] IN BLOOD BY AUTOMATED COUNT: 14.7 % (ref 10–15)
GFR SERPL CREATININE-BSD FRML MDRD: 38 ML/MIN/1.73M2
GLUCOSE BLDC GLUCOMTR-MCNC: 70 MG/DL (ref 70–99)
GLUCOSE BLDC GLUCOMTR-MCNC: 77 MG/DL (ref 70–99)
GLUCOSE BLDC GLUCOMTR-MCNC: 92 MG/DL (ref 70–99)
GLUCOSE BLDC GLUCOMTR-MCNC: 93 MG/DL (ref 70–99)
GLUCOSE SERPL-MCNC: 72 MG/DL (ref 70–99)
GRAM STAIN RESULT: NORMAL
GRAM STAIN RESULT: NORMAL
HCT VFR BLD AUTO: 44 % (ref 40–53)
HGB BLD-MCNC: 13.4 G/DL (ref 13.3–17.7)
MAGNESIUM SERPL-MCNC: 2.3 MG/DL (ref 1.7–2.3)
MCH RBC QN AUTO: 28.2 PG (ref 26.5–33)
MCHC RBC AUTO-ENTMCNC: 30.5 G/DL (ref 31.5–36.5)
MCV RBC AUTO: 93 FL (ref 78–100)
PLATELET # BLD AUTO: 70 10E3/UL (ref 150–450)
POTASSIUM SERPL-SCNC: 3.1 MMOL/L (ref 3.4–5.3)
PROT SERPL-MCNC: 6.1 G/DL (ref 6.4–8.3)
RBC # BLD AUTO: 4.75 10E6/UL (ref 4.4–5.9)
SODIUM SERPL-SCNC: 148 MMOL/L (ref 136–145)
WBC # BLD AUTO: 5.4 10E3/UL (ref 4–11)

## 2023-02-28 PROCEDURE — 250N000013 HC RX MED GY IP 250 OP 250 PS 637: Performed by: INTERNAL MEDICINE

## 2023-02-28 PROCEDURE — 36415 COLL VENOUS BLD VENIPUNCTURE: CPT | Performed by: INTERNAL MEDICINE

## 2023-02-28 PROCEDURE — 250N000013 HC RX MED GY IP 250 OP 250 PS 637: Performed by: STUDENT IN AN ORGANIZED HEALTH CARE EDUCATION/TRAINING PROGRAM

## 2023-02-28 PROCEDURE — 99255 IP/OBS CONSLTJ NEW/EST HI 80: CPT | Performed by: CLINICAL NURSE SPECIALIST

## 2023-02-28 PROCEDURE — 250N000011 HC RX IP 250 OP 636: Performed by: INTERNAL MEDICINE

## 2023-02-28 PROCEDURE — 80053 COMPREHEN METABOLIC PANEL: CPT | Performed by: INTERNAL MEDICINE

## 2023-02-28 PROCEDURE — 99233 SBSQ HOSP IP/OBS HIGH 50: CPT | Performed by: INTERNAL MEDICINE

## 2023-02-28 PROCEDURE — 97116 GAIT TRAINING THERAPY: CPT | Mod: GP

## 2023-02-28 PROCEDURE — 99232 SBSQ HOSP IP/OBS MODERATE 35: CPT | Performed by: NURSE PRACTITIONER

## 2023-02-28 PROCEDURE — 83735 ASSAY OF MAGNESIUM: CPT | Performed by: INTERNAL MEDICINE

## 2023-02-28 PROCEDURE — 99232 SBSQ HOSP IP/OBS MODERATE 35: CPT | Performed by: INTERNAL MEDICINE

## 2023-02-28 PROCEDURE — 92526 ORAL FUNCTION THERAPY: CPT | Mod: GN

## 2023-02-28 PROCEDURE — 85027 COMPLETE CBC AUTOMATED: CPT | Performed by: INTERNAL MEDICINE

## 2023-02-28 PROCEDURE — 97535 SELF CARE MNGMENT TRAINING: CPT | Mod: GO

## 2023-02-28 PROCEDURE — 97110 THERAPEUTIC EXERCISES: CPT | Mod: GP

## 2023-02-28 PROCEDURE — 97530 THERAPEUTIC ACTIVITIES: CPT | Mod: GP

## 2023-02-28 PROCEDURE — 99418 PROLNG IP/OBS E/M EA 15 MIN: CPT | Performed by: CLINICAL NURSE SPECIALIST

## 2023-02-28 PROCEDURE — 120N000001 HC R&B MED SURG/OB

## 2023-02-28 PROCEDURE — 250N000013 HC RX MED GY IP 250 OP 250 PS 637: Performed by: NURSE PRACTITIONER

## 2023-02-28 RX ORDER — POTASSIUM CHLORIDE 750 MG/1
30 TABLET, EXTENDED RELEASE ORAL 2 TIMES DAILY
Status: COMPLETED | OUTPATIENT
Start: 2023-02-28 | End: 2023-02-28

## 2023-02-28 RX ORDER — BUMETANIDE 2 MG/1
2 TABLET ORAL
Status: DISCONTINUED | OUTPATIENT
Start: 2023-02-28 | End: 2023-03-02 | Stop reason: HOSPADM

## 2023-02-28 RX ADMIN — MICONAZOLE NITRATE ANTIFUNGAL POWDER: 2 POWDER TOPICAL at 22:36

## 2023-02-28 RX ADMIN — FLUTICASONE PROPIONATE 1 SPRAY: 50 SPRAY, METERED NASAL at 22:40

## 2023-02-28 RX ADMIN — POTASSIUM CHLORIDE 30 MEQ: 750 TABLET, EXTENDED RELEASE ORAL at 16:45

## 2023-02-28 RX ADMIN — AZELASTINE HYDROCHLORIDE 1 SPRAY: 137 SPRAY, METERED NASAL at 10:10

## 2023-02-28 RX ADMIN — APIXABAN 2.5 MG: 2.5 TABLET, FILM COATED ORAL at 20:03

## 2023-02-28 RX ADMIN — PRAVASTATIN SODIUM 20 MG: 20 TABLET ORAL at 22:31

## 2023-02-28 RX ADMIN — AMOXICILLIN AND CLAVULANATE POTASSIUM 1 TABLET: 875; 125 TABLET, FILM COATED ORAL at 10:12

## 2023-02-28 RX ADMIN — APIXABAN 2.5 MG: 2.5 TABLET, FILM COATED ORAL at 10:16

## 2023-02-28 RX ADMIN — FINASTERIDE 5 MG: 5 TABLET, FILM COATED ORAL at 22:31

## 2023-02-28 RX ADMIN — BUDESONIDE AND FORMOTEROL FUMARATE DIHYDRATE 2 PUFF: 160; 4.5 AEROSOL RESPIRATORY (INHALATION) at 22:56

## 2023-02-28 RX ADMIN — FLUTICASONE PROPIONATE 1 SPRAY: 50 SPRAY, METERED NASAL at 10:09

## 2023-02-28 RX ADMIN — Medication 25 MCG: at 10:17

## 2023-02-28 RX ADMIN — ACETAMINOPHEN 975 MG: 325 TABLET ORAL at 10:16

## 2023-02-28 RX ADMIN — LIDOCAINE: 50 OINTMENT TOPICAL at 20:03

## 2023-02-28 RX ADMIN — LIDOCAINE: 50 OINTMENT TOPICAL at 22:21

## 2023-02-28 RX ADMIN — POTASSIUM CHLORIDE 30 MEQ: 750 TABLET, EXTENDED RELEASE ORAL at 22:31

## 2023-02-28 RX ADMIN — BUDESONIDE AND FORMOTEROL FUMARATE DIHYDRATE 2 PUFF: 160; 4.5 AEROSOL RESPIRATORY (INHALATION) at 10:07

## 2023-02-28 RX ADMIN — BUMETANIDE 2 MG: 0.25 INJECTION INTRAMUSCULAR; INTRAVENOUS at 10:22

## 2023-02-28 RX ADMIN — AMOXICILLIN AND CLAVULANATE POTASSIUM 1 TABLET: 875; 125 TABLET, FILM COATED ORAL at 20:03

## 2023-02-28 RX ADMIN — THIAMINE HYDROCHLORIDE 100 MG: 100 INJECTION, SOLUTION INTRAMUSCULAR; INTRAVENOUS at 22:21

## 2023-02-28 ASSESSMENT — ACTIVITIES OF DAILY LIVING (ADL)
ADLS_ACUITY_SCORE: 45
ADLS_ACUITY_SCORE: 49
ADLS_ACUITY_SCORE: 49
ADLS_ACUITY_SCORE: 45
ADLS_ACUITY_SCORE: 49
ADLS_ACUITY_SCORE: 45
ADLS_ACUITY_SCORE: 49
ADLS_ACUITY_SCORE: 45
ADLS_ACUITY_SCORE: 49

## 2023-02-28 NOTE — PROGRESS NOTES
Renal progress note  CC:CKD , hypervolemia  Assessment and Plan:  87 year old male with a past medical history of heart failure with preserved ejection fraction history of COPD CKD history of atrial fibrillation hypertension chronic lymphedema presented with increasing generalized weakness poor oral intake worsening fatigue and decreased urine output.  Admission labs noted for mildly elevated creatinine, nephrology consulted for NOREEN on CKD     NOREEN nonoliguric  Baseline likely CKD 3, baseline creatinine is around 1.2-1.3 with GFR close to 60 based on creatinine clearance  Cystatin C 2.8 GFR 18  Urine sodium less than 20 urine protein 0.19 g/g, UA with uric acid crystals and hyaline casts otherwise bland  Unremarkable renal imaging except for noted pleural effusion and ascites  Has had paraprotein work-up in 2021 that was negative  Repeat work up this admission:  BLACK negative.  C3 and C4 normal.  CH50 pending.  Serum immunofixation negative for monoclonal protein.    Creatinine elevation appears to be consistent with cardiorenal syndrome likely acute on chronic, less likely to be of primary renal disease with no proteinuria at this time.  Initially Bumex was held now resumed since 2/24/2023 with increased to IV Bumex 2 mg twice a day 2/25/2023  Cr today fairly stable at 1.7  Elevated Na and CO2, electrolytes otherwise ok-->cutting back on diuretics today.  Nonoliguric.  --> Follow on renal panel daily with ongoing diuresis     Electrolytes  Mild hypernatremia->Follow-up with ongoing diuresis may need thiazide for additional natriuresis.  Bumex cut back today.  Hypokalemia->give KCL today.  Repeat labs in AM.     Hemoglobin 13+  Acute onset thrombocytopenia  Heme-onc following currently CellCept on hold     Heart failure exacerbation with heart failure with preserved ejection fraction  History of severe TR  History of CAD  Moderate pleural effusion on right lower lobe s/p thoracocentesis  Possibly cor pulmonale  with worsened right heart functions  Mild to moderate aortic stenosis on TTE  History of atrial fibrillation Eliquis resumed 2.5 mg twice daily was initially on hold secondary to bleeding and thrombocytopenia  Cardiology following  Diuresing with IV bumex.  Switched to PO bumex today per cardiology.      Initial concerns for RLL aspirated pneumonia  Switched to Augmentin     Acute on chronic respiratory failure  History of COPD  Likely secondary to heart failure exacerbation as well  S/p thoracocentesis right pleural effusion       History of BPH  No evidence of retention  Currently off Cardura continued on Proscar     Chronic lymphedema  Local management with compression wraps     History of bullous pemphigoid on mycophenolate PTA       Vira Pandey NP  [facility]  [phone]        Subjective  Sitting up in chair talking with palliative, hospitalist, and family member.   Slept much better last night.  Tolerating PO.  Updated pt/family with stable Cr and plans for KCL    Objective    Vital signs in last 24 hours  Temp:  [97  F (36.1  C)-98  F (36.7  C)] 98  F (36.7  C)  Pulse:  [73-90] 90  Resp:  [16-20] 20  BP: ()/(59-73) 129/63  SpO2:  [91 %-96 %] 93 %  Weight:       Intake/Output last 3 shifts  I/O last 3 completed shifts:  In: 480 [P.O.:480]  Out: 1350 [Urine:1350]  Intake/Output this shift:  No intake/output data recorded.    Physical Exam  Alert/, awake, NAD, sitting up in chair  Lung:normal effort, room air    Pertinent Labs   Lab Results   Component Value Date    WBC 5.4 02/28/2023    HGB 13.4 02/28/2023    HCT 44.0 02/28/2023    MCV 93 02/28/2023    PLT 70 (L) 02/28/2023     Lab Results   Component Value Date    BUN 19.5 02/28/2023     (H) 02/28/2023    CO2 32 (H) 02/28/2023       Lab Results   Component Value Date    ALBUMIN 3.3 (L) 02/28/2023     Lab Results   Component Value Date    PHOS 3.7 02/23/2023     I reviewed all lab results

## 2023-02-28 NOTE — PROGRESS NOTES
[facility]    Medicine Progress Note - Hospitalist Service    Date of Admission:  2/20/2023    Assessment & Plan    Nishant Gilliam is a 87 year old malewith  HFpEF, Afib, COPD, CKD, HTN, HLD who was admitted on 2/20/2023 with generalized weakness, decreased po intake, fatigue, and decreased urine output.     Goals of care  -Currently DNI DNR  -Had goals of care discussion with palliative care 2/28  -Hospice will speak with wife and patient  -Giving them time to decide their options     HFpEF with ADHF (end-stage) predominant RHF with severe Tricuspid regurgitation: RLL moderate pleural effusion, nt-BNP ~1900.  Hypervolemic on exam consistent with right heart failure. LVEF 60-65%, right ventricle is severely dilated with mildly decreased right ventricular systolic function. Worse vs TTE in 2021.  -remains significantly decompensated. Not responding well to IV bumetanide. He has a strong allergy to high doses of loop diuretics resulting in Bullous Pemphigoid which is mostly controlled with mycophenolate. Cardiology, Nephrology and prior hospitalist d/w patient/spouse the limited treatment options and no cure.   -IV Bumex discontinued, now on oral  -Continue to hold metoprolol 12.5mg BID given blood pressure  -Cardiology following  -strict I/O, daily weights     NOREEN on probable CKD stage III:  Due to CRS due to decompensated heart failure.  Cr remains elevated. Erika < 20.  Renal US showed chronic renal disease, no hydro but mild bladder distension. Cr slight worsening from admission. CK normal.  UA bland. Uprot 19.1.   -Nephrology following  -no dialysis     Generalized weakness, Slow response, speech, tremor:  Stable. Very [place].  -CT-head unremarkable for acute process  -MRI brain to rule out cerebrovascular disease refused by patient  -Neurology consultation appreciated: not likely to be Parkinson.   ?Encephalopaty  -EEG suggestive of mild encephalopathy.  -CK 28, TSH & ammonia normal.    -Neurology s/o     Dysphagia, Silent aspiration (d/t weak hyolaryneal excursion creating vallecular and pyriform stasis - per SLP), Zenker's diverticulum, Esophageal dysmotility disorder:   -esophagram/VSS reviewed  -SLP following  -mechanical soft, thin liquid, swallowing strategies advised by SLP  -HOB 30 degrees  -ppi     Acute Thrombocytopenia: slowly improving/stable. No splenomegaly. + Cirrhosis. Bone marrow suppression/Toxicity from CellCept in setting of renal failure. LDH normal. B12 normal. PBS normal platelet morphology. numerous katie cells. Polychromasia not increased.   -Oncology/Hematology following  -hold cellcept  -hemolysis labs neg     Mild to moderate valvular aortic stenosis: TTE 2/23/223     PAF: EKG on admission showed a. Fib, ST abnormality, troponin 37 x2.   -metoprolol hold due to #1  -eliquis 2.5mg BID     Possible Sepsis:  Hypothermic, hypotension, immunosupressed, concern for RLL aspiration pneumonia.  Renal US no hydro. Initial UA no infection. COVID negative 2/20/23. CT Chest w/o contrast showed moderate right pleural effusion with right basilar compressive atelectasis.  Procalcitonin 0.16.  However, I think his hypothermia, hypotension, etc are all due to decompensated heart failure and cardiorenal syndrome resulting in tissue hypoperfusion/shock  -BC's x2 NGTD, MRSA nasal swab neg, pleural fluid cultures negative.  -zosyn -> augmentin x 3 days from today     Acute hypoxic respiratory failure, COPD without acute exacerbation, Severe tricuspid regurgitation with HFpEF with ADHF predominant right heart failure, moderate right pleural effusion with compressive atelectasis, probable RLL aspiration PNA given silent aspiration on VSS. S/P right thoracentesis 2/23 with 1.5L fluid removed.  Meets 1/3 of light's coiteria for exudative effusion.  Improved hypoxia.  - symbicort  -duoneb qid prn  -augmentin x 3d  -oximetry  -bronchial hygiene  -aspiration precautions  -diuresis      CAD: no  chest pain. Initial admission trop x 2 37.   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 LFT's with mild TB/DB elevation, normal transaminases, thrombocytopenia. CT Chest showed mild ascites. Nodular appearance of liver suggestive of cirrhosis. No h/o EtOH use. UQ US with doppler w/o arterio-veno occlusive disease.     BPH:  Renal US no hydronephrosis, + mild bladder distension  -cardura hold  -on Proscar  -continue to bladder scan/PVR q shift/void to monitor for retention  -External catheter more for comfort     HLD  -pravachol     H/O Bullous pemphigoid: on chronic CellCept. In remission  -hold CellCept due to concern for possible pneumonia and thrombocytopenia in setting of renal failure.      Chronic BLE edema:  -venous US BLE negative for dvt  -compression wraps  -diuresis     Chronic post nasal drip, nonallergic rhinitis  -trial Azelastin-Fluticasone BID     Inguinal candidiasis  - lotrimin bid     Decubitus ulcer stage 2  -Wound care following     Impaired hearing     Constipation:  -senna-s 2 qam  -dulcolax supp prn     Severe malnutrition in context of Chronic illness or disease       Diet: Snacks/Supplements Adult: Ensure Enlive; With Meals  Combination Diet Easy to Chew (level 7); Thin Liquids (level 0); 2 gm NA Diet  Fluid restriction 1500 ML FLUID  Snacks/Supplements Adult: Gelatein Plus; With Meals    DVT Prophylaxis: DOAC  [place] Catheter: Not present  Lines: None     Cardiac Monitoring: ACTIVE order. Indication: Acute decompensated heart failure (48 hours)  Code Status: No CPR- Do NOT Intubate      Clinically Significant Risk Factors        # Hypokalemia: Lowest K = 3.1 mmol/L in last 2 days, will replace as needed  # Hypernatremia: Highest Na = 148 mmol/L in last 2 days, will monitor as appropriate   # Hypercalcemia: corrected calcium is >10.1, will monitor as appropriate    # Hypoalbuminemia: Lowest  "albumin = 3.2 g/dL at 2/24/2023  6:29 AM, will monitor as appropriate   # Thrombocytopenia: Lowest platelets = 70 in last 2 days, will monitor for bleeding         # Obesity: Estimated body mass index is 33.21 kg/m  as calculated from the following:    Height as of this encounter: 1.829 m (6' 0.01\").    Weight as of this encounter: 111.1 kg (244 lb 14.9 oz).   # Severe Malnutrition: based on nutrition assessment        Disposition Plan      Expected Discharge Date: 03/01/2023      Destination: inpatient rehabilitation facility;home with help/services  Discharge Comments: palliative following - plan for goals of care conference meeting at 1400          Etta Estrada MD  Hospitalist Service  [facility]  Securely message with [employer] (more info)  Text page via mob[employer]Manage Paging/Directory   ______________________________________________________________________    Interval History   Had palliative care meeting today with wife and palliative care.  Also spoke with patient prior to this.  He is feeling somewhat better.  He continues to have protuberant abdomen and lower extremity edema.  This is my first time meeting him today but he states that is about the same.  After discussion about palliative care, his disease state, and his prognosis his wife and he would like some time to discuss their options.  They would also like to speak with hospice as well.  We will have them consulted.    Physical Exam   Vital Signs: Temp: 97.4  F (36.3  C) Temp src: Temporal BP: 104/74 Pulse: 87   Resp: 18 SpO2: 91 % O2 Device: None (Room air)    Weight: 244 lbs 14.9 oz    General Appearance: Awake, alert, in no acute distress, frail  Respiratory: CTAB, no wheeze  Cardiovascular: RRR, 3+ pitting edema bilaterally  GI: soft, nontender, distended, normal bowel sounds  Skin: no jaundice, no rash      Medical Decision Making       MANAGEMENT DISCUSSED with the following over the past 24 hours: Palliative care, " nephrology, nursing, wife   NOTE(S)/MEDICAL RECORDS REVIEWED over the past 24 hours: Nephrology, palliative care, nursing,  , And cardiology    Data     I have personally reviewed the following data over the past 24 hrs:    5.4  \   13.4   / 70 (L)     148 (H) 105 19.5 /  93   3.1 (L) 32 (H) 1.73 (H) \       ALT: 18 AST: 37 AP: 127 TBILI: 1.5 (H)   ALB: 3.3 (L) TOT PROTEIN: 6.1 (L) LIPASE: N/A       Procal: N/A CRP: N/A Lactic Acid: 1.3         Imaging results reviewed over the past 24 hrs:   No results found for this or any previous visit (from the past 24 hour(s)).

## 2023-02-28 NOTE — PLAN OF CARE
Problem: Plan of Care - These are the overarching goals to be used throughout the patient stay.    Goal: Absence of Hospital-Acquired Illness or Injury  Intervention: Identify and Manage Fall Risk  Recent Flowsheet Documentation  Taken 2/28/2023 1400 by Shauna Dobbs RN  Safety Promotion/Fall Prevention:   activity supervised   fall prevention program maintained   increased rounding and observation   lighting adjusted   mobility aid in reach   nonskid shoes/slippers when out of bed   safety round/check completed  Taken 2/28/2023 1000 by Shauna Dobbs RN  Safety Promotion/Fall Prevention:   activity supervised   fall prevention program maintained   increased rounding and observation   lighting adjusted   mobility aid in reach   nonskid shoes/slippers when out of bed   safety round/check completed  Intervention: Prevent Skin Injury  Recent Flowsheet Documentation  Taken 2/28/2023 1400 by Shauna Dobbs RN  Body Position: position changed independently  Taken 2/28/2023 1000 by Shauna Dobbs RN  Body Position: position changed independently  Intervention: Prevent and Manage VTE (Venous Thromboembolism) Risk  Recent Flowsheet Documentation  Taken 2/28/2023 1400 by Shauna Dobbs RN  VTE Prevention/Management: (Bilat Lymphodema stockingon.) other (see comments)  Taken 2/28/2023 1000 by Shauna Dobbs RN  VTE Prevention/Management: (Bilat Lymphodema stockingon.) other (see comments)  Goal: Optimal Comfort and Wellbeing  Outcome: Progressing   Goal Outcome Evaluation:       Patient is alert and oriented x4.  Tolerating PO ABX augmentin q12 well. Appetite was poor on this shift.  Hydration was encouraged frequently. Pleasant and cooperative with cares.. Has spent a large amount of time in his chair where he reports to be most comfortable / without pain. Medications may be refused as patient sees necessary, may need to be convinced that ABX are necessary. Patient appreciated medications crushed  that can be crushed and given with pudding.  R arm IV intact / saline locked.  Denies pain currently.  Primo fit in place and working well on this shift. Care conference this afternoon with spouse attending. Shauna Dobbs RN

## 2023-02-28 NOTE — PLAN OF CARE
Speech Language Therapy Discharge Summary    Reason for therapy discharge:    All goals and outcomes met, no further needs identified.  No further expectations of functional progress.    Progress towards therapy goal(s). See goals on Care Plan in [facility] electronic health record for goal details.  Goals met    Therapy recommendation(s):    No further therapy is recommended.Status at the time of discharge: Pt reports swallow continues to improve, better appetite. He verbalizes and demonstrates independent use of strategies, sitting upright, alternating bites/sips, soft food choices, smaller amounts throughout the day. Used his exersize handout to review efforful swallow and tongue back exercise. With cues and demonstration he was able to effectively do the tongue back exercise, 2 sets of 10. Effortful swallow was more difficult even with small sips of liquid to help initiate swallow. Amory focusing on the swallow made it more unnatural and difficult to coordinate. He states he will keep trying. His focus continues to be on comfort, no further skilled ST at this time.

## 2023-02-28 NOTE — PLAN OF CARE
Problem: Heart Failure  Goal: Effective Breathing Pattern During Sleep  Outcome: Progressing  Intervention: Monitor and Manage Obstructive Sleep Apnea  Recent Flowsheet Documentation  Taken 2/28/2023 0054 by Scott Golden RN  Medication Review/Management: medications reviewed     Problem: Risk for Delirium  Goal: Improved Attention and Thought Clarity  Intervention: Maximize Cognitive Function  Recent Flowsheet Documentation  Taken 2/28/2023 0054 by Scott Golden RN  Reorientation Measures:    clock in view    glasses use encouraged    hearing device use encouraged   Goal Outcome Evaluation:  Patient is still weak. Slept well tonight. Has Primo Fit external male catheter. Denies pain. Assisted with turning and repositioning. Voided 800 ml.

## 2023-02-28 NOTE — PROGRESS NOTES
Care Management Follow Up    Length of Stay (days): 8    Expected Discharge Date: 02/28/2023     Concerns to be Addressed: discharge planning     Patient plan of care discussed at interdisciplinary rounds: Yes    Anticipated Discharge Disposition:       Anticipated Discharge Services:    Anticipated Discharge DME:      Patient/family educated on Medicare website which has current facility and service quality ratings:    Education Provided on the Discharge Plan:    Patient/Family in Agreement with the Plan:      Referrals Placed by CM/SW:    Private pay costs discussed: Not applicable    Additional Information:  Met with Pt after he met with palliative care.  He is continuing to try to decide whether he is going to go on Hospice or not. He states he has not yet made a decision.  Did give him a brochure on [facility] but have not made any calls as of yet.  Advised pt to let Case management know when he made a decision if he would like us to call them.  He stated he would.  CM will continue to follow. [facility] is aware he has not yet made a decision.      Sierra Steinberg RN

## 2023-02-28 NOTE — PROGRESS NOTES
"CLINICAL NUTRITION SERVICES - ASSESSMENT NOTE     Nutrition Prescription    RECOMMENDATIONS FOR MDs/PROVIDERS TO ORDER:  None    Malnutrition Status:    Severe in context of chronic illness 2/23    Recommendations already ordered by Registered Dietitian (RD):  Change magic cup to gel plus daily     Future/Additional Recommendations:  Adjust supplements pending intake, tolerance, acceptance, weight, labs     EVALUATION OF PROGRESS NOTES    CURRENT NUTRITION ORDERS  Diet: Easy to Chew (level 7), thin liquids, 2 g Na, 1500 ml fluid restriction  Supplement: Magic cup daily, ensure enlive 4 oz bid  Intake/Tolerance: Suspect Fair. Only 1 meal intake/day being documented at %.   Nursing reports pt eating 50% of meals recently    Pt ordering [id number]  Kcal, 47-51 g protein/day, not including supplements    Pt seen with wife at bedside. He reports he doesn't like the magic cup. He is not fond of the ensure but is willing to try another flavor. Pt willing to try gel plus. Pt state he feels his intake is improving as he is able to find out what will work for his swallowing.     LABS  Labs reviewed  Na 148 (H), increased - diuresing -renal following  K+ 3.1 (L), decreased  Cr 1.7 (H),s table    MEDICATIONS  Medications reviewed: oral abx, iv bumex bid, mvi with minerals, pericolace daily, B1 iv daily, vit D      ANTHROPOMETRICS  Height: 182.9 cm (6' .008\")  Most Recent Weight:111.1 kb (244 lb  14.9 oz) 2/27, down 22 lb x 3 days with diuresis  118.5 kg (261 lb 3.9 oz) 2/21  IBW: 80.9 kg  BMI: Obesity Grade II BMI 35-39.9  Weight History:   Wt Readings from Last 3 Encounters:   02/27/23 111.1 kg (244 lb 14.9 oz)   09/06/22 113.7 kg (250 lb 11.2 oz)   03/01/22 129.5 kg (285 lb 6.4 oz)       Dosing Weight: 90.3 kg adjusted    ASSESSED NUTRITION NEEDS  Estimated Energy Needs: 2255 - 2700 kcals/day (25 - 30 kcals/kg)  Justification: Increased needs and Maintenance  Estimated Protein Needs: 90-108grams protein/day (1-1.2 grams " of pro/kg)  Justification: Maintenance and Repletion (CKD)  Estimated Fluid Needs: 2255 mL/day (25 mL/kg), or per Provider  Justification: Maintenance with CHF      Skin: Coccyx Stage 2 PI, excoriated  3+ bilateral ROBISNON- no change  GI: 1-3 BM/day    MALNUTRITION:  % Weight Loss:  Weight loss does not meet criteria for malnutrition   % Intake:  </= 50% for >/= 1 month (severe malnutrition)  Subcutaneous Fat Loss:  Orbital region moderate depletion  Muscle Loss:  Buccal mild depletion and Temporal region mild depletion  Fluid Retention:  Moderate     Malnutrition Diagnosis: Severe malnutrition  In Context of:  Chronic illness or disease    NUTRITION DIAGNOSIS  Malnutrition related to poor appetite as evidenced by intake of <= 50% for 1 month and moderate muscle and subcutaneous fat loss, fluid accumulation  - not improving    INTERVENTIONS  Implementation  -Encouraged intake of ensure supplement and choosing smooth moist foods for swallowing  -Change magic cup to gel plus daily     Goals  Patient to consume % of nutritionally adequate meals three times per day, or the equivalent with supplements/snacks.- not improving     Monitoring/Evaluation  Intake, supplement acceptance, weight, labs, wound healing, fluid balance

## 2023-02-28 NOTE — PROGRESS NOTES
HEART CARE NOTE          Assessment/Recommendations     1. HFpEF/RV dysfunction  Assessment / Plan    Will transition to oral diuretic and continue to monitor    GDMT as detailed below; mainstay of treatment for HFpEF includes diuretics and adequate BP control (class I) and SGLT2-I (class 2a); additional medical therapy (ARNI, MRA, ARB) demonstrated less robust evidence for indication but may be considered per guideline recommendations (2b); no indication for BBlockers      Current Pharmacotherapy AHA Guideline-Directed Medical Therapy   Losartan - on hold given NOREEN and borderline BP ARNI/ARB   Spironolactone not started  MRA   SGLT2 inhibitor not started SGLT2-I    Bumetanide IV Loop diuretic       2. Valvular heart disease  Assessment / Plan    Severe TR; likely functional in the setting of a dilated RV with reduced function - will refer to valve clinic if patient is amenable    Diuresis as above     3. Atrial fibrillation  Assessment / Plan    Paroxysmal - continue apixaban     4. CKD  Assessment / Plan    Continue to monitor renal function closely while diuresing     5. CAD  Assessment / Plan    Denies chest pain or anginal equivalents    Continue metoprolol (reduced to 12.5 mg BID), statin    History of Present Illness/Subjective      Mr. Nishant Gilliam is a 87 year old male with a PMHx significant for (per Dr. Pham's note) HFpEF, Afib, COPD, CKD, HTN, HLD presented with generalized weakness with concern for dehydration. HF service now consulted regarding RV dysfunction/CHF management      Today, Mr. TERRI Gilliam denies any acute cardiac events or complaints; Management plan as detailed above     ECG: Personally reviewed. atrial fibrillation, rate controlled, RBBB.     ECHO (personnaly Reviewed):   The left ventricle is normal in size with normal left ventricular wall  thickness.  Left ventricular function is normal.The ejection fraction is 60-65%.  The right ventricle is severely dilated with mildly  "decreased right  ventricular systolic function  There is severe (4+) tricuspid regurgitation.  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  Compared to the prior study of 7/8/21, the right ventricle is now severely  dilated and there is now severe tricuspid regurgitation.     Consider outpatient referral to the [facility] valve clinic to  consider treatment options for the tricuspid regurgitation.          Physical Examination Review of Systems   /69 (BP Location: Right arm)   Pulse 73   Temp 97.4  F (36.3  C) (Axillary)   Resp 16   Ht 1.829 m (6' 0.01\")   Wt 111.1 kg (244 lb 14.9 oz)   SpO2 96%   BMI 33.21 kg/m    Body mass index is 33.21 kg/m .  Wt Readings from Last 3 Encounters:   02/27/23 111.1 kg (244 lb 14.9 oz)   09/06/22 113.7 kg (250 lb 11.2 oz)   03/01/22 129.5 kg (285 lb 6.4 oz)     General Appearance:   no distress, normal body habitus   ENT/Mouth: membranes moist, no oral lesions or bleeding gums.      EYES:  no scleral icterus, normal conjunctivae   Neck: no carotid bruits or thyromegaly   Chest/Lungs:   lungs are clear to auscultation, no rales or wheezing, equal chest wall expansion    Cardiovascular:   Irregular. Normal first and second heart sounds with no murmurs, rubs, or gallops; the carotid, radial and posterior tibial pulses are intact, no JVD and mild LE edema bilaterally    Abdomen:  no organomegaly, masses, bruits, or tenderness; bowel sounds are present   Extremities: no cyanosis or clubbing   Skin: no xanthelasma, warm.    Neurologic: alert and calm     Psychiatric: alert and calm     A complete 10 systems ROS was reviewed  And is negative except what is listed in the HPI.          Medical History  Surgical History Family History Social History   Past Medical History:   Diagnosis Date     COPD (chronic obstructive pulmonary disease) (H)      Coronary artery disease      Hypertension      Right bundle branch block     complete    Past Surgical History:   Procedure " Laterality Date     HC KNEE SCOPE, DIAGNOSTIC      Description: Arthroscopy Knee Left;  Recorded: 06/27/2009;  Comments: details unk.     HERNIA REPAIR       ZZHC CORONARY STENT PERCUT, INITIAL VESSEL      Description: Cath Stent Placement;  Recorded: 02/24/2014;  Comments: stenting to the LAD in 1999. 2nd done years later.    no family history of premature coronary artery disease Social History     Socioeconomic History     Marital status:      Spouse name: Not on file     Number of children: Not on file     Years of education: Not on file     Highest education level: Not on file   Occupational History     Not on file   Tobacco Use     Smoking status: Former     Years: 30.00     Types: Cigarettes     Smokeless tobacco: Never   Substance and Sexual Activity     Alcohol use: No     Drug use: No     Sexual activity: Not on file   Other Topics Concern     Not on file   Social History Narrative    Lives with his wife.     Social Determinants of Health     Financial Resource Strain: Not on file   Food Insecurity: Not on file   Transportation Needs: Not on file   Physical Activity: Not on file   Stress: Not on file   Social Connections: Not on file   Intimate Partner Violence: Not on file   Housing Stability: Not on file           Lab Results    Chemistry/lipid CBC Cardiac Enzymes/BNP/TSH/INR   Lab Results   Component Value Date    CHOL 128 02/25/2022    HDL 48 02/25/2022    TRIG 121 02/25/2022    BUN 23.0 02/27/2023     (H) 02/27/2023    CO2 30 (H) 02/27/2023    Lab Results   Component Value Date    WBC 6.1 02/27/2023    HGB 13.4 02/27/2023    HCT 44.5 02/27/2023    MCV 94 02/27/2023    PLT 75 (L) 02/27/2023    Lab Results   Component Value Date    TROPONINI 0.03 07/07/2021     (H) 07/07/2021    TSH 2.42 02/21/2023    INR 1.60 (H) 02/23/2023     Lab Results   Component Value Date    TROPONINI 0.03 07/07/2021          Weight:    Wt Readings from Last 3 Encounters:   02/27/23 111.1 kg (244 lb 14.9 oz)    09/06/22 113.7 kg (250 lb 11.2 oz)   03/01/22 129.5 kg (285 lb 6.4 oz)       Allergies  Allergies   Allergen Reactions     Furosemide Rash     pemphigoid     Cat Hair Std Allergenic Ext [Cat Hair Extract] Unknown     Sulfa Drugs Unknown         Surgical History  Past Surgical History:   Procedure Laterality Date     HC KNEE SCOPE, DIAGNOSTIC      Description: Arthroscopy Knee Left;  Recorded: 06/27/2009;  Comments: details unk.     HERNIA REPAIR       ZZHC CORONARY STENT PERCUT, INITIAL VESSEL      Description: Cath Stent Placement;  Recorded: 02/24/2014;  Comments: stenting to the LAD in 1999. 2nd done years later.       Social History  Tobacco:   History   Smoking Status     Former     Years: 30.00     Types: Cigarettes   Smokeless Tobacco     Never    Alcohol:   Social History    Substance and Sexual Activity      Alcohol use: No   Illicit Drugs:   History   Drug Use No       Family History  No family history on file.       Edd Fontanez MD on 2/28/2023      cc: Aditya Mehta

## 2023-02-28 NOTE — PLAN OF CARE
Denies pain. Up to chair and commode with 2 assist. Primo fit applied at bedtime. Poor appetite at dinner. Took all medications except Melatonin. Sepsis protocol triggered. Lactic 1.3. Bladder scan 85.  Problem: Plan of Care - These are the overarching goals to be used throughout the patient stay.    Goal: Absence of Hospital-Acquired Illness or Injury  Intervention: Identify and Manage Fall Risk  Recent Flowsheet Documentation  Taken 2/27/2023 2010 by Shayna Ortega, RN  Safety Promotion/Fall Prevention:   activity supervised   bed alarm on   chair alarm on   nonskid shoes/slippers when out of bed   supervised activity   clutter free environment maintained   fall prevention program maintained  Taken 2/27/2023 1600 by Shayna Ortega, RN  Safety Promotion/Fall Prevention:   activity supervised   bed alarm on   chair alarm on   nonskid shoes/slippers when out of bed   supervised activity   clutter free environment maintained   fall prevention program maintained     Problem: Heart Failure  Goal: Improved Oral Intake  Outcome: Progressing

## 2023-03-01 ENCOUNTER — APPOINTMENT (OUTPATIENT)
Dept: PHYSICAL THERAPY | Facility: HOSPITAL | Age: 88
DRG: 682 | End: 2023-03-01
Payer: MEDICARE

## 2023-03-01 ENCOUNTER — APPOINTMENT (OUTPATIENT)
Dept: OCCUPATIONAL THERAPY | Facility: HOSPITAL | Age: 88
DRG: 682 | End: 2023-03-01
Payer: MEDICARE

## 2023-03-01 LAB
ANION GAP SERPL CALCULATED.3IONS-SCNC: 12 MMOL/L (ref 7–15)
BACTERIA BLD CULT: NO GROWTH
BACTERIA BLD CULT: NO GROWTH
BUN SERPL-MCNC: 17.3 MG/DL (ref 8–23)
CALCIUM SERPL-MCNC: 9.5 MG/DL (ref 8.8–10.2)
CH50 SERPL-ACNC: 71.4 U/ML
CHLORIDE SERPL-SCNC: 105 MMOL/L (ref 98–107)
CREAT SERPL-MCNC: 1.56 MG/DL (ref 0.67–1.17)
DEPRECATED HCO3 PLAS-SCNC: 30 MMOL/L (ref 22–29)
GFR SERPL CREATININE-BSD FRML MDRD: 43 ML/MIN/1.73M2
GLUCOSE BLDC GLUCOMTR-MCNC: 110 MG/DL (ref 70–99)
GLUCOSE BLDC GLUCOMTR-MCNC: 68 MG/DL (ref 70–99)
GLUCOSE BLDC GLUCOMTR-MCNC: 88 MG/DL (ref 70–99)
GLUCOSE SERPL-MCNC: 73 MG/DL (ref 70–99)
HOLD SPECIMEN: NORMAL
POTASSIUM SERPL-SCNC: 3.6 MMOL/L (ref 3.4–5.3)
SODIUM SERPL-SCNC: 147 MMOL/L (ref 136–145)
VIT B1 PYROPHOSHATE BLD-SCNC: 96 NMOL/L

## 2023-03-01 PROCEDURE — 250N000013 HC RX MED GY IP 250 OP 250 PS 637: Performed by: NURSE PRACTITIONER

## 2023-03-01 PROCEDURE — 250N000013 HC RX MED GY IP 250 OP 250 PS 637: Performed by: STUDENT IN AN ORGANIZED HEALTH CARE EDUCATION/TRAINING PROGRAM

## 2023-03-01 PROCEDURE — 97110 THERAPEUTIC EXERCISES: CPT | Mod: GP

## 2023-03-01 PROCEDURE — 97116 GAIT TRAINING THERAPY: CPT | Mod: GP

## 2023-03-01 PROCEDURE — 36415 COLL VENOUS BLD VENIPUNCTURE: CPT | Performed by: NURSE PRACTITIONER

## 2023-03-01 PROCEDURE — 97535 SELF CARE MNGMENT TRAINING: CPT | Mod: GO

## 2023-03-01 PROCEDURE — 99233 SBSQ HOSP IP/OBS HIGH 50: CPT | Performed by: INTERNAL MEDICINE

## 2023-03-01 PROCEDURE — 99233 SBSQ HOSP IP/OBS HIGH 50: CPT | Performed by: CLINICAL NURSE SPECIALIST

## 2023-03-01 PROCEDURE — 999N000197 HC STATISTIC WOC PT EDUCATION, 0-15 MIN

## 2023-03-01 PROCEDURE — 120N000001 HC R&B MED SURG/OB

## 2023-03-01 PROCEDURE — 250N000013 HC RX MED GY IP 250 OP 250 PS 637: Performed by: INTERNAL MEDICINE

## 2023-03-01 PROCEDURE — 99232 SBSQ HOSP IP/OBS MODERATE 35: CPT | Performed by: NURSE PRACTITIONER

## 2023-03-01 PROCEDURE — 80048 BASIC METABOLIC PNL TOTAL CA: CPT | Performed by: NURSE PRACTITIONER

## 2023-03-01 PROCEDURE — 99418 PROLNG IP/OBS E/M EA 15 MIN: CPT | Performed by: CLINICAL NURSE SPECIALIST

## 2023-03-01 RX ORDER — ACETAMINOPHEN 325 MG/1
975 TABLET ORAL EVERY 6 HOURS PRN
Status: DISCONTINUED | OUTPATIENT
Start: 2023-03-01 | End: 2023-03-02 | Stop reason: HOSPADM

## 2023-03-01 RX ORDER — POTASSIUM CHLORIDE 750 MG/1
30 TABLET, EXTENDED RELEASE ORAL ONCE
Status: COMPLETED | OUTPATIENT
Start: 2023-03-01 | End: 2023-03-01

## 2023-03-01 RX ADMIN — BUDESONIDE AND FORMOTEROL FUMARATE DIHYDRATE 2 PUFF: 160; 4.5 AEROSOL RESPIRATORY (INHALATION) at 08:37

## 2023-03-01 RX ADMIN — FLUTICASONE PROPIONATE 1 SPRAY: 50 SPRAY, METERED NASAL at 08:37

## 2023-03-01 RX ADMIN — SENNOSIDES AND DOCUSATE SODIUM 2 TABLET: 50; 8.6 TABLET ORAL at 08:35

## 2023-03-01 RX ADMIN — FLUTICASONE PROPIONATE 1 SPRAY: 50 SPRAY, METERED NASAL at 21:53

## 2023-03-01 RX ADMIN — BUDESONIDE AND FORMOTEROL FUMARATE DIHYDRATE 2 PUFF: 160; 4.5 AEROSOL RESPIRATORY (INHALATION) at 21:48

## 2023-03-01 RX ADMIN — AMOXICILLIN AND CLAVULANATE POTASSIUM 1 TABLET: 875; 125 TABLET, FILM COATED ORAL at 08:35

## 2023-03-01 RX ADMIN — AMOXICILLIN AND CLAVULANATE POTASSIUM 1 TABLET: 875; 125 TABLET, FILM COATED ORAL at 21:40

## 2023-03-01 RX ADMIN — Medication 25 MCG: at 08:35

## 2023-03-01 RX ADMIN — MICONAZOLE NITRATE ANTIFUNGAL POWDER 1 G: 2 POWDER TOPICAL at 08:38

## 2023-03-01 RX ADMIN — APIXABAN 2.5 MG: 2.5 TABLET, FILM COATED ORAL at 08:35

## 2023-03-01 RX ADMIN — APIXABAN 2.5 MG: 2.5 TABLET, FILM COATED ORAL at 21:42

## 2023-03-01 RX ADMIN — BUMETANIDE 2 MG: 2 TABLET ORAL at 18:28

## 2023-03-01 RX ADMIN — Medication 1 TABLET: at 08:35

## 2023-03-01 RX ADMIN — ACETAMINOPHEN 975 MG: 325 TABLET ORAL at 08:35

## 2023-03-01 RX ADMIN — LIDOCAINE 1 G: 50 OINTMENT TOPICAL at 08:38

## 2023-03-01 RX ADMIN — POTASSIUM CHLORIDE 30 MEQ: 750 TABLET, EXTENDED RELEASE ORAL at 14:51

## 2023-03-01 RX ADMIN — Medication 1 MG: at 21:41

## 2023-03-01 RX ADMIN — PANTOPRAZOLE SODIUM 40 MG: 40 TABLET, DELAYED RELEASE ORAL at 06:44

## 2023-03-01 RX ADMIN — FINASTERIDE 5 MG: 5 TABLET, FILM COATED ORAL at 21:41

## 2023-03-01 RX ADMIN — BUMETANIDE 2 MG: 2 TABLET ORAL at 08:36

## 2023-03-01 RX ADMIN — MICONAZOLE NITRATE ANTIFUNGAL POWDER: 2 POWDER TOPICAL at 21:57

## 2023-03-01 ASSESSMENT — ACTIVITIES OF DAILY LIVING (ADL)
ADLS_ACUITY_SCORE: 44
ADLS_ACUITY_SCORE: 45
ADLS_ACUITY_SCORE: 44
ADLS_ACUITY_SCORE: 44
ADLS_ACUITY_SCORE: 51
ADLS_ACUITY_SCORE: 44

## 2023-03-01 NOTE — PROGRESS NOTES
Palliative Care Initial Social Work Note    Patient Info:  Nishant Gilliam is a 87 year old male with Nishant Gilliam is a 87 year old malewith  HFpEF, Afib, COPD, CKD, HTN, HLD who was admitted on 2/20/2023 with generalized weakness, decreased po intake, fatigue, and decreased urine output.     Brief summary of visit: PCSW met with Palliative care NP, patient, spouse Daniela. Reviewed goals of care. They have decided to proceed with hospice at home. Patient and spouse engaged in life review talking about careers, travels. What is most important to Pérez is to be with Daniela at home and have less intrusion (he is an introvert) and wants to recharge at home. The next step is hospice coordination and information. Daniela received call from hospice during visit and will reconnect. They were provided a HCD and POLST. Open to completion of items tomorrow.   Both appear to be coping well, asking questions, supportive of one another. He has a history of anxiety but no concerns identified today.    Date of Admission: 2/20/2023    Reason for consult: Goals of care  Decisional support  Patient and family support    Sources of information: Patient  Family member spouse, Daniela.    Recommendations & Plan:  Palliative care team continues to follow.     Symptoms & Concerns Addressed Today:  End of life discussed hospice information/hospice at home, patient and spouse are open about end of life.    Strengths Identified:  Pérez and Daniela have understanding of prognosis/health. Ability to share with team what his/their goals of care are. Limited extended family support/is not in communication with children. Spouse is primary care giver. Works from home.    Relationships & Support:  Aspects of relationships and support assessed today:    Identified family members: Spouse/PCG    Professional supports: hoping for [facility] to initiate care upon discharge from hospital.     Family coping: coping adequately, no concerns  identified.    Bereavement Risk concerns: no concerns identified.    Coping, Mental Health & Adjustment to Illness:   Other no concerns identified.    Goals, Decision Making & Advance Care Planning:   Prognosis, Goals, and/or Advance Care Planning were assessed today: Yes  If yes, brief summary of discussion: Hospice at home  Preferred language: English   Patient's decision making preferences: shared with support from loved ones  I have concerns about the patient/family's health literacy today: No  Patient has a completed Health Care Directive: No.   Code status per chart review: No CPR / No Intubation    Key Palliative Symptom Data:  We are not helping to manage these symptoms currently in this patient.    Clinical Social Work Interventions:   Assessment of palliative specific issues    Introduction of Palliative clinical social work interventions  Advanced care planning  Facilitation of processing of thoughts/feelings  Life review facilitation  Psychoeducation    LANEY Giang, [facility]   Palliative Care    [phone]

## 2023-03-01 NOTE — PROGRESS NOTES
"[facility]  Palliative Care Daily Progress Note       Recommendations & Counseling     GOALS OF CARE: Goals are for comfort focused treatments.  DNR/DNI, no dialysis.  Continuing with current cares and treatments at this time, but plans to discharge to home with hospice once arranged.  Consider reviewing medications to help minimize pill burden (vitamin D, multivitamin, pravachol, eliquis?).  [facility] has been consulted.   -He has a good understanding of his medical conditions, and priority is to be able to spend his remaining time with his wife.  Does not want to be \"institutionalized\" and describes himself as an introvert.  Finds peace in his home and quiet environment.   -Will plan to meet with patient and  tomorrow at 2 pm to follow up and complete POLST and health care directive.     ADVANCE CARE PLANNING:   Advance directive: no-discussed today and provided blank document  POLST: no-discussed today and will complete tomorrow  Surrogate decision maker: Wife Daniela  Code status: Confirmed DNR/DNI     SYMPTOM MANAGEMENT: Palliative care is not actively managing symptoms at this time.  Will continue to monitor.     Discussed with: Dr. Estrada, Yamini GOTTI with palliative care, and RN care manager      Assessments          Nishant Gilliam is a 87 year old male with PMH of  HFpEF, Afib, COPD, CKD, HTN, HLD who presented to the ED on 2/20/23 with generalized weakness.  Admitted to the hospital with dehydration, NOREEN on CKD, generalized weakness, elevated troponin, HFpEF, paroxysmal a-fib, thrombocytopenia, bullous pemphigoid, lymphedema.    Cardiology, neurology, GI, heme/onc, and nephrology have also been consulted this admission.       Today, the patient was seen for:  Goals of care    Prognosis, Goals, or Advance Care Planning was addressed today with: Yes.  Mood, coping, and/or meaning in the context of serious illness were addressed today: Yes.              Interval " "History:     Chart review/discussion with unit or clinical team members:   No significant overnight events    Per patient or family/caregivers today:  Frustrated with interruptions from staff, especially during night (\"at least 20 times/day.\").  Sleeping better since external catheter placed. Mild chronic back pain.    Met with patient, wife, Yamini palliative care SW, and Dr. Estrada.  Patient and wife state he would like to move forward with hospice care at home.  Lengthy discussion about hospice services, philosophy, and options to add more support at home as his condition declines.    Discussed POLST and advance directive.  Provided with blank documents.  Would like to meet again tomorrow to complete.     Key Palliative Symptoms:  # Pain severity the last 12 hours: low  # Dyspnea severity the last 12 hours: none  # Nausea severity the last 12 hours: none  # Anxiety severity the last 12 hours: none             Review of Systems:     Besides above, an additional 4 point system ROS was reviewed and is unremarkable          Medications:     I have reviewed this patient's medication profile and medications during this hospitalization.           Physical Exam:   Vital Signs: Blood pressure 121/67, pulse 98, temperature 97.9  F (36.6  C), temperature source Oral, resp. rate 19, height 1.829 m (6' 0.01\"), weight 111.1 kg (244 lb 14.9 oz), SpO2 92 %.   GENERAL: Alert, sitting up in chair, NAD, hard of hearing    HEENT: Normocephalic, moist mucous membranes  LUNGS: non-labored   ABDOMINAL: slightly distended, non tender  EXTREMITIES: Bilateral lower extremities wrapped in ace bandages  NEUROLOGIC: Alert and oriented X 4             Data Reviewed:         Results for orders placed or performed during the hospital encounter of 02/20/23   Head CT w/o contrast    Impression    IMPRESSION:  1.  No CT evidence for acute intracranial process.  2.  Brain atrophy and presumed chronic microvascular ischemic changes as above.   XR " Chest Port 1 View    Impression    IMPRESSION: Opacity at right lung base represents pleural fluid with possible atelectasis/consolidation of right lower and middle lobes. Right upper lobe and left lung are clear. Heart size likely normal.   US Renal Complete Non-Vascular    Impression    IMPRESSION:  1.  Mildly echogenic renal cortices which could reflect the sequela of chronic medical renal disease. No hydronephrosis.    2.  Large right pleural effusion and moderate right upper quadrant ascites.   CT Chest w/o Contrast    Impression    IMPRESSION:   1.  Moderate right pleural effusion.  2.  Hepatic morphology compatible with cirrhosis.  3.  The left lobe liver has 3 probable cysts and an additional 2.6 cm indeterminate hypodense lesion. Hepatic ultrasound recommended to evaluate the indeterminate lesion.     US Thoracentesis    Impression    IMPRESSION:  Status post right ultrasound-guided thoracentesis.    Reference CPT Code: 05424   US Lower Extremity Venous Duplex Bilateral    Impression    IMPRESSION:  1.  No deep venous thrombosis in the bilateral lower extremities.   US Abdomen Limited w Doppler Complete    Impression    IMPRESSION:  1.  Scattered benign cysts in the liver.    2.  Moderate sludge within the gallbladder with slight gallbladder wall thickening at 3.5 mm. No pericholecystic fluid or positive Bah sign.    3.  Normal abdominal liver duplex.    4.  The remainder of the visualized abdomen is normal.       Echocardiogram Complete   Result Value Ref Range    LVEF  60-65%        Recent Labs   Lab 03/01/23  0651 02/28/23  2232 02/28/23  1637 02/28/23  0816 02/28/23  0632 02/27/23  1228 02/27/23  0946 02/27/23  0752 02/27/23  0309 02/26/23  0900 02/26/23  0627 02/23/23  2143 02/23/23  1339   WBC  --   --   --   --  5.4  --  6.1  --   --   --  5.2   < >  --    HGB  --   --   --   --  13.4  --  13.4  --   --   --  13.2*   < >  --    MCV  --   --   --   --  93  --  94  --   --   --  94   < >  --    PLT   --   --   --   --  70*  --  75*  --   --   --  61*   < >  --    INR  --   --   --   --   --   --   --   --   --   --   --   --  1.60*   *  --   --   --  148*  --   --   --  147*  --  146*   < >  --    POTASSIUM 3.6  --   --   --  3.1*  --   --   --  3.6  --  3.5   < >  --    CHLORIDE 105  --   --   --  105  --   --   --  104  --  105   < >  --    CO2 30*  --   --   --  32*  --   --   --  30*  --  30*   < >  --    BUN 17.3  --   --   --  19.5  --   --   --  23.0  --  24.1*   < >  --    CR 1.56*  --   --   --  1.73*  --   --   --  1.86*  --  1.71*   < >  --    ANIONGAP 12  --   --   --  11  --   --   --  13  --  11   < >  --    GUILLERMO 9.5  --   --   --  9.3  --   --   --  9.6  --  9.5   < >  --    GLC 73 92 93   < > 72   < >  --    < > 77   < > 67*   < >  --    ALBUMIN  --   --   --   --  3.3*  --   --   --  3.5  --  3.3*   < >  --    PROTTOTAL  --   --   --   --  6.1*  --   --   --  6.1*  --  5.8*   < >  --    BILITOTAL  --   --   --   --  1.5*  --   --   --  1.6*  --  1.8*   < >  --    ALKPHOS  --   --   --   --  127  --   --   --  141*  --  134*   < >  --    ALT  --   --   --   --  18  --   --   --  17  --  18   < >  --    AST  --   --   --   --  37  --   --   --  40  --  38   < >  --     < > = values in this interval not displayed.      Lab Results   Component Value Date    WBC 5.4 02/28/2023    HGB 13.4 02/28/2023    HCT 44.0 02/28/2023    PLT 70 (L) 02/28/2023     (H) 03/01/2023    POTASSIUM 3.6 03/01/2023    CHLORIDE 105 03/01/2023    CO2 30 (H) 03/01/2023    BUN 17.3 03/01/2023    CR 1.56 (H) 03/01/2023    GLC 73 03/01/2023    SED 3 05/04/2021    NTBNPI 1,962 (H) 02/23/2023    AST 37 02/28/2023    ALT 18 02/28/2023    ALKPHOS 127 02/28/2023    BILITOTAL 1.5 (H) 02/28/2023    FRANCESCO 32 02/23/2023    INR 1.60 (H) 02/23/2023      Lab Results   Component Value Date    ALBUMIN 3.3 02/28/2023    ALBUMIN 3.8 03/09/2021                    ====================================================  TT: I have  personally spent a total of 65 minutes on the day of the encounter on the unit in review of medical record, consultation with the medical providers and assessment of patient today, with time spent in counseling, coordination of care, and discussion with patient and family re: diagnostic results, prognosis, symptom management, risks and benefits of management options, and development of plan of care as noted above.      ====================================================    KATERIN Pillai, NS-BC  Clinical Nurse Specialist  [facility]  [phone]

## 2023-03-01 NOTE — PROGRESS NOTES
Renal progress note  CC:CKD , hypervolemia  Assessment and Plan:  87 year old male with a past medical history of heart failure with preserved ejection fraction history of COPD CKD history of atrial fibrillation hypertension chronic lymphedema presented with increasing generalized weakness poor oral intake worsening fatigue and decreased urine output.  Admission labs noted for mildly elevated creatinine, nephrology consulted for NOREEN on CKD     NOREEN nonoliguric  Baseline likely CKD 3, baseline creatinine is around 1.2-1.3 with GFR close to 60 based on creatinine clearance  Cystatin C 2.8 GFR 18  Urine sodium less than 20 urine protein 0.19 g/g, UA with uric acid crystals and hyaline casts otherwise bland  Unremarkable renal imaging except for noted pleural effusion and ascites  Has had paraprotein work-up in 2021 that was negative  Repeat work up this admission:  BLACK negative.  C3 and C4 normal.  CH50 pending.  Serum immunofixation negative for monoclonal protein.    Creatinine elevation appears to be consistent with cardiorenal syndrome likely acute on chronic, less likely to be of primary renal disease with no proteinuria at this time.  Initially Bumex was held now resumed since 2/24/2023 with increased to IV Bumex 2 mg twice a day 2/25/2023  Cr today improved at 1.5  Elevated Na and CO2, electrolytes otherwise ok-->slightly improvement with cutting back on diuretics 2/28.  Nonoliguric.  --> Follow on renal panel daily with ongoing diuresis     Electrolytes  Mild hypernatremia->Follow-up with ongoing diuresis may need thiazide for additional natriuresis.  Bumex cut back today.  Hypokalemia->give KCL again today   Repeat labs in AM.     Hemoglobin 13+  Acute onset thrombocytopenia  Heme-onc following currently CellCept on hold     Heart failure exacerbation with heart failure with preserved ejection fraction  History of severe TR  History of CAD  Moderate pleural effusion on right lower lobe s/p  thoracocentesis  Possibly cor pulmonale with worsened right heart functions  Mild to moderate aortic stenosis on TTE  History of atrial fibrillation Eliquis resumed 2.5 mg twice daily was initially on hold secondary to bleeding and thrombocytopenia  Cardiology following  Diuresed with IV bumex.  Switched to PO bumex 2/28  per cardiology.      Initial concerns for RLL aspirated pneumonia  Switched to Augmentin     Acute on chronic respiratory failure  History of COPD  Likely secondary to heart failure exacerbation as well  S/p thoracocentesis right pleural effusion       History of BPH  No evidence of retention  Currently off Cardura continued on Proscar     Chronic lymphedema  Local management with compression wraps     History of bullous pemphigoid on mycophenolate PTA       Vira Pandey NP  [facility]  [phone]        Subjective  Sitting up in chair.  No SOB.  Legs a bit softer.  Questions about bumex dosing.  Reviewed BID ordered by cardiology.  Reviewed renal labs today      Objective    Vital signs in last 24 hours  Temp:  [97.4  F (36.3  C)-98  F (36.7  C)] 97.6  F (36.4  C)  Pulse:  [64-98] 94  Resp:  [16-20] 19  BP: (104-129)/(60-74) 113/60  SpO2:  [91 %-94 %] 92 %  Weight:       Intake/Output last 3 shifts  I/O last 3 completed shifts:  In: 440 [P.O.:440]  Out: 1925 [Urine:1925]  Intake/Output this shift:  I/O this shift:  In: 360 [P.O.:360]  Out: -     Physical Exam  Alert/, awake, NAD, sitting up in chair  Lung:diminished right base, o/w clear.  normal effort, room air  Abd: soft  Ext:  + LEs wrapped, +edema      Pertinent Labs   Lab Results   Component Value Date    WBC 5.4 02/28/2023    HGB 13.4 02/28/2023    HCT 44.0 02/28/2023    MCV 93 02/28/2023    PLT 70 (L) 02/28/2023     Lab Results   Component Value Date    BUN 17.3 03/01/2023     (H) 03/01/2023    CO2 30 (H) 03/01/2023       Lab Results   Component Value Date    ALBUMIN 3.3 (L) 02/28/2023     Lab Results    Component Value Date    PHOS 3.7 02/23/2023     I reviewed all lab results

## 2023-03-01 NOTE — PLAN OF CARE
"Goal Outcome Evaluation:    Pt is A/O x4. Denies pain. No nausea. LS are clear/dim, on RA. Tele is a-fib. Has some slight redness in groin folds, powder available. BLE are still edematous, lymph wraps in place. Primofit in place. Takes medications crushed with pudding. [place], pocket talker in room. Assist of 2, pivots well to commode. VSS.    /65 (BP Location: Right arm)   Pulse 82   Temp 98  F (36.7  C) (Axillary)   Resp 16   Ht 1.829 m (6' 0.01\")   Wt 111.1 kg (244 lb 14.9 oz)   SpO2 94%   BMI 33.21 kg/m        "

## 2023-03-01 NOTE — PROGRESS NOTES
[facility]  WOC Nurse Inpatient Assessment     Consulted for: Sacrum    3.1.23 Attempted to see patient today. Patient declined WOC visit. Will attempt to see tomorrow. See below for last in person WOC assessment.  Sybil Rodas MSN RN CWOCN      Patient History (according to provider note(s):      Nishant Gilliam is a 87 year old male admitted on 2/20/2023. Nishant Gilliam is a 87 year old male with  HFpEF, Afib, COPD, CKD, HTN, HLD presented with generalized weakness. He has decreased po intake, fatigue, and decreased urine output.  Wife states that 'everything has slowed down for the patient- both his motion and speech, and he is more sleepy'. Denied fever, chills, nausea, vomiting, abdominal pain, diarrhea. She noted that he is having more trouble swallowing which contributes to his decreased intake. In the ED, lab is shows dehydration and NOREEN. Pt is to be admitted for further management.    Areas Assessed:      Skin Injury Location: Sacrum    2/22      Skin injury due to: Moisture associated skin damage (MASD)  Skin history and plan of care:   Patient has a divot in this area, maceration from both sides of the crevice rubbing together, mirrored outlines of epidermal ridging, likely a chronic moisture issue. Redness is blanchable, scant area of purpura noted at the upper margin near 12 oclock. No open areas noted at this time, Mepilex placed.   Affected area:      Skin assessment: Intact     Measurements (length x width x depth, in cm) NA     Color: normal and consistent with surrounding tissue     Temperature  normal      Drainage: none .      Color: none      Odor: none  Pain: absent, none  Pain interventions prior to dressing change: slow and gentle cares   Treatment goal: Protection  STATUS: initial assessment  Supplies ordered: supplies stored on unit       Treatment Plan:     Sacral Area  1. Cleanse area with bath wipes, pat dry  2. Apply sacral mepilex over area, look under  mepilex each shift  3. Replace mepilex every 3 days or PRN if soiled, saturated, falls off  4. No briefs in bed, put isoflex pump on bed    Orders: Written    RECOMMEND PRIMARY TEAM ORDER: None, at this time  Education provided: plan of care and Off-loading pressure  Discussed plan of care with: Patient  WOC nurse follow-up plan: weekly  Notify WOC if wound(s) deteriorate.  Nursing to notify the Provider(s) and re-consult the WOC Nurse if new skin concern.    DATA:     Current support surface: Standard  Standard gel/foam mattress (IsoFlex, Atmos air, etc)  Containment of urine/stool: Incontinence Protocol  BMI: Body mass index is 33.21 kg/m .   Active diet order: Orders Placed This Encounter      Combination Diet Easy to Chew (level 7); Thin Liquids (level 0); 2 gm NA Diet     Output: I/O last 3 completed shifts:  In: 440 [P.O.:440]  Out: 1925 [Urine:1925]     Labs:   Recent Labs   Lab 02/28/23  0632 02/24/23  0629 02/23/23  1339   ALBUMIN 3.3*   < >  --    HGB 13.4   < >  --    INR  --   --  1.60*   WBC 5.4   < >  --     < > = values in this interval not displayed.     Pressure injury risk assessment:   Sensory Perception: 3-->slightly limited  Moisture: 3-->occasionally moist  Activity: 3-->walks occasionally  Mobility: 2-->very limited  Nutrition: 3-->adequate  Friction and Shear: 2-->potential problem  Antwon Score: 16    GRAY JOLLEY RN CWOCN  Pager no longer is use, please contact through [employer] group: WOC Nurse

## 2023-03-01 NOTE — PROGRESS NOTES
HEART CARE NOTE          Assessment/Recommendations     1. HFpEF/RV dysfunction  Assessment / Plan    Tolerating oral diuretic regimen - no changes at this time    GDMT as detailed below; mainstay of treatment for HFpEF includes diuretics and adequate BP control (class I) and SGLT2-I (class 2a); additional medical therapy (ARNI, MRA, ARB) demonstrated less robust evidence for indication but may be considered per guideline recommendations (2b); no indication for BBlockers      Current Pharmacotherapy AHA Guideline-Directed Medical Therapy   Losartan - on hold given NOREEN and borderline BP ARNI/ARB   Spironolactone not started  MRA   SGLT2 inhibitor not started SGLT2-I    Bumetanide 2 mg BID Loop diuretic       2. Valvular heart disease  Assessment / Plan    Severe TR; likely functional in the setting of a dilated RV with reduced function - patients reports likely transition to Hospice and declines further intervention    Diuresis as above     3. Atrial fibrillation  Assessment / Plan    Paroxysmal - continue apixaban     4. CKD  Assessment / Plan    Continue to monitor renal function closely while diuresing     5. CAD  Assessment / Plan    Denies chest pain or anginal equivalents    Continue metoprolol (reduced to 12.5 mg BID), statin    Ok for discharge from a cardiology standpoint. Cardiology team will sign-off for now. Please do not hesitate to consult us again if new questions or concerns arise. .       History of Present Illness/Subjective      Mr. Nishant Gilliam is a 87 year old male with a PMHx significant for (per Dr. Pham's note) HFpEF, Afib, COPD, CKD, HTN, HLD presented with generalized weakness with concern for dehydration. HF service now consulted regarding RV dysfunction/CHF management      Today, Mr. TERRI Gilliam denies any acute cardiac events or complaints; Management plan as detailed above     ECG: Personally reviewed. atrial fibrillation, rate controlled, RBBB.     ECHO (personnaly  "Reviewed):   The left ventricle is normal in size with normal left ventricular wall  thickness.  Left ventricular function is normal.The ejection fraction is 60-65%.  The right ventricle is severely dilated with mildly decreased right  ventricular systolic function  There is severe (4+) tricuspid regurgitation.  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  Compared to the prior study of 7/8/21, the right ventricle is now severely  dilated and there is now severe tricuspid regurgitation.     Consider outpatient referral to the [facility] valve clinic to  consider treatment options for the tricuspid regurgitation.             Physical Examination Review of Systems   /65 (BP Location: Right arm)   Pulse 82   Temp 98  F (36.7  C) (Axillary)   Resp 16   Ht 1.829 m (6' 0.01\")   Wt 111.1 kg (244 lb 14.9 oz)   SpO2 94%   BMI 33.21 kg/m    Body mass index is 33.21 kg/m .  Wt Readings from Last 3 Encounters:   02/27/23 111.1 kg (244 lb 14.9 oz)   09/06/22 113.7 kg (250 lb 11.2 oz)   03/01/22 129.5 kg (285 lb 6.4 oz)     General Appearance:   no distress, normal body habitus   ENT/Mouth: membranes moist, no oral lesions or bleeding gums.      EYES:  no scleral icterus, normal conjunctivae   Neck: no carotid bruits or thyromegaly   Chest/Lungs:   lungs are clear to auscultation, no rales or wheezing, equal chest wall expansion    Cardiovascular:   Regular. Normal first and second heart sounds with no murmurs, rubs, or gallops; the carotid, radial and posterior tibial pulses are intact, no JVD and mild LE edema bilaterally    Abdomen:  no organomegaly, masses, bruits, or tenderness; bowel sounds are present   Extremities: no cyanosis or clubbing   Skin: no xanthelasma, warm.    Neurologic: alert and calm     Psychiatric: alert and calm     A complete 10 systems ROS was reviewed  And is negative except what is listed in the HPI.          Medical History  Surgical History Family History Social History   Past " Medical History:   Diagnosis Date     COPD (chronic obstructive pulmonary disease) (H)      Coronary artery disease      Hypertension      Right bundle branch block     complete    Past Surgical History:   Procedure Laterality Date     HC KNEE SCOPE, DIAGNOSTIC      Description: Arthroscopy Knee Left;  Recorded: 06/27/2009;  Comments: details unk.     HERNIA REPAIR       ZZHC CORONARY STENT PERCUT, INITIAL VESSEL      Description: Cath Stent Placement;  Recorded: 02/24/2014;  Comments: stenting to the LAD in 1999. 2nd done years later.    no family history of premature coronary artery disease Social History     Socioeconomic History     Marital status:      Spouse name: Not on file     Number of children: Not on file     Years of education: Not on file     Highest education level: Not on file   Occupational History     Not on file   Tobacco Use     Smoking status: Former     Years: 30.00     Types: Cigarettes     Smokeless tobacco: Never   Substance and Sexual Activity     Alcohol use: No     Drug use: No     Sexual activity: Not on file   Other Topics Concern     Not on file   Social History Narrative    Lives with his wife.     Social Determinants of Health     Financial Resource Strain: Not on file   Food Insecurity: Not on file   Transportation Needs: Not on file   Physical Activity: Not on file   Stress: Not on file   Social Connections: Not on file   Intimate Partner Violence: Not on file   Housing Stability: Not on file           Lab Results    Chemistry/lipid CBC Cardiac Enzymes/BNP/TSH/INR   Lab Results   Component Value Date    CHOL 128 02/25/2022    HDL 48 02/25/2022    TRIG 121 02/25/2022    BUN 19.5 02/28/2023     (H) 02/28/2023    CO2 32 (H) 02/28/2023    Lab Results   Component Value Date    WBC 5.4 02/28/2023    HGB 13.4 02/28/2023    HCT 44.0 02/28/2023    MCV 93 02/28/2023    PLT 70 (L) 02/28/2023    Lab Results   Component Value Date    TROPONINI 0.03 07/07/2021     (H)  07/07/2021    TSH 2.42 02/21/2023    INR 1.60 (H) 02/23/2023     Lab Results   Component Value Date    TROPONINI 0.03 07/07/2021          Weight:    Wt Readings from Last 3 Encounters:   02/27/23 111.1 kg (244 lb 14.9 oz)   09/06/22 113.7 kg (250 lb 11.2 oz)   03/01/22 129.5 kg (285 lb 6.4 oz)       Allergies  Allergies   Allergen Reactions     Furosemide Rash     pemphigoid     Cat Hair Std Allergenic Ext [Cat Hair Extract] Unknown     Sulfa Drugs Unknown         Surgical History  Past Surgical History:   Procedure Laterality Date     HC KNEE SCOPE, DIAGNOSTIC      Description: Arthroscopy Knee Left;  Recorded: 06/27/2009;  Comments: details unk.     HERNIA REPAIR       ZZHC CORONARY STENT PERCUT, INITIAL VESSEL      Description: Cath Stent Placement;  Recorded: 02/24/2014;  Comments: stenting to the LAD in 1999. 2nd done years later.       Social History  Tobacco:   History   Smoking Status     Former     Years: 30.00     Types: Cigarettes   Smokeless Tobacco     Never    Alcohol:   Social History    Substance and Sexual Activity      Alcohol use: No   Illicit Drugs:   History   Drug Use No       Family History  No family history on file.       Edd Fontanez MD on 3/1/2023      cc: Aditya Mehta

## 2023-03-01 NOTE — PROGRESS NOTES
Care Management Follow Up    Length of Stay (days): 9    Expected Discharge Date: 03/01/2023     Concerns to be Addressed: discharge planning     Patient plan of care discussed at interdisciplinary rounds: Yes    Anticipated Discharge Disposition:       Anticipated Discharge Services:    Anticipated Discharge DME:      Patient/family educated on Medicare website which has current facility and service quality ratings:    Education Provided on the Discharge Plan:    Patient/Family in Agreement with the Plan:      Referrals Placed by CM/SW:    Private pay costs discussed: Not applicable    Additional Information:  Pt now wanting more information from hospice.  Referral placed to [facility] to meet with the patient and to explain the services in greater depth to help the pt make a decision on whether or not hospice is the direction they want to take or if they want to go to TCU. Care management will continue to follow.     Will call [facility]  and update them.      Sierra Steinberg RN

## 2023-03-01 NOTE — PLAN OF CARE
Problem: Heart Failure  Goal: Optimal Coping  Outcome: Adequate for Care Transition   Goal Outcome Evaluation:      Lungs diminished occasional cough up to chair with assist of two  Denies pain short of breathe with activity, K 3.6 30 meq ordered once  AF on monitor. [place], waiting for TCU at discharge,

## 2023-03-01 NOTE — PLAN OF CARE
Problem: COPD (Chronic Obstructive Pulmonary Disease) Comorbidity  Goal: Maintenance of COPD Symptom Control  Outcome: Progressing     Problem: Malnutrition  Goal: Improved Nutritional Intake  Outcome: Progressing   Goal Outcome Evaluation:       Patient denied pain at rest; did not report pain when assisted with transfers. Refused some medications like tylenol, melatonin, and bumex. Patient takes pills crushed with chocolate pudding. Assist of 2 with transfers. Leg wraps in place. Voiding well with R <100ml. Primofit in place.

## 2023-03-02 ENCOUNTER — APPOINTMENT (OUTPATIENT)
Dept: OCCUPATIONAL THERAPY | Facility: HOSPITAL | Age: 88
DRG: 682 | End: 2023-03-02
Payer: MEDICARE

## 2023-03-02 ENCOUNTER — APPOINTMENT (OUTPATIENT)
Dept: PHYSICAL THERAPY | Facility: HOSPITAL | Age: 88
DRG: 682 | End: 2023-03-02
Payer: MEDICARE

## 2023-03-02 VITALS
SYSTOLIC BLOOD PRESSURE: 107 MMHG | RESPIRATION RATE: 18 BRPM | DIASTOLIC BLOOD PRESSURE: 75 MMHG | BODY MASS INDEX: 33.68 KG/M2 | OXYGEN SATURATION: 93 % | HEIGHT: 72 IN | HEART RATE: 94 BPM | TEMPERATURE: 97.6 F | WEIGHT: 248.68 LBS

## 2023-03-02 LAB
ANION GAP SERPL CALCULATED.3IONS-SCNC: 9 MMOL/L (ref 7–15)
BACTERIA PLR CULT: NORMAL
BUN SERPL-MCNC: 17.1 MG/DL (ref 8–23)
CALCIUM SERPL-MCNC: 9.5 MG/DL (ref 8.8–10.2)
CHLORIDE SERPL-SCNC: 106 MMOL/L (ref 98–107)
CREAT SERPL-MCNC: 1.6 MG/DL (ref 0.67–1.17)
DEPRECATED HCO3 PLAS-SCNC: 33 MMOL/L (ref 22–29)
GFR SERPL CREATININE-BSD FRML MDRD: 42 ML/MIN/1.73M2
GLUCOSE BLDC GLUCOMTR-MCNC: 76 MG/DL (ref 70–99)
GLUCOSE SERPL-MCNC: 81 MG/DL (ref 70–99)
HOLD SPECIMEN: NORMAL
POTASSIUM SERPL-SCNC: 4.3 MMOL/L (ref 3.4–5.3)
SODIUM SERPL-SCNC: 148 MMOL/L (ref 136–145)

## 2023-03-02 PROCEDURE — 250N000013 HC RX MED GY IP 250 OP 250 PS 637: Performed by: INTERNAL MEDICINE

## 2023-03-02 PROCEDURE — 97530 THERAPEUTIC ACTIVITIES: CPT | Mod: GP

## 2023-03-02 PROCEDURE — 36415 COLL VENOUS BLD VENIPUNCTURE: CPT | Performed by: NURSE PRACTITIONER

## 2023-03-02 PROCEDURE — 99239 HOSP IP/OBS DSCHRG MGMT >30: CPT | Performed by: INTERNAL MEDICINE

## 2023-03-02 PROCEDURE — 80048 BASIC METABOLIC PNL TOTAL CA: CPT | Performed by: NURSE PRACTITIONER

## 2023-03-02 PROCEDURE — 97535 SELF CARE MNGMENT TRAINING: CPT | Mod: GO

## 2023-03-02 RX ORDER — FLUTICASONE PROPIONATE 50 MCG
1 SPRAY, SUSPENSION (ML) NASAL 2 TIMES DAILY
Qty: 9.9 ML | Refills: 1 | Status: SHIPPED | OUTPATIENT
Start: 2023-03-02

## 2023-03-02 RX ORDER — LIDOCAINE 50 MG/G
OINTMENT TOPICAL 4 TIMES DAILY
Qty: 50 G | Refills: 1 | Status: SHIPPED | OUTPATIENT
Start: 2023-03-02 | End: 2023-05-30

## 2023-03-02 RX ORDER — IPRATROPIUM BROMIDE AND ALBUTEROL SULFATE 2.5; .5 MG/3ML; MG/3ML
3 SOLUTION RESPIRATORY (INHALATION) EVERY 4 HOURS PRN
Status: DISCONTINUED | OUTPATIENT
Start: 2023-03-02 | End: 2023-03-02

## 2023-03-02 RX ORDER — LEVALBUTEROL INHALATION SOLUTION 1.25 MG/3ML
1.25 SOLUTION RESPIRATORY (INHALATION) EVERY 4 HOURS PRN
Status: DISCONTINUED | OUTPATIENT
Start: 2023-03-02 | End: 2023-03-02 | Stop reason: HOSPADM

## 2023-03-02 RX ADMIN — BUMETANIDE 2 MG: 2 TABLET ORAL at 09:07

## 2023-03-02 RX ADMIN — APIXABAN 2.5 MG: 2.5 TABLET, FILM COATED ORAL at 09:07

## 2023-03-02 RX ADMIN — BUDESONIDE AND FORMOTEROL FUMARATE DIHYDRATE 2 PUFF: 160; 4.5 AEROSOL RESPIRATORY (INHALATION) at 09:09

## 2023-03-02 RX ADMIN — FLUTICASONE PROPIONATE 1 SPRAY: 50 SPRAY, METERED NASAL at 09:09

## 2023-03-02 ASSESSMENT — ACTIVITIES OF DAILY LIVING (ADL)
ADLS_ACUITY_SCORE: 51
ADLS_ACUITY_SCORE: 47
ADLS_ACUITY_SCORE: 51
ADLS_ACUITY_SCORE: 51
ADLS_ACUITY_SCORE: 47
ADLS_ACUITY_SCORE: 51
ADLS_ACUITY_SCORE: 51

## 2023-03-02 NOTE — PLAN OF CARE
Goal Outcome Evaluation:    Pt A/O x4. Comfort cares continued. Denied pain. Slept between cares. [place], pocket talker in room. Primofit in place. Mepilex on sacrum CDI. Lymphedema wraps in place. Aspiration precautions maintained, ensure HOB is 90 degrees so pt can swallow. Takes medications crushed in pudding. Assist of 2, does well with pivot transfers. Plan is for home hospice at discharge.

## 2023-03-02 NOTE — PROGRESS NOTES
Care Management Discharge Note    Discharge Date: 03/02/2023       Discharge Disposition: Home, Hospice    Discharge Services: None    Discharge DME: None    Discharge Transportation: agency ([facility] wheelchair per spouse request)    Private pay costs discussed: transportation costs    Education Provided on the Discharge Plan:  AVS per bedside nurse.   Persons Notified of Discharge Plans: patient and spouse Daniela  Patient/Family in Agreement with the Plan: yes    Additional Information:  11:38 AM  Pt and spouse met with [facility] today.  Pt and spouse want to discharge pt to home with [facility] to follow out in the community.    [facility] wheelchair to home arranged for today 3/2/23 at 14:30pm to pt's home - as requested by spouse Daniela; address verified via [email or web address].    Update given to pt, spouse Daniela, bedside RN, Jaya - liaison at [facility] ([phone]).  Pt and spouse agreeable to this discharge plan.  No further CM needs expressed nor identified.    Shannan Perales RN, CM

## 2023-03-02 NOTE — PROGRESS NOTES
Plan discharge later today once final plans per hospice are in place. Will need to confirm that dispo to home on hospice is appropriate.   Will await hospice eval today. Please contact me with discharge recommendations and any discharge medications that will be needed per hospice.     Estuardo Dugan D.O.

## 2023-03-02 NOTE — DISCHARGE SUMMARY
[facility]  Hospitalist Discharge Summary      Date of Admission:  2/20/2023  Date of Discharge:  3/2/2023  2:30 PM  Discharging Provider: Estuardo Dugan,   Discharge Service: Hospitalist Service       Follow-ups Needed After Discharge       Unresulted Labs Ordered in the Past 30 Days of this Admission     Date and Time Order Name Status Description    2/23/2023 12:54 PM Acid-Fast Bacilli Culture and Stain In process       These results will be followed up by Hospitalist results pool    Discharge Disposition   Discharged to home  Condition at discharge: Stable      Hospital Course   87 year old male with history of HFpEF, Afib, COPD, CKD, HTN, HLD who was admitted on 2/20/2023 with generalized weakness, decreased po intake, fatigue, and decreased urine output. Found to have CHF exacerbation, failure to thrive, NOREEN, possible sepsis.   Eventually patient and family agreed to sign on to hospice services and were discharged home with hospice.   The medication changes below reflect goals of care for comfort/hospice care.         HFpEF with ADHF (end-stage) predominant RHF with severe Tricuspid regurgitation: RLL moderate pleural effusion, nt-BNP ~1900.  Hypervolemic on exam consistent with right heart failure. LVEF 60-65%, right ventricle is severely dilated with mildly decreased right ventricular systolic function. Worse vs TTE in 2021.   -- will resume home bumex only to prevent volume overload       NOREEN on probable CKD stage III:  Due to CRS due to decompensated heart failure.  Cr remains elevated. Erika < 20.  Renal US showed chronic renal disease, no hydro but mild bladder distension. Cr slight worsening from admission. CK normal.  UA bland. Uprot 19.1.   -Nephrology consulted  -no dialysis     Generalized weakness, Slow response, speech, tremor:  Stable. Very [place].  -CT-head unremarkable for acute process  -MRI brain to rule out cerebrovascular disease refused by patient  -Neurology  consultation appreciated: not likely to be Parkinson.   ?Encephalopaty  -EEG suggestive of mild encephalopathy.  -CK 28, TSH & ammonia normal.    - Neurology signed off     Dysphagia, Silent aspiration (d/t weak hyolaryneal excursion creating vallecular and pyriform stasis - per SLP), Zenker's diverticulum, Esophageal dysmotility disorder:   -will now liberalize diet given goals of care     Acute Thrombocytopenia: slowly improving/stable. No splenomegaly. + Cirrhosis. Bone marrow suppression/Toxicity from CellCept in setting of renal failure. LDH normal. B12 normal. PBS normal platelet morphology. numerous katie cells. Polychromasia not increased.   -Oncology/Hematology followed  -hold cellcept  -hemolysis labs neg           PAF: EKG on admission showed a. Fib, ST abnormality, troponin 37 x2.   -metoprolol held after discharge due to hypotension  -continue eliquis 2.5mg BID       Possible Sepsis:  Hypothermic, hypotension, immunosupressed, concern for RLL aspiration pneumonia.  Renal US no hydro. Initial UA no infection. COVID negative 2/20/23. CT Chest w/o contrast showed moderate right pleural effusion with right basilar compressive atelectasis.  Procalcitonin 0.16.  However, I think his hypothermia, hypotension, etc are all due to decompensated heart failure and cardiorenal syndrome resulting in tissue hypoperfusion/shock  -BC's x2 NGTD, MRSA nasal swab neg, pleural fluid cultures negative.  -ABX stopped     Acute hypoxic respiratory failure, COPD without acute exacerbation, Severe tricuspid regurgitation with HFpEF with ADHF predominant right heart failure, moderate right pleural effusion with compressive atelectasis, probable RLL aspiration PNA given silent aspiration on VSS. S/P right thoracentesis 2/23 with 1.5L fluid removed.  Meets 1/3 of light's coiteria for exudative effusion.  Improved hypoxia.  - comfort cares       CAD: no chest pain. Initial admission trop x 2 37.        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 LFT's with mild TB/DB elevation, normal transaminases, thrombocytopenia. CT Chest showed mild ascites. Nodular appearance of liver suggestive of cirrhosis. No h/o EtOH use. UQ US with doppler w/o arterio-veno occlusive disease.       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      Chronic BLE edema:  -compression wraps  -diuresis     Chronic post nasal drip, nonallergic rhinitis  -trial Azelastin-Fluticasone BID     Inguinal candidiasis  - lotrimin bid     Decubitus ulcer stage 2  -lidocaine ointment     Impaired hearing       Consultations This Hospital Stay   PHYSICAL THERAPY ADULT IP CONSULT  OCCUPATIONAL THERAPY ADULT IP CONSULT  SPEECH LANGUAGE PATH ADULT IP CONSULT  NUTRITION SERVICES ADULT IP CONSULT  NEUROLOGY IP CONSULT  WOUND OSTOMY CONTINENCE NURSE  IP CONSULT  SOCIAL WORK IP CONSULT  CARE MANAGEMENT / SOCIAL WORK IP CONSULT  HEMATOLOGY & ONCOLOGY IP CONSULT  GASTROENTEROLOGY IP CONSULT  PHARMACY TO DOSE VANCO  LYMPHEDEMA THERAPY IP CONSULT  CARDIOLOGY IP CONSULT  NEPHROLOGY IP CONSULT  PALLIATIVE CARE ADULT IP CONSULT  CARE MANAGEMENT / SOCIAL WORK IP CONSULT    Code Status   DNR    Time Spent on this Encounter   I, Estuardo Dugan DO, personally saw the patient today and spent greater than 30 minutes discharging this patient.       Estuardo Dugan DO  [place] [id number]  Phone: [phone]  Fax: [phone]  ______________________________________________________________________    Physical Exam   Vital Signs:                    Weight: 248 lbs 10.86 oz    General: NAD  RESPIRATORY: Respirations nonlabored  CARDIOVASCULAR: No le edema bilat.  NEUROLOGIC: No focal arm or leg weakness, speech is clear    Primary Care Physician    Aditya Mehta    Discharge Orders      Reason for your hospital stay    Failure to thrive     Follow-up and recommended labs and tests     Follow up with hospice as directed     Activity    Your activity upon discharge: activity as tolerated     Diet    Follow this diet upon discharge: Diet as tolerated         Discharge Medications   Discharge Medication List as of 3/2/2023  1:49 PM      START taking these medications    Details   fluticasone (FLONASE) 50 MCG/ACT nasal spray Spray 1 spray into both nostrils 2 times daily, Disp-9.9 mL, R-1, E-Prescribe      lidocaine (XYLOCAINE) 5 % external ointment Apply topically 4 times daily To low back pain areasDisp-50 g, [id number]      miconazole (MICATIN) 2 % external powder Apply topically 2 times daily Apply to groin and abdominal foldsDisp-90 g, [id number]         CONTINUE these medications which have CHANGED    Details   apixaban ANTICOAGULANT (ELIQUIS) 2.5 MG tablet Take 1 tablet (2.5 mg) by mouth 2 times daily, Disp-60 tablet, R-1, E-Prescribe         CONTINUE these medications which have NOT CHANGED    Details   budesonide-formoterol (SYMBICORT) 160-4.5 MCG/ACT Inhaler [BUDESONIDE-FORMOTEROL (SYMBICORT) 160-4.5 MCG/ACTUATION INHALER] USE 2 INHALATIONS TWICE A DAY, Disp-10.2 g, R-11, E-Prescribe      bumetanide (BUMEX) 1 MG tablet Take 2 tablets (2 mg) by mouth daily, Disp-180 tablet, R-3, E-Prescribe      finasteride (PROSCAR) 5 MG tablet Take 1 tablet (5 mg) by mouth daily, Disp-90 tablet, R-3, E-Prescribe      sodium chloride (OCEAN) 0.65 % nasal spray Spray 1 spray into both nostrils daily as needed for congestion, Historical         STOP taking these medications       cholecalciferol, vitamin D3, (VITAMIN D3) 25 mcg (1,000 unit) capsule Comments:   Reason for Stopping:         doxazosin (CARDURA) 4 MG tablet Comments:   Reason for Stopping:         doxycycline hyclate (VIBRAMYCIN) 100 MG capsule Comments:   Reason for Stopping:         ipratropium  (ATROVENT) 0.03 % nasal spray Comments:   Reason for Stopping:         LORazepam (ATIVAN) 0.5 MG tablet Comments:   Reason for Stopping:         metoprolol tartrate (LOPRESSOR) 100 MG tablet Comments:   Reason for Stopping:         mycophenolate (GENERIC EQUIVALENT) 500 MG tablet Comments:   Reason for Stopping:         niacinamide 500 MG tablet Comments:   Reason for Stopping:         pravastatin (PRAVACHOL) 20 MG tablet Comments:   Reason for Stopping:         spironolactone (ALDACTONE) 25 MG tablet Comments:   Reason for Stopping:             Allergies   Allergies   Allergen Reactions     Furosemide Rash     pemphigoid     Cat Hair Std Allergenic Ext [Cat Hair Extract] Unknown     Sulfa Drugs Unknown

## 2023-03-02 NOTE — PROGRESS NOTES
Plan for discharge home later today with hospice noted.  Renal will sign off.   Please call if questions.    Vira Pandey NP  [facility]  [phone]

## 2023-03-02 NOTE — PLAN OF CARE
Occupational Therapy Discharge Summary    Reason for therapy discharge:    Discharged to home with home therapy.    Progress towards therapy goal(s). See goals on Care Plan in Epic electronic health record for goal details.  Goals partially met.  Barriers to achieving goals:   discharge from facility.    Therapy recommendation(s):    Continued therapy is recommended.  Rationale/Recommendations:  d/c home on hospice .    Goal Outcome Evaluation:

## 2023-03-02 NOTE — CONSULTS
Care Management Follow Up    Length of Stay (days): 10    Expected Discharge Date: 03/02/2023     Concerns to be Addressed: all concerns addressed in this encounter     Patient plan of care discussed at interdisciplinary rounds: Yes      Additional Information:  11:38 AM  CM consulted for hospice.  [facility] to follow pt in the community after discharge from hospital.  See also CM discharge note 3/2/23.    Shannan Perales RN, CM

## 2023-03-02 NOTE — PLAN OF CARE
Physical Therapy Discharge Summary    Reason for therapy discharge:    Discharged to home on hospice    Progress towards therapy goal(s). See goals on Care Plan in [facility] electronic health record for goal details.  Goals partially met.  Barriers to achieving goals:   discharge from facility.    Therapy recommendation(s):    Continued therapy is recommended.  Rationale/Recommendations:  discharge on hospice.

## 2023-03-02 NOTE — PROGRESS NOTES
[facility]    Medicine Progress Note - Hospitalist Service    Date of Admission:  2/20/2023    Assessment & Plan   Nishant Gilliam is a 87 year old malewith  HFpEF, Afib, COPD, CKD, HTN, HLD who was admitted on 2/20/2023 with generalized weakness, decreased po intake, fatigue, and decreased urine output.     Goals of care  -Currently DNI DNR  -Had goals of care discussion with palliative care 2/28, 3/1  -Likely going to [facility] outpatient, wife and patient to speak with them later today 3/1  -Reviewed medications with him and his wife 3/1 and removed unnecessary medications, likely will go over this with hospice as well     HFpEF with ADHF (end-stage) predominant RHF with severe Tricuspid regurgitation: RLL moderate pleural effusion, nt-BNP ~1900.  Hypervolemic on exam consistent with right heart failure. LVEF 60-65%, right ventricle is severely dilated with mildly decreased right ventricular systolic function. Worse vs TTE in 2021.  -remains significantly decompensated. Not responding well to IV bumetanide. He has a strong allergy to high doses of loop diuretics resulting in Bullous Pemphigoid which is mostly controlled with mycophenolate. Cardiology, Nephrology and prior hospitalist d/w patient/spouse the limited treatment options and no cure.   -IV Bumex discontinued, now on oral  -Continue to hold metoprolol 12.5mg BID given blood pressure  -Cardiology consulted  -strict I/O, daily weights     NOREEN on probable CKD stage III:  Due to CRS due to decompensated heart failure.  Cr remains elevated. Erika < 20.  Renal US showed chronic renal disease, no hydro but mild bladder distension. Cr slight worsening from admission. CK normal.  UA bland. Uprot 19.1.   -Nephrology consulted  -no dialysis     Generalized weakness, Slow response, speech, tremor:  Stable. Very [place].  -CT-head unremarkable for acute process  -MRI brain to rule out cerebrovascular disease refused by patient  -Neurology  consultation appreciated: not likely to be Parkinson.   ?Encephalopaty  -EEG suggestive of mild encephalopathy.  -CK 28, TSH & ammonia normal.   -Neurology s/o     Dysphagia, Silent aspiration (d/t weak hyolaryneal excursion creating vallecular and pyriform stasis - per SLP), Zenker's diverticulum, Esophageal dysmotility disorder:   -esophagram/VSS reviewed  -SLP following  -mechanical soft, thin liquid, swallowing strategies advised by SLP  -HOB 30 degrees  -ppi     Acute Thrombocytopenia: slowly improving/stable. No splenomegaly. + Cirrhosis. Bone marrow suppression/Toxicity from CellCept in setting of renal failure. LDH normal. B12 normal. PBS normal platelet morphology. numerous katie cells. Polychromasia not increased.   -Oncology/Hematology following  -hold cellcept  -hemolysis labs neg     Mild to moderate valvular aortic stenosis: TTE 2/23/223     PAF: EKG on admission showed a. Fib, ST abnormality, troponin 37 x2.   -metoprolol hold due to #1  -eliquis 2.5mg BID     Possible Sepsis:  Hypothermic, hypotension, immunosupressed, concern for RLL aspiration pneumonia.  Renal US no hydro. Initial UA no infection. COVID negative 2/20/23. CT Chest w/o contrast showed moderate right pleural effusion with right basilar compressive atelectasis.  Procalcitonin 0.16.  However, I think his hypothermia, hypotension, etc are all due to decompensated heart failure and cardiorenal syndrome resulting in tissue hypoperfusion/shock  -BC's x2 NGTD, MRSA nasal swab neg, pleural fluid cultures negative.  -zosyn -> augmentin x 3 days from today     Acute hypoxic respiratory failure, COPD without acute exacerbation, Severe tricuspid regurgitation with HFpEF with ADHF predominant right heart failure, moderate right pleural effusion with compressive atelectasis, probable RLL aspiration PNA given silent aspiration on VSS. S/P right thoracentesis 2/23 with 1.5L fluid removed.  Meets 1/3 of light's coiteria for exudative effusion.   Improved hypoxia.  - symbicort  -duoneb qid prn  -augmentin x 3d  -oximetry  -bronchial hygiene  -aspiration precautions  -diuresis      CAD: no chest pain. Initial admission trop x 2 37.   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 LFT's with mild TB/DB elevation, normal transaminases, thrombocytopenia. CT Chest showed mild ascites. Nodular appearance of liver suggestive of cirrhosis. No h/o EtOH use. UQ US with doppler w/o arterio-veno occlusive disease.     BPH:  Renal US no hydronephrosis, + mild bladder distension  -cardura hold  -on Proscar  -continue to bladder scan/PVR q shift/void to monitor for retention  -External catheter more for comfort     HLD  -pravachol     H/O Bullous pemphigoid: on chronic CellCept. In remission  -hold CellCept due to concern for possible pneumonia and thrombocytopenia in setting of renal failure.      Chronic BLE edema:  -venous US BLE negative for dvt  -compression wraps  -diuresis     Chronic post nasal drip, nonallergic rhinitis  -trial Azelastin-Fluticasone BID     Inguinal candidiasis  - lotrimin bid     Decubitus ulcer stage 2  -Wound care following     Impaired hearing     Constipation:  -senna-s 2 qam  -dulcolax supp prn     Severe malnutrition in context of Chronic illness or disease       Diet: Snacks/Supplements Adult: Ensure Enlive; With Meals  Combination Diet Easy to Chew (level 7); Thin Liquids (level 0); 2 gm NA Diet  Fluid restriction 1500 ML FLUID  Snacks/Supplements Adult: Gelatein Plus; With Meals    DVT Prophylaxis: Going comfort care  Mendes Catheter: Not present  Lines: None     Cardiac Monitoring: None  Code Status: No CPR- Do NOT Intubate      Clinically Significant Risk Factors        # Hypokalemia: Lowest K = 3.1 mmol/L in last 2 days, will replace as needed  # Hypernatremia: Highest Na = 148 mmol/L in last 2 days, will monitor as appropriate      #  "Hypoalbuminemia: Lowest albumin = 3.2 g/dL at 2/24/2023  6:29 AM, will monitor as appropriate   # Thrombocytopenia: Lowest platelets = 70 in last 2 days, will monitor for bleeding         # Obesity: Estimated body mass index is 33.72 kg/m  as calculated from the following:    Height as of this encounter: 1.829 m (6' 0.01\").    Weight as of this encounter: 112.8 kg (248 lb 10.9 oz).   # Severe Malnutrition: based on nutrition assessment        Disposition Plan      Expected Discharge Date: 03/02/2023      Destination: inpatient rehabilitation facility;home with help/services  Discharge Comments: palliative following - plan for goals of care conference meeting at 1400  undecided about hospice          Etta Estrada MD  Hospitalist Service  [facility]  Securely message with [employer] (more info)  Text page via [employer] Paging/Directory   ______________________________________________________________________    Interval History   Him and his wife have decided they will likely go comfort care.  They will talk with [facility] hospice.  I did remove some of the medications that are unnecessary today but further changes in orders will come once they have discussed with hospice.  He is doing well and taking things well.    Physical Exam   Vital Signs: Temp: 97.8  F (36.6  C) Temp src: Temporal BP: 109/80 Pulse: 94   Resp: 18 SpO2: 93 % O2 Device: None (Room air)    Weight: 248 lbs 10.86 oz    General Appearance: Awake, alert, in no acute distress  Respiratory: CTAB, no wheeze  Cardiovascular: RRR, no murmur noted  GI: soft, nontender, non distended, normal bowel sounds  Skin: no jaundice, no rash      Medical Decision Making       70 MINUTES SPENT BY ME on the date of service doing chart review, history, exam, documentation & further activities per the note.      Data     I have personally reviewed the following data over the past 24 hrs:    N/A  \   N/A   / N/A     147 (H) 105 17.3 /  110 (H)   3.6 30 " (H) 1.56 (H) \       Imaging results reviewed over the past 24 hrs:   No results found for this or any previous visit (from the past 24 hour(s)).

## 2023-03-02 NOTE — PLAN OF CARE
Lymphedema Discharge Summary    Reason for therapy discharge:    Discharged to home.    Progress towards therapy goal(s). See goals on Care Plan in [facility] electronic health record for goal details.  Goals not met.  Barriers to achieving goals:   discharge from facility.    Therapy recommendation(s):    No further therapy is recommended. Patient to wear compression for comfort.

## 2023-03-02 NOTE — PLAN OF CARE
Problem: Plan of Care - These are the overarching goals to be used throughout the patient stay.    Goal: Readiness for Transition of Care  Outcome: Progressing     Problem: Risk for Delirium  Goal: Optimal Coping  Outcome: Progressing  Goal: Improved Attention and Thought Clarity  Outcome: Progressing   Goal Outcome Evaluation:       Patient alert and oriented x 4. Denied pain as long as patient is at rest. Lymphedema wraps in place. Primofit in place. Assist of 2 with transfers.

## 2023-03-02 NOTE — TREATMENT PLAN
Discharge to home with [facility] hospice  Wife Daniela at bedside aware of plan, transferred to wheelchair with assist.Denies pain instructed family to  medications at [facility] on [address]

## 2023-04-19 DIAGNOSIS — R35.0 FREQUENCY OF URINATION: ICD-10-CM

## 2023-04-20 RX ORDER — FINASTERIDE 5 MG/1
5 TABLET, FILM COATED ORAL DAILY
Qty: 90 TABLET | Refills: 3 | OUTPATIENT
Start: 2023-04-20

## 2023-04-20 NOTE — TELEPHONE ENCOUNTER
"Former patient of Janine & has not established care with another provider.  Please assign refill request to covering provider per clinic standard process.    Routing refill request to provider for review/approval because:  Patient reports taking medication differently.  No PCP    Last Written Prescription Date:  4/14/22  Last Fill Quantity: 90,  # refills: 3   Last office visit provider:  9/6/22     Requested Prescriptions   Pending Prescriptions Disp Refills     finasteride (PROSCAR) 5 MG tablet 90 tablet 3     Sig: Take 1 tablet (5 mg) by mouth daily       BPH Agents Failed - 4/20/2023  9:31 AM        Failed - Recent (12 mo) or future (30 days) visit within the authorizing provider's department     Patient has had an office visit with the authorizing provider or a provider within the authorizing providers department within the previous 12 mos or has a future within next 30 days. See \"Patient Info\" tab in inbasket, or \"Choose Columns\" in Meds & Orders section of the refill encounter.              Passed - Medication is active on med list        Passed - Patient is 18 years of age or older             Jonnie Kingston RN 04/20/23 9:31 AM  "

## 2023-04-20 NOTE — TELEPHONE ENCOUNTER
Dr. Mehta has left the practice.  He needs to establish care with new provider.  If he schedules an appointment we can send refills to get him through.

## 2023-04-20 NOTE — TELEPHONE ENCOUNTER
4/20/2023 called and scheduled pt for est care with Dr Xiao 5/30. Pt asks to please refill proscar.

## 2023-04-21 LAB
ACID FAST STAIN (ARUP): NORMAL

## 2023-04-21 RX ORDER — FINASTERIDE 5 MG/1
5 TABLET, FILM COATED ORAL AT BEDTIME
Qty: 60 TABLET | Refills: 0 | Status: SHIPPED | OUTPATIENT
Start: 2023-04-21 | End: 2023-05-30

## 2023-05-15 ENCOUNTER — TRANSFERRED RECORDS (OUTPATIENT)
Dept: HEALTH INFORMATION MANAGEMENT | Facility: CLINIC | Age: 88
End: 2023-05-15
Payer: MEDICARE

## 2023-05-30 ENCOUNTER — OFFICE VISIT (OUTPATIENT)
Dept: INTERNAL MEDICINE | Facility: CLINIC | Age: 88
End: 2023-05-30
Payer: COMMERCIAL

## 2023-05-30 VITALS
HEART RATE: 76 BPM | HEIGHT: 72 IN | WEIGHT: 231 LBS | DIASTOLIC BLOOD PRESSURE: 60 MMHG | RESPIRATION RATE: 15 BRPM | TEMPERATURE: 98.6 F | OXYGEN SATURATION: 95 % | SYSTOLIC BLOOD PRESSURE: 102 MMHG | BODY MASS INDEX: 31.29 KG/M2

## 2023-05-30 DIAGNOSIS — I10 ESSENTIAL HYPERTENSION: ICD-10-CM

## 2023-05-30 DIAGNOSIS — Z71.89 ADVANCED CARE PLANNING/COUNSELING DISCUSSION: ICD-10-CM

## 2023-05-30 DIAGNOSIS — E78.5 HYPERLIPIDEMIA LDL GOAL <100: ICD-10-CM

## 2023-05-30 DIAGNOSIS — I50.32 CHRONIC DIASTOLIC HEART FAILURE (H): ICD-10-CM

## 2023-05-30 DIAGNOSIS — L12.0 BULLOUS PEMPHIGOID (H): ICD-10-CM

## 2023-05-30 DIAGNOSIS — K74.69 OTHER CIRRHOSIS OF LIVER (H): ICD-10-CM

## 2023-05-30 DIAGNOSIS — N18.31 CHRONIC KIDNEY DISEASE, STAGE 3A (H): ICD-10-CM

## 2023-05-30 DIAGNOSIS — M17.0 PRIMARY OSTEOARTHRITIS OF BOTH KNEES: ICD-10-CM

## 2023-05-30 DIAGNOSIS — Z86.0100 HISTORY OF COLONIC POLYPS: ICD-10-CM

## 2023-05-30 DIAGNOSIS — I50.812 CHRONIC RIGHT HEART FAILURE (H): ICD-10-CM

## 2023-05-30 DIAGNOSIS — H91.93 BILATERAL HEARING LOSS, UNSPECIFIED HEARING LOSS TYPE: Primary | ICD-10-CM

## 2023-05-30 DIAGNOSIS — J43.1 PANLOBULAR EMPHYSEMA (H): ICD-10-CM

## 2023-05-30 DIAGNOSIS — R35.0 FREQUENCY OF URINATION: ICD-10-CM

## 2023-05-30 PROBLEM — E86.0 DEHYDRATION: Status: RESOLVED | Noted: 2023-02-20 | Resolved: 2023-05-30

## 2023-05-30 PROBLEM — I48.91 ATRIAL FIBRILLATION WITH RAPID VENTRICULAR RESPONSE (H): Status: RESOLVED | Noted: 2021-07-10 | Resolved: 2023-05-30

## 2023-05-30 PROBLEM — I50.31 ACUTE DIASTOLIC CONGESTIVE HEART FAILURE (H): Status: RESOLVED | Noted: 2021-07-08 | Resolved: 2023-05-30

## 2023-05-30 PROBLEM — D72.829 LEUKOCYTOSIS: Status: RESOLVED | Noted: 2021-07-08 | Resolved: 2023-05-30

## 2023-05-30 PROBLEM — M62.81 GENERALIZED MUSCLE WEAKNESS: Status: RESOLVED | Noted: 2023-02-20 | Resolved: 2023-05-30

## 2023-05-30 PROBLEM — E66.01 MORBID OBESITY WITH BMI OF 40.0-44.9, ADULT (H): Status: RESOLVED | Noted: 2018-12-05 | Resolved: 2023-05-30

## 2023-05-30 LAB
ANION GAP SERPL CALCULATED.3IONS-SCNC: 14 MMOL/L (ref 7–15)
BUN SERPL-MCNC: 24.9 MG/DL (ref 8–23)
CALCIUM SERPL-MCNC: 9.4 MG/DL (ref 8.8–10.2)
CHLORIDE SERPL-SCNC: 100 MMOL/L (ref 98–107)
CREAT SERPL-MCNC: 1.37 MG/DL (ref 0.67–1.17)
DEPRECATED HCO3 PLAS-SCNC: 28 MMOL/L (ref 22–29)
ERYTHROCYTE [DISTWIDTH] IN BLOOD BY AUTOMATED COUNT: 16.2 % (ref 10–15)
GFR SERPL CREATININE-BSD FRML MDRD: 50 ML/MIN/1.73M2
GLUCOSE SERPL-MCNC: 103 MG/DL (ref 70–99)
HCT VFR BLD AUTO: 45.7 % (ref 40–53)
HGB BLD-MCNC: 14.3 G/DL (ref 13.3–17.7)
MCH RBC QN AUTO: 29.4 PG (ref 26.5–33)
MCHC RBC AUTO-ENTMCNC: 31.3 G/DL (ref 31.5–36.5)
MCV RBC AUTO: 94 FL (ref 78–100)
PLATELET # BLD AUTO: 187 10E3/UL (ref 150–450)
POTASSIUM SERPL-SCNC: 4.3 MMOL/L (ref 3.4–5.3)
RBC # BLD AUTO: 4.87 10E6/UL (ref 4.4–5.9)
SODIUM SERPL-SCNC: 142 MMOL/L (ref 136–145)
WBC # BLD AUTO: 8.8 10E3/UL (ref 4–11)

## 2023-05-30 PROCEDURE — 85027 COMPLETE CBC AUTOMATED: CPT | Performed by: INTERNAL MEDICINE

## 2023-05-30 PROCEDURE — 99214 OFFICE O/P EST MOD 30 MIN: CPT | Performed by: INTERNAL MEDICINE

## 2023-05-30 PROCEDURE — 36415 COLL VENOUS BLD VENIPUNCTURE: CPT | Performed by: INTERNAL MEDICINE

## 2023-05-30 PROCEDURE — 80048 BASIC METABOLIC PNL TOTAL CA: CPT | Performed by: INTERNAL MEDICINE

## 2023-05-30 RX ORDER — FINASTERIDE 5 MG/1
5 TABLET, FILM COATED ORAL AT BEDTIME
Qty: 90 TABLET | Refills: 3 | Status: SHIPPED | OUTPATIENT
Start: 2023-05-30 | End: 2024-06-17

## 2023-05-30 NOTE — PROGRESS NOTES
ASSESSMENT AND PLAN:    1. Frequency of urination  No dysuria, noticeable with bumetanide diuresis    2. Coronary atherosclerosis  Asymptomatic.     3. Chronic kidney disease, stage 3a  Need to monitor.     4. Bilateral hearing loss    5. History of colonic polyps    6. Bullous pemphigoid  Has been taken off the cellcept.  Has dermatology care, and is using topical corticosteroid.     7. Panlobular emphysema   Long history of smoking.     8. Essential hypertension  Current satisfactory control.     9. Hyperlipidemia LDL goal <100  Not on treatment.     10. Other cirrhosis of liver   This is related to chronic right heart failure.      11. Chronic right heart failure   Severe, related to COPD. Possible multiple pulmonary emboli in the past.     12. Chronic diastolic heart failure  Compensated presently.     13. Advanced care planning/counseling discussion  He is clear about DNR and DNI, and currently is on hospice.     14. Primary osteoarthritis of both knees  Severe.    Follow up in one month    CHIEF COMPLAINT:  Establish care and follow up    HISTORY OF PRESENT ILLNESS:  Nishant Gilliam is a 88 year old male at home now since hospitalization 2 months ago, doing OK.  Can't walk much, has wheel chair.Getting hospice at home. He is DNR and DNI but does want care that can help him, not using morphine. Sleeping OK, using his diuretic. His bullous pemphigoid is an issue, he is seeing dermatology.  Was taken off cellcept.  No cough, not needing oxygen. No bowel or bladder issues.      REVIEW OF SYSTEMS:   See HPI, all other systems on review are negative.    Past Medical History:   Diagnosis Date     Advanced care planning/counseling discussion 5/30/2023     Bullous pemphigoid      Chronic atrial fibrillation (H)      Chronic diastolic heart failure (H)      Chronic renal failure syndrome, stage 3 (moderate) (H)      Chronic right heart failure (H)      COPD (chronic obstructive pulmonary disease) (H)      Coronary  artery disease      Essential hypertension      Hyperlipidemia LDL goal <100 5/30/2023     Other cirrhosis of liver (H) 5/30/2023     Primary osteoarthritis of both knees      Primary osteoarthritis of both knees 5/30/2023     Right bundle branch block     complete     Venous (peripheral) insufficiency      History   Smoking Status     Former     Years: 30.00     Types: Cigarettes   Smokeless Tobacco     Never     No family history on file.  Past Surgical History:   Procedure Laterality Date     HC KNEE SCOPE, DIAGNOSTIC      Description: Arthroscopy Knee Left;  Recorded: 06/27/2009;  Comments: details unk.     HERNIA REPAIR       ZZHC CORONARY STENT PERCUT, INITIAL VESSEL      Description: Cath Stent Placement;  Recorded: 02/24/2014;  Comments: stenting to the LAD in 1999. 2nd done years later.     VITALS:  Vitals:    05/30/23 1343   BP: 102/60   Pulse: 76   Resp: 15   Temp: 98.6  F (37  C)   TempSrc: Tympanic   SpO2: 95%   Weight: 104.8 kg (231 lb)   Height: 1.829 m (6')     Wt Readings from Last 3 Encounters:   05/30/23 104.8 kg (231 lb)   03/01/23 112.8 kg (248 lb 10.9 oz)   09/06/22 113.7 kg (250 lb 11.2 oz)     PHYSICAL EXAM:  Constitutional:  In NAD, alert and oriented  Cardiac:  S1 S2   Lungs: Clear, decreased bilaterally  Extremities: knees are dystrophic, bilateral 2+ edema, no ulcers or stasis inflammation    DECISION TO OBTAIN OLD RECORDS AND/OR OBTAIN HISTORY FROM SOMEONE OTHER THAN PATIENT, AND/OR ACCESSING CARE EVERYWHERE):  1 0    REVIEW AND SUMMARIZATION OF OLD RECORDS, AND/OR OBTAINING HISTORY FROM SOMEONE OTHER THAN PATIENT, AND/OR DISCUSSION OF CASE WITH ANOTHER HEALTH CARE PROVIDER:  2 reviewed hospitalization notes    REVIEW AND/OR ORDER OF OF CLINICAL LAB TESTS: 1  Reviewed and ordered.    REVIEW AND/OR ORDER OF RADIOLOGY TESTS: 1 0.    REVIEW AND/OR ORDER OF MEDICAL TESTS (EKG/ECHO/COLONOSCOPY/EGD): 1  Reviewed echocardiogram, and CT chest which included his liver, and liver US    INDEPENDENT   VISUALIZATION OF IMAGE, TRACING, OR SPECIMEN ITSELF (2 EACH):  0     TOTAL: 4    Current Outpatient Medications   Medication Sig Dispense Refill     apixaban ANTICOAGULANT (ELIQUIS) 2.5 MG tablet Take 1 tablet (2.5 mg) by mouth 2 times daily 60 tablet 1     budesonide-formoterol (SYMBICORT) 160-4.5 MCG/ACT Inhaler [BUDESONIDE-FORMOTEROL (SYMBICORT) 160-4.5 MCG/ACTUATION INHALER] USE 2 INHALATIONS TWICE A DAY 10.2 g 11     bumetanide (BUMEX) 1 MG tablet Take 2 tablets (2 mg) by mouth daily 180 tablet 3     finasteride (PROSCAR) 5 MG tablet Take 1 tablet (5 mg) by mouth At Bedtime 60 tablet 0     fluticasone (FLONASE) 50 MCG/ACT nasal spray Spray 1 spray into both nostrils 2 times daily 9.9 mL 1     Roosevelt Xiao MD  Internal Medicine  [facility]

## 2023-07-07 ENCOUNTER — TELEPHONE (OUTPATIENT)
Dept: INTERNAL MEDICINE | Facility: CLINIC | Age: 88
End: 2023-07-07
Payer: MEDICARE

## 2023-07-07 NOTE — TELEPHONE ENCOUNTER
Prior Authorization Request    1. Prior Authorization for the medication apixaban ANTICOAGULANT (ELIQUIS) 2.5 MG tablet        Requesting Provider: No primary care provider on file.   Pharmacy name and location: [facility]        Pt name: Nishant Sinclairshira        Pt : 1935        Pt MRN: [id number]        Last Office Visit: 2023           Insurance: Payor: MEDICARE / Plan: MEDICARE PT A ONLY / Product Type: Medicare /         Insurance ID Number: [unfilled]        Prior Auth Contact Phone number:     BIN#:   JÚNIORN#:         CMM KEY: [id number]    Informed patient that prior authorizations can take up to 10 business days  for response NA     THE CURRENT PA WILL  ON 23

## 2023-07-11 NOTE — TELEPHONE ENCOUNTER
Central Prior Authorization Team   Phone: [phone]    PA Initiation    Medication: apixaban ANTICOAGULANT (ELIQUIS) 2.5 MG tablet  Insurance Company: [employer] - Phone [phone] Fax [phone]  Pharmacy Filling the Rx: [id number] - [place], [place] - [address] AT [facility] [address]  Filling Pharmacy Phone: [phone]  Filling Pharmacy Fax:    Start Date: 7/11/2023

## 2023-07-12 NOTE — TELEPHONE ENCOUNTER
Prior Authorization Approval-Renewal     Authorization Effective Date: 6/11/2023  Authorization Expiration Date: 7/10/2024  Medication: apixaban ANTICOAGULANT (ELIQUIS) 2.5 MG tablet  Approved Dose/Quantity:    Reference #:     Insurance Company: [employer] - Phone [phone] Fax [phone]  Expected CoPay:       CoPay Card Available:      Foundation Assistance Needed:    Which Pharmacy is filling the prescription (Not needed for infusion/clinic administered): [id number] - [place], [place] - [address] AT [facility] [address]  Pharmacy Notified: Yes  Patient Notified: No

## 2023-07-25 ENCOUNTER — OFFICE VISIT (OUTPATIENT)
Dept: INTERNAL MEDICINE | Facility: CLINIC | Age: 88
End: 2023-07-25
Payer: COMMERCIAL

## 2023-07-25 VITALS
BODY MASS INDEX: 31.19 KG/M2 | WEIGHT: 230.3 LBS | HEIGHT: 72 IN | TEMPERATURE: 98 F | DIASTOLIC BLOOD PRESSURE: 60 MMHG | RESPIRATION RATE: 16 BRPM | SYSTOLIC BLOOD PRESSURE: 101 MMHG | OXYGEN SATURATION: 97 % | HEART RATE: 77 BPM

## 2023-07-25 DIAGNOSIS — I50.812 CHRONIC RIGHT HEART FAILURE (H): ICD-10-CM

## 2023-07-25 DIAGNOSIS — N18.31 CHRONIC KIDNEY DISEASE, STAGE 3A (H): ICD-10-CM

## 2023-07-25 DIAGNOSIS — I10 ESSENTIAL HYPERTENSION: ICD-10-CM

## 2023-07-25 DIAGNOSIS — E78.5 HYPERLIPIDEMIA LDL GOAL <100: ICD-10-CM

## 2023-07-25 DIAGNOSIS — I50.32 CHRONIC DIASTOLIC HEART FAILURE (H): ICD-10-CM

## 2023-07-25 DIAGNOSIS — I87.2 VENOUS (PERIPHERAL) INSUFFICIENCY: ICD-10-CM

## 2023-07-25 DIAGNOSIS — I48.19 PERSISTENT ATRIAL FIBRILLATION (H): ICD-10-CM

## 2023-07-25 DIAGNOSIS — L12.0 BULLOUS PEMPHIGOID (H): ICD-10-CM

## 2023-07-25 DIAGNOSIS — J43.1 PANLOBULAR EMPHYSEMA (H): Primary | ICD-10-CM

## 2023-07-25 PROBLEM — N28.9 RENAL INSUFFICIENCY: Status: RESOLVED | Noted: 2023-02-20 | Resolved: 2023-07-25

## 2023-07-25 LAB
ALBUMIN SERPL BCG-MCNC: 4.1 G/DL (ref 3.5–5.2)
ALP SERPL-CCNC: 128 U/L (ref 40–129)
ALT SERPL W P-5'-P-CCNC: 17 U/L (ref 0–70)
ANION GAP SERPL CALCULATED.3IONS-SCNC: 12 MMOL/L (ref 7–15)
AST SERPL W P-5'-P-CCNC: 28 U/L (ref 0–45)
BILIRUB SERPL-MCNC: 0.9 MG/DL
BUN SERPL-MCNC: 31.7 MG/DL (ref 8–23)
CALCIUM SERPL-MCNC: 9.7 MG/DL (ref 8.8–10.2)
CHLORIDE SERPL-SCNC: 102 MMOL/L (ref 98–107)
CREAT SERPL-MCNC: 1.41 MG/DL (ref 0.67–1.17)
DEPRECATED HCO3 PLAS-SCNC: 29 MMOL/L (ref 22–29)
ERYTHROCYTE [DISTWIDTH] IN BLOOD BY AUTOMATED COUNT: 14.1 % (ref 10–15)
GFR SERPL CREATININE-BSD FRML MDRD: 49 ML/MIN/1.73M2
GLUCOSE SERPL-MCNC: 80 MG/DL (ref 70–99)
HCT VFR BLD AUTO: 46.8 % (ref 40–53)
HGB BLD-MCNC: 14.9 G/DL (ref 13.3–17.7)
MCH RBC QN AUTO: 29.9 PG (ref 26.5–33)
MCHC RBC AUTO-ENTMCNC: 31.8 G/DL (ref 31.5–36.5)
MCV RBC AUTO: 94 FL (ref 78–100)
PLATELET # BLD AUTO: 170 10E3/UL (ref 150–450)
POTASSIUM SERPL-SCNC: 4.3 MMOL/L (ref 3.4–5.3)
PROT SERPL-MCNC: 7.1 G/DL (ref 6.4–8.3)
RBC # BLD AUTO: 4.98 10E6/UL (ref 4.4–5.9)
SODIUM SERPL-SCNC: 143 MMOL/L (ref 136–145)
WBC # BLD AUTO: 8.6 10E3/UL (ref 4–11)

## 2023-07-25 PROCEDURE — 85027 COMPLETE CBC AUTOMATED: CPT | Performed by: INTERNAL MEDICINE

## 2023-07-25 PROCEDURE — 36415 COLL VENOUS BLD VENIPUNCTURE: CPT | Performed by: INTERNAL MEDICINE

## 2023-07-25 PROCEDURE — 80053 COMPREHEN METABOLIC PANEL: CPT | Performed by: INTERNAL MEDICINE

## 2023-07-25 PROCEDURE — 99214 OFFICE O/P EST MOD 30 MIN: CPT | Performed by: INTERNAL MEDICINE

## 2023-07-25 NOTE — PROGRESS NOTES
ASSESSMENT AND PLAN:    1 Chronic kidney disease, stage 3a   Last creatinine was 1.37    2. Essential hypertension  Satisfactory control.      3. Chronic diastolic heart failure   Clinically is compensated.     4. Chronic right heart failure   Compensated.     5. Persistent atrial fibrillation )  Chronic with HR control      6. Venous (peripheral) insufficiency  Stable chronic edema.     7. Bullous pemphigoid  Symptoms are controlled with topical treatment.     8. Cirrhosis  Asymptomatic, related to chronic right heart failure    Overall he is clinically stable.  We discussed the high risk time for respiratory infection:  October 15- February 1.  I offered covid bivalent booster, #2,  he declines.  No changes in medications at this time, pending lab testing.  He may come off hospice if remains stable.      Follow up in 3 months.     CHIEF COMPLAINT:  Follow up heart failure    HISTORY OF PRESENT ILLNESS:  Nishant Gilliam is a 88 year old male has been well.  Still in hospice, but may come off.  Taking the finasteride, the apixiban, and bumetanide only.  His breathing has been OK, sleeping is chronically fitful, but OK, no chest pain, and LE edema has been stable.  Appetite is good.  She has been having teeth removal as needed.   Voiding OK, no significant constipation.     REVIEW OF SYSTEMS:   See HPI, all other systems on review are negative.    Past Medical History:   Diagnosis Date    Advanced care planning/counseling discussion 5/30/2023    Bullous pemphigoid     Chronic atrial fibrillation (H)     Chronic diastolic heart failure (H)     Chronic renal failure syndrome, stage 3 (moderate) (H)     Chronic right heart failure (H)     COPD (chronic obstructive pulmonary disease) (H)     Coronary artery disease     Essential hypertension     Hyperlipidemia LDL goal <100 5/30/2023    Other cirrhosis of liver (H) 5/30/2023    Primary osteoarthritis of both knees     Primary osteoarthritis of both knees 5/30/2023     Right bundle branch block     complete    Venous (peripheral) insufficiency      History   Smoking Status    Former    Years: 30.00    Types: Cigarettes   Smokeless Tobacco    Never     No family history on file.  Past Surgical History:   Procedure Laterality Date    HC KNEE SCOPE, DIAGNOSTIC      Description: Arthroscopy Knee Left;  Recorded: 06/27/2009;  Comments: details unk.    HERNIA REPAIR      ZZHC CORONARY STENT PERCUT, INITIAL VESSEL      Description: Cath Stent Placement;  Recorded: 02/24/2014;  Comments: stenting to the LAD in 1999. 2nd done years later.     VITALS:  Vitals:    07/25/23 1154   BP: 101/60   BP Location: Left arm   Patient Position: Sitting   Cuff Size: Adult Regular   Pulse: 77   Resp: 16   Temp: 98  F (36.7  C)   TempSrc: Tympanic   SpO2: 97%   Weight: 104.5 kg (230 lb 4.8 oz)   Height: 1.829 m (6')     Wt Readings from Last 3 Encounters:   07/25/23 104.5 kg (230 lb 4.8 oz)   05/30/23 104.8 kg (231 lb)   03/01/23 112.8 kg (248 lb 10.9 oz)     PHYSICAL EXAM:  Constitutional:  In NAD, alert and oriented  Neck: no cervical or axillary adenopathy  Cardiac:  S1 S2   Lungs: Clear  Abdomen:   Soft, flat and non-tender  Extremities: bilateral edema is soft, both LE are wrapped, feet are edematous in socks and slippers.   Neurologic:  Speech clear,   Psychiatric:  Mood and behavior appropriate, thinking is clear.     DECISION TO OBTAIN OLD RECORDS AND/OR OBTAIN HISTORY FROM SOMEONE OTHER THAN PATIENT, AND/OR ACCESSING CARE EVERYWHERE):  1  0     REVIEW AND SUMMARIZATION OF OLD RECORDS, AND/OR OBTAINING HISTORY FROM SOMEONE OTHER THAN PATIENT, AND/OR DISCUSSION OF CASE WITH ANOTHER HEALTH CARE PROVIDER:  2 reviewed past health notes    REVIEW AND/OR ORDER OF OF CLINICAL LAB TESTS: 1  ordered.    REVIEW AND/OR ORDER OF RADIOLOGY TESTS: 1 0.    REVIEW AND/OR ORDER OF MEDICAL TESTS (EKG/ECHO/COLONOSCOPY/EGD): 1  0    INDEPENDENT  VISUALIZATION OF IMAGE, TRACING, OR SPECIMEN ITSELF (2 EACH):  0      TOTAL: 3    Current Outpatient Medications   Medication Sig Dispense Refill    apixaban ANTICOAGULANT (ELIQUIS) 2.5 MG tablet Take 1 tablet (2.5 mg) by mouth 2 times daily 60 tablet 1    budesonide-formoterol (SYMBICORT) 160-4.5 MCG/ACT Inhaler [BUDESONIDE-FORMOTEROL (SYMBICORT) 160-4.5 MCG/ACTUATION INHALER] USE 2 INHALATIONS TWICE A DAY 10.2 g 11    bumetanide (BUMEX) 1 MG tablet Take 2 tablets (2 mg) by mouth daily 180 tablet 3    finasteride (PROSCAR) 5 MG tablet Take 1 tablet (5 mg) by mouth At Bedtime 90 tablet 3    fluticasone (FLONASE) 50 MCG/ACT nasal spray Spray 1 spray into both nostrils 2 times daily 9.9 mL 1     Roosevelt Xiao MD  Internal Medicine  [facility]

## 2023-09-05 ENCOUNTER — OFFICE VISIT (OUTPATIENT)
Dept: INTERNAL MEDICINE | Facility: CLINIC | Age: 88
End: 2023-09-05
Payer: MEDICARE

## 2023-09-05 VITALS
SYSTOLIC BLOOD PRESSURE: 102 MMHG | OXYGEN SATURATION: 95 % | WEIGHT: 231 LBS | BODY MASS INDEX: 31.33 KG/M2 | DIASTOLIC BLOOD PRESSURE: 68 MMHG | RESPIRATION RATE: 16 BRPM | HEART RATE: 69 BPM | TEMPERATURE: 97.3 F

## 2023-09-05 DIAGNOSIS — I50.32 CHRONIC DIASTOLIC HEART FAILURE (H): ICD-10-CM

## 2023-09-05 DIAGNOSIS — K74.69 OTHER CIRRHOSIS OF LIVER (H): ICD-10-CM

## 2023-09-05 DIAGNOSIS — J43.1 PANLOBULAR EMPHYSEMA (H): Primary | ICD-10-CM

## 2023-09-05 DIAGNOSIS — K05.10 GINGIVITIS: ICD-10-CM

## 2023-09-05 DIAGNOSIS — E78.5 HYPERLIPIDEMIA LDL GOAL <100: ICD-10-CM

## 2023-09-05 DIAGNOSIS — I87.2 VENOUS (PERIPHERAL) INSUFFICIENCY: ICD-10-CM

## 2023-09-05 DIAGNOSIS — N18.31 CHRONIC KIDNEY DISEASE, STAGE 3A (H): ICD-10-CM

## 2023-09-05 DIAGNOSIS — H01.003 BLEPHARITIS OF BOTH EYES, UNSPECIFIED EYELID, UNSPECIFIED TYPE: ICD-10-CM

## 2023-09-05 DIAGNOSIS — I10 ESSENTIAL HYPERTENSION: ICD-10-CM

## 2023-09-05 DIAGNOSIS — Z79.01 CHRONIC ANTICOAGULATION: ICD-10-CM

## 2023-09-05 DIAGNOSIS — I50.812 CHRONIC RIGHT HEART FAILURE (H): ICD-10-CM

## 2023-09-05 DIAGNOSIS — H01.006 BLEPHARITIS OF BOTH EYES, UNSPECIFIED EYELID, UNSPECIFIED TYPE: ICD-10-CM

## 2023-09-05 LAB
ALBUMIN SERPL BCG-MCNC: 4 G/DL (ref 3.5–5.2)
ALP SERPL-CCNC: 127 U/L (ref 40–129)
ALT SERPL W P-5'-P-CCNC: 18 U/L (ref 0–70)
ANION GAP SERPL CALCULATED.3IONS-SCNC: 10 MMOL/L (ref 7–15)
AST SERPL W P-5'-P-CCNC: 31 U/L (ref 0–45)
BILIRUB SERPL-MCNC: 0.9 MG/DL
BUN SERPL-MCNC: 30.7 MG/DL (ref 8–23)
CALCIUM SERPL-MCNC: 9.4 MG/DL (ref 8.8–10.2)
CHLORIDE SERPL-SCNC: 100 MMOL/L (ref 98–107)
CREAT SERPL-MCNC: 1.33 MG/DL (ref 0.67–1.17)
DEPRECATED HCO3 PLAS-SCNC: 29 MMOL/L (ref 22–29)
ERYTHROCYTE [DISTWIDTH] IN BLOOD BY AUTOMATED COUNT: 13.8 % (ref 10–15)
GFR SERPL CREATININE-BSD FRML MDRD: 52 ML/MIN/1.73M2
GLUCOSE SERPL-MCNC: 107 MG/DL (ref 70–99)
HCT VFR BLD AUTO: 46.6 % (ref 40–53)
HGB BLD-MCNC: 15.1 G/DL (ref 13.3–17.7)
MCH RBC QN AUTO: 30.3 PG (ref 26.5–33)
MCHC RBC AUTO-ENTMCNC: 32.4 G/DL (ref 31.5–36.5)
MCV RBC AUTO: 94 FL (ref 78–100)
PLATELET # BLD AUTO: 192 10E3/UL (ref 150–450)
POTASSIUM SERPL-SCNC: 4.1 MMOL/L (ref 3.4–5.3)
PROT SERPL-MCNC: 7.1 G/DL (ref 6.4–8.3)
RBC # BLD AUTO: 4.98 10E6/UL (ref 4.4–5.9)
SODIUM SERPL-SCNC: 139 MMOL/L (ref 136–145)
WBC # BLD AUTO: 8.3 10E3/UL (ref 4–11)

## 2023-09-05 PROCEDURE — 85027 COMPLETE CBC AUTOMATED: CPT | Mod: GW | Performed by: INTERNAL MEDICINE

## 2023-09-05 PROCEDURE — 80053 COMPREHEN METABOLIC PANEL: CPT | Mod: GW | Performed by: INTERNAL MEDICINE

## 2023-09-05 PROCEDURE — 99214 OFFICE O/P EST MOD 30 MIN: CPT | Mod: GW | Performed by: INTERNAL MEDICINE

## 2023-09-05 PROCEDURE — 36415 COLL VENOUS BLD VENIPUNCTURE: CPT | Mod: GW | Performed by: INTERNAL MEDICINE

## 2023-09-05 RX ORDER — CHLORHEXIDINE GLUCONATE ORAL RINSE 1.2 MG/ML
15 SOLUTION DENTAL 2 TIMES DAILY
Qty: 473 ML | Refills: 11 | Status: SHIPPED | OUTPATIENT
Start: 2023-09-05

## 2023-09-05 RX ORDER — MOXIFLOXACIN 5 MG/ML
1 SOLUTION/ DROPS OPHTHALMIC 2 TIMES DAILY PRN
Qty: 3 ML | Refills: 11 | Status: SHIPPED | OUTPATIENT
Start: 2023-09-05 | End: 2023-12-13

## 2023-09-05 NOTE — PROGRESS NOTES
ASSESSMENT AND PLAN:    1. Panlobular emphysema     2. Hyperlipidemia LDL goal <100    3. Chronic kidney disease, stage 3a  Will assess.      4. Other cirrhosis of liver     5. Blepharitis of both eyes, unspecified eyelid, unspecified type  He has used this in the past. Can use it prn in episodes of 5-7 days as needed.   - moxifloxacin (VIGAMOX) 0.5 % ophthalmic solution; Place 1 drop into both eyes 2 times daily as needed (for one week at a time as needed.)  Dispense: 3 mL; Refill: 11    6. Chronic anticoagulation  Ongoing apixiban therapy.     7. Chronic diastolic heart failure   compensated    8. Chronic right heart failure   conpensated    9. Essential hypertension  Satisfactory control.   - CBC with platelets; Future  - Comprehensive metabolic panel; Future    10. Venous (peripheral) insufficiency  Stable without stasis inflammation or ulceration.  Ongoing use of bumetanide. He may benefit from spironolactone if hypokalemia develops.     11. Gingivitis  Refilled this, ordered by oral surgeon.    - chlorhexidine (PERIDEX) 0.12 % solution; Swish and spit 15 mLs in mouth 2 times daily  Dispense: 473 mL; Refill: 11    Follow up in three months.     CHIEF COMPLAINT:  Follow up     HISTORY OF PRESENT ILLNESS:  Nishant Gilliam is a 88 year old male here in follow up.  He is doing well overall.  LE edema is mostly controlled, no inflammation. Sometimes skips the bumetanide if going out.  No increase in dyspnea, no bleeding from apixiban. Chronic blepharitis - has used antibiotic eyedrops in the past with benefit.  Sleeping adequately, and no unusual dyspnea or chest pain. Remains on hospice.     REVIEW OF SYSTEMS:   See HPI, all other systems on review are negative.    Past Medical History:   Diagnosis Date    Advanced care planning/counseling discussion 05/30/2023    Bullous pemphigoid     Chronic anticoagulation     Chronic atrial fibrillation (H)     Chronic diastolic heart failure (H)     Chronic renal failure  syndrome, stage 3 (moderate) (H)     Chronic right heart failure (H)     COPD (chronic obstructive pulmonary disease) (H)     Coronary artery disease     Essential hypertension     Hyperlipidemia LDL goal <100 05/30/2023    Other cirrhosis of liver (H) 05/30/2023    Primary osteoarthritis of both knees     Primary osteoarthritis of both knees 05/30/2023    Right bundle branch block     complete    Venous (peripheral) insufficiency      History   Smoking Status    Former    Years: 30.00    Types: Cigarettes   Smokeless Tobacco    Never     No family history on file.  Past Surgical History:   Procedure Laterality Date    HC KNEE SCOPE, DIAGNOSTIC      Description: Arthroscopy Knee Left;  Recorded: 06/27/2009;  Comments: details unk.    HERNIA REPAIR      [facility] CORONARY STENT PERCUT, INITIAL VESSEL      Description: Cath Stent Placement;  Recorded: 02/24/2014;  Comments: stenting to the LAD in 1999. 2nd done years later.     VITALS:  Vitals:    09/05/23 1426   BP: 102/68   BP Location: Right arm   Patient Position: Sitting   Cuff Size: Adult Regular   Pulse: 69   Resp: 16   Temp: 97.3  F (36.3  C)   TempSrc: Oral   SpO2: 95%   Weight: 104.8 kg (231 lb)     Wt Readings from Last 3 Encounters:   09/05/23 104.8 kg (231 lb)   07/25/23 104.5 kg (230 lb 4.8 oz)   05/30/23 104.8 kg (231 lb)     PHYSICAL EXAM:  Constitutional:  In NAD, alert and oriented  Cardiac:  S1 S2, with systolic murmur  Lungs: decreased bilaterally  Abdomen:   Soft, flat and non-tender,  Extremities: bilateral 3+ edema    DECISION TO OBTAIN OLD RECORDS AND/OR OBTAIN HISTORY FROM SOMEONE OTHER THAN PATIENT, AND/OR ACCESSING CARE EVERYWHERE):  1  0     REVIEW AND SUMMARIZATION OF OLD RECORDS, AND/OR OBTAINING HISTORY FROM SOMEONE OTHER THAN PATIENT, AND/OR DISCUSSION OF CASE WITH ANOTHER HEALTH CARE PROVIDER:  2 reviewed past health notes    REVIEW AND/OR ORDER OF OF CLINICAL LAB TESTS: 1  ordered.    REVIEW AND/OR ORDER OF RADIOLOGY TESTS: 1 0.    REVIEW  AND/OR ORDER OF MEDICAL TESTS (EKG/ECHO/COLONOSCOPY/EGD): 1  0    INDEPENDENT  VISUALIZATION OF IMAGE, TRACING, OR SPECIMEN ITSELF (2 EACH):  0     TOTAL: 3    Current Outpatient Medications   Medication Sig Dispense Refill    apixaban ANTICOAGULANT (ELIQUIS) 2.5 MG tablet Take 1 tablet (2.5 mg) by mouth 2 times daily 60 tablet 1    budesonide-formoterol (SYMBICORT) 160-4.5 MCG/ACT Inhaler [BUDESONIDE-FORMOTEROL (SYMBICORT) 160-4.5 MCG/ACTUATION INHALER] USE 2 INHALATIONS TWICE A DAY 10.2 g 11    bumetanide (BUMEX) 1 MG tablet Take 2 tablets (2 mg) by mouth daily 180 tablet 3    chlorhexidine (PERIDEX) 0.12 % solution Swish and spit 15 mLs in mouth 2 times daily 473 mL 11    finasteride (PROSCAR) 5 MG tablet Take 1 tablet (5 mg) by mouth At Bedtime 90 tablet 3    fluticasone (FLONASE) 50 MCG/ACT nasal spray Spray 1 spray into both nostrils 2 times daily 9.9 mL 1    moxifloxacin (VIGAMOX) 0.5 % ophthalmic solution Place 1 drop into both eyes 2 times daily as needed (for one week at a time as needed.) 3 mL 11     Roosevelt Xiao MD  Internal Medicine  [facility]

## 2023-10-12 ENCOUNTER — TRANSFERRED RECORDS (OUTPATIENT)
Dept: HEALTH INFORMATION MANAGEMENT | Facility: CLINIC | Age: 88
End: 2023-10-12
Payer: MEDICARE

## 2023-10-14 ENCOUNTER — HEALTH MAINTENANCE LETTER (OUTPATIENT)
Age: 88
End: 2023-10-14

## 2023-10-25 ENCOUNTER — TRANSFERRED RECORDS (OUTPATIENT)
Dept: INTERNAL MEDICINE | Facility: CLINIC | Age: 88
End: 2023-10-25
Payer: MEDICARE

## 2023-12-12 ENCOUNTER — OFFICE VISIT (OUTPATIENT)
Dept: INTERNAL MEDICINE | Facility: CLINIC | Age: 88
End: 2023-12-12
Payer: MEDICARE

## 2023-12-12 VITALS
HEIGHT: 72 IN | SYSTOLIC BLOOD PRESSURE: 116 MMHG | DIASTOLIC BLOOD PRESSURE: 67 MMHG | HEART RATE: 55 BPM | RESPIRATION RATE: 18 BRPM | WEIGHT: 238 LBS | TEMPERATURE: 97.9 F | OXYGEN SATURATION: 97 % | BODY MASS INDEX: 32.23 KG/M2

## 2023-12-12 DIAGNOSIS — J43.1 PANLOBULAR EMPHYSEMA (H): Primary | ICD-10-CM

## 2023-12-12 DIAGNOSIS — N18.31 CHRONIC KIDNEY DISEASE, STAGE 3A (H): ICD-10-CM

## 2023-12-12 DIAGNOSIS — E78.5 HYPERLIPIDEMIA LDL GOAL <100: ICD-10-CM

## 2023-12-12 PROCEDURE — 99214 OFFICE O/P EST MOD 30 MIN: CPT | Mod: GV | Performed by: INTERNAL MEDICINE

## 2023-12-12 RX ORDER — PRAVASTATIN SODIUM 20 MG
20 TABLET ORAL DAILY
Qty: 90 TABLET | Refills: 3 | Status: SHIPPED | OUTPATIENT
Start: 2023-12-12

## 2023-12-12 RX ORDER — RESPIRATORY SYNCYTIAL VIRUS VACCINE 120MCG/0.5
0.5 KIT INTRAMUSCULAR ONCE
Qty: 1 EACH | Refills: 0 | Status: CANCELLED | OUTPATIENT
Start: 2023-12-12 | End: 2023-12-12

## 2023-12-12 ASSESSMENT — PAIN SCALES - GENERAL: PAINLEVEL: SEVERE PAIN (6)

## 2023-12-12 NOTE — PROGRESS NOTES
ASSESSMENT AND PLAN:    1. Panlobular emphysema  Clinically stable, not on chronic oxygen.     2. Hyperlipidemia LDL goal <100  He wants to restart pravastatin. Prescription written.     3. Chronic kidney disease, stage 3a (H)  Last creatinine 1.41,  previousl 1.37 1.60 1.86 on 2/2023    4. Chronic right heart failure  Clinically compensated and not progressive.     5. Chronic venous insufficiency  Stable 2 +edema without ulceration or infection, has chronic, mild stasis dermatitis    6. Cirrhosis related to right heart failure  Clinically seems stable.     7. Squamous cell cancer, skin/face  S/p Moh's surgery    8. Blepharitis  This has resolved nicely, he had ectropion repair.     Remains on hospice, he has been stable. He may be de-certified, for recertification later when and if he starts to decompensate.     Follow up in 6 months and as needed.     CHIEF COMPLAINT:  Follow up    HISTORY OF PRESENT ILLNESS:  Nishant Gilliam is a 88 year old male here in follow up. Things are going pretty well.  Not having increased LE edema, or unusual dyspnea or malaise.  Remains on hospice but may come off. No acute illness. Sleep is fitful and naps during the day. Awakens early.  Some constipation.  Doesn't like to take miralax, does use senna.  No bleeding. Voiding OK. Well being overall has improved.  Leg edema is stable, not painful. He is happy about his right eye ectropion repair and no further blepharitis. Also had recent right face squamous cell cancer Moh's surgery with flap closure - it went well.     REVIEW OF SYSTEMS:   See HPI, all other systems on review are negative.    Past Medical History:   Diagnosis Date    Advanced care planning/counseling discussion 05/30/2023    Bullous pemphigoid (H28)     Chronic anticoagulation     Chronic atrial fibrillation (H)     Chronic diastolic heart failure (H)     Chronic renal failure syndrome, stage 3 (moderate) (H)     Chronic right heart failure (H)     COPD (chronic  obstructive pulmonary disease) (H)     Coronary artery disease     Essential hypertension     Hyperlipidemia LDL goal <100 05/30/2023    Other cirrhosis of liver (H) 05/30/2023    Primary osteoarthritis of both knees     Primary osteoarthritis of both knees 05/30/2023    Right bundle branch block     complete    Venous (peripheral) insufficiency      History   Smoking Status    Former    Years: 30.00    Types: Cigarettes   Smokeless Tobacco    Never     No family history on file.  Past Surgical History:   Procedure Laterality Date    HC KNEE SCOPE, DIAGNOSTIC      Description: Arthroscopy Knee Left;  Recorded: 06/27/2009;  Comments: details unk.    HERNIA REPAIR      [facility] CORONARY STENT PERCUT, INITIAL VESSEL      Description: Cath Stent Placement;  Recorded: 02/24/2014;  Comments: stenting to the LAD in 1999. 2nd done years later.     VITALS:  Vitals:    12/12/23 1527   BP: 116/67   BP Location: Left arm   Patient Position: Sitting   Cuff Size: Adult Regular   Pulse: 55   Resp: 18   Temp: 97.9  F (36.6  C)   TempSrc: Tympanic   SpO2: 97%   Weight: 108 kg (238 lb)   Height: 1.829 m (6')     Wt Readings from Last 3 Encounters:   12/12/23 108 kg (238 lb)   09/05/23 104.8 kg (231 lb)   07/25/23 104.5 kg (230 lb 4.8 oz)     PHYSICAL EXAM:  Constitutional:  In NAD, alert and oriented  Cheerful, looks better than I remember him being.   Neck: no cervical or axillary adenopathy  Cardiac:  S1 S2   Lungs: decreased, some rhonchi, no rales or wheezes  Extremities: 2+ bilateral edema, minor distal erythema   appropriate, thinking is clear.     DECISION TO OBTAIN OLD RECORDS AND/OR OBTAIN HISTORY FROM SOMEONE OTHER THAN PATIENT, AND/OR ACCESSING CARE EVERYWHERE):  1  0     REVIEW AND SUMMARIZATION OF OLD RECORDS, AND/OR OBTAINING HISTORY FROM SOMEONE OTHER THAN PATIENT, AND/OR DISCUSSION OF CASE WITH ANOTHER HEALTH CARE PROVIDER:  2 reviewed past health notes    REVIEW AND/OR ORDER OF OF CLINICAL LAB TESTS: 1  reviewed old  results.    REVIEW AND/OR ORDER OF RADIOLOGY TESTS: 1 0.    REVIEW AND/OR ORDER OF MEDICAL TESTS (EKG/ECHO/COLONOSCOPY/EGD): 1  0    INDEPENDENT  VISUALIZATION OF IMAGE, TRACING, OR SPECIMEN ITSELF (2 EACH):  0     TOTAL: 3    Current Outpatient Medications   Medication Sig Dispense Refill    apixaban ANTICOAGULANT (ELIQUIS) 2.5 MG tablet Take 1 tablet (2.5 mg) by mouth 2 times daily 60 tablet 1    budesonide-formoterol (SYMBICORT) 160-4.5 MCG/ACT Inhaler [BUDESONIDE-FORMOTEROL (SYMBICORT) 160-4.5 MCG/ACTUATION INHALER] USE 2 INHALATIONS TWICE A DAY 10.2 g 11    bumetanide (BUMEX) 1 MG tablet Take 2 tablets (2 mg) by mouth daily 180 tablet 3    chlorhexidine (PERIDEX) 0.12 % solution Swish and spit 15 mLs in mouth 2 times daily 473 mL 11    finasteride (PROSCAR) 5 MG tablet Take 1 tablet (5 mg) by mouth At Bedtime 90 tablet 3    fluticasone (FLONASE) 50 MCG/ACT nasal spray Spray 1 spray into both nostrils 2 times daily 9.9 mL 1    moxifloxacin (VIGAMOX) 0.5 % ophthalmic solution Place 1 drop into both eyes 2 times daily as needed (for one week at a time as needed.) 3 mL 11     Roosevelt Xiao MD  Internal Medicine  [facility]

## 2024-01-05 ENCOUNTER — TELEPHONE (OUTPATIENT)
Dept: INTERNAL MEDICINE | Facility: CLINIC | Age: 89
End: 2024-01-05

## 2024-01-05 NOTE — TELEPHONE ENCOUNTER
FYI - Status Update    Who is Calling: [facility]    Update: Pt decided to discharge from hospice as of 01/04/24 and pt is doing quiet well and has morphine at home.    Does caller want a call/response back: No

## 2024-01-12 ENCOUNTER — MYC MEDICAL ADVICE (OUTPATIENT)
Dept: INTERNAL MEDICINE | Facility: CLINIC | Age: 89
End: 2024-01-12
Payer: MEDICARE

## 2024-01-12 DIAGNOSIS — I50.33 ACUTE ON CHRONIC DIASTOLIC HEART FAILURE (H): ICD-10-CM

## 2024-01-12 DIAGNOSIS — I48.91 ATRIAL FIBRILLATION WITH RVR (H): ICD-10-CM

## 2024-01-12 RX ORDER — BUMETANIDE 1 MG/1
2 TABLET ORAL DAILY
Qty: 180 TABLET | Refills: 3 | Status: SHIPPED | OUTPATIENT
Start: 2024-01-12

## 2024-04-02 ENCOUNTER — OFFICE VISIT (OUTPATIENT)
Dept: INTERNAL MEDICINE | Facility: CLINIC | Age: 89
End: 2024-04-02
Payer: MEDICARE

## 2024-04-02 VITALS
TEMPERATURE: 97.4 F | SYSTOLIC BLOOD PRESSURE: 112 MMHG | RESPIRATION RATE: 16 BRPM | WEIGHT: 238 LBS | HEIGHT: 73 IN | BODY MASS INDEX: 31.54 KG/M2 | DIASTOLIC BLOOD PRESSURE: 76 MMHG | HEART RATE: 60 BPM | OXYGEN SATURATION: 96 %

## 2024-04-02 DIAGNOSIS — M17.0 PRIMARY OSTEOARTHRITIS OF BOTH KNEES: ICD-10-CM

## 2024-04-02 DIAGNOSIS — I10 ESSENTIAL HYPERTENSION: ICD-10-CM

## 2024-04-02 DIAGNOSIS — I50.32 CHRONIC DIASTOLIC HEART FAILURE (H): ICD-10-CM

## 2024-04-02 DIAGNOSIS — I87.2 VENOUS (PERIPHERAL) INSUFFICIENCY: ICD-10-CM

## 2024-04-02 DIAGNOSIS — J43.1 PANLOBULAR EMPHYSEMA (H): Primary | ICD-10-CM

## 2024-04-02 DIAGNOSIS — K74.69 OTHER CIRRHOSIS OF LIVER (H): ICD-10-CM

## 2024-04-02 DIAGNOSIS — N18.31 CHRONIC KIDNEY DISEASE, STAGE 3A (H): ICD-10-CM

## 2024-04-02 DIAGNOSIS — E78.5 HYPERLIPIDEMIA LDL GOAL <100: ICD-10-CM

## 2024-04-02 DIAGNOSIS — I48.19 PERSISTENT ATRIAL FIBRILLATION (H): ICD-10-CM

## 2024-04-02 LAB
ERYTHROCYTE [DISTWIDTH] IN BLOOD BY AUTOMATED COUNT: 13.5 % (ref 10–15)
HCT VFR BLD AUTO: 49.2 % (ref 40–53)
HGB BLD-MCNC: 15.5 G/DL (ref 13.3–17.7)
MCH RBC QN AUTO: 29.9 PG (ref 26.5–33)
MCHC RBC AUTO-ENTMCNC: 31.5 G/DL (ref 31.5–36.5)
MCV RBC AUTO: 95 FL (ref 78–100)
PLATELET # BLD AUTO: 166 10E3/UL (ref 150–450)
RBC # BLD AUTO: 5.18 10E6/UL (ref 4.4–5.9)
WBC # BLD AUTO: 9.2 10E3/UL (ref 4–11)

## 2024-04-02 PROCEDURE — 36415 COLL VENOUS BLD VENIPUNCTURE: CPT | Performed by: INTERNAL MEDICINE

## 2024-04-02 PROCEDURE — 99214 OFFICE O/P EST MOD 30 MIN: CPT | Performed by: INTERNAL MEDICINE

## 2024-04-02 PROCEDURE — 85027 COMPLETE CBC AUTOMATED: CPT | Performed by: INTERNAL MEDICINE

## 2024-04-02 PROCEDURE — 80048 BASIC METABOLIC PNL TOTAL CA: CPT | Performed by: INTERNAL MEDICINE

## 2024-04-02 RX ORDER — RESPIRATORY SYNCYTIAL VIRUS VACCINE 120MCG/0.5
0.5 KIT INTRAMUSCULAR ONCE
Qty: 1 EACH | Refills: 0 | Status: CANCELLED | OUTPATIENT
Start: 2024-04-02 | End: 2024-04-02

## 2024-04-02 NOTE — PROGRESS NOTES
ASSESSMENT AND PLAN:    1. Panlobular emphysema   Stable without exacerbation    2. Hyperlipidemia LDL goal <100  Ongoing statin therapy.     3. Chronic kidney disease, stage 3a (H)  Need to follow.  Last creatinine 1.33    4. Other cirrhosis of liver   Clinically no worsening ascites. Eating well.     5. Chronic diastolic heart failure  No indication of exacerbation with current diuretic therapy.     6. Essential hypertension  Satisfactory control     7. Persistent atrial fibrillation   Ongoing apixiban anticoagulation. HR remains controlled.     8. Venous (peripheral) insufficiency  2+ bilateral edema, stable, with mild stasis inflammation, no cellulitis.     9. Primary osteoarthritis of both knees  Chronic with symptoms.  Has WC, motorized, walks with walker in the home.     10. Immunization  Will get TDAP and shingrix, consider RSV    Follow up in 4 months.     CHIEF COMPLAINT:  Follow up heart disease    HISTORY OF PRESENT ILLNESS:  Nishant Gilliam is a 89 year old male here in follow up. Actually doing OK. Sleep is fitful, as aways, up the second half of the night, and naps during the day.  Mentally clear, eats well, no bowel or bladder symptoms.  LE edema is unchanged.  No unusual dyspnea or cough.  No confusion    REVIEW OF SYSTEMS:   See HPI, all other systems on review are negative.    Past Medical History:   Diagnosis Date    Advanced care planning/counseling discussion 05/30/2023    Bullous pemphigoid (H28)     Chronic anticoagulation     Chronic atrial fibrillation (H)     Chronic diastolic heart failure (H)     Chronic renal failure syndrome, stage 3 (moderate) (H)     Chronic right heart failure (H)     COPD (chronic obstructive pulmonary disease) (H)     Coronary artery disease     Essential hypertension     Hyperlipidemia LDL goal <100 05/30/2023    Other cirrhosis of liver (H) 05/30/2023    Primary osteoarthritis of both knees     Primary osteoarthritis of both knees 05/30/2023    Right bundle  "branch block     complete    Venous (peripheral) insufficiency      History   Smoking Status    Former    Years: 30.00    Types: Cigarettes   Smokeless Tobacco    Never     No family history on file.  Past Surgical History:   Procedure Laterality Date    HC KNEE SCOPE, DIAGNOSTIC      Description: Arthroscopy Knee Left;  Recorded: 06/27/2009;  Comments: details unk.    HERNIA REPAIR      [facility]HC CORONARY STENT PERCUT, INITIAL VESSEL      Description: Cath Stent Placement;  Recorded: 02/24/2014;  Comments: stenting to the LAD in 1999. 2nd done years later.     Allergies   Allergen Reactions    Furosemide Rash     pemphigoid    Cat Hair Std Allergenic Ext [Cat Hair Extract] Unknown    Sulfa Antibiotics Unknown     VITALS:  Vitals:    04/02/24 1422   BP: 112/76   BP Location: Left arm   Patient Position: Sitting   Cuff Size: Adult Regular   Pulse: 60   Resp: 16   Temp: 97.4  F (36.3  C)   TempSrc: Oral   SpO2: 96%   Weight: 108 kg (238 lb)   Height: 1.842 m (6' 0.5\")     Estimated body mass index is 31.83 kg/m  as calculated from the following:    Height as of this encounter: 1.842 m (6' 0.5\").    Weight as of this encounter: 108 kg (238 lb).  Wt Readings from Last 3 Encounters:   04/02/24 108 kg (238 lb)   12/12/23 108 kg (238 lb)   09/05/23 104.8 kg (231 lb)     PHYSICAL EXAM:  Constitutional:  In NAD, alert and oriented  Neck: no cervical or axillary adenopathy  Cardiac:  S1 S2, with distant systolic murmur   Lungs: Clear   Abdomen:   Soft, flat and non-tender   Extremities:bilateral 2+ edema with stasis dermatitis, no cellulitis or ulcer      Psychiatric:  Mood and behavior are appropriate, thinking is clear.     DECISION TO OBTAIN OLD RECORDS AND/OR OBTAIN HISTORY FROM SOMEONE OTHER THAN PATIENT, AND/OR ACCESSING CARE EVERYWHERE):  1  0     REVIEW AND SUMMARIZATION OF OLD RECORDS, AND/OR OBTAINING HISTORY FROM SOMEONE OTHER THAN PATIENT, AND/OR DISCUSSION OF CASE WITH ANOTHER HEALTH CARE PROVIDER:  2 reviewed past " health notes    REVIEW AND/OR ORDER OF OF CLINICAL LAB TESTS: 1  ordered.    REVIEW AND/OR ORDER OF RADIOLOGY TESTS: 1 0.    REVIEW AND/OR ORDER OF MEDICAL TESTS (EKG/ECHO/COLONOSCOPY/EGD): 1  0    INDEPENDENT  VISUALIZATION OF IMAGE, TRACING, OR SPECIMEN ITSELF (2 EACH):  0     TOTAL: 3    Current Outpatient Medications   Medication Sig Dispense Refill    apixaban ANTICOAGULANT (ELIQUIS) 2.5 MG tablet Take 1 tablet (2.5 mg) by mouth 2 times daily 60 tablet 1    bumetanide (BUMEX) 1 MG tablet Take 2 tablets (2 mg) by mouth daily 180 tablet 3    chlorhexidine (PERIDEX) 0.12 % solution Swish and spit 15 mLs in mouth 2 times daily 473 mL 11    finasteride (PROSCAR) 5 MG tablet Take 1 tablet (5 mg) by mouth At Bedtime 90 tablet 3    fluticasone (FLONASE) 50 MCG/ACT nasal spray Spray 1 spray into both nostrils 2 times daily 9.9 mL 1    pravastatin (PRAVACHOL) 20 MG tablet Take 1 tablet (20 mg) by mouth daily 90 tablet 3    budesonide-formoterol (SYMBICORT) 160-4.5 MCG/ACT Inhaler [BUDESONIDE-FORMOTEROL (SYMBICORT) 160-4.5 MCG/ACTUATION INHALER] USE 2 INHALATIONS TWICE A DAY 10.2 g 11     Roosevelt Xiao MD  Internal Medicine  [facility]

## 2024-04-03 LAB
ANION GAP SERPL CALCULATED.3IONS-SCNC: 10 MMOL/L (ref 7–15)
BUN SERPL-MCNC: 32.5 MG/DL (ref 8–23)
CALCIUM SERPL-MCNC: 9.3 MG/DL (ref 8.8–10.2)
CHLORIDE SERPL-SCNC: 101 MMOL/L (ref 98–107)
CREAT SERPL-MCNC: 1.69 MG/DL (ref 0.67–1.17)
DEPRECATED HCO3 PLAS-SCNC: 31 MMOL/L (ref 22–29)
EGFRCR SERPLBLD CKD-EPI 2021: 39 ML/MIN/1.73M2
GLUCOSE SERPL-MCNC: 108 MG/DL (ref 70–99)
POTASSIUM SERPL-SCNC: 4.2 MMOL/L (ref 3.4–5.3)
SODIUM SERPL-SCNC: 142 MMOL/L (ref 135–145)

## 2024-04-05 ENCOUNTER — MYC REFILL (OUTPATIENT)
Dept: INTERNAL MEDICINE | Facility: CLINIC | Age: 89
End: 2024-04-05
Payer: MEDICARE

## 2024-04-05 DIAGNOSIS — I48.91 ATRIAL FIBRILLATION WITH RVR (H): ICD-10-CM

## 2024-05-25 NOTE — PLAN OF CARE
Problem: Heart Failure  Goal: Optimal Coping  Outcome: Progressing  Goal: Optimal Cardiac Output  Outcome: Progressing  Goal: Stable Heart Rate and Rhythm  Outcome: Progressing  Goal: Optimal Functional Ability  Outcome: Progressing  Intervention: Optimize Functional Ability  Recent Flowsheet Documentation  Taken 2/25/2023 0400 by Gisel Giron RN  Activity Management: activity adjusted per tolerance  Taken 2/25/2023 0045 by Gisel Giron RN  Activity Management: activity adjusted per tolerance  Goal: Fluid and Electrolyte Balance  Outcome: Progressing  Goal: Improved Oral Intake  Outcome: Progressing  Goal: Effective Oxygenation and Ventilation  Outcome: Progressing  Intervention: Promote Airway Secretion Clearance  Recent Flowsheet Documentation  Taken 2/25/2023 0400 by Gisel Giron RN  Cough And Deep Breathing: done independently per patient  Activity Management: activity adjusted per tolerance  Taken 2/25/2023 0045 by Gisel Giron RN  Cough And Deep Breathing: done independently per patient  Activity Management: activity adjusted per tolerance  Intervention: Optimize Oxygenation and Ventilation  Recent Flowsheet Documentation  Taken 2/25/2023 0400 by Gisel Giron RN  Head of Bed (HOB) Positioning: HOB at 20-30 degrees  Taken 2/25/2023 0045 by Gisel Giron RN  Head of Bed (HOB) Positioning: HOB at 20-30 degrees  Goal: Effective Breathing Pattern During Sleep  Outcome: Progressing  Intervention: Monitor and Manage Obstructive Sleep Apnea  Recent Flowsheet Documentation  Taken 2/25/2023 0400 by Gisel Giron RN  Medication Review/Management: medications reviewed  Taken 2/25/2023 0045 by Gisel Giron RN  Medication Review/Management: medications reviewed   Goal Outcome Evaluation:       Pt is alert and oriented x 4, hard of hearing. Lung sounds diminished, on O2 at 1.5 LPM per nasal cannula, has SOB with activity. He was c/o pain  in the left foot but was able to sleep later when checked after an hour. BP was 80/51 at 2300, NA did not informed the writer, when checked at 0328 H, BP came up to 96/56. HR in the 70's to 80's, Afib with BBB, with inverted TW. Pt verbalized feeling better. He got up I the recliner around 0500 H. Voiding well, PVR was 135 ml. Lymphedema wrap in place.                  98.3

## 2024-06-07 NOTE — PATIENT INSTRUCTIONS
Called to pt schedule  No answer  LVM and call back number    Sent portal message    ND   Moxifloxin eye drops three times daily for blepharitis until improved.    Cleanse lids twice daily    Try Atrovent nasal spray four times daily as needed for nasal congestion    Flu shot and new Covid vaccine this fall.    Continue other medications at current doses.    See in three to six months or as needed.

## 2024-06-17 ENCOUNTER — MYC REFILL (OUTPATIENT)
Dept: INTERNAL MEDICINE | Facility: CLINIC | Age: 89
End: 2024-06-17
Payer: MEDICARE

## 2024-06-17 DIAGNOSIS — R35.0 FREQUENCY OF URINATION: ICD-10-CM

## 2024-06-18 RX ORDER — FINASTERIDE 5 MG/1
5 TABLET, FILM COATED ORAL AT BEDTIME
Qty: 90 TABLET | Refills: 2 | Status: SHIPPED | OUTPATIENT
Start: 2024-06-18

## 2024-07-03 ENCOUNTER — MYC REFILL (OUTPATIENT)
Dept: INTERNAL MEDICINE | Facility: CLINIC | Age: 89
End: 2024-07-03
Payer: MEDICARE

## 2024-07-03 DIAGNOSIS — I48.91 ATRIAL FIBRILLATION WITH RVR (H): ICD-10-CM

## 2024-08-06 ENCOUNTER — OFFICE VISIT (OUTPATIENT)
Dept: INTERNAL MEDICINE | Facility: CLINIC | Age: 89
End: 2024-08-06
Payer: MEDICARE

## 2024-08-06 VITALS
BODY MASS INDEX: 31.54 KG/M2 | HEART RATE: 56 BPM | DIASTOLIC BLOOD PRESSURE: 74 MMHG | HEIGHT: 73 IN | WEIGHT: 238 LBS | SYSTOLIC BLOOD PRESSURE: 122 MMHG | TEMPERATURE: 97.3 F | RESPIRATION RATE: 16 BRPM | OXYGEN SATURATION: 97 %

## 2024-08-06 DIAGNOSIS — K02.9 DENTAL CARIES: ICD-10-CM

## 2024-08-06 DIAGNOSIS — I50.32 CHRONIC DIASTOLIC HEART FAILURE (H): ICD-10-CM

## 2024-08-06 DIAGNOSIS — I87.2 VENOUS (PERIPHERAL) INSUFFICIENCY: ICD-10-CM

## 2024-08-06 DIAGNOSIS — E03.9 ACQUIRED HYPOTHYROIDISM: ICD-10-CM

## 2024-08-06 DIAGNOSIS — I10 ESSENTIAL HYPERTENSION: ICD-10-CM

## 2024-08-06 DIAGNOSIS — I48.19 PERSISTENT ATRIAL FIBRILLATION (H): ICD-10-CM

## 2024-08-06 DIAGNOSIS — K74.69 OTHER CIRRHOSIS OF LIVER (H): ICD-10-CM

## 2024-08-06 DIAGNOSIS — E78.5 HYPERLIPIDEMIA LDL GOAL <100: ICD-10-CM

## 2024-08-06 DIAGNOSIS — N18.31 CHRONIC KIDNEY DISEASE, STAGE 3A (H): ICD-10-CM

## 2024-08-06 DIAGNOSIS — I50.812 CHRONIC RIGHT HEART FAILURE (H): ICD-10-CM

## 2024-08-06 DIAGNOSIS — J43.1 PANLOBULAR EMPHYSEMA (H): Primary | ICD-10-CM

## 2024-08-06 LAB
ALBUMIN SERPL BCG-MCNC: 4.2 G/DL (ref 3.5–5.2)
ALP SERPL-CCNC: 132 U/L (ref 40–150)
ALT SERPL W P-5'-P-CCNC: 21 U/L (ref 0–70)
ANION GAP SERPL CALCULATED.3IONS-SCNC: 7 MMOL/L (ref 7–15)
AST SERPL W P-5'-P-CCNC: 26 U/L (ref 0–45)
BILIRUB SERPL-MCNC: 0.9 MG/DL
BUN SERPL-MCNC: 27.4 MG/DL (ref 8–23)
CALCIUM SERPL-MCNC: 9.6 MG/DL (ref 8.8–10.4)
CHLORIDE SERPL-SCNC: 100 MMOL/L (ref 98–107)
CREAT SERPL-MCNC: 1.34 MG/DL (ref 0.67–1.17)
EGFRCR SERPLBLD CKD-EPI 2021: 51 ML/MIN/1.73M2
ERYTHROCYTE [DISTWIDTH] IN BLOOD BY AUTOMATED COUNT: 13.2 % (ref 10–15)
GLUCOSE SERPL-MCNC: 97 MG/DL (ref 70–99)
HCO3 SERPL-SCNC: 33 MMOL/L (ref 22–29)
HCT VFR BLD AUTO: 51 % (ref 40–53)
HGB BLD-MCNC: 16.2 G/DL (ref 13.3–17.7)
LDLC SERPL DIRECT ASSAY-MCNC: 76 MG/DL
MCH RBC QN AUTO: 30 PG (ref 26.5–33)
MCHC RBC AUTO-ENTMCNC: 31.8 G/DL (ref 31.5–36.5)
MCV RBC AUTO: 94 FL (ref 78–100)
PLATELET # BLD AUTO: 181 10E3/UL (ref 150–450)
POTASSIUM SERPL-SCNC: 4.5 MMOL/L (ref 3.4–5.3)
PROT SERPL-MCNC: 7.6 G/DL (ref 6.4–8.3)
RBC # BLD AUTO: 5.4 10E6/UL (ref 4.4–5.9)
SODIUM SERPL-SCNC: 140 MMOL/L (ref 135–145)
TSH SERPL DL<=0.005 MIU/L-ACNC: 0.85 UIU/ML (ref 0.3–4.2)
WBC # BLD AUTO: 9.4 10E3/UL (ref 4–11)

## 2024-08-06 PROCEDURE — G2211 COMPLEX E/M VISIT ADD ON: HCPCS | Performed by: INTERNAL MEDICINE

## 2024-08-06 PROCEDURE — 80053 COMPREHEN METABOLIC PANEL: CPT | Performed by: INTERNAL MEDICINE

## 2024-08-06 PROCEDURE — 36415 COLL VENOUS BLD VENIPUNCTURE: CPT | Performed by: INTERNAL MEDICINE

## 2024-08-06 PROCEDURE — 83721 ASSAY OF BLOOD LIPOPROTEIN: CPT | Performed by: INTERNAL MEDICINE

## 2024-08-06 PROCEDURE — 84443 ASSAY THYROID STIM HORMONE: CPT | Performed by: INTERNAL MEDICINE

## 2024-08-06 PROCEDURE — 99214 OFFICE O/P EST MOD 30 MIN: CPT | Performed by: INTERNAL MEDICINE

## 2024-08-06 PROCEDURE — 85027 COMPLETE CBC AUTOMATED: CPT | Performed by: INTERNAL MEDICINE

## 2024-08-06 NOTE — LETTER
August 7, 2024      Pérez Gilliam  [address] [place] [address]        Dear Mr.D Gilliam,    We are writing to inform you of your test results.    Your tests are all good.  The cholesterol levels are good. The blood counts and blood chemistries are good.  The minor abnormalities are not significant.     Resulted Orders   CBC with platelets   Result Value Ref Range    WBC Count 9.4 4.0 - 11.0 10e3/uL    RBC Count 5.40 4.40 - 5.90 10e6/uL    Hemoglobin 16.2 13.3 - 17.7 g/dL    Hematocrit 51.0 40.0 - 53.0 %    MCV 94 78 - 100 fL    MCH 30.0 26.5 - 33.0 pg    MCHC 31.8 31.5 - 36.5 g/dL    RDW 13.2 10.0 - 15.0 %    Platelet Count 181 150 - 450 10e3/uL   TSH with free T4 reflex   Result Value Ref Range    TSH 0.85 0.30 - 4.20 uIU/mL   Comprehensive metabolic panel   Result Value Ref Range    Sodium 140 135 - 145 mmol/L    Potassium 4.5 3.4 - 5.3 mmol/L    Carbon Dioxide (CO2) 33 (H) 22 - 29 mmol/L    Anion Gap 7 7 - 15 mmol/L    Urea Nitrogen 27.4 (H) 8.0 - 23.0 mg/dL    Creatinine 1.34 (H) 0.67 - 1.17 mg/dL    GFR Estimate 51 (L) >60 mL/min/1.73m2      Comment:      eGFR calculated using 2021 CKD-EPI equation.    Calcium 9.6 8.8 - 10.4 mg/dL      Comment:      Reference intervals for this test were updated on 7/16/2024 to reflect our healthy population more accurately. There may be differences in the flagging of prior results with similar values performed with this method. Those prior results can be interpreted in the context of the updated reference intervals.    Chloride 100 98 - 107 mmol/L    Glucose 97 70 - 99 mg/dL    Alkaline Phosphatase 132 40 - 150 U/L    AST 26 0 - 45 U/L    ALT 21 0 - 70 U/L    Protein Total 7.6 6.4 - 8.3 g/dL    Albumin 4.2 3.5 - 5.2 g/dL    Bilirubin Total 0.9 <=1.2 mg/dL   LDL cholesterol direct   Result Value Ref Range    LDL Cholesterol Direct 76 <100 mg/dL      Comment:      Age 2-19 years:  Desirable: 0-110 mg/dL   Borderline high: 110-129 mg/dL   High: >= 130 mg/dL    Age 20  years and older:  Desirable: <100mg/dL  Above desirable: 100-129 mg/dL   Borderline high: 130-159 mg/dL   High: 160-189 mg/dL   Very high: >= 190 mg/dL       If you have any questions or concerns, please call the clinic at the number listed above.       Sincerely,      Roosevelt Xiao MD

## 2024-08-06 NOTE — PROGRESS NOTES
ASSESSMENT AND PLAN:    1. Panlobular emphysema   Stable, does not use oxygen.     2. Hyperlipidemia LDL goal <100  Ongoing statin therapy.     3. Chronic kidney disease, stage 3a   Continue to follow    4. Persistent atrial fibrillation   Heart rate controlled and asymptomatic.     5. Essential hypertension  satisfactory    6. Chronic right heart failure   Stable and essentially asymptomatic.     7. Chronic diastolic heart failure   Compensated, clinically.     8. Other cirrhosis of liver (H)  Asymptomatic.      9. Venous (peripheral) insufficiency  Stable, no ulceration or pain     10. Dental caries  Chronic.  He has worried about getting systemic infection.  We discussed now that would present - fever, etc.  Not likely.  He can use short course of oral penicillin agent as needed for gum infection should it occur, he does use peridex daily.     11. DJD both knees  This is severe, he has electric wheelchair and can get out    Follow up 3 months.     CHIEF COMPLAINT:    Follow up heart failure, COPD and hepatic cirrhosis    HISTORY OF PRESENT ILLNESS:  Nishant Gilliam is a 89 year old male has been well overall.  Appetite is good, and some weight gain but no unusual cough or unusual dyspnea. Able to get outside, has an electric wheelchair now.  Voiding Ok, mild constipation at times.  Ongoing dental care and recurrent need for teeth being pulled.  Has never had systemic infection related to dental care.  He does worry about that.  No overt dysphagia, at times some coughing when eating.     REVIEW OF SYSTEMS:   See HPI, all other systems on review are negative.    Past Medical History:   Diagnosis Date    Advanced care planning/counseling discussion 05/30/2023    Bullous pemphigoid (H28)     Chronic anticoagulation     Chronic atrial fibrillation (H)     Chronic diastolic heart failure (H)     Chronic renal failure syndrome, stage 3 (moderate) (H)     Chronic right heart failure (H)     COPD (chronic obstructive  "pulmonary disease) (H)     Coronary artery disease     Essential hypertension     Hyperlipidemia LDL goal <100 05/30/2023    Other cirrhosis of liver (H) 05/30/2023    Primary osteoarthritis of both knees 05/30/2023    Right bundle branch block     complete    Venous (peripheral) insufficiency      History   Smoking Status    Former    Types: Cigarettes   Smokeless Tobacco    Never     No family history on file.  Past Surgical History:   Procedure Laterality Date    HC KNEE SCOPE, DIAGNOSTIC      Description: Arthroscopy Knee Left;  Recorded: 06/27/2009;  Comments: details unk.    HERNIA REPAIR      ZZHC CORONARY STENT PERCUT, INITIAL VESSEL      Description: Cath Stent Placement;  Recorded: 02/24/2014;  Comments: stenting to the LAD in 1999. 2nd done years later.     Allergies   Allergen Reactions    Furosemide Rash     pemphigoid    Cat Hair Std Allergenic Ext [Cat Hair Extract] Unknown    Sulfa Antibiotics Unknown     VITALS:  Vitals:    08/06/24 1420   BP: 122/74   BP Location: Left arm   Patient Position: Sitting   Cuff Size: Adult Regular   Pulse: 56   Resp: 16   Temp: 97.3  F (36.3  C)   TempSrc: Tympanic   SpO2: 97%   Weight: 108 kg (238 lb)   Height: 1.842 m (6' 0.5\")     Estimated body mass index is 31.83 kg/m  as calculated from the following:    Height as of this encounter: 1.842 m (6' 0.5\").    Weight as of this encounter: 108 kg (238 lb).  Wt Readings from Last 3 Encounters:   08/06/24 108 kg (238 lb)   04/02/24 108 kg (238 lb)   12/12/23 108 kg (238 lb)     PHYSICAL EXAM:  Constitutional:  In NAD, alert and oriented  Cardiac:  S1 S2, irregular, rate controlled.   Lungs: Clear   Abdomen:   Soft, flat and non-tender  Extremities: bilateral edema, without ulceration or inflammation    DECISION TO OBTAIN OLD RECORDS AND/OR OBTAIN HISTORY FROM SOMEONE OTHER THAN PATIENT, AND/OR ACCESSING CARE EVERYWHERE):  1  0     REVIEW AND SUMMARIZATION OF OLD RECORDS, AND/OR OBTAINING HISTORY FROM SOMEONE OTHER THAN " PATIENT, AND/OR DISCUSSION OF CASE WITH ANOTHER HEALTH CARE PROVIDER:  2 reviewed past health notes    REVIEW AND/OR ORDER OF OF CLINICAL LAB TESTS: 1  ordered.    REVIEW AND/OR ORDER OF RADIOLOGY TESTS: 1 0.    REVIEW AND/OR ORDER OF MEDICAL TESTS (EKG/ECHO/COLONOSCOPY/EGD): 1  0    INDEPENDENT  VISUALIZATION OF IMAGE, TRACING, OR SPECIMEN ITSELF (2 EACH):  0     TOTAL: 3    Current Outpatient Medications   Medication Sig Dispense Refill    apixaban ANTICOAGULANT (ELIQUIS) 2.5 MG tablet Take 1 tablet (2.5 mg) by mouth 2 times daily 60 tablet 1    bumetanide (BUMEX) 1 MG tablet Take 2 tablets (2 mg) by mouth daily 180 tablet 3    chlorhexidine (PERIDEX) 0.12 % solution Swish and spit 15 mLs in mouth 2 times daily 473 mL 11    finasteride (PROSCAR) 5 MG tablet Take 1 tablet (5 mg) by mouth at bedtime 90 tablet 2    fluticasone (FLONASE) 50 MCG/ACT nasal spray Spray 1 spray into both nostrils 2 times daily 9.9 mL 1    pravastatin (PRAVACHOL) 20 MG tablet Take 1 tablet (20 mg) by mouth daily 90 tablet 3     Roosevelt Xiao MD  Internal Medicine  [facility]

## 2024-09-16 ENCOUNTER — MYC REFILL (OUTPATIENT)
Dept: INTERNAL MEDICINE | Facility: CLINIC | Age: 89
End: 2024-09-16
Payer: MEDICARE

## 2024-09-16 DIAGNOSIS — I48.91 ATRIAL FIBRILLATION WITH RVR (H): ICD-10-CM

## 2024-09-19 ENCOUNTER — DOCUMENTATION ONLY (OUTPATIENT)
Dept: ANTICOAGULATION | Facility: CLINIC | Age: 89
End: 2024-09-19
Payer: MEDICARE

## 2024-09-19 NOTE — PROGRESS NOTES
ANTICOAGULATION DIRECT ORAL ANTICOAGULANT MONITORING    SUBJECTIVE     The [facility] is evaluating Nishant Gilliam's Apixaban (Eliquis) as part of its Anticoagulation Monitoring Program.    Indication:Atrial Fibrillation  Current dose per medication list: Apixaban 2.5 mg BID  Recent hospitalizations/ED/Office Visits for bleeding/clotting concerns: No  Other bleeding or side effect concerns: Yes: bleeding noted on 3/1/2023 citing scant purpura, and also, notation stating eliquis was on hold for bleeding and thrombocytopenia  Additional findings: None    OBJECTIVE     Age: 89 year old    Wt Readings from Last 2 Encounters:   08/06/24 108 kg (238 lb)   04/02/24 108 kg (238 lb)      Lab Results   Component Value Date    CR 1.34 (H) 08/06/2024    CR 1.69 (H) 04/02/2024    CR 1.33 (H) 09/05/2023     Creatinine Clearance (using actual bodyweight, mL/min):     Lab Results   Component Value Date    HGB 16.2 08/06/2024     08/06/2024     Wt Readings from Last 2 Encounters:   08/06/24 108 kg (238 lb)   04/02/24 108 kg (238 lb)      Lab Results   Component Value Date    CR 1.34 (H) 08/06/2024    CR 1.69 (H) 04/02/2024    CR 1.33 (H) 09/05/2023     Creatinine Clearance (using actual bodyweight, mL/min):     Lab Results   Component Value Date    HGB 16.2 08/06/2024     08/06/2024     ASSESSMENT/PLAN     A chart review for Direct Oral Anticoagulant (DOAC) Stewardship has been completed for:     Dosing: dose appropriate for age. Continue to monitor CR as it has been intermittently > 1.5 (5 months ago and 1 year ago).    Plan made per [facility] anticoagulation protocol    Enedina Kowalski RN  Anticoagulation Clinic

## 2024-12-07 ENCOUNTER — HEALTH MAINTENANCE LETTER (OUTPATIENT)
Age: 89
End: 2024-12-07

## 2024-12-16 DIAGNOSIS — E78.5 HYPERLIPIDEMIA LDL GOAL <100: ICD-10-CM

## 2024-12-16 DIAGNOSIS — K05.10 GINGIVITIS: ICD-10-CM

## 2024-12-17 ENCOUNTER — OFFICE VISIT (OUTPATIENT)
Dept: INTERNAL MEDICINE | Facility: CLINIC | Age: 89
End: 2024-12-17
Payer: MEDICARE

## 2024-12-17 VITALS
HEART RATE: 57 BPM | BODY MASS INDEX: 33.5 KG/M2 | DIASTOLIC BLOOD PRESSURE: 60 MMHG | RESPIRATION RATE: 17 BRPM | SYSTOLIC BLOOD PRESSURE: 110 MMHG | WEIGHT: 252.8 LBS | HEIGHT: 73 IN | TEMPERATURE: 98 F | OXYGEN SATURATION: 97 %

## 2024-12-17 DIAGNOSIS — R73.03 PREDIABETES: ICD-10-CM

## 2024-12-17 DIAGNOSIS — N18.31 CHRONIC KIDNEY DISEASE, STAGE 3A (H): ICD-10-CM

## 2024-12-17 DIAGNOSIS — I48.91 ATRIAL FIBRILLATION WITH RVR (H): ICD-10-CM

## 2024-12-17 DIAGNOSIS — E78.5 HYPERLIPIDEMIA LDL GOAL <100: Primary | ICD-10-CM

## 2024-12-17 DIAGNOSIS — I50.33 ACUTE ON CHRONIC DIASTOLIC HEART FAILURE (H): ICD-10-CM

## 2024-12-17 DIAGNOSIS — R35.0 FREQUENCY OF URINATION: ICD-10-CM

## 2024-12-17 DIAGNOSIS — J43.1 PANLOBULAR EMPHYSEMA (H): ICD-10-CM

## 2024-12-17 LAB
ERYTHROCYTE [DISTWIDTH] IN BLOOD BY AUTOMATED COUNT: 13.1 % (ref 10–15)
EST. AVERAGE GLUCOSE BLD GHB EST-MCNC: 117 MG/DL
HBA1C MFR BLD: 5.7 % (ref 0–5.6)
HCT VFR BLD AUTO: 51.1 % (ref 40–53)
HGB BLD-MCNC: 16.2 G/DL (ref 13.3–17.7)
MCH RBC QN AUTO: 30.1 PG (ref 26.5–33)
MCHC RBC AUTO-ENTMCNC: 31.7 G/DL (ref 31.5–36.5)
MCV RBC AUTO: 95 FL (ref 78–100)
PLATELET # BLD AUTO: 187 10E3/UL (ref 150–450)
RBC # BLD AUTO: 5.38 10E6/UL (ref 4.4–5.9)
WBC # BLD AUTO: 9.8 10E3/UL (ref 4–11)

## 2024-12-17 PROCEDURE — 83036 HEMOGLOBIN GLYCOSYLATED A1C: CPT | Performed by: INTERNAL MEDICINE

## 2024-12-17 PROCEDURE — 80053 COMPREHEN METABOLIC PANEL: CPT | Performed by: INTERNAL MEDICINE

## 2024-12-17 PROCEDURE — 36415 COLL VENOUS BLD VENIPUNCTURE: CPT | Performed by: INTERNAL MEDICINE

## 2024-12-17 PROCEDURE — 99214 OFFICE O/P EST MOD 30 MIN: CPT | Mod: 25 | Performed by: INTERNAL MEDICINE

## 2024-12-17 PROCEDURE — 85027 COMPLETE CBC AUTOMATED: CPT | Performed by: INTERNAL MEDICINE

## 2024-12-17 PROCEDURE — G0439 PPPS, SUBSEQ VISIT: HCPCS | Performed by: INTERNAL MEDICINE

## 2024-12-17 RX ORDER — FINASTERIDE 5 MG/1
5 TABLET, FILM COATED ORAL AT BEDTIME
Qty: 90 TABLET | Refills: 3 | Status: SHIPPED | OUTPATIENT
Start: 2024-12-17

## 2024-12-17 RX ORDER — PRAVASTATIN SODIUM 20 MG
20 TABLET ORAL DAILY
Qty: 90 TABLET | Refills: 1 | Status: SHIPPED | OUTPATIENT
Start: 2024-12-17

## 2024-12-17 RX ORDER — BUMETANIDE 1 MG/1
2 TABLET ORAL DAILY
Qty: 180 TABLET | Refills: 3 | Status: SHIPPED | OUTPATIENT
Start: 2024-12-17

## 2024-12-17 RX ORDER — CHLORHEXIDINE GLUCONATE ORAL RINSE 1.2 MG/ML
15 SOLUTION DENTAL 2 TIMES DAILY
Qty: 473 ML | Refills: 11 | Status: SHIPPED | OUTPATIENT
Start: 2024-12-17

## 2024-12-17 SDOH — HEALTH STABILITY: PHYSICAL HEALTH: ON AVERAGE, HOW MANY MINUTES DO YOU ENGAGE IN EXERCISE AT THIS LEVEL?: 0 MIN

## 2024-12-17 SDOH — HEALTH STABILITY: PHYSICAL HEALTH: ON AVERAGE, HOW MANY DAYS PER WEEK DO YOU ENGAGE IN MODERATE TO STRENUOUS EXERCISE (LIKE A BRISK WALK)?: 0 DAYS

## 2024-12-17 ASSESSMENT — SOCIAL DETERMINANTS OF HEALTH (SDOH): HOW OFTEN DO YOU GET TOGETHER WITH FRIENDS OR RELATIVES?: NEVER

## 2024-12-17 NOTE — LETTER
December 18, 2024      Pérez Gilliam  [address] [place] [address]        Dear Mr.D Gilliam,    We are writing to inform you of your test results.    Your tests are good.  The blood counts, and blood chemistries are good. The minor abnormalities are not significant.      Resulted Orders   Hemoglobin A1c   Result Value Ref Range    Estimated Average Glucose 117 (H) <117 mg/dL    Hemoglobin A1C 5.7 (H) 0.0 - 5.6 %      Comment:      Normal <5.7%   Prediabetes 5.7-6.4%    Diabetes 6.5% or higher     Note: Adopted from ADA consensus guidelines.   CBC with platelets   Result Value Ref Range    WBC Count 9.8 4.0 - 11.0 10e3/uL    RBC Count 5.38 4.40 - 5.90 10e6/uL    Hemoglobin 16.2 13.3 - 17.7 g/dL    Hematocrit 51.1 40.0 - 53.0 %    MCV 95 78 - 100 fL    MCH 30.1 26.5 - 33.0 pg    MCHC 31.7 31.5 - 36.5 g/dL    RDW 13.1 10.0 - 15.0 %    Platelet Count 187 150 - 450 10e3/uL   Comprehensive metabolic panel   Result Value Ref Range    Sodium 141 135 - 145 mmol/L    Potassium 4.3 3.4 - 5.3 mmol/L    Carbon Dioxide (CO2) 30 (H) 22 - 29 mmol/L    Anion Gap 11 7 - 15 mmol/L    Urea Nitrogen 27.8 (H) 8.0 - 23.0 mg/dL    Creatinine 1.39 (H) 0.67 - 1.17 mg/dL    GFR Estimate 49 (L) >60 mL/min/1.73m2      Comment:      eGFR calculated using 2021 CKD-EPI equation.    Calcium 9.3 8.8 - 10.4 mg/dL      Comment:      Reference intervals for this test were updated on 7/16/2024 to reflect our healthy population more accurately. There may be differences in the flagging of prior results with similar values performed with this method. Those prior results can be interpreted in the context of the updated reference intervals.    Chloride 100 98 - 107 mmol/L    Glucose 95 70 - 99 mg/dL    Alkaline Phosphatase 151 (H) 40 - 150 U/L    AST 28 0 - 45 U/L    ALT 18 0 - 70 U/L    Protein Total 7.4 6.4 - 8.3 g/dL    Albumin 4.1 3.5 - 5.2 g/dL    Bilirubin Total 0.8 <=1.2 mg/dL       If you have any questions or concerns, please call the  clinic at the number listed above.       Sincerely,      Roosevelt Xiao MD

## 2024-12-17 NOTE — PROGRESS NOTES
Preventive Care Visit  [facility]  Roosevelt Xiao MD, Internal Medicine  Dec 17, 2024    Assessment & Plan     Hyperlipidemia LDL goal <100  Ongoing statin therapy.   - CBC with platelets  - Comprehensive metabolic panel    Chronic kidney disease, stage 3a   Voiding has been OK, using bumetanide with control of LE swelling.     Panlobular emphysema   Not on oxygen. Comfortable at rest.    Frequency of urination  Refilled.   - finasteride (PROSCAR) 5 MG tablet  Dispense: 90 tablet; Refill: 3    Atrial fibrillation   Pulse is regular today.  Suspect he has converted to NSR.    Chronic diastolic heart failure with chronic right heart failure  Clinically compensated, no apparent significant progression.      History of Cirrhosis  Nodular liver, thought related to chronic right heart failure, seems improved.  Will get CMP. Was actually on hospice for awhile, after 2/2023 hospitalization.     R/O Prediabetes  Given his craving of sweets,and weight gain,  need to check A1c.    - Hemoglobin A1c    Chronic venous insufficiency  Stable, no stasis inflammation, mild stasis dryness. No ulcerations.     Bullous Pemphigoid  Some minor 'flare' in upper chest, mild itching, using topical steroid with benefit. Not on cellcept any longer.     Weight Gain  He reports craving sweets, and eating significant candy, and treats. Not having blurred vision, or teeth issues. We discussed diet methods, and consideration of GLP - 1 agonist therapy.  He wants to try diet.     Poor dentition  He has not had acute infection episode since last visit.     Daytime somnolence  With falling asleep episodes during the day. Seems to have developed coincident with his weight gain.  Possibly developing SHANKAR.  He wants to try weight loss, and will follow.     Overall, he has improved, with current treatments and chronic anticoagulation, since hospitalization 2/23 at which time hospice was initially recommended.  He has recently enjoyed  "two trips to [place] last fall in a car, driven by his wife and that went well (they skipped his bumetanide when driving - some mild edema did increase).      BMI  Estimated body mass index is 33.81 kg/m  as calculated from the following:    Height as of this encounter: 1.842 m (6' 0.5\").    Weight as of this encounter: 114.7 kg (252 lb 12.8 oz).     Counseling  Appropriate preventive services were addressed with this patient.     Follow up 6 months and sooner prn.     Subjective   Pérez is a 89 year old, presenting for the following:  Heart Failure (4 months follow up & refills needed ) and Wellness Visit (General physical check )        12/17/2024     3:49 PM   Additional Questions   Roomed by Anisha   Accompanied by Daniela - spouse         12/17/2024     3:49 PM   Patient Reported Additional Medications   Patient reports taking the following new medications none     HPI  He has been doing Ok overall, able to travel last fall, in long car rides. Has had weight gain,and has noted craving sweets.  No blurred vision.  His LE edema has been stable.  No GI complaints, or voiding issues. Still sleeps poorly at night, and naps during the day. Some daytime sleepiness is occurring at times when on the computer.  No unusual cough or dyspnea, has not had any dental issues.     Health Care Directive  Patient does not have a Health Care Directive: Advance Directive received and scanned. Click on Code in the patient header to view.      12/17/2024   General Health   How would you rate your overall physical health? Good   Feel stress (tense, anxious, or unable to sleep) Not at all          12/17/2024   Nutrition   Diet: Regular (no restrictions)          12/17/2024   Exercise   Days per week of moderate/strenous exercise 0 days   Average minutes spent exercising at this level 0 min      (!) EXERCISE CONCERN      12/17/2024   Social Factors   Frequency of gathering with friends or relatives Never   Worry food won't last until " get money to buy more No   Food not last or not have enough money for food? No   Do you have housing? (Housing is defined as stable permanent housing and does not include staying ouside in a car, in a tent, in an abandoned building, in an overnight shelter, or couch-surfing.) Yes   Are you worried about losing your housing? No   Lack of transportation? No   Unable to get utilities (heat,electricity)? No      (!) SOCIAL CONNECTIONS CONCERN      12/17/2024   Fall Risk   Fallen 2 or more times in the past year? No    Trouble with walking or balance? Yes           12/17/2024   Activities of Daily Living- Home Safety   Needs help with the following daily activites Transportation   Safety concerns in the home None of the above          12/17/2024   Dental   Dentist two times every year? (!) NO          12/17/2024   Hearing Screening   Hearing concerns? (!) I FEEL THAT PEOPLE ARE MUMBLING OR NOT SPEAKING CLEARLY.    (!) I NEED TO ASK PEOPLE TO SPEAK UP OR REPEAT THEMSELVES.          12/17/2024   Driving Risk Screening   Patient/family members have concerns about driving No          12/17/2024   General Alertness/Fatigue Screening   Have you been more tired than usual lately? (!) YES          12/17/2024   Urinary Incontinence Screening   Bothered by leaking urine in past 6 months No          12/17/2024   TB Screening   Were you born outside of the [place]? No         12/17/2024   Substance Use   Alcohol more than 3/day or more than 7/wk No   Do you have a current opioid prescription? No   How severe/bad is pain from 1 to 10? 0/10 (No Pain)   Do you use any other substances recreationally? No      Social History     Tobacco Use    Smoking status: Former     Types: Cigarettes    Smokeless tobacco: Never   Vaping Use    Vaping status: Never Used   Substance Use Topics    Alcohol use: No    Drug use: No     Reviewed and updated as needed this visit by Provider    Past Medical History:   Diagnosis Date    Advanced care  planning/counseling discussion 05/30/2023    Bullous pemphigoid (H)     Chronic anticoagulation     Chronic atrial fibrillation (H)     Chronic diastolic heart failure (H)     Chronic renal failure syndrome, stage 3 (moderate) (H)     Chronic right heart failure (H)     COPD (chronic obstructive pulmonary disease) (H)     Coronary artery disease     Essential hypertension     Hyperlipidemia LDL goal <100 05/30/2023    Other cirrhosis of liver (H) 05/30/2023    Primary osteoarthritis of both knees 05/30/2023    Right bundle branch block     complete    Venous (peripheral) insufficiency      Past Surgical History:   Procedure Laterality Date    HC KNEE SCOPE, DIAGNOSTIC      Description: Arthroscopy Knee Left;  Recorded: 06/27/2009;  Comments: details unk.    HERNIA REPAIR      ZZHC CORONARY STENT PERCUT, INITIAL VESSEL      Description: Cath Stent Placement;  Recorded: 02/24/2014;  Comments: stenting to the LAD in 1999. 2nd done years later.     Current providers sharing in care for this patient include:  Patient Care Team:  Roosevelt Xiao MD as PCP - General    The following health maintenance items are reviewed in Epic and correct as of today:  Health Maintenance   Topic Date Due    HF ACTION PLAN  Never done    SPIROMETRY  Never done    COPD ACTION PLAN  Never done    HEPATITIS A IMMUNIZATION (1 of 2 - Risk 2-dose series) Never done    HEPATITIS B IMMUNIZATION (1 of 3 - Risk 3-dose series) Never done    RSV VACCINE (1 - 1-dose 75+ series) Never done    ZOSTER IMMUNIZATION (2 of 3) 04/21/2014    MEDICARE ANNUAL WELLNESS VISIT  06/08/2016    ANNUAL REVIEW OF HM ORDERS  12/14/2022    LIPID  02/25/2023    MICROALBUMIN  02/25/2023    BMP  02/06/2025    ALT  08/06/2025    CBC  08/06/2025    HEMOGLOBIN  08/06/2025    FALL RISK ASSESSMENT  12/17/2025    ADVANCE CARE PLANNING  05/30/2028    DTAP/TDAP/TD IMMUNIZATION (3 - Td or Tdap) 07/25/2034    TSH W/FREE T4 REFLEX  Completed    PHQ-2 (once per calendar year)   "Completed    INFLUENZA VACCINE  Completed    Pneumococcal Vaccine: 65+ Years  Completed    URINALYSIS  Completed    COVID-19 Vaccine  Completed    HPV IMMUNIZATION  Aged Out    MENINGITIS IMMUNIZATION  Aged Out    RSV MONOCLONAL ANTIBODY  Aged Out     Review of Systems  See HPI    Objective      PHYSICAL EXAM:  General Appearance: In no acute distress  Vitals:    12/17/24 1549   BP: 110/60   BP Location: Left arm   Patient Position: Sitting   Cuff Size: Adult Regular   Pulse: 57   Resp: 17   Temp: 98  F (36.7  C)   TempSrc: Tympanic   SpO2: 97%   Weight: 114.7 kg (252 lb 12.8 oz)   Height: 1.842 m (6' 0.5\")     Estimated body mass index is 33.81 kg/m  as calculated from the following:    Height as of this encounter: 1.842 m (6' 0.5\").    Weight as of this encounter: 114.7 kg (252 lb 12.8 oz).  EYES: Clear  NECK:  without cervical or axillary adenopathy  RESPIRATORY: Clear   CARDIOVASCULAR: S1, S2  ABDOMEN: soft, flat, and non-tender  EXTREMITIES:  bilateral 2+ edema, minor stasis dryness, no erythema or inflammation or tenderness, good capillary filling.   NEUROLOGIC: Speech is clear,  he is wheelchair bound  PSYCHIATRIC: Oriented X 3,  thinking is clear         12/17/2024   Mini Cog   Clock Draw Score 2 Normal   3 Item Recall 3 objects recalled   Mini Cog Total Score 5      Cognitive function is intact today.     Signed Electronically by: Roosevelt Xiao MD  "

## 2024-12-18 LAB
ALBUMIN SERPL BCG-MCNC: 4.1 G/DL (ref 3.5–5.2)
ALP SERPL-CCNC: 151 U/L (ref 40–150)
ALT SERPL W P-5'-P-CCNC: 18 U/L (ref 0–70)
ANION GAP SERPL CALCULATED.3IONS-SCNC: 11 MMOL/L (ref 7–15)
AST SERPL W P-5'-P-CCNC: 28 U/L (ref 0–45)
BILIRUB SERPL-MCNC: 0.8 MG/DL
BUN SERPL-MCNC: 27.8 MG/DL (ref 8–23)
CALCIUM SERPL-MCNC: 9.3 MG/DL (ref 8.8–10.4)
CHLORIDE SERPL-SCNC: 100 MMOL/L (ref 98–107)
CREAT SERPL-MCNC: 1.39 MG/DL (ref 0.67–1.17)
EGFRCR SERPLBLD CKD-EPI 2021: 49 ML/MIN/1.73M2
GLUCOSE SERPL-MCNC: 95 MG/DL (ref 70–99)
HCO3 SERPL-SCNC: 30 MMOL/L (ref 22–29)
POTASSIUM SERPL-SCNC: 4.3 MMOL/L (ref 3.4–5.3)
PROT SERPL-MCNC: 7.4 G/DL (ref 6.4–8.3)
SODIUM SERPL-SCNC: 141 MMOL/L (ref 135–145)

## 2025-02-04 ENCOUNTER — MYC REFILL (OUTPATIENT)
Dept: INTERNAL MEDICINE | Facility: CLINIC | Age: 89
End: 2025-02-04
Payer: MEDICARE

## 2025-02-04 DIAGNOSIS — I50.33 ACUTE ON CHRONIC DIASTOLIC HEART FAILURE (H): ICD-10-CM

## 2025-02-04 DIAGNOSIS — I48.91 ATRIAL FIBRILLATION WITH RVR (H): ICD-10-CM

## 2025-02-04 RX ORDER — BUMETANIDE 1 MG/1
2 TABLET ORAL DAILY
Qty: 180 TABLET | Refills: 3 | OUTPATIENT
Start: 2025-02-04

## 2025-02-05 ENCOUNTER — DOCUMENTATION ONLY (OUTPATIENT)
Dept: ANTICOAGULATION | Facility: CLINIC | Age: 89
End: 2025-02-05
Payer: MEDICARE

## 2025-02-05 DIAGNOSIS — I48.19 PERSISTENT ATRIAL FIBRILLATION (H): Primary | ICD-10-CM

## 2025-02-05 NOTE — PROGRESS NOTES
2.5 mg eliquis prescription discontinued, new prescription for 5 mg tablets e-faxed.  See DOAC encounter dated 09/19/24 for complete DOAC documentation.    Enedina Kowalski RN  Anticoagulation Clinic

## 2025-02-06 ENCOUNTER — MYC MEDICAL ADVICE (OUTPATIENT)
Dept: INTERNAL MEDICINE | Facility: CLINIC | Age: 89
End: 2025-02-06
Payer: MEDICARE

## 2025-02-06 NOTE — TELEPHONE ENCOUNTER
12/17/24 Prescription sent by Dr. Xiao:       Contacted [facility] pharmacy and spoke with Jonna. Jonna confirms they have the prescription on file for Bumex that was sent on 12/17/24. Jonna states they will get a refill ready for patient. Patient last picked up Bumex 10/31/24.     MyChart will be sent to patient to update him.

## 2025-03-14 NOTE — TELEPHONE ENCOUNTER
Message left for patient to contact sleep center to schedule sleep testing and follow up.   Wheelchair/Stroller

## 2025-04-05 ENCOUNTER — HEALTH MAINTENANCE LETTER (OUTPATIENT)
Age: 89
End: 2025-04-05

## 2025-06-23 DIAGNOSIS — E78.5 HYPERLIPIDEMIA LDL GOAL <100: ICD-10-CM

## 2025-06-23 RX ORDER — PRAVASTATIN SODIUM 20 MG
20 TABLET ORAL DAILY
Qty: 90 TABLET | Refills: 0 | Status: SHIPPED | OUTPATIENT
Start: 2025-06-23

## 2025-06-26 ENCOUNTER — OFFICE VISIT (OUTPATIENT)
Dept: INTERNAL MEDICINE | Facility: CLINIC | Age: 89
End: 2025-06-26
Payer: MEDICARE

## 2025-06-26 ENCOUNTER — RESULTS FOLLOW-UP (OUTPATIENT)
Dept: INTERNAL MEDICINE | Facility: CLINIC | Age: 89
End: 2025-06-26

## 2025-06-26 VITALS
SYSTOLIC BLOOD PRESSURE: 112 MMHG | WEIGHT: 252 LBS | OXYGEN SATURATION: 96 % | RESPIRATION RATE: 15 BRPM | HEIGHT: 72 IN | TEMPERATURE: 97.6 F | DIASTOLIC BLOOD PRESSURE: 60 MMHG | BODY MASS INDEX: 34.13 KG/M2 | HEART RATE: 84 BPM

## 2025-06-26 DIAGNOSIS — Z79.01 CHRONIC ANTICOAGULATION: ICD-10-CM

## 2025-06-26 DIAGNOSIS — N18.31 CHRONIC KIDNEY DISEASE, STAGE 3A (H): ICD-10-CM

## 2025-06-26 DIAGNOSIS — I48.19 PERSISTENT ATRIAL FIBRILLATION (H): ICD-10-CM

## 2025-06-26 DIAGNOSIS — J43.1 PANLOBULAR EMPHYSEMA (H): ICD-10-CM

## 2025-06-26 DIAGNOSIS — Z23 NEED FOR VACCINATION: Primary | ICD-10-CM

## 2025-06-26 DIAGNOSIS — I50.812 CHRONIC RIGHT HEART FAILURE (H): ICD-10-CM

## 2025-06-26 DIAGNOSIS — I50.32 CHRONIC DIASTOLIC HEART FAILURE (H): ICD-10-CM

## 2025-06-26 DIAGNOSIS — Z13.1 SCREENING FOR DIABETES MELLITUS: ICD-10-CM

## 2025-06-26 DIAGNOSIS — B37.2 YEAST DERMATITIS: ICD-10-CM

## 2025-06-26 DIAGNOSIS — I10 ESSENTIAL HYPERTENSION: ICD-10-CM

## 2025-06-26 DIAGNOSIS — E78.5 HYPERLIPIDEMIA LDL GOAL <100: ICD-10-CM

## 2025-06-26 LAB
ALBUMIN SERPL BCG-MCNC: 3.9 G/DL (ref 3.5–5.2)
ALP SERPL-CCNC: 122 U/L (ref 40–150)
ALT SERPL W P-5'-P-CCNC: 19 U/L (ref 0–70)
ANION GAP SERPL CALCULATED.3IONS-SCNC: 10 MMOL/L (ref 7–15)
AST SERPL W P-5'-P-CCNC: 26 U/L (ref 0–45)
BILIRUB SERPL-MCNC: 0.7 MG/DL
BUN SERPL-MCNC: 28.5 MG/DL (ref 8–23)
CALCIUM SERPL-MCNC: 9.5 MG/DL (ref 8.8–10.4)
CHLORIDE SERPL-SCNC: 98 MMOL/L (ref 98–107)
CHOLEST SERPL-MCNC: 133 MG/DL
CREAT SERPL-MCNC: 1.39 MG/DL (ref 0.67–1.17)
EGFRCR SERPLBLD CKD-EPI 2021: 49 ML/MIN/1.73M2
ERYTHROCYTE [DISTWIDTH] IN BLOOD BY AUTOMATED COUNT: 13.5 % (ref 10–15)
EST. AVERAGE GLUCOSE BLD GHB EST-MCNC: 117 MG/DL
FASTING STATUS PATIENT QL REPORTED: YES
FASTING STATUS PATIENT QL REPORTED: YES
GLUCOSE SERPL-MCNC: 100 MG/DL (ref 70–99)
HBA1C MFR BLD: 5.7 % (ref 0–5.6)
HCO3 SERPL-SCNC: 31 MMOL/L (ref 22–29)
HCT VFR BLD AUTO: 50.8 % (ref 40–53)
HDLC SERPL-MCNC: 46 MG/DL
HGB BLD-MCNC: 16.5 G/DL (ref 13.3–17.7)
LDLC SERPL CALC-MCNC: 68 MG/DL
MCH RBC QN AUTO: 30.5 PG (ref 26.5–33)
MCHC RBC AUTO-ENTMCNC: 32.5 G/DL (ref 31.5–36.5)
MCV RBC AUTO: 94 FL (ref 78–100)
NONHDLC SERPL-MCNC: 87 MG/DL
PLATELET # BLD AUTO: 144 10E3/UL (ref 150–450)
POTASSIUM SERPL-SCNC: 4.3 MMOL/L (ref 3.4–5.3)
PROT SERPL-MCNC: 7.2 G/DL (ref 6.4–8.3)
RBC # BLD AUTO: 5.41 10E6/UL (ref 4.4–5.9)
SODIUM SERPL-SCNC: 139 MMOL/L (ref 135–145)
TRIGL SERPL-MCNC: 97 MG/DL
WBC # BLD AUTO: 9.4 10E3/UL (ref 4–11)

## 2025-06-26 RX ORDER — DOXYCYCLINE HYCLATE 100 MG
100 TABLET ORAL 2 TIMES DAILY
Qty: 20 TABLET | Refills: 2 | Status: SHIPPED | OUTPATIENT
Start: 2025-06-26

## 2025-06-26 RX ORDER — FLUCONAZOLE 200 MG/1
200 TABLET ORAL WEEKLY
Qty: 6 TABLET | Refills: 3 | Status: SHIPPED | OUTPATIENT
Start: 2025-06-26

## 2025-06-26 RX ORDER — PRENATAL VIT 91/IRON/FOLIC/DHA 28-975-200
COMBINATION PACKAGE (EA) ORAL 2 TIMES DAILY
Qty: 42 G | Refills: 3 | Status: SHIPPED | OUTPATIENT
Start: 2025-06-26

## 2025-06-26 NOTE — PROGRESS NOTES
ASSESSMENT AND PLAN:    Hyperlipidemia LDL goal <100  Ongoing pravastatin therapy.     2. Immunization  He will get the second zoster immunization.     3. Chronic kidney disease, stage 3a   Last creatinine 1.39.  Will repeat.     4. Panlobular emphysema   No need for oxygen, seems stable, Has daily clear sputum. He will have on hand a one week precription for doxycycine 100 gm po bid for 7 days if acute on chronic bronchitis occurs when travelling.     5. Persistent atrial fibrillation   HR controlled.     6. Chronic anticoagulation  Ongoing apixiban treatment.     7. Chronic diastolic heart failure   Clinically compensated.     8. Chronic right heart failure   Clinically compensated.     9. Essential hypertension  Satisfactory control with just using bumetanide. Bp is satisfactory.     10. Esophageal dysmotility  Chronic, and evaluated.  Has a small zenker's.  He is very careful with chewing, and cutting his food into small pieces.     11. Chronic knee osteoarthritis  Stable symptoms.  He can walk with walker.  Uses WC for distance    12. Chronic venous insufficiency  Stable no ulcerations or inflammation.     Follow up 6 months  The longitudinal plan of care for the diagnosis(es)/condition(s) as documented were addressed during this visit. Due to the added complexity in care, I will continue to support Pérez in the subsequent management and with ongoing continuity of care.    CHIEF COMPLAINT:  1 lump under skin near left ear - notes some tenderness at times ), Yeast Diaper Dermatitis (Ongoing yeast infection x 1 year - currently using otc antifungal topically ), and Hyperlipidemia (6 months follow up - fasting for labs     HISTORY OF PRESENT ILLNESS:  Nishant Gilliam is a 89 year old male has been well. Has been able to travel by car without issues.  His breathing is stable.  Chronic clear sputum occurs daily.  He has not been ill or had yellow sputum or fever.  His bowels are OK, sleeps fitfully which is normal  for him. Appetite is good, swallowing is OK. Overall he has been stable.     REVIEW OF SYSTEMS:   See HPI, all other systems on review are negative.    Past Medical History:   Diagnosis Date    Advanced care planning/counseling discussion 05/30/2023    Bullous pemphigoid (H)     Chronic anticoagulation     Chronic atrial fibrillation (H)     Chronic diastolic heart failure (H)     Chronic renal failure syndrome, stage 3 (moderate) (H)     Chronic right heart failure (H)     COPD (chronic obstructive pulmonary disease) (H)     Coronary artery disease     Essential hypertension     Hyperlipidemia LDL goal <100 05/30/2023    Other cirrhosis of liver (H) 05/30/2023    Primary osteoarthritis of both knees 05/30/2023    Right bundle branch block     complete    Venous (peripheral) insufficiency      History   Smoking Status    Former    Types: Cigarettes   Smokeless Tobacco    Never     No family history on file.  Past Surgical History:   Procedure Laterality Date    HC KNEE SCOPE, DIAGNOSTIC      Description: Arthroscopy Knee Left;  Recorded: 06/27/2009;  Comments: details unk.    HERNIA REPAIR      ZZHC CORONARY STENT PERCUT, INITIAL VESSEL      Description: Cath Stent Placement;  Recorded: 02/24/2014;  Comments: stenting to the LAD in 1999. 2nd done years later.     Allergies   Allergen Reactions    Furosemide Rash     pemphigoid    Cat Hair Std Allergenic Ext [Cat Dander] Unknown    Sulfa Antibiotics Unknown     VITALS:  Vitals:    06/26/25 0712   BP: 112/60   BP Location: Left arm   Patient Position: Sitting   Cuff Size: Adult Regular   Pulse: 84   Resp: 15   Temp: 97.6  F (36.4  C)   TempSrc: Tympanic   SpO2: 96%   Weight: 114.3 kg (252 lb)   Height: 1.829 m (6')     Estimated body mass index is 34.18 kg/m  as calculated from the following:    Height as of this encounter: 1.829 m (6').    Weight as of this encounter: 114.3 kg (252 lb).  Wt Readings from Last 3 Encounters:   06/26/25 114.3 kg (252 lb)   12/17/24  114.7 kg (252 lb 12.8 oz)   08/06/24 108 kg (238 lb)     PHYSICAL EXAM:  Constitutional:  In NAD, alert and oriented  Neck: no cervical or axillary adenopathy  Cardiac:  S1 S2, mostly regular controlled rhythm.    Lungs: Clear   Extremities: prominent dystrophic changes of both knees, 2+ bilateral edema, no inflammation noted    DECISION TO OBTAIN OLD RECORDS AND/OR OBTAIN HISTORY FROM SOMEONE OTHER THAN PATIENT, AND/OR ACCESSING CARE EVERYWHERE):  1  0     REVIEW AND SUMMARIZATION OF OLD RECORDS, AND/OR OBTAINING HISTORY FROM SOMEONE OTHER THAN PATIENT, AND/OR DISCUSSION OF CASE WITH ANOTHER HEALTH CARE PROVIDER:  2 reviewed past health notes    REVIEW AND/OR ORDER OF OF CLINICAL LAB TESTS: 1  ordered.    REVIEW AND/OR ORDER OF RADIOLOGY TESTS: 1 0.    REVIEW AND/OR ORDER OF MEDICAL TESTS (EKG/ECHO/COLONOSCOPY/EGD): 1  0    INDEPENDENT  VISUALIZATION OF IMAGE, TRACING, OR SPECIMEN ITSELF (2 EACH):  0     TOTAL: 3    Current Outpatient Medications   Medication Sig Dispense Refill    apixaban ANTICOAGULANT (ELIQUIS) 5 MG tablet Take 1 tablet (5 mg) by mouth 2 times daily. 180 tablet 1    bumetanide (BUMEX) 1 MG tablet Take 2 tablets (2 mg) by mouth daily. 180 tablet 3    chlorhexidine (PERIDEX) 0.12 % solution Swish and spit 15 mLs in mouth 2 times daily. 473 mL 11    finasteride (PROSCAR) 5 MG tablet Take 1 tablet (5 mg) by mouth at bedtime. 90 tablet 3    fluticasone (FLONASE) 50 MCG/ACT nasal spray Spray 1 spray into both nostrils 2 times daily 9.9 mL 1    pravastatin (PRAVACHOL) 20 MG tablet Take 1 tablet (20 mg) by mouth daily. 90 tablet 0     Roosevelt Xiao MD  Internal Medicine  [facility]

## 2025-08-25 ENCOUNTER — MYC REFILL (OUTPATIENT)
Dept: INTERNAL MEDICINE | Facility: CLINIC | Age: 89
End: 2025-08-25
Payer: MEDICARE

## 2025-08-25 DIAGNOSIS — I48.19 PERSISTENT ATRIAL FIBRILLATION (H): ICD-10-CM
